# Patient Record
Sex: MALE | Race: WHITE | NOT HISPANIC OR LATINO | Employment: FULL TIME | URBAN - METROPOLITAN AREA
[De-identification: names, ages, dates, MRNs, and addresses within clinical notes are randomized per-mention and may not be internally consistent; named-entity substitution may affect disease eponyms.]

---

## 2017-04-14 ENCOUNTER — HOSPITAL ENCOUNTER (EMERGENCY)
Facility: CLINIC | Age: 47
Discharge: HOME | End: 2017-04-14
Attending: NURSE PRACTITIONER | Admitting: NURSE PRACTITIONER
Payer: OTHER MISCELLANEOUS

## 2017-04-14 VITALS
TEMPERATURE: 98.1 F | HEART RATE: 71 BPM | SYSTOLIC BLOOD PRESSURE: 146 MMHG | RESPIRATION RATE: 18 BRPM | OXYGEN SATURATION: 95 % | DIASTOLIC BLOOD PRESSURE: 106 MMHG

## 2017-04-14 DIAGNOSIS — S41.112A LACERATION OF ARM, LEFT, INITIAL ENCOUNTER: ICD-10-CM

## 2017-04-14 PROCEDURE — 99284 EMERGENCY DEPT VISIT MOD MDM: CPT | Mod: 25 | Performed by: NURSE PRACTITIONER

## 2017-04-14 PROCEDURE — 90715 TDAP VACCINE 7 YRS/> IM: CPT | Performed by: NURSE PRACTITIONER

## 2017-04-14 PROCEDURE — 99283 EMERGENCY DEPT VISIT LOW MDM: CPT

## 2017-04-14 PROCEDURE — 90471 IMMUNIZATION ADMIN: CPT

## 2017-04-14 PROCEDURE — 12002 RPR S/N/AX/GEN/TRNK2.6-7.5CM: CPT | Performed by: NURSE PRACTITIONER

## 2017-04-14 PROCEDURE — 25000125 ZZHC RX 250: Performed by: NURSE PRACTITIONER

## 2017-04-14 PROCEDURE — 12002 RPR S/N/AX/GEN/TRNK2.6-7.5CM: CPT

## 2017-04-14 PROCEDURE — 99284 EMERGENCY DEPT VISIT MOD MDM: CPT | Mod: 25

## 2017-04-14 RX ADMIN — CLOSTRIDIUM TETANI TOXOID ANTIGEN (FORMALDEHYDE INACTIVATED), CORYNEBACTERIUM DIPHTHERIAE TOXOID ANTIGEN (FORMALDEHYDE INACTIVATED), BORDETELLA PERTUSSIS TOXOID ANTIGEN (GLUTARALDEHYDE INACTIVATED), BORDETELLA PERTUSSIS FILAMENTOUS HEMAGGLUTININ ANTIGEN (FORMALDEHYDE INACTIVATED), BORDETELLA PERTUSSIS PERTACTIN ANTIGEN, AND BORDETELLA PERTUSSIS FIMBRIAE 2/3 ANTIGEN 0.5 ML: 5; 2; 2.5; 5; 3; 5 INJECTION, SUSPENSION INTRAMUSCULAR at 10:47

## 2017-04-14 NOTE — ED AVS SNAPSHOT
[facility] Emergency Department    [address] [address]    Phone:  [phone]    Fax:  [phone]                                       Matthew Still   MRN: [id number]    Department:  [facility] Emergency Department   Date of Visit:  4/14/2017           After Visit Summary Signature Page     I have received my discharge instructions, and my questions have been answered. I have discussed any challenges I see with this plan with the nurse or doctor.    ..........................................................................................................................................  Patient/Patient Representative Signature      ..........................................................................................................................................  Patient Representative Print Name and Relationship to Patient    ..................................................               ................................................  Date                                            Time    ..........................................................................................................................................  Reviewed by Signature/Title    ...................................................              ..............................................  Date                                                            Time

## 2017-04-14 NOTE — ED AVS SNAPSHOT
[facility] Emergency Department    [address] [address]    Phone:  [phone]    Fax:  [phone]                                       Matthew Still   MRN: [id number]    Department:  [facility] Emergency Department   Date of Visit:  4/14/2017           Patient Information     Date Of Birth          1970        Your diagnoses for this visit were:     Laceration of arm, left, initial encounter        You were seen by Suha Boles APRN CNP.      Follow-up Information     Follow up In 10 days.    Why:  For suture removal        Discharge Instructions           *LACERATION (All: sutures, staples, tape, glue)  A laceration is a cut through the skin. This will usually require stitches (sutures) or staples if it is deep. Minor cuts may be treated with a tape closure ( Steri-Strips ) or Dermabond skin glue.    HOME CARE:  1. EXTREMITY, FACE or TRUNK WOUNDS: Keep the wound clean and dry. If a bandage was applied and it becomes wet or dirty, replace it. Otherwise, leave it in place for the first 24 hours.    If stitches or staples were used, clean the wound daily. Protect the wound from sunlight and tanning lamps.    After removing the bandage, wash the area with soap and water. Use a wet cotton swab (Q tip) to loosen and remove any blood or crust that forms.    After cleaning, apply a thin layer of Polysporin or Bacitracin ointment. This will keep the wound clean and make it easier to remove the stitches or staples. Reapply a fresh bandage.    You may remove the bandage to shower as usual after the first 24 hours, but do not soak the area in water (no swimming) until the stitches or staples are removed.    If Steri-Strips were used, keep the area clean and dry. If it becomes wet, blot it dry with a towel. It is okay to take a brief shower, but avoid scrubbing the area.    If Dermabond skin adhesive was used, do not scratch, rub or pick at the adhesive film. Do not place  tape directly over the film. Do not apply liquid, ointment or creams to the wound while the film is in place. Do not clean the wound with peroxide and do not apply ointments. Avoid activities that cause heavy sweating until the film has fallen off. Protect the wound from prolonged exposure to sunlight or tanning lamps. You may shower as usual but do not soak the wound in water (no baths or swimming). The film will fall off by itself in 5-10 days.  2. SCALP WOUNDS: During the first two days, you may carefully rinse your hair in the shower to remove blood, glass or dirt particles. After two days, you may shower and shampoo your hair normally. Do not soak your scalp in the tub or go swimming until the stitches or staples have been removed.  3. MOUTH WOUNDS: Eat soft foods to reduce pain. If the cut is inside of your mouth, clean by rinsing after each meal and at bedtime with a mixture of equal parts water and Hydrogen Peroxide (do not swallow!). Or, you can use a cotton swab to directly apply Hydrogen Peroxide onto the cut.  4. You may use acetaminophen (Tylenol) 650-1000 mg every 6 hours or ibuprofen (Motrin, Advil) 600 mg every 6-8 hours with food to control pain, if you are able to take these medicines. [NOTE: If you have chronic liver or kidney disease or ever had a stomach ulcer or GI bleeding, talk with your doctor before using these medicines.]  Use sunscreen on the area for 6 months after the wound heals to keep the scar from getting darker.   FOLLOW UP: Most skin wounds heal within ten days. Mouth and facial wounds heal within five days. However, even with proper treatment, a wound infection may sometimes occur. Therefore, you should check the wound daily for signs of infection listed below.  Stitches should be removed from the face within five days; stitches and staples should be removed from other parts of the body within 7-10 days. Unless you are told to come back to the emergency room, you may have your  doctor or urgent care remove the stitches. If dissolving stitches were used in the mouth, these will fall out or dissolve without the need for removal. If tape closures ( Steri-Strips ) were used, remove them yourself if they have not fallen off after 7 days. If Dermabond skin glue was used, the film will fall off by itself in 5-10 days.   GET PROMPT MEDICAL ATTENTION if any of the following occur:    Increasing pain in the wound    Redness, swelling or pus coming from the wound    Fever over 101 F (38.3 C) oral    If stitches or staples come apart or fall out or if Steri-Strips fall off before seven days    If the wound edges re-open    Bleeding not controlled by direct pressure    0574-4722 [employer], [address] [place] [address], [place] [address]. All rights reserved. This information is not intended as a substitute for professional medical care. Always follow your healthcare professional's instructions.      24 Hour Appointment Hotline       To make an appointment at any [facility] clinic, call [phone] ([phone]). If you don't have a family doctor or clinic, we will help you find one. [facility] clinics are conveniently located to serve the needs of you and your family.             Review of your medicines      Notice     You have not been prescribed any medications.            Procedures and tests performed during your visit     Blood pressure      Orders Needing Specimen Collection     None      Pending Results     No orders found from 4/12/2017 to 4/15/2017.            Pending Culture Results     No orders found from 4/12/2017 to 4/15/2017.            Test Results From Your Hospital Stay               Thank you for choosing [facility]       Thank you for choosing [facility] for your care. Our goal is always to provide you with excellent care. Hearing back from our patients is one way we can continue to improve our services. Please take a few minutes to complete the written survey that you may  "receive in the mail after you visit with us. Thank you!        Amanda Huff DBA [facility] Information     [employer] lets you send messages to your doctor, view your test results, renew your prescriptions, schedule appointments and more. To sign up, go to [email or web address] . Click on \"Log in\" on the left side of the screen, which will take you to the Welcome page. Then click on \"Sign up Now\" on the right side of the page.     You will be asked to enter the access code listed below, as well as some personal information. Please follow the directions to create your username and password.     Your access code is: [email or web address]  Expires: 2017 10:52 AM     Your access code will  in 90 days. If you need help or a new code, please call your [place] clinic or [phone].        Care EveryWhere ID     This is your Care EveryWhere ID. This could be used by other organizations to access your [facility] medical records  [id number]        After Visit Summary       This is your record. Keep this with you and show to your community pharmacist(s) and doctor(s) at your next visit.                  "

## 2017-04-14 NOTE — DISCHARGE INSTRUCTIONS
*LACERATION (All: sutures, staples, tape, glue)  A laceration is a cut through the skin. This will usually require stitches (sutures) or staples if it is deep. Minor cuts may be treated with a tape closure ( Steri-Strips ) or Dermabond skin glue.    HOME CARE:  1. EXTREMITY, FACE or TRUNK WOUNDS: Keep the wound clean and dry. If a bandage was applied and it becomes wet or dirty, replace it. Otherwise, leave it in place for the first 24 hours.    If stitches or staples were used, clean the wound daily. Protect the wound from sunlight and tanning lamps.    After removing the bandage, wash the area with soap and water. Use a wet cotton swab (Q tip) to loosen and remove any blood or crust that forms.    After cleaning, apply a thin layer of Polysporin or Bacitracin ointment. This will keep the wound clean and make it easier to remove the stitches or staples. Reapply a fresh bandage.    You may remove the bandage to shower as usual after the first 24 hours, but do not soak the area in water (no swimming) until the stitches or staples are removed.    If Steri-Strips were used, keep the area clean and dry. If it becomes wet, blot it dry with a towel. It is okay to take a brief shower, but avoid scrubbing the area.    If Dermabond skin adhesive was used, do not scratch, rub or pick at the adhesive film. Do not place tape directly over the film. Do not apply liquid, ointment or creams to the wound while the film is in place. Do not clean the wound with peroxide and do not apply ointments. Avoid activities that cause heavy sweating until the film has fallen off. Protect the wound from prolonged exposure to sunlight or tanning lamps. You may shower as usual but do not soak the wound in water (no baths or swimming). The film will fall off by itself in 5-10 days.  2. SCALP WOUNDS: During the first two days, you may carefully rinse your hair in the shower to remove blood, glass or dirt particles. After two days, you may  shower and shampoo your hair normally. Do not soak your scalp in the tub or go swimming until the stitches or staples have been removed.  3. MOUTH WOUNDS: Eat soft foods to reduce pain. If the cut is inside of your mouth, clean by rinsing after each meal and at bedtime with a mixture of equal parts water and Hydrogen Peroxide (do not swallow!). Or, you can use a cotton swab to directly apply Hydrogen Peroxide onto the cut.  4. You may use acetaminophen (Tylenol) 650-1000 mg every 6 hours or ibuprofen (Motrin, Advil) 600 mg every 6-8 hours with food to control pain, if you are able to take these medicines. [NOTE: If you have chronic liver or kidney disease or ever had a stomach ulcer or GI bleeding, talk with your doctor before using these medicines.]  Use sunscreen on the area for 6 months after the wound heals to keep the scar from getting darker.   FOLLOW UP: Most skin wounds heal within ten days. Mouth and facial wounds heal within five days. However, even with proper treatment, a wound infection may sometimes occur. Therefore, you should check the wound daily for signs of infection listed below.  Stitches should be removed from the face within five days; stitches and staples should be removed from other parts of the body within 7-10 days. Unless you are told to come back to the emergency room, you may have your doctor or urgent care remove the stitches. If dissolving stitches were used in the mouth, these will fall out or dissolve without the need for removal. If tape closures ( Steri-Strips ) were used, remove them yourself if they have not fallen off after 7 days. If Dermabond skin glue was used, the film will fall off by itself in 5-10 days.   GET PROMPT MEDICAL ATTENTION if any of the following occur:    Increasing pain in the wound    Redness, swelling or pus coming from the wound    Fever over 101 F (38.3 C) oral    If stitches or staples come apart or fall out or if Steri-Strips fall off before seven  days    If the wound edges re-open    Bleeding not controlled by direct pressure    5907-8362 [employer], [address] [place] [address], [place] [address]. All rights reserved. This information is not intended as a substitute for professional medical care. Always follow your healthcare professional's instructions.

## 2017-04-14 NOTE — ED PROVIDER NOTES
History     Chief Complaint   Patient presents with     Laceration     HPI  Matthew Still is a 46 year old male with history of A-fib (takes ASA daily) who presents to urgent care for evaluation of laceration to his left forearm.  He was at work today using a utility knife accidentally cut his left forearm while cutting dense Styrofoam.  Bleeding controlled.  He did not irrigate her clean the wound.  His tetanus is not up-to-date.I have reviewed the Medications, Allergies, Past Medical and Surgical History, and Social History in the Epic system.    Review of Systems  As mentioned above in the history present illness. All other systems were reviewed and are negative.    Physical Exam   BP: (!) 195/114  Pulse: 71  Temp: 98.1  F (36.7  C)  Resp: 18  SpO2: 95 %  Physical Exam       Appearance:  Patient appears well, vitals are normal.   SKIN:  Laceration 3 cm noted to .  Description: clean wound edges, no foreign bodies. Neurovascular and tendon structures are intact.        ED Course     ED Course     Procedures           [facility] Procedure Note        Laceration Repair:    Performed by: Suha Boles  Authorized by: Suha Boles  Consent given by: Patient who states understanding of the procedure being performed after discussing the risks, benefits and alternatives.    Preparation: Patient was prepped and draped in usual sterile fashion.  Irrigation solution: saline (250ml)    Body area:left forearm  Laceration length: 3cm  Contamination: The wound is not contaminated.  Foreign bodies:none  Tendon involvement: none  Anesthesia: Local  Local anesthetic: Lidocaine  1%, with epinephrine  Anesthetic total: 4ml  Debridement: none  Skin closure: Closed with 6 sutures x 3.0 Ethilon  Technique: interrupted  Approximation: close  Approximation difficulty: simple    Patient tolerance: Patient tolerated the procedure well with no immediate complications.        Labs Ordered and Resulted from Time of  ED Arrival Up to the Time of Departure from the ED - No data to display    Assessments & Plan (with Medical Decision Making)     I have reviewed the nursing notes.    I have reviewed the findings, diagnosis, plan and need for follow up with the patient.    There are no discharge medications for this patient.      Final diagnoses:   Laceration of arm, left, initial encounter   -wound repaired as noted above.  -recheck of elevated BP, recommended follow-up with PCP  -instructed to have sutures out in 10 days, keep wound clean, dry, and covered with dressing when working.  Ok to shower.    4/14/2017   [facility] EMERGENCY DEPARTMENT     Suha Boles APRN CNP  04/14/17 1121

## 2017-11-14 ENCOUNTER — HOSPITAL ENCOUNTER (INPATIENT)
Facility: CLINIC | Age: 47
LOS: 3 days | Discharge: HOME | DRG: 282 | End: 2017-11-17
Attending: INTERNAL MEDICINE | Admitting: INTERNAL MEDICINE
Payer: COMMERCIAL

## 2017-11-14 ENCOUNTER — HOSPITAL ENCOUNTER (EMERGENCY)
Facility: CLINIC | Age: 47
Discharge: ANOTHER HOSPITAL | End: 2017-11-14
Attending: EMERGENCY MEDICINE | Admitting: EMERGENCY MEDICINE
Payer: COMMERCIAL

## 2017-11-14 ENCOUNTER — APPOINTMENT (OUTPATIENT)
Dept: GENERAL RADIOLOGY | Facility: CLINIC | Age: 47
End: 2017-11-14
Attending: EMERGENCY MEDICINE
Payer: COMMERCIAL

## 2017-11-14 VITALS
HEART RATE: 95 BPM | OXYGEN SATURATION: 97 % | TEMPERATURE: 97.5 F | DIASTOLIC BLOOD PRESSURE: 126 MMHG | HEIGHT: 70 IN | WEIGHT: 225 LBS | SYSTOLIC BLOOD PRESSURE: 163 MMHG | RESPIRATION RATE: 10 BRPM | BODY MASS INDEX: 32.21 KG/M2

## 2017-11-14 DIAGNOSIS — E78.5 HYPERLIPIDEMIA LDL GOAL <70: Primary | ICD-10-CM

## 2017-11-14 DIAGNOSIS — I21.4 NSTEMI (NON-ST ELEVATED MYOCARDIAL INFARCTION) (H): ICD-10-CM

## 2017-11-14 DIAGNOSIS — I48.91 ATRIAL FIBRILLATION, UNSPECIFIED TYPE (H): ICD-10-CM

## 2017-11-14 DIAGNOSIS — I10 BENIGN ESSENTIAL HYPERTENSION: ICD-10-CM

## 2017-11-14 DIAGNOSIS — I48.0 PAROXYSMAL ATRIAL FIBRILLATION (H): ICD-10-CM

## 2017-11-14 LAB
ALBUMIN SERPL-MCNC: 3.8 G/DL (ref 3.4–5)
ALP SERPL-CCNC: 67 U/L (ref 40–150)
ALT SERPL W P-5'-P-CCNC: 34 U/L (ref 0–70)
ANION GAP SERPL CALCULATED.3IONS-SCNC: 10 MMOL/L (ref 3–14)
AST SERPL W P-5'-P-CCNC: 18 U/L (ref 0–45)
BASOPHILS # BLD AUTO: 0.1 10E9/L (ref 0–0.2)
BASOPHILS NFR BLD AUTO: 0.5 %
BILIRUB SERPL-MCNC: 0.2 MG/DL (ref 0.2–1.3)
BUN SERPL-MCNC: 17 MG/DL (ref 7–30)
CALCIUM SERPL-MCNC: 9.2 MG/DL (ref 8.5–10.1)
CHLORIDE SERPL-SCNC: 109 MMOL/L (ref 94–109)
CO2 SERPL-SCNC: 22 MMOL/L (ref 20–32)
CREAT SERPL-MCNC: 0.96 MG/DL (ref 0.66–1.25)
DIFFERENTIAL METHOD BLD: NORMAL
EOSINOPHIL # BLD AUTO: 0.3 10E9/L (ref 0–0.7)
EOSINOPHIL NFR BLD AUTO: 3 %
ERYTHROCYTE [DISTWIDTH] IN BLOOD BY AUTOMATED COUNT: 12.1 % (ref 10–15)
ERYTHROCYTE [DISTWIDTH] IN BLOOD BY AUTOMATED COUNT: 12.6 % (ref 10–15)
GFR SERPL CREATININE-BSD FRML MDRD: 83 ML/MIN/1.7M2
GLUCOSE SERPL-MCNC: 94 MG/DL (ref 70–99)
HCT VFR BLD AUTO: 44.6 % (ref 40–53)
HCT VFR BLD AUTO: 45.1 % (ref 40–53)
HGB BLD-MCNC: 15.5 G/DL (ref 13.3–17.7)
HGB BLD-MCNC: 15.6 G/DL (ref 13.3–17.7)
IMM GRANULOCYTES # BLD: 0 10E9/L (ref 0–0.4)
IMM GRANULOCYTES NFR BLD: 0.3 %
LYMPHOCYTES # BLD AUTO: 2.9 10E9/L (ref 0.8–5.3)
LYMPHOCYTES NFR BLD AUTO: 30.3 %
MCH RBC QN AUTO: 30.8 PG (ref 26.5–33)
MCH RBC QN AUTO: 31.4 PG (ref 26.5–33)
MCHC RBC AUTO-ENTMCNC: 34.4 G/DL (ref 31.5–36.5)
MCHC RBC AUTO-ENTMCNC: 35 G/DL (ref 31.5–36.5)
MCV RBC AUTO: 88 FL (ref 78–100)
MCV RBC AUTO: 91 FL (ref 78–100)
MONOCYTES # BLD AUTO: 0.6 10E9/L (ref 0–1.3)
MONOCYTES NFR BLD AUTO: 6.2 %
NEUTROPHILS # BLD AUTO: 5.8 10E9/L (ref 1.6–8.3)
NEUTROPHILS NFR BLD AUTO: 59.7 %
NT-PROBNP SERPL-MCNC: 1376 PG/ML (ref 0–450)
PLATELET # BLD AUTO: 231 10E9/L (ref 150–450)
PLATELET # BLD AUTO: 240 10E9/L (ref 150–450)
POTASSIUM SERPL-SCNC: 3.8 MMOL/L (ref 3.4–5.3)
PROT SERPL-MCNC: 7.4 G/DL (ref 6.8–8.8)
RBC # BLD AUTO: 4.94 10E12/L (ref 4.4–5.9)
RBC # BLD AUTO: 5.06 10E12/L (ref 4.4–5.9)
SODIUM SERPL-SCNC: 141 MMOL/L (ref 133–144)
TROPONIN I SERPL-MCNC: 0.13 UG/L (ref 0–0.04)
TROPONIN I SERPL-MCNC: 0.13 UG/L (ref 0–0.04)
WBC # BLD AUTO: 10 10E9/L (ref 4–11)
WBC # BLD AUTO: 9.7 10E9/L (ref 4–11)

## 2017-11-14 PROCEDURE — 83880 ASSAY OF NATRIURETIC PEPTIDE: CPT | Performed by: EMERGENCY MEDICINE

## 2017-11-14 PROCEDURE — 93005 ELECTROCARDIOGRAM TRACING: CPT

## 2017-11-14 PROCEDURE — 93010 ELECTROCARDIOGRAM REPORT: CPT | Mod: Z6 | Performed by: EMERGENCY MEDICINE

## 2017-11-14 PROCEDURE — 99223 1ST HOSP IP/OBS HIGH 75: CPT | Mod: AI | Performed by: INTERNAL MEDICINE

## 2017-11-14 PROCEDURE — 99285 EMERGENCY DEPT VISIT HI MDM: CPT | Mod: 25

## 2017-11-14 PROCEDURE — 21400006 ZZH R&B CCU INTERMEDIATE UMMC

## 2017-11-14 PROCEDURE — 96366 THER/PROPH/DIAG IV INF ADDON: CPT

## 2017-11-14 PROCEDURE — 99285 EMERGENCY DEPT VISIT HI MDM: CPT | Mod: 25 | Performed by: EMERGENCY MEDICINE

## 2017-11-14 PROCEDURE — 93010 ELECTROCARDIOGRAM REPORT: CPT | Mod: 59 | Performed by: INTERNAL MEDICINE

## 2017-11-14 PROCEDURE — 85520 HEPARIN ASSAY: CPT | Performed by: INTERNAL MEDICINE

## 2017-11-14 PROCEDURE — 80053 COMPREHEN METABOLIC PANEL: CPT | Performed by: EMERGENCY MEDICINE

## 2017-11-14 PROCEDURE — 85025 COMPLETE CBC W/AUTO DIFF WBC: CPT | Performed by: EMERGENCY MEDICINE

## 2017-11-14 PROCEDURE — 85027 COMPLETE CBC AUTOMATED: CPT | Performed by: INTERNAL MEDICINE

## 2017-11-14 PROCEDURE — 36415 COLL VENOUS BLD VENIPUNCTURE: CPT | Performed by: INTERNAL MEDICINE

## 2017-11-14 PROCEDURE — 84484 ASSAY OF TROPONIN QUANT: CPT | Performed by: INTERNAL MEDICINE

## 2017-11-14 PROCEDURE — 71020 XR CHEST 2 VW: CPT

## 2017-11-14 PROCEDURE — 96365 THER/PROPH/DIAG IV INF INIT: CPT

## 2017-11-14 PROCEDURE — 25000132 ZZH RX MED GY IP 250 OP 250 PS 637: Performed by: INTERNAL MEDICINE

## 2017-11-14 PROCEDURE — 84484 ASSAY OF TROPONIN QUANT: CPT | Performed by: EMERGENCY MEDICINE

## 2017-11-14 PROCEDURE — 25000128 H RX IP 250 OP 636: Performed by: EMERGENCY MEDICINE

## 2017-11-14 RX ORDER — ASPIRIN 325 MG
325 TABLET ORAL ONCE
Status: ON HOLD | COMMUNITY
End: 2017-11-15

## 2017-11-14 RX ORDER — ACETAMINOPHEN 650 MG/1
650 SUPPOSITORY RECTAL EVERY 4 HOURS PRN
Status: DISCONTINUED | OUTPATIENT
Start: 2017-11-14 | End: 2017-11-17 | Stop reason: HOSPADM

## 2017-11-14 RX ORDER — ACETAMINOPHEN 325 MG/1
650 TABLET ORAL EVERY 4 HOURS PRN
Status: DISCONTINUED | OUTPATIENT
Start: 2017-11-14 | End: 2017-11-17 | Stop reason: HOSPADM

## 2017-11-14 RX ORDER — LIDOCAINE 40 MG/G
CREAM TOPICAL
Status: DISCONTINUED | OUTPATIENT
Start: 2017-11-14 | End: 2017-11-17 | Stop reason: HOSPADM

## 2017-11-14 RX ORDER — HEPARIN SODIUM 10000 [USP'U]/100ML
0-3500 INJECTION, SOLUTION INTRAVENOUS CONTINUOUS
Status: DISCONTINUED | OUTPATIENT
Start: 2017-11-14 | End: 2017-11-17 | Stop reason: HOSPADM

## 2017-11-14 RX ORDER — ASPIRIN 81 MG/1
81 TABLET, CHEWABLE ORAL DAILY
COMMUNITY
End: 2017-12-19

## 2017-11-14 RX ORDER — ASPIRIN 81 MG/1
81 TABLET, CHEWABLE ORAL DAILY
Status: DISCONTINUED | OUTPATIENT
Start: 2017-11-15 | End: 2017-11-17 | Stop reason: HOSPADM

## 2017-11-14 RX ORDER — ALUMINA, MAGNESIA, AND SIMETHICONE 2400; 2400; 240 MG/30ML; MG/30ML; MG/30ML
30 SUSPENSION ORAL EVERY 4 HOURS PRN
Status: DISCONTINUED | OUTPATIENT
Start: 2017-11-14 | End: 2017-11-17 | Stop reason: HOSPADM

## 2017-11-14 RX ADMIN — HEPARIN SODIUM 12 UNITS/KG/HR: 10000 INJECTION, SOLUTION INTRAVENOUS at 17:58

## 2017-11-14 RX ADMIN — Medication 5000 UNITS: at 17:58

## 2017-11-14 RX ADMIN — METOPROLOL TARTRATE 12.5 MG: 25 TABLET, FILM COATED ORAL at 21:43

## 2017-11-14 ASSESSMENT — ENCOUNTER SYMPTOMS
SHORTNESS OF BREATH: 1
MUSCULOSKELETAL NEGATIVE: 1
CHEST TIGHTNESS: 1
HEMATOLOGIC/LYMPHATIC NEGATIVE: 1
PSYCHIATRIC NEGATIVE: 1
GASTROINTESTINAL NEGATIVE: 1
EYES NEGATIVE: 1
ENDOCRINE NEGATIVE: 1
CONSTITUTIONAL NEGATIVE: 1
NEUROLOGICAL NEGATIVE: 1
ALLERGIC/IMMUNOLOGIC NEGATIVE: 1
PALPITATIONS: 1

## 2017-11-14 NOTE — IP AVS SNAPSHOT
[address] [address]    Phone:  [phone]                                       After Visit Summary   11/14/2017    Matthew Still    MRN: [id number]           After Visit Summary Signature Page     I have received my discharge instructions, and my questions have been answered. I have discussed any challenges I see with this plan with the nurse or doctor.    ..........................................................................................................................................  Patient/Patient Representative Signature      ..........................................................................................................................................  Patient Representative Print Name and Relationship to Patient    ..................................................               ................................................  Date                                            Time    ..........................................................................................................................................  Reviewed by Signature/Title    ...................................................              ..............................................  Date                                                            Time

## 2017-11-14 NOTE — ED PROVIDER NOTES
"  History     Chief Complaint   Patient presents with     Irregular Heart Beat     at 1 am last night pt c/o chest tight, \"feels like skipping beats\", dizzy with activity,      HPI  Matthew Still is a 47 year old male with a history of atrial fibrillation who presents to the emergency department today with his wife for evaluation of an irregular heart beat. The patient reports that he was diagnosed with atrial fibrillation when he was 25 years old and was started on daily baby aspirin. He has been taking a baby aspirin daily, but has not ever followed with a cardiologist. He reports that a week or two ago he had a sudden onset of intense sharp left arm pain while he was laying in bed that lasted for a few minutes. He was not evaluated after this event. This morning he was awoken at 1:00 AM with palpitations that have continued until now. He felt light headed and dizzy while he was at work, but these symptoms improved with rest. He also developed a chest pressure at the same time, which has improved but is still present. He did take a baby aspirin this morning. He does not smoke.       Social History: The patient lives in [place], [place]. He presents by private car with his wife. He works building semi trucks.     Problem List:    There are no active problems to display for this patient.       Past Medical History:    No past medical history on file.    Past Surgical History:    No past surgical history on file.    Family History:    No family history on file.    Medications:      aspirin 81 MG chewable tablet   aspirin 325 MG tablet         Review of Systems   Constitutional: Negative.    HENT: Negative.    Eyes: Negative.    Respiratory: Positive for chest tightness and shortness of breath.    Cardiovascular: Positive for palpitations.   Gastrointestinal: Negative.    Endocrine: Negative.    Genitourinary: Negative.    Musculoskeletal: Negative.    Skin: Negative.    Allergic/Immunologic: Negative.    Neurological: " "Negative.    Hematological: Negative.    Psychiatric/Behavioral: Negative.        Physical Exam   BP: (!) 165/120  Pulse: 95  Heart Rate: 74  Temp: 97.5  F (36.4  C)  Resp: (!) 36  Height: 177.8 cm (5' 10\")  Weight: 102.1 kg (225 lb)  SpO2: 99 %      Physical Exam   Constitutional: He is oriented to person, place, and time. He appears well-developed and well-nourished. No distress.   HENT:   Head: Normocephalic and atraumatic.   Eyes: Conjunctivae and EOM are normal. Pupils are equal, round, and reactive to light. Right eye exhibits no discharge. Left eye exhibits no discharge. No scleral icterus.   Neck: Normal range of motion. Neck supple. No JVD present. No tracheal deviation present. No thyromegaly present.   Cardiovascular: Normal rate and regular rhythm.    Pulmonary/Chest: Effort normal and breath sounds normal. No stridor. No respiratory distress. He has no wheezes. He has no rales. He exhibits no tenderness.   Abdominal: Soft. He exhibits no distension and no mass. There is no tenderness. There is no rebound and no guarding.   Lymphadenopathy:     He has no cervical adenopathy.   Neurological: He is alert and oriented to person, place, and time.   Skin: No rash noted. He is not diaphoretic. No erythema. No pallor.   Psychiatric: He has a normal mood and affect. His behavior is normal. Judgment and thought content normal.       ED Course     ED Course     Procedures               EKG Interpretation:      Interpreted by Bairon Soriano  Time reviewed:17:10  Symptoms at time of EKG: palpitations, chest pressure and discomfort   Rhythm: atrial fibrillation - controlled  Rate: Normal  Axis: Normal  Ectopy: none  Conduction: normal  ST Segments/ T Waves: T wave inversion diffusely in anterior lateral and inferior leads  Q Waves: nonspecific  Comparison to prior: No old EKG available    Clinical Impression: rate controlled atrial fibrillation with deep T wave inversion and depression.        Critical Care " time:  none                 ED Medications:  Medications   heparin infusion 25,000 units in 0.45% NaCl 250 mL (12 Units/kg/hr × 84.6 kg (Adjusted) Intravenous New Bag 11/14/17 1758)   heparin Loading Dose bolus dose from infusion pump 5,000 Units (5,000 Units Intravenous Given 11/14/17 1758)       ED Vitals:  Vitals:    11/14/17 1815 11/14/17 1830 11/14/17 1845 11/14/17 1900   BP: (!) 170/122 (!) 184/138 (!) 173/115 (!) 174/124   Pulse:       Resp: (!) 36 13 14 13   Temp:       TempSrc:       SpO2: 98% 98% 97% 98%   Weight:       Height:           ED Labs and Imaging:  Results for orders placed or performed during the hospital encounter of 11/14/17 (from the past 24 hour(s))   CBC with platelets differential   Result Value Ref Range    WBC 9.7 4.0 - 11.0 10e9/L    RBC Count 5.06 4.4 - 5.9 10e12/L    Hemoglobin 15.6 13.3 - 17.7 g/dL    Hematocrit 44.6 40.0 - 53.0 %    MCV 88 78 - 100 fl    MCH 30.8 26.5 - 33.0 pg    MCHC 35.0 31.5 - 36.5 g/dL    RDW 12.1 10.0 - 15.0 %    Platelet Count 240 150 - 450 10e9/L    Diff Method Automated Method     % Neutrophils 59.7 %    % Lymphocytes 30.3 %    % Monocytes 6.2 %    % Eosinophils 3.0 %    % Basophils 0.5 %    % Immature Granulocytes 0.3 %    Absolute Neutrophil 5.8 1.6 - 8.3 10e9/L    Absolute Lymphocytes 2.9 0.8 - 5.3 10e9/L    Absolute Monocytes 0.6 0.0 - 1.3 10e9/L    Absolute Eosinophils 0.3 0.0 - 0.7 10e9/L    Absolute Basophils 0.1 0.0 - 0.2 10e9/L    Abs Immature Granulocytes 0.0 0 - 0.4 10e9/L   Comprehensive metabolic panel   Result Value Ref Range    Sodium 141 133 - 144 mmol/L    Potassium 3.8 3.4 - 5.3 mmol/L    Chloride 109 94 - 109 mmol/L    Carbon Dioxide 22 20 - 32 mmol/L    Anion Gap 10 3 - 14 mmol/L    Glucose 94 70 - 99 mg/dL    Urea Nitrogen 17 7 - 30 mg/dL    Creatinine 0.96 0.66 - 1.25 mg/dL    GFR Estimate 83 >60 mL/min/1.7m2    GFR Estimate If Black >90 >60 mL/min/1.7m2    Calcium 9.2 8.5 - 10.1 mg/dL    Bilirubin Total 0.2 0.2 - 1.3 mg/dL    Albumin  3.8 3.4 - 5.0 g/dL    Protein Total 7.4 6.8 - 8.8 g/dL    Alkaline Phosphatase 67 40 - 150 U/L    ALT 34 0 - 70 U/L    AST 18 0 - 45 U/L   Troponin I   Result Value Ref Range    Troponin I ES 0.132 (HH) 0.000 - 0.045 ug/L   NT pro BNP   Result Value Ref Range    N-Terminal Pro BNP Inpatient 1376 (H) 0 - 450 pg/mL   Chest XR,  PA & LAT    Narrative    XR CHEST 2 VW 11/14/2017 5:28 PM    COMPARISON: None.    HISTORY: Palpitations and chest pressure.      Impression    IMPRESSION: Cardiac silhouette and pulmonary vasculature are within  normal limits. No focal airspace disease, pleural effusion or  pneumothorax.    MARICRUZ JJ MD       5:04 PM Patient Assessed    Assessments & Plan (with Medical Decision Making)   Clinical Impression: Pleasant 47-year-old male who reports a history of atrial fibrillation diagnosed at age 25 who presented for chest pressure and palpitations with  changes noted on EKG and rate controlled atrial fibrillation.  Patient arrived by private car with his wife reporting at 1 AM (15 hours prior to exam) this morning he awoke with palpitations and chest pressure.  He has been lost a cardiology follow-up since his diagnosis of A. fib at age 25.  He takes a baby aspirin and no other medications.  He is a nonsmoker.  He reported he felt dizzy with shortness of breath and some chest discomfort while at work today.  He works making trucks.  He did not report any orthopnea PND or lower extremity swelling.  No prior history of DVT or PE.  No recent illnesses.  His arrival EKG in the ED is shows diffuse T wave inversion with rate control atrial fibrillation.  Unfortunately is no old EKG for comparison.  On arrival in the ED patient was slightly hypertensive with diastolic blood pressure greater than 120.      ED Course and Plan:   He was monitored on telemetry, EKG given acute abnormality with report of symptoms of palpitation and chest pressure was reviewed with Dr. Mckee- on-call cardiologist at  [facility] at 5:15 PM.  We agreed that we'll obtain biomarkers if they're positive transfer him for further care and evaluation if normal consider admission to [facility] with echocardiogram in the morning and trending of his biomarkers.  X-ray chest was reviewed independently.  See radiologist interpretation in detailed report above.  Normal cardiac silhouette no widened mediastinum no effusion or other acute process. Arrival troponin is 0.132, BNP-1376. Patient was started empirically on IV heparin.  Patient is transferred to the [facility] for further care and evaluation by cardiology lab results x-ray imaging and plan of care and rationale for transfer as reviewed with the patient and his wife.          I have reviewed the nursing notes.    I have reviewed the findings, diagnosis, plan and need for follow up with the patient.       New Prescriptions    No medications on file       Final diagnoses:   NSTEMI (non-ST elevated myocardial infarction) (H)   Paroxysmal atrial fibrillation (H) - reported history of atrial fibrillation diagnosed at age 25     Disclaimer: This note consists of symbols derived from keyboarding, dictation and/or voice recognition software. As a result, there may be errors in the script that have gone undetected. Please consider this when interpreting information found in this chart.    This document serves as a record of the services and decisions personally performed and made by Bairon Soriano, *. The HPI was created on his behalf by Maikel Farmer, a trained medical scribe. The creation of this document is based the provider's statements to the medical scribe.  Maikel Farmer 5:03 PM 11/14/2017    Provider:   The information in this document, created by the medical scribe for me, accurately reflects the services I personally performed and the decisions made by me. I have reviewed and approved this document for accuracy prior to leaving the patient care area.  Bairon Soriano, *  5:03 PM 11/14/2017 11/14/2017   [facility] EMERGENCY DEPARTMENT     Bairon Soriano MD  11/14/17 1930

## 2017-11-14 NOTE — ED NOTES
Pt presents to ED for palpations that started around 0100 this morning. Pt states it woke him up out of his sleep. Pt denies pain but states he gets light headed with exertion. Pt breathing even and unlabored. Pt ambulatory and moving all extremities. Pt states he only takes aspirin for his Afib.

## 2017-11-14 NOTE — IP AVS SNAPSHOT
MRN:[id number]                      After Visit Summary   11/14/2017    Matthew Still    MRN: [id number]           Thank you!     Thank you for choosing [facility] for your care. Our goal is always to provide you with excellent care. Hearing back from our patients is one way we can continue to improve our services. Please take a few minutes to complete the written survey that you may receive in the mail after you visit with us. Thank you!        Patient Information     Date Of Birth          1970        Designated Caregiver       Most Recent Value    Caregiver    Will someone help with your care after discharge? yes    Name of designated caregiver Mery-spouse    Phone number of caregiver [phone]    Caregiver address [address] [place], [place]      About your hospital stay     You were admitted on:  November 14, 2017 You last received care in the:  [address] [facility]    You were discharged on:  November 16, 2017        Reason for your hospital stay       You were having chest pain for which you underwent a cor angio.            Reason for your hospital stay       Came with chest pain, underwent invasive coronary angiography with no evidence of disease based on FFR                  Who to Call     For medical emergencies, please call 911.  For non-urgent questions about your medical care, please call your primary care provider or clinic, [phone]          Attending Provider     Provider Specialty    Asa Webber MD Cardiology    LancasterAbhishek MD Internal Medicine - Interventional Cardiology       Primary Care Provider Office Phone # Fax #    [facility] [phone]      After Care Instructions     Activity       Your activity upon discharge: activity as tolerated            Activity       Your activity upon discharge: activity as tolerated            Diet       Follow this diet upon discharge: Orders Placed This Encounter    Regular             Diet       Follow this diet upon discharge: Orders Placed This Encounter      Diet  Cardiac diet                  Follow-up Appointments     Adult [facility] Follow-up and recommended labs and tests       Follow up with primary care provider, [facility], within 7 days for hospital follow- up.      Please follow up with cardiology as described    Appointments on [place] and/or [facility] (with [facility] or [facility] provider or service). Call [phone] if you haven't heard regarding these appointments within 7 days of discharge.                  Your next 10 appointments already scheduled     Nov 22, 2017 10:00 AM CST   Office Visit [facility] Roger [facility]lett MD   [facility]    [address] [place] [address] [phone]           Bring a current list of meds and any records pertaining to this visit. For Physicals, please bring immunization records and any forms needing to be filled out. Please arrive 10 minutes early to complete paperwork.              Further instructions from your care team       Going Home after Coronary Angiogram       Name: Matthew LELAND Cheko  Medical Record Number:  [id number]  Today's Date: November 15, 2017        For 24 hours:         Have an adult stay with you for 24 hours.         Relax and take it easy.         Drink plenty of fluids.         You may eat your normal diet, unless your doctor tells you otherwise.         Do NOT make any important or legal decisions.         Do NOT drive or operate machines at home or at work.         Do NOT drink alcohol.      Do NOT smoke.     Medicines:         If you have begun Plavix (clopidogrel), Effient (prasugrel), or Brilinta (ticagrelor), do not stop taking it until you talk to your heart doctor (cardiologist).         If you are on metformin (Glucophage), do not restart it until you have blood tests (within 2 to 3 days after discharge). When your doctor tells you it is  safe, you may restart the metformin.         If you have stopped any other medicines, check with your nurse or provider about when to restart them.    Care of groin site:         Remove the Band-Aid after 24 hours. If there is minor oozing, apply another Band-aid and remove it after 12 hours.          Do NOT take a bath, or use a hot tub or pool for at least 3 days. You may shower.          It is normal to have a small bruise or lump at the site.         Do not scrub the site.         Do not use lotion or powder near the puncture site for 3 days.         For the first 2 days: Do not stoop or squat. When you cough, sneeze or move your bowels, hold your hand over the puncture site and press gently.         Do not lift more than 10 pounds for at least 3 to 5 days.         For 2 days, do NOT have sex or do any heavy exercise.     If you start bleeding from the site in your groin:  Lie down flat and press firmly on the site.  Call your physician immediately, or, come to the emergency room.      Call 911 right away if you have bleeding that is heavy or does not stop.     Call your doctor if:         You have a large or growing hard lump around the site.         The site is red, swollen, hot or tender.         Blood or fluid is draining from the site.         You have chills or a fever greater than 101 F (38 C).         Your leg or arm turns bluish, feels numb or cool.         You have hives, a rash or unusual itching.     Cardiac Rehabilitation: You should receive a phone call from the Cardiac Rehabilitation Department within the next week.  If you do not receive the call, please contact [facility] at [phone].    ADDITIONAL INSTRUCTIONS: ***    [facility] [place]:   [phone] (7 days a week)      Cardiology Fellow on call (24 hours per day) at [facility] [place]:   [phone] (ask for Cardiology Fellow on call)      Number where we can reach you:   "____________    Pending Results     Date and Time Order Name Status Description    11/15/2017 2227 EKG 12-lead, tracing only Preliminary     11/15/2017 1917 EKG 12-lead, tracing only Preliminary             Statement of Approval     Ordered          17 2330  I have reviewed and agree with all the recommendations and orders detailed in this document.  EFFECTIVE NOW     Approved and electronically signed by:  Aaron Calixto MD             Admission Information     Date & Time Provider Department Dept. Phone    2017 Abhishek Huntley MD [address] [facility] [phone]      Your Vitals Were     Blood Pressure Pulse Temperature Respirations Height Weight    129/81 80 97.7  F (36.5  C) (Oral) 20 1.803 m (5' 11\") 102.4 kg (225 lb 11.2 oz)    Pulse Oximetry BMI (Body Mass Index)                97% 31.48 kg/m2          MyChart Information     [facility] lets you send messages to your doctor, view your test results, renew your prescriptions, schedule appointments and more. To sign up, go to [email or web address] . Click on \"Log in\" on the left side of the screen, which will take you to the Welcome page. Then click on \"Sign up Now\" on the right side of the page.     You will be asked to enter the access code listed below, as well as some personal information. Please follow the directions to create your username and password.     Your access code is: [email or web address]  Expires: 2018  6:25 PM     Your access code will  in 90 days. If you need help or a new code, please call your [facility] clinic or [phone].        Care EveryWhere ID     This is your Care EveryWhere ID. This could be used by other organizations to access your [facility] medical records  [id number]        Equal Access to Services     ARCHIE MOSCOSO : Meliza Glez, geneav dietrich, qanandini kapaul arshad, kika juan. So [facility] [phone].    ATENCIÓN: Si kari fuentes villeda " disposición servicios gratuitos de asistencia lingüística. Promise olsen [phone].    We comply with applicable federal civil rights laws and [place] laws. We do not discriminate on the basis of race, color, national origin, age, disability, sex, sexual orientation, or gender identity.               Review of your medicines      START taking        Dose / Directions    atorvastatin 80 MG tablet   Commonly known as:  LIPITOR   Used for:  Hyperlipidemia LDL goal <70        Dose:  80 mg   Take 1 tablet (80 mg) by mouth daily   Quantity:  30 tablet   Refills:  11       hydrALAZINE 25 MG tablet   Commonly known as:  APRESOLINE   Used for:  Benign essential hypertension        Dose:  25 mg   Take 1 tablet (25 mg) by mouth 4 times daily   Quantity:  120 tablet   Refills:  11       metoprolol 50 MG tablet   Commonly known as:  LOPRESSOR        Dose:  50 mg   Take 1 tablet (50 mg) by mouth 2 times daily   Quantity:  60 tablet   Refills:  11         CONTINUE these medicines which may have CHANGED, or have new prescriptions. If we are uncertain of the size of tablets/capsules you have at home, strength may be listed as something that might have changed.        Dose / Directions    aspirin 81 MG chewable tablet   This may have changed:  Another medication with the same name was removed. Continue taking this medication, and follow the directions you see here.        Dose:  81 mg   Take 81 mg by mouth daily   Refills:  0            Where to get your medicines      These medications were sent to [facility] [place], [place] - [address] [address]     Phone:  [phone]     atorvastatin 80 MG tablet    hydrALAZINE 25 MG tablet    metoprolol 50 MG tablet                Protect others around you: Learn how to safely use, store and throw away your medicines at www.disposemymeds.org.             Medication List: This is a list of all your medications and when to take them.  Check marks below indicate your daily home schedule. Keep this list as a reference.      Medications           Morning Afternoon Evening Bedtime As Needed    aspirin 81 MG chewable tablet   Take 81 mg by mouth daily   Last time this was given:  81 mg on 11/16/2017  8:31 AM                                atorvastatin 80 MG tablet   Commonly known as:  LIPITOR   Take 1 tablet (80 mg) by mouth daily   Last time this was given:  80 mg on 11/16/2017  8:31 AM                                hydrALAZINE 25 MG tablet   Commonly known as:  APRESOLINE   Take 1 tablet (25 mg) by mouth 4 times daily   Last time this was given:  25 mg on 11/16/2017  8:37 PM                                metoprolol 50 MG tablet   Commonly known as:  LOPRESSOR   Take 1 tablet (50 mg) by mouth 2 times daily   Last time this was given:  50 mg on 11/16/2017  8:37 PM

## 2017-11-15 ENCOUNTER — APPOINTMENT (OUTPATIENT)
Dept: CARDIOLOGY | Facility: CLINIC | Age: 47
DRG: 282 | End: 2017-11-15
Attending: INTERNAL MEDICINE
Payer: COMMERCIAL

## 2017-11-15 LAB
ANION GAP SERPL CALCULATED.3IONS-SCNC: 10 MMOL/L (ref 3–14)
BUN SERPL-MCNC: 16 MG/DL (ref 7–30)
CALCIUM SERPL-MCNC: 8.9 MG/DL (ref 8.5–10.1)
CHLORIDE SERPL-SCNC: 109 MMOL/L (ref 94–109)
CHOLEST SERPL-MCNC: 241 MG/DL
CO2 SERPL-SCNC: 23 MMOL/L (ref 20–32)
CREAT SERPL-MCNC: 0.9 MG/DL (ref 0.66–1.25)
ERYTHROCYTE [DISTWIDTH] IN BLOOD BY AUTOMATED COUNT: 12.6 % (ref 10–15)
GFR SERPL CREATININE-BSD FRML MDRD: 90 ML/MIN/1.7M2
GLUCOSE SERPL-MCNC: 102 MG/DL (ref 70–99)
HBA1C MFR BLD: 5.3 % (ref 4.3–6)
HCT VFR BLD AUTO: 44.9 % (ref 40–53)
HDLC SERPL-MCNC: 36 MG/DL
HGB BLD-MCNC: 15.3 G/DL (ref 13.3–17.7)
INTERPRETATION ECG - MUSE: NORMAL
LDLC SERPL CALC-MCNC: 172 MG/DL
LMWH PPP CHRO-ACNC: 0.23 IU/ML
LMWH PPP CHRO-ACNC: <0.1 IU/ML
MCH RBC QN AUTO: 30.8 PG (ref 26.5–33)
MCHC RBC AUTO-ENTMCNC: 34.1 G/DL (ref 31.5–36.5)
MCV RBC AUTO: 90 FL (ref 78–100)
NONHDLC SERPL-MCNC: 205 MG/DL
PLATELET # BLD AUTO: 220 10E9/L (ref 150–450)
POTASSIUM SERPL-SCNC: 3.8 MMOL/L (ref 3.4–5.3)
RBC # BLD AUTO: 4.97 10E12/L (ref 4.4–5.9)
SODIUM SERPL-SCNC: 141 MMOL/L (ref 133–144)
TRIGL SERPL-MCNC: 166 MG/DL
TROPONIN I SERPL-MCNC: 0.04 UG/L (ref 0–0.04)
TROPONIN I SERPL-MCNC: 0.07 UG/L (ref 0–0.04)
WBC # BLD AUTO: 9.2 10E9/L (ref 4–11)

## 2017-11-15 PROCEDURE — 25000128 H RX IP 250 OP 636: Performed by: INTERNAL MEDICINE

## 2017-11-15 PROCEDURE — 93010 ELECTROCARDIOGRAM REPORT: CPT | Mod: 76 | Performed by: INTERNAL MEDICINE

## 2017-11-15 PROCEDURE — 84484 ASSAY OF TROPONIN QUANT: CPT | Performed by: INTERNAL MEDICINE

## 2017-11-15 PROCEDURE — 40000264 ECHO COMPLETE WITH LUMASON

## 2017-11-15 PROCEDURE — 83036 HEMOGLOBIN GLYCOSYLATED A1C: CPT | Performed by: INTERNAL MEDICINE

## 2017-11-15 PROCEDURE — 80061 LIPID PANEL: CPT | Performed by: INTERNAL MEDICINE

## 2017-11-15 PROCEDURE — 85520 HEPARIN ASSAY: CPT | Performed by: INTERNAL MEDICINE

## 2017-11-15 PROCEDURE — 93306 TTE W/DOPPLER COMPLETE: CPT | Mod: 26 | Performed by: INTERNAL MEDICINE

## 2017-11-15 PROCEDURE — 25500064 ZZH RX 255 OP 636: Performed by: INTERNAL MEDICINE

## 2017-11-15 PROCEDURE — 25000132 ZZH RX MED GY IP 250 OP 250 PS 637: Performed by: INTERNAL MEDICINE

## 2017-11-15 PROCEDURE — 36415 COLL VENOUS BLD VENIPUNCTURE: CPT | Performed by: INTERNAL MEDICINE

## 2017-11-15 PROCEDURE — 99233 SBSQ HOSP IP/OBS HIGH 50: CPT | Performed by: INTERNAL MEDICINE

## 2017-11-15 PROCEDURE — 25000125 ZZHC RX 250

## 2017-11-15 PROCEDURE — 85027 COMPLETE CBC AUTOMATED: CPT | Performed by: INTERNAL MEDICINE

## 2017-11-15 PROCEDURE — 80048 BASIC METABOLIC PNL TOTAL CA: CPT | Performed by: INTERNAL MEDICINE

## 2017-11-15 PROCEDURE — 93005 ELECTROCARDIOGRAM TRACING: CPT

## 2017-11-15 PROCEDURE — 21400006 ZZH R&B CCU INTERMEDIATE UMMC

## 2017-11-15 RX ORDER — METOPROLOL TARTRATE 25 MG/1
25 TABLET, FILM COATED ORAL 2 TIMES DAILY
Status: DISCONTINUED | OUTPATIENT
Start: 2017-11-15 | End: 2017-11-15

## 2017-11-15 RX ORDER — HYDRALAZINE HYDROCHLORIDE 20 MG/ML
10-20 INJECTION INTRAMUSCULAR; INTRAVENOUS
Status: DISCONTINUED | OUTPATIENT
Start: 2017-11-15 | End: 2017-11-17 | Stop reason: HOSPADM

## 2017-11-15 RX ORDER — ASPIRIN 325 MG
325 TABLET ORAL
Status: DISCONTINUED | OUTPATIENT
Start: 2017-11-15 | End: 2017-11-17 | Stop reason: HOSPADM

## 2017-11-15 RX ORDER — NITROGLYCERIN 5 MG/ML
100-200 VIAL (ML) INTRAVENOUS
Status: DISCONTINUED | OUTPATIENT
Start: 2017-11-15 | End: 2017-11-17 | Stop reason: HOSPADM

## 2017-11-15 RX ORDER — NICARDIPINE HYDROCHLORIDE 2.5 MG/ML
100 INJECTION INTRAVENOUS
Status: DISCONTINUED | OUTPATIENT
Start: 2017-11-15 | End: 2017-11-17 | Stop reason: HOSPADM

## 2017-11-15 RX ORDER — ARGATROBAN 1 MG/ML
150 INJECTION, SOLUTION INTRAVENOUS
Status: DISCONTINUED | OUTPATIENT
Start: 2017-11-15 | End: 2017-11-17 | Stop reason: HOSPADM

## 2017-11-15 RX ORDER — METOPROLOL TARTRATE 50 MG
50 TABLET ORAL 2 TIMES DAILY
Status: DISCONTINUED | OUTPATIENT
Start: 2017-11-16 | End: 2017-11-17 | Stop reason: HOSPADM

## 2017-11-15 RX ORDER — DEXTROSE MONOHYDRATE 25 G/50ML
12.5-5 INJECTION, SOLUTION INTRAVENOUS EVERY 30 MIN PRN
Status: DISCONTINUED | OUTPATIENT
Start: 2017-11-15 | End: 2017-11-17 | Stop reason: HOSPADM

## 2017-11-15 RX ORDER — EPINEPHRINE 1 MG/ML
0.3 INJECTION, SOLUTION, CONCENTRATE INTRAVENOUS
Status: DISCONTINUED | OUTPATIENT
Start: 2017-11-15 | End: 2017-11-17 | Stop reason: HOSPADM

## 2017-11-15 RX ORDER — MAGNESIUM HYDROXIDE 1200 MG/15ML
1000 LIQUID ORAL CONTINUOUS PRN
Status: DISCONTINUED | OUTPATIENT
Start: 2017-11-15 | End: 2017-11-17 | Stop reason: HOSPADM

## 2017-11-15 RX ORDER — EPTIFIBATIDE 2 MG/ML
180 INJECTION, SOLUTION INTRAVENOUS EVERY 10 MIN PRN
Status: DISCONTINUED | OUTPATIENT
Start: 2017-11-15 | End: 2017-11-17 | Stop reason: HOSPADM

## 2017-11-15 RX ORDER — FENTANYL CITRATE 50 UG/ML
25-50 INJECTION, SOLUTION INTRAMUSCULAR; INTRAVENOUS
Status: DISCONTINUED | OUTPATIENT
Start: 2017-11-15 | End: 2017-11-17 | Stop reason: HOSPADM

## 2017-11-15 RX ORDER — POTASSIUM CHLORIDE 7.45 MG/ML
10 INJECTION INTRAVENOUS
Status: DISCONTINUED | OUTPATIENT
Start: 2017-11-15 | End: 2017-11-17 | Stop reason: HOSPADM

## 2017-11-15 RX ORDER — VERAPAMIL HYDROCHLORIDE 2.5 MG/ML
1-5 INJECTION, SOLUTION INTRAVENOUS
Status: DISCONTINUED | OUTPATIENT
Start: 2017-11-15 | End: 2017-11-17 | Stop reason: HOSPADM

## 2017-11-15 RX ORDER — CLOPIDOGREL BISULFATE 75 MG/1
300-600 TABLET ORAL
Status: DISCONTINUED | OUTPATIENT
Start: 2017-11-15 | End: 2017-11-17 | Stop reason: HOSPADM

## 2017-11-15 RX ORDER — METOPROLOL TARTRATE 1 MG/ML
5 INJECTION, SOLUTION INTRAVENOUS ONCE
Status: COMPLETED | OUTPATIENT
Start: 2017-11-15 | End: 2017-11-15

## 2017-11-15 RX ORDER — METOPROLOL TARTRATE 1 MG/ML
5 INJECTION, SOLUTION INTRAVENOUS EVERY 5 MIN PRN
Status: DISCONTINUED | OUTPATIENT
Start: 2017-11-15 | End: 2017-11-17 | Stop reason: HOSPADM

## 2017-11-15 RX ORDER — ADENOSINE 3 MG/ML
12-12000 INJECTION, SOLUTION INTRAVENOUS
Status: DISCONTINUED | OUTPATIENT
Start: 2017-11-15 | End: 2017-11-17 | Stop reason: HOSPADM

## 2017-11-15 RX ORDER — POTASSIUM CHLORIDE 29.8 MG/ML
20 INJECTION INTRAVENOUS
Status: DISCONTINUED | OUTPATIENT
Start: 2017-11-15 | End: 2017-11-17 | Stop reason: HOSPADM

## 2017-11-15 RX ORDER — LIDOCAINE HYDROCHLORIDE 10 MG/ML
30 INJECTION, SOLUTION EPIDURAL; INFILTRATION; INTRACAUDAL; PERINEURAL
Status: DISCONTINUED | OUTPATIENT
Start: 2017-11-15 | End: 2017-11-17 | Stop reason: HOSPADM

## 2017-11-15 RX ORDER — DIPHENHYDRAMINE HYDROCHLORIDE 50 MG/ML
25-50 INJECTION INTRAMUSCULAR; INTRAVENOUS
Status: DISCONTINUED | OUTPATIENT
Start: 2017-11-15 | End: 2017-11-17 | Stop reason: HOSPADM

## 2017-11-15 RX ORDER — ASPIRIN 81 MG/1
81-324 TABLET, CHEWABLE ORAL
Status: DISCONTINUED | OUTPATIENT
Start: 2017-11-15 | End: 2017-11-17 | Stop reason: HOSPADM

## 2017-11-15 RX ORDER — ATORVASTATIN CALCIUM 80 MG/1
80 TABLET, FILM COATED ORAL DAILY
Status: DISCONTINUED | OUTPATIENT
Start: 2017-11-15 | End: 2017-11-17 | Stop reason: HOSPADM

## 2017-11-15 RX ORDER — NITROGLYCERIN 0.4 MG/1
0.4 TABLET SUBLINGUAL EVERY 5 MIN PRN
Status: DISCONTINUED | OUTPATIENT
Start: 2017-11-15 | End: 2017-11-17 | Stop reason: HOSPADM

## 2017-11-15 RX ORDER — LORAZEPAM 2 MG/ML
.5-2 INJECTION INTRAMUSCULAR EVERY 4 HOURS PRN
Status: DISCONTINUED | OUTPATIENT
Start: 2017-11-15 | End: 2017-11-17 | Stop reason: HOSPADM

## 2017-11-15 RX ORDER — PRASUGREL 10 MG/1
10-60 TABLET, FILM COATED ORAL
Status: DISCONTINUED | OUTPATIENT
Start: 2017-11-15 | End: 2017-11-17 | Stop reason: HOSPADM

## 2017-11-15 RX ORDER — SODIUM NITROPRUSSIDE 25 MG/ML
100-200 INJECTION INTRAVENOUS
Status: DISCONTINUED | OUTPATIENT
Start: 2017-11-15 | End: 2017-11-17 | Stop reason: HOSPADM

## 2017-11-15 RX ORDER — ATORVASTATIN CALCIUM 80 MG/1
80 TABLET, FILM COATED ORAL DAILY
Qty: 30 TABLET | Refills: 11 | Status: SHIPPED | OUTPATIENT
Start: 2017-11-16 | End: 2018-03-27

## 2017-11-15 RX ORDER — DOPAMINE HYDROCHLORIDE 160 MG/100ML
2-20 INJECTION, SOLUTION INTRAVENOUS CONTINUOUS PRN
Status: DISCONTINUED | OUTPATIENT
Start: 2017-11-15 | End: 2017-11-17 | Stop reason: HOSPADM

## 2017-11-15 RX ORDER — NIFEDIPINE 10 MG/1
10 CAPSULE ORAL
Status: DISCONTINUED | OUTPATIENT
Start: 2017-11-15 | End: 2017-11-17 | Stop reason: HOSPADM

## 2017-11-15 RX ORDER — METOPROLOL TARTRATE 1 MG/ML
INJECTION, SOLUTION INTRAVENOUS
Status: COMPLETED
Start: 2017-11-15 | End: 2017-11-15

## 2017-11-15 RX ORDER — ARGATROBAN 1 MG/ML
350 INJECTION, SOLUTION INTRAVENOUS
Status: DISCONTINUED | OUTPATIENT
Start: 2017-11-15 | End: 2017-11-17 | Stop reason: HOSPADM

## 2017-11-15 RX ORDER — DOBUTAMINE HYDROCHLORIDE 200 MG/100ML
2-20 INJECTION INTRAVENOUS CONTINUOUS PRN
Status: DISCONTINUED | OUTPATIENT
Start: 2017-11-15 | End: 2017-11-17 | Stop reason: HOSPADM

## 2017-11-15 RX ORDER — NALOXONE HYDROCHLORIDE 0.4 MG/ML
0.4 INJECTION, SOLUTION INTRAMUSCULAR; INTRAVENOUS; SUBCUTANEOUS EVERY 5 MIN PRN
Status: DISCONTINUED | OUTPATIENT
Start: 2017-11-15 | End: 2017-11-17 | Stop reason: HOSPADM

## 2017-11-15 RX ORDER — NITROGLYCERIN 5 MG/ML
100-500 VIAL (ML) INTRAVENOUS
Status: DISCONTINUED | OUTPATIENT
Start: 2017-11-15 | End: 2017-11-17 | Stop reason: HOSPADM

## 2017-11-15 RX ORDER — ATROPINE SULFATE 0.1 MG/ML
.5-1 INJECTION INTRAVENOUS
Status: DISCONTINUED | OUTPATIENT
Start: 2017-11-15 | End: 2017-11-17 | Stop reason: HOSPADM

## 2017-11-15 RX ORDER — HEPARIN SODIUM 1000 [USP'U]/ML
1000-10000 INJECTION, SOLUTION INTRAVENOUS; SUBCUTANEOUS EVERY 5 MIN PRN
Status: DISCONTINUED | OUTPATIENT
Start: 2017-11-15 | End: 2017-11-17 | Stop reason: HOSPADM

## 2017-11-15 RX ORDER — ENALAPRILAT 1.25 MG/ML
1.25-2.5 INJECTION INTRAVENOUS
Status: DISCONTINUED | OUTPATIENT
Start: 2017-11-15 | End: 2017-11-17 | Stop reason: HOSPADM

## 2017-11-15 RX ORDER — HYDRALAZINE HYDROCHLORIDE 25 MG/1
25 TABLET, FILM COATED ORAL 4 TIMES DAILY
Status: DISCONTINUED | OUTPATIENT
Start: 2017-11-15 | End: 2017-11-17 | Stop reason: HOSPADM

## 2017-11-15 RX ORDER — FUROSEMIDE 10 MG/ML
20-100 INJECTION INTRAMUSCULAR; INTRAVENOUS
Status: DISCONTINUED | OUTPATIENT
Start: 2017-11-15 | End: 2017-11-17 | Stop reason: HOSPADM

## 2017-11-15 RX ORDER — CLOPIDOGREL BISULFATE 75 MG/1
75 TABLET ORAL
Status: DISCONTINUED | OUTPATIENT
Start: 2017-11-15 | End: 2017-11-17 | Stop reason: HOSPADM

## 2017-11-15 RX ORDER — PROTAMINE SULFATE 10 MG/ML
1-5 INJECTION, SOLUTION INTRAVENOUS
Status: DISCONTINUED | OUTPATIENT
Start: 2017-11-15 | End: 2017-11-17 | Stop reason: HOSPADM

## 2017-11-15 RX ORDER — PROTAMINE SULFATE 10 MG/ML
25-100 INJECTION, SOLUTION INTRAVENOUS EVERY 5 MIN PRN
Status: DISCONTINUED | OUTPATIENT
Start: 2017-11-15 | End: 2017-11-17 | Stop reason: HOSPADM

## 2017-11-15 RX ORDER — NITROGLYCERIN 20 MG/100ML
.07-2 INJECTION INTRAVENOUS CONTINUOUS PRN
Status: DISCONTINUED | OUTPATIENT
Start: 2017-11-15 | End: 2017-11-17 | Stop reason: HOSPADM

## 2017-11-15 RX ORDER — ONDANSETRON 2 MG/ML
4 INJECTION INTRAMUSCULAR; INTRAVENOUS EVERY 4 HOURS PRN
Status: DISCONTINUED | OUTPATIENT
Start: 2017-11-15 | End: 2017-11-17 | Stop reason: HOSPADM

## 2017-11-15 RX ORDER — PHENYLEPHRINE HCL IN 0.9% NACL 1 MG/10 ML
20-100 SYRINGE (ML) INTRAVENOUS
Status: DISCONTINUED | OUTPATIENT
Start: 2017-11-15 | End: 2017-11-17 | Stop reason: HOSPADM

## 2017-11-15 RX ORDER — ATORVASTATIN CALCIUM 80 MG/1
80 TABLET, FILM COATED ORAL DAILY
Status: DISCONTINUED | OUTPATIENT
Start: 2017-11-15 | End: 2017-11-15

## 2017-11-15 RX ORDER — PROMETHAZINE HYDROCHLORIDE 25 MG/ML
6.25-25 INJECTION, SOLUTION INTRAMUSCULAR; INTRAVENOUS EVERY 4 HOURS PRN
Status: DISCONTINUED | OUTPATIENT
Start: 2017-11-15 | End: 2017-11-17 | Stop reason: HOSPADM

## 2017-11-15 RX ORDER — METHYLPREDNISOLONE SODIUM SUCCINATE 125 MG/2ML
125 INJECTION, POWDER, LYOPHILIZED, FOR SOLUTION INTRAMUSCULAR; INTRAVENOUS
Status: DISCONTINUED | OUTPATIENT
Start: 2017-11-15 | End: 2017-11-17 | Stop reason: HOSPADM

## 2017-11-15 RX ORDER — FLUMAZENIL 0.1 MG/ML
0.2 INJECTION, SOLUTION INTRAVENOUS
Status: DISCONTINUED | OUTPATIENT
Start: 2017-11-15 | End: 2017-11-17 | Stop reason: HOSPADM

## 2017-11-15 RX ORDER — EPTIFIBATIDE 2 MG/ML
2 INJECTION, SOLUTION INTRAVENOUS CONTINUOUS PRN
Status: DISCONTINUED | OUTPATIENT
Start: 2017-11-15 | End: 2017-11-17 | Stop reason: HOSPADM

## 2017-11-15 RX ADMIN — METOPROLOL TARTRATE 5 MG: 1 INJECTION, SOLUTION INTRAVENOUS at 19:22

## 2017-11-15 RX ADMIN — METOPROLOL TARTRATE 25 MG: 25 TABLET ORAL at 08:56

## 2017-11-15 RX ADMIN — HYDRALAZINE HYDROCHLORIDE 25 MG: 25 TABLET ORAL at 08:56

## 2017-11-15 RX ADMIN — ASPIRIN 325 MG: 325 TABLET, DELAYED RELEASE ORAL at 16:38

## 2017-11-15 RX ADMIN — METOPROLOL TARTRATE 5 MG: 5 INJECTION, SOLUTION INTRAVENOUS at 19:22

## 2017-11-15 RX ADMIN — HEPARIN SODIUM 1300 UNITS/HR: 10000 INJECTION, SOLUTION INTRAVENOUS at 06:49

## 2017-11-15 RX ADMIN — ASPIRIN 81 MG CHEWABLE TABLET 81 MG: 81 TABLET CHEWABLE at 08:56

## 2017-11-15 RX ADMIN — HYDRALAZINE HYDROCHLORIDE 25 MG: 25 TABLET ORAL at 15:54

## 2017-11-15 RX ADMIN — ATORVASTATIN CALCIUM 80 MG: 80 TABLET, FILM COATED ORAL at 08:56

## 2017-11-15 RX ADMIN — HYDRALAZINE HYDROCHLORIDE 25 MG: 25 TABLET ORAL at 12:07

## 2017-11-15 RX ADMIN — HYDRALAZINE HYDROCHLORIDE 25 MG: 25 TABLET ORAL at 20:14

## 2017-11-15 RX ADMIN — METOPROLOL TARTRATE 25 MG: 25 TABLET ORAL at 19:22

## 2017-11-15 RX ADMIN — SULFUR HEXAFLUORIDE 5 ML: KIT at 09:12

## 2017-11-15 NOTE — PLAN OF CARE
Problem: Patient Care Overview  Goal: Plan of Care/Patient Progress Review  Focus:  Admission  Diagnosis: CP/Afib  Admitted from: [facility] ED  Via: stretcher   Accompanied by: self, family en route  Belongings: Placed in closet; valuables sent home with family(pt to send wallet home with wife)  Admission paperwork: complete.   Teaching: Call don't fall, use of console, meal times, visiting hours, orientation to unit, when to call for the RN (angina/sob/dizzyness, etc.), and stressed the importance of safety.   Access: PIV   Telemetry: Placed on pt   Ht./Wt.: complete  Arrived with heparin gtt @ 1000u/hr

## 2017-11-15 NOTE — PLAN OF CARE
Problem: Arrhythmia/Dysrhythmia (Symptomatic) (Adult)  Goal: Signs and Symptoms of Listed Potential Problems Will be Absent, Minimized or Managed (Arrhythmia/Dysrhythmia)  Signs and symptoms of listed potential problems will be absent, minimized or managed by discharge/transition of care (reference Arrhythmia/Dysrhythmia (Symptomatic) (Adult) CPG).   D: VSS. A-fib 70-100s. Denies chest pain. Up in room ad funmilayo. Wife supportive at bedside.  I: NPO all shift for angiogram today. Patient told at 1745 that procedure will be bumped to tomorrow. Heparin drip infusing at 1300units/hour.   A: Patient stable. Heparin therapeutic until tomorrow am Xa recheck.   P: Angiogram to be done tomorrow am. Continue to monitor patient and notify [facility] with changes or concerns.

## 2017-11-15 NOTE — PROGRESS NOTES
Care Coordinator Progress Note     Admission Date/Time:  11/14/2017  Attending MD:  Abhishek Huntley MD     Data  Chart reviewed, discussed with interdisciplinary team.   Patient was admitted for atrial fibrillation with elevated troponin.     Concerns with insurance coverage for discharge needs: None.  Current Living Situation: Patient lives with spouse. Pt states he is independent with his cares.  Support System: Supportive and Involved spouse  Services Involved: None currently  Transportation: Family or Friend will provide  Barriers to Discharge: Medical plan of care    Coordination of Care and Referrals: Provided patient/family with options for establishing a PCP. Pt states he would like to establish a new PCP at [facility]. Per MD, pt should have follow up with PCP end of next week.  Intervention  Appointment made at [facility] (P: [phone]) on 11/22/2017 at 10 am with Dr. Daniel Willett for establishment of care and post hospitalization follow up.     Plan  Anticipated Discharge Date: TBD  Anticipated Discharge Plan: Discharge to home with clinic follow up.  CC will continue to monitor patient's medical condition and progress towards discharge.  Celia Benitez RN BSN  6C Unit Care Coordinator  Phone number: [phone]  Pager: [phone]

## 2017-11-15 NOTE — PLAN OF CARE
Problem: Patient Care Overview  Goal: Plan of Care/Patient Progress Review  Monitor Afib, rate controlled. Pt hypertensive, MD aware. Xa <0.10, heparin gtt increased to 1300u/hr and given 5000u bolus. Recheck scheduled for 0630. Pt denies c/o CP. Troponin elevated but unchanged from previous result. NPO after MN for possible procedure today. Continue to monitor and notify MD with any issues or concerns.

## 2017-11-15 NOTE — ED NOTES
Attempted to call report to [facility], they state they will call RN back.

## 2017-11-15 NOTE — DISCHARGE INSTRUCTIONS
Going Home after Coronary Angiogram       Name: Matthew Still  Medical Record Number:  [id number]  Today's Date: November 15, 2017        For 24 hours:         Have an adult stay with you for 24 hours.         Relax and take it easy.         Drink plenty of fluids.         You may eat your normal diet, unless your doctor tells you otherwise.         Do NOT make any important or legal decisions.         Do NOT drive or operate machines at home or at work.         Do NOT drink alcohol.      Do NOT smoke.     Medicines:         If you have begun Plavix (clopidogrel), Effient (prasugrel), or Brilinta (ticagrelor), do not stop taking it until you talk to your heart doctor (cardiologist).         If you are on metformin (Glucophage), do not restart it until you have blood tests (within 2 to 3 days after discharge). When your doctor tells you it is safe, you may restart the metformin.         If you have stopped any other medicines, check with your nurse or provider about when to restart them.    Care of groin site:         Remove the Band-Aid after 24 hours. If there is minor oozing, apply another Band-aid and remove it after 12 hours.          Do NOT take a bath, or use a hot tub or pool for at least 3 days. You may shower.          It is normal to have a small bruise or lump at the site.         Do not scrub the site.         Do not use lotion or powder near the puncture site for 3 days.         For the first 2 days: Do not stoop or squat. When you cough, sneeze or move your bowels, hold your hand over the puncture site and press gently.         Do not lift more than 10 pounds for at least 3 to 5 days.         For 2 days, do NOT have sex or do any heavy exercise.     If you start bleeding from the site in your groin:  Lie down flat and press firmly on the site.  Call your physician immediately, or, come to the emergency room.      Call 911 right away if you have bleeding that is heavy or does not stop.     Call your  doctor if:         You have a large or growing hard lump around the site.         The site is red, swollen, hot or tender.         Blood or fluid is draining from the site.         You have chills or a fever greater than 101 F (38 C).         Your leg or arm turns bluish, feels numb or cool.         You have hives, a rash or unusual itching.     Cardiac Rehabilitation: You should receive a phone call from the Cardiac Rehabilitation Department within the next week.  If you do not receive the call, please contact [facility] at [phone].    ADDITIONAL INSTRUCTIONS: ***    [facility] [place]:   [phone] (7 days a week)      Cardiology Fellow on call (24 hours per day) at [facility], [place]:   [phone] (ask for Cardiology Fellow on call)      Number where we can reach you:  ____________

## 2017-11-15 NOTE — PROGRESS NOTES
SPIRITUAL HEALTH SERVICES  SPIRITUAL ASSESSMENT Progress Note  [facility] ([place]) 6C   ON-CALL VISIT    REFERRAL SOURCE: Patient requested visit upon his admission.     Visited with patient and his wife Mery.  He reflected on what brought him to the hospital.  He was at work yesterday and didn't feel well. Listened to his concerns about finding out he had suffered a heart attack.  His wife was tearful at times during this conversation.  Introduced  services and let them know that SH dept is here for them if they want to talk and/or pray.  Patient acknowledged this and said they would let us know if they wanted another visit.     PLAN: I will notify unit  of care provided.    Liz Alcocer  Staff   Pager [phone]

## 2017-11-15 NOTE — PROGRESS NOTES
"  Interventional Cardiology Progress Note  Subjective:  No acute events overnight. Patient remains on Heparin, currently chest pain free.    Objective:  Most recent vital signs:  BP (!) 141/104 (BP Location: Left arm)  Temp 97.5  F (36.4  C) (Oral)  Resp 16  Ht 1.803 m (5' 11\")  Wt 102.4 kg (225 lb 11.2 oz)  SpO2 96%  BMI 31.48 kg/m2  Temp:  [97.5  F (36.4  C)-98.2  F (36.8  C)] 97.5  F (36.4  C)  Pulse:  [95] 95  Heart Rate:  [] 77  Resp:  [9-36] 16  BP: (136-190)/() 141/104  SpO2:  [96 %-99 %] 96 %  Wt Readings from Last 2 Encounters:   11/15/17 102.4 kg (225 lb 11.2 oz)   11/14/17 102.1 kg (225 lb)       Intake/Output Summary (Last 24 hours) at 11/15/17 0848  Last data filed at 11/15/17 0659   Gross per 24 hour   Intake            355.7 ml   Output                0 ml   Net            355.7 ml     Physical exam:  General: In bed, in NAD  HEENT: EOMI, PERRLA, no scleral icterus or injection  CARDIAC: RRR, no m/r/g appreciated. Peripheral pulses 2+  RESP: CTAB, no wheezes, rhonchi or crackles appreciated.  GI: NABS, NT/ND, no guarding or rebound  EXTREMITIES: NO LE edema, pulses 2+.   SKIN: No acute lesions appreciated  NEURO: Alert and oriented X3, CN II-XII grossly intact, no focal neurological deficits noted      Labs (Past three days):  CBC  Recent Labs  Lab 11/15/17  0640 11/14/17  2143 11/14/17  1714   WBC 9.2 10.0 9.7   RBC 4.97 4.94 5.06   HGB 15.3 15.5 15.6   HCT 44.9 45.1 44.6   MCV 90 91 88   MCH 30.8 31.4 30.8   MCHC 34.1 34.4 35.0   RDW 12.6 12.6 12.1    231 240     BMP  Recent Labs  Lab 11/15/17  0640 11/14/17  1714    141   POTASSIUM 3.8 3.8   CHLORIDE 109 109   CO2 23 22   ANIONGAP 10 10   * 94   BUN 16 17   CR 0.90 0.96   GFRESTIMATED 90 83   GFRESTBLACK >90 >90   BARRON 8.9 9.2     Troponins:     INRNo lab results found in last 7 days.  Liver panel  Recent Labs  Lab 11/14/17  1714   PROTTOTAL 7.4   ALBUMIN 3.8   BILITOTAL 0.2   ALKPHOS 67   AST 18   ALT 34 "       Assessment & Plan:  Mr. Matthew Still is a 46 yo male patient with a PMH notable for paroxysmal atrial fibrillation who presented to OSH ED with chief complaint of chest pain, dyspnea, found to be in atrial fibrillation with elevated troponin.      #Chest Pain // resolved   #Elevated Troponin: 0.132   Chest pain free upon arrival. History with CP that awoke him from sleep, constant 5/6, central pressure. Worse with exertion. Resolved with sleep.   EKG with TWI in I, II, V6. No prior EKGS to compare; currently rate controlled in atrial fibrillation with rates in the 90s to 100s. Family history on mother's side. DYLAN score 3 (+ depending  CH, A1C (unk)). Trop leak could also be related to below, but chest pain and family history concerning.   RF: HTN, fam hx  - continue heparin  - continue ASA  - start metoprolol 12.5mg BID, up titrate as needed  - trend troponin, lipid panel and A1C in AM   - NPO for Cor angio  - echo ordered for AM      #Paroxysmal Atrial Fibrillation  Per patient, diagnosed ~20y ago at this institution. No record on file, possible that patient had different MRN. Reports he underwent coronary angiogram (states it was 'clean'), and cardioversion. Stayed out of afib for 2-3 years but then starting getting symptomatic (palpitations) episodes every once in a while. Lately, it has been about 1/week, lasting ~1 hour at time. Persistent palpitations led him to ED presentation.  CHADSVASC 1(HTN).   - heparin gtt for now   - echo as above, NPO at MN     HORTENSIA Garcia, CNP  [facility]  Interventional Cardiology  [phone]      ATTENDING NOTE:  Patient has been seen and evaluated by me on 11/15/2017. I have reviewed the H&P.  Please refer to it for additional details.  I have reviewed today's vital signs, medications, labs, and imaging results.  I have reviewed and edited, as necessary, the history, review of systems, physical examination, and assessment and plan.  I  have discussed my assessment and plan with the cardiology fellow.  Matthew Still is a 47 year old male with risk factor profile (+) HTN, (-) DM, (+) hypercholesterolemia, (-) tobacco use, (+) fam Hx premature CAD, chronic AF, CHADS2-Vasc =1 [not anticoagulated]        Abhishek Huntley MD     Cardiovascular Division

## 2017-11-15 NOTE — H&P
Cardiology History and Physical  Matthew Still MRN: [id number]  Age: 47 year old, : 1970  Primary care provider: [facility]            Assessment and Plan:     Mr. Matthew Still is a 48 yo male patient with a PMH notable for paroxysmal atrial fibrillation who presented to OSH ED with chief complaint of chest pain, dyspnea, found to be in atrial fibrillation with elevated troponin.     #Chest Pain // resolved   #Elevated Troponin: 0.132   Chest pain free upon arrival. History with CP that awoke him from sleep, constant 5/6, central pressure. Worse with exertion. Resolved with sleep.   EKG with TWI in I, II, V6. No prior EKGS to compare; currently rate controlled in atrial fibrillation with rates in the 90s to 100s. Family history on mother's side. DYLAN score 3 (+ depending  CH, A1C (unk)). Trop leak could also be related to below, but chest pain and family history concerning.   RF: HTN  - continue heparin  - continue ASA  - start metoprolol 12.5mg BID, up titrate as needed  - trend troponin, lipid panel and A1C in AM   - NPO at MN in case of procedure   - echo ordered for AM     #Paroxysmal Atrial Fibrillation  Per patient, diagnosed ~20y ago at this institution. No record on file, possible that patient had different MRN. Reports he underwent coronary angiogram (states it was 'clean'), and cardioversion. Stayed out of afib for 2-3 years but then starting getting symptomatic (palpitations) episodes every once in a while. Lately, it has been about 1/week, lasting ~1 hour at time. Persistent palpitations led him to ED presentation.  CHADSVASC 1(HTN).   - heparin gtt for now   - echo as above, NPO at MN       FEN:  - NPO at MN   - replete lytes to K 4, Mg 2   - no mIVF   PPX: heparin gtt  Code Status: Full      Patient discussed with cardiology fellow, Dr. Calixto. To be formally staffed in AM.     Darcy Pickard MD   Internal Medicine PGY2  Cardiology Service  Pager: [phone]            Chief  Complaint:     Chest pain, palpitations, dyspnea          History of Present Illness:     History is obtained from the patient.    Mr. Matthew Still is a 46 yo male patient with a PMH notable for paroxysmal atrial fibrillation who presented to [facility] ED with chief complaint of chest pain, dyspnea, found to be in atrial fibrillation with elevated troponin.     Per patient, he awoke around 1am this morning with centrally located chest pain. 5/6. Noted palpitations as well so figured his pain was related to atrial fibrillation. He went to work, but felt very winded and fatigued throughout the day. Chest pain persisted. No nausea, vomiting, sweating, abdominal or back pain. He went home to take a nap, woke up and chest pain had resolved - however he continued to have palpitations, so he went to the ED. Upon the arrival to the floor, patient was without chest pain/nausea/dyspnea but noted ongoing palpitations.     In the ED, patient was hypertensive, HR in the 90s, saturating well on room air. EKG showed atrial fibrillation with diffuse TWI in anterior lateral and inferior leads. CBC and BMP wnl. Troponin at 0.132, BNP of 1376. CXR negative for pathology. He was started on heparin gtt and transferred to [facility] for further management.           Past Medical History:   Atrial fibrillation s/p DCCV x1, on ASA          Past Surgical History:      No past surgical history on file.           Social History:   Non smoker  Quit alcohol 5 years ago - prior to that was drinking a case of beer/night  Used to chew a can of tobacco/day - quit that two years ago  No other drugs    Builds trucks for a living          Family History:     Maternal grandfather with MI <50, maternal uncle with MI <50  Mother with MI ~70ys, mother with atrial fibrillation            Allergies:     No Known Allergies           Medications:     ASA 81mg QD             Physical Exam:     Temp:  [97.5  F (36.4  C)-98.2  F (36.8  C)] 98.2  F (36.8  C)  Pulse:  [95]  95  Heart Rate:  [] 101  Resp:  [9-36] 10  BP: (159-190)/(115-146) 159/136  SpO2:  [97 %-99 %] 99 %    Gen: Resting comfortably in bed, NAD   HEENT:AT/ NC, PERRL b/l, EOM grossly intact, MMM   PULM/THORAX: Normal effort on RA. Clear to auscultation bilaterally, no rales/rhonchi/wheezes  CV: Irregularly irregular. Intermittently tachycardic.   ABD: soft, nontender, nondistended. Normoactive bowel sounds.   EXT: Warm, well perfused. No LE edema noted.  NEURO: AOx4. Speech fluent and articulate. No focal deficits appreciated.     Drips: heparinn           Data:     Labs Reviewed  Na 141   Cl 109   BUN 17  K 3.8     CO2 22    Cr 0.96    NTBNP 1376  Troponin 0.132    WBC 9.7 // Hgb 15.6 // Plt 240         Most Recent Imagin2017 CXR  XR CHEST 2 VW 2017 5:28 PM     COMPARISON: None.    HISTORY: Palpitations and chest pressure.      IMPRESSION: Cardiac silhouette and pulmonary vasculature are within  normal limits. No focal airspace disease, pleural effusion or  Pneumothorax.                  ATTENDING NOTE:  Patient has been seen and evaluated by me on 11/15/2017. I have reviewed the H&P.  Please refer to it for additional details.  I have reviewed today's vital signs, medications, labs, and imaging results.  I have reviewed and edited, as necessary, the history, review of systems, physical examination, and assessment and plan.  I have discussed my assessment and plan with the cardiology fellow.  Matthew Still is a 47 year old male with risk factor profile (+) HTN, (-) DM, (+) hypercholesterolemia, (-) tobacco use, (+) fam Hx premature CAD, chronic AF, CHADS2-Vasc =1 [not anticoagulated]      Abhishek Huntley MD     Cardiovascular Division

## 2017-11-16 ENCOUNTER — APPOINTMENT (OUTPATIENT)
Dept: CARDIOLOGY | Facility: CLINIC | Age: 47
DRG: 282 | End: 2017-11-16
Attending: INTERNAL MEDICINE
Payer: COMMERCIAL

## 2017-11-16 LAB
ANION GAP SERPL CALCULATED.3IONS-SCNC: 6 MMOL/L (ref 3–14)
BUN SERPL-MCNC: 17 MG/DL (ref 7–30)
CALCIUM SERPL-MCNC: 8.9 MG/DL (ref 8.5–10.1)
CHLORIDE SERPL-SCNC: 111 MMOL/L (ref 94–109)
CO2 SERPL-SCNC: 24 MMOL/L (ref 20–32)
CREAT SERPL-MCNC: 1 MG/DL (ref 0.66–1.25)
GFR SERPL CREATININE-BSD FRML MDRD: 80 ML/MIN/1.7M2
GLUCOSE SERPL-MCNC: 84 MG/DL (ref 70–99)
INR PPP: 1 (ref 0.86–1.14)
KCT BLD-ACNC: 270 SEC (ref 105–167)
LMWH PPP CHRO-ACNC: 0.12 IU/ML
LMWH PPP CHRO-ACNC: 0.4 IU/ML
POTASSIUM SERPL-SCNC: 4 MMOL/L (ref 3.4–5.3)
SODIUM SERPL-SCNC: 141 MMOL/L (ref 133–144)

## 2017-11-16 PROCEDURE — 25000132 ZZH RX MED GY IP 250 OP 250 PS 637: Performed by: INTERNAL MEDICINE

## 2017-11-16 PROCEDURE — 27211089 ZZH KIT ACIST INJECTOR CR3

## 2017-11-16 PROCEDURE — 93571 IV DOP VEL&/PRESS C FLO 1ST: CPT

## 2017-11-16 PROCEDURE — 99233 SBSQ HOSP IP/OBS HIGH 50: CPT | Performed by: INTERNAL MEDICINE

## 2017-11-16 PROCEDURE — 36415 COLL VENOUS BLD VENIPUNCTURE: CPT | Performed by: INTERNAL MEDICINE

## 2017-11-16 PROCEDURE — C1894 INTRO/SHEATH, NON-LASER: HCPCS

## 2017-11-16 PROCEDURE — 4A0335C MEASUREMENT OF ARTERIAL FLOW, CORONARY, PERCUTANEOUS APPROACH: ICD-10-PCS | Performed by: INTERNAL MEDICINE

## 2017-11-16 PROCEDURE — C1769 GUIDE WIRE: HCPCS

## 2017-11-16 PROCEDURE — 25000128 H RX IP 250 OP 636: Performed by: INTERNAL MEDICINE

## 2017-11-16 PROCEDURE — 27210843 ZZH DEVICE COMPRESSION CR12

## 2017-11-16 PROCEDURE — 85520 HEPARIN ASSAY: CPT | Performed by: INTERNAL MEDICINE

## 2017-11-16 PROCEDURE — B2111ZZ FLUOROSCOPY OF MULTIPLE CORONARY ARTERIES USING LOW OSMOLAR CONTRAST: ICD-10-PCS | Performed by: INTERNAL MEDICINE

## 2017-11-16 PROCEDURE — 27210893 ZZH CATH CR5

## 2017-11-16 PROCEDURE — 99153 MOD SED SAME PHYS/QHP EA: CPT

## 2017-11-16 PROCEDURE — 4A033BC MEASUREMENT OF ARTERIAL PRESSURE, CORONARY, PERCUTANEOUS APPROACH: ICD-10-PCS | Performed by: INTERNAL MEDICINE

## 2017-11-16 PROCEDURE — 99152 MOD SED SAME PHYS/QHP 5/>YRS: CPT

## 2017-11-16 PROCEDURE — 25000125 ZZHC RX 250: Performed by: INTERNAL MEDICINE

## 2017-11-16 PROCEDURE — 93454 CORONARY ARTERY ANGIO S&I: CPT | Mod: 26 | Performed by: INTERNAL MEDICINE

## 2017-11-16 PROCEDURE — C1887 CATHETER, GUIDING: HCPCS

## 2017-11-16 PROCEDURE — 21400006 ZZH R&B CCU INTERMEDIATE UMMC

## 2017-11-16 PROCEDURE — 27210787 ZZH MANIFOLD CR2

## 2017-11-16 PROCEDURE — 85347 COAGULATION TIME ACTIVATED: CPT

## 2017-11-16 PROCEDURE — 93571 IV DOP VEL&/PRESS C FLO 1ST: CPT | Mod: 26 | Performed by: INTERNAL MEDICINE

## 2017-11-16 PROCEDURE — 93454 CORONARY ARTERY ANGIO S&I: CPT

## 2017-11-16 PROCEDURE — 80048 BASIC METABOLIC PNL TOTAL CA: CPT | Performed by: INTERNAL MEDICINE

## 2017-11-16 PROCEDURE — 27210946 ZZH KIT HC TOTES DISP CR8

## 2017-11-16 PROCEDURE — 85610 PROTHROMBIN TIME: CPT | Performed by: INTERNAL MEDICINE

## 2017-11-16 RX ORDER — ARGATROBAN 1 MG/ML
350 INJECTION, SOLUTION INTRAVENOUS
Status: DISCONTINUED | OUTPATIENT
Start: 2017-11-16 | End: 2017-11-17 | Stop reason: HOSPADM

## 2017-11-16 RX ORDER — NITROGLYCERIN 5 MG/ML
100-500 VIAL (ML) INTRAVENOUS
Status: COMPLETED | OUTPATIENT
Start: 2017-11-16 | End: 2017-11-16

## 2017-11-16 RX ORDER — ADENOSINE 3 MG/ML
12-12000 INJECTION, SOLUTION INTRAVENOUS
Status: DISCONTINUED | OUTPATIENT
Start: 2017-11-16 | End: 2017-11-17 | Stop reason: HOSPADM

## 2017-11-16 RX ORDER — DOPAMINE HYDROCHLORIDE 160 MG/100ML
2-20 INJECTION, SOLUTION INTRAVENOUS CONTINUOUS PRN
Status: DISCONTINUED | OUTPATIENT
Start: 2017-11-16 | End: 2017-11-17 | Stop reason: HOSPADM

## 2017-11-16 RX ORDER — FENTANYL CITRATE 50 UG/ML
25-50 INJECTION, SOLUTION INTRAMUSCULAR; INTRAVENOUS
Status: DISCONTINUED | OUTPATIENT
Start: 2017-11-16 | End: 2017-11-17 | Stop reason: HOSPADM

## 2017-11-16 RX ORDER — NICARDIPINE HYDROCHLORIDE 2.5 MG/ML
100 INJECTION INTRAVENOUS
Status: DISCONTINUED | OUTPATIENT
Start: 2017-11-16 | End: 2017-11-17 | Stop reason: HOSPADM

## 2017-11-16 RX ORDER — HYDRALAZINE HYDROCHLORIDE 20 MG/ML
10-20 INJECTION INTRAMUSCULAR; INTRAVENOUS
Status: DISCONTINUED | OUTPATIENT
Start: 2017-11-16 | End: 2017-11-17 | Stop reason: HOSPADM

## 2017-11-16 RX ORDER — NITROGLYCERIN 20 MG/100ML
.07-2 INJECTION INTRAVENOUS CONTINUOUS PRN
Status: DISCONTINUED | OUTPATIENT
Start: 2017-11-16 | End: 2017-11-17 | Stop reason: HOSPADM

## 2017-11-16 RX ORDER — METHYLPREDNISOLONE SODIUM SUCCINATE 125 MG/2ML
125 INJECTION, POWDER, LYOPHILIZED, FOR SOLUTION INTRAMUSCULAR; INTRAVENOUS
Status: DISCONTINUED | OUTPATIENT
Start: 2017-11-16 | End: 2017-11-17 | Stop reason: HOSPADM

## 2017-11-16 RX ORDER — ASPIRIN 325 MG
325 TABLET ORAL
Status: DISCONTINUED | OUTPATIENT
Start: 2017-11-16 | End: 2017-11-17 | Stop reason: HOSPADM

## 2017-11-16 RX ORDER — PROMETHAZINE HYDROCHLORIDE 25 MG/ML
6.25-25 INJECTION, SOLUTION INTRAMUSCULAR; INTRAVENOUS EVERY 4 HOURS PRN
Status: DISCONTINUED | OUTPATIENT
Start: 2017-11-16 | End: 2017-11-17 | Stop reason: HOSPADM

## 2017-11-16 RX ORDER — PROTAMINE SULFATE 10 MG/ML
1-5 INJECTION, SOLUTION INTRAVENOUS
Status: DISCONTINUED | OUTPATIENT
Start: 2017-11-16 | End: 2017-11-17 | Stop reason: HOSPADM

## 2017-11-16 RX ORDER — SODIUM NITROPRUSSIDE 25 MG/ML
100-200 INJECTION INTRAVENOUS
Status: DISCONTINUED | OUTPATIENT
Start: 2017-11-16 | End: 2017-11-17 | Stop reason: HOSPADM

## 2017-11-16 RX ORDER — VERAPAMIL HYDROCHLORIDE 2.5 MG/ML
1-5 INJECTION, SOLUTION INTRAVENOUS
Status: COMPLETED | OUTPATIENT
Start: 2017-11-16 | End: 2017-11-16

## 2017-11-16 RX ORDER — NALOXONE HYDROCHLORIDE 0.4 MG/ML
0.4 INJECTION, SOLUTION INTRAMUSCULAR; INTRAVENOUS; SUBCUTANEOUS EVERY 5 MIN PRN
Status: DISCONTINUED | OUTPATIENT
Start: 2017-11-16 | End: 2017-11-17 | Stop reason: HOSPADM

## 2017-11-16 RX ORDER — PROTAMINE SULFATE 10 MG/ML
25-100 INJECTION, SOLUTION INTRAVENOUS EVERY 5 MIN PRN
Status: DISCONTINUED | OUTPATIENT
Start: 2017-11-16 | End: 2017-11-17 | Stop reason: HOSPADM

## 2017-11-16 RX ORDER — LIDOCAINE HYDROCHLORIDE 10 MG/ML
30 INJECTION, SOLUTION EPIDURAL; INFILTRATION; INTRACAUDAL; PERINEURAL
Status: DISCONTINUED | OUTPATIENT
Start: 2017-11-16 | End: 2017-11-17 | Stop reason: HOSPADM

## 2017-11-16 RX ORDER — ARGATROBAN 1 MG/ML
150 INJECTION, SOLUTION INTRAVENOUS
Status: DISCONTINUED | OUTPATIENT
Start: 2017-11-16 | End: 2017-11-17 | Stop reason: HOSPADM

## 2017-11-16 RX ORDER — FUROSEMIDE 10 MG/ML
20-100 INJECTION INTRAMUSCULAR; INTRAVENOUS
Status: DISCONTINUED | OUTPATIENT
Start: 2017-11-16 | End: 2017-11-17 | Stop reason: HOSPADM

## 2017-11-16 RX ORDER — LORAZEPAM 2 MG/ML
.5-2 INJECTION INTRAMUSCULAR EVERY 4 HOURS PRN
Status: DISCONTINUED | OUTPATIENT
Start: 2017-11-16 | End: 2017-11-17 | Stop reason: HOSPADM

## 2017-11-16 RX ORDER — IOPAMIDOL 755 MG/ML
130 INJECTION, SOLUTION INTRAVASCULAR ONCE
Status: COMPLETED | OUTPATIENT
Start: 2017-11-16 | End: 2017-11-16

## 2017-11-16 RX ORDER — PRASUGREL 10 MG/1
10-60 TABLET, FILM COATED ORAL
Status: DISCONTINUED | OUTPATIENT
Start: 2017-11-16 | End: 2017-11-17 | Stop reason: HOSPADM

## 2017-11-16 RX ORDER — NITROGLYCERIN 0.4 MG/1
0.4 TABLET SUBLINGUAL EVERY 5 MIN PRN
Status: DISCONTINUED | OUTPATIENT
Start: 2017-11-16 | End: 2017-11-17 | Stop reason: HOSPADM

## 2017-11-16 RX ORDER — ENALAPRILAT 1.25 MG/ML
1.25-2.5 INJECTION INTRAVENOUS
Status: DISCONTINUED | OUTPATIENT
Start: 2017-11-16 | End: 2017-11-17 | Stop reason: HOSPADM

## 2017-11-16 RX ORDER — DEXTROSE MONOHYDRATE 25 G/50ML
12.5-5 INJECTION, SOLUTION INTRAVENOUS EVERY 30 MIN PRN
Status: DISCONTINUED | OUTPATIENT
Start: 2017-11-16 | End: 2017-11-17 | Stop reason: HOSPADM

## 2017-11-16 RX ORDER — EPINEPHRINE 1 MG/ML
0.3 INJECTION, SOLUTION, CONCENTRATE INTRAVENOUS
Status: DISCONTINUED | OUTPATIENT
Start: 2017-11-16 | End: 2017-11-17 | Stop reason: HOSPADM

## 2017-11-16 RX ORDER — NITROGLYCERIN 5 MG/ML
100-200 VIAL (ML) INTRAVENOUS
Status: DISCONTINUED | OUTPATIENT
Start: 2017-11-16 | End: 2017-11-17 | Stop reason: HOSPADM

## 2017-11-16 RX ORDER — ASPIRIN 81 MG/1
81-324 TABLET, CHEWABLE ORAL
Status: DISCONTINUED | OUTPATIENT
Start: 2017-11-16 | End: 2017-11-17 | Stop reason: HOSPADM

## 2017-11-16 RX ORDER — METOPROLOL TARTRATE 50 MG
50 TABLET ORAL 2 TIMES DAILY
Qty: 60 TABLET | Refills: 11 | Status: SHIPPED | OUTPATIENT
Start: 2017-11-16 | End: 2018-01-23

## 2017-11-16 RX ORDER — MAGNESIUM HYDROXIDE 1200 MG/15ML
1000 LIQUID ORAL CONTINUOUS PRN
Status: DISCONTINUED | OUTPATIENT
Start: 2017-11-16 | End: 2017-11-17 | Stop reason: HOSPADM

## 2017-11-16 RX ORDER — EPTIFIBATIDE 2 MG/ML
180 INJECTION, SOLUTION INTRAVENOUS EVERY 10 MIN PRN
Status: DISCONTINUED | OUTPATIENT
Start: 2017-11-16 | End: 2017-11-17 | Stop reason: HOSPADM

## 2017-11-16 RX ORDER — POTASSIUM CHLORIDE 29.8 MG/ML
20 INJECTION INTRAVENOUS
Status: DISCONTINUED | OUTPATIENT
Start: 2017-11-16 | End: 2017-11-17 | Stop reason: HOSPADM

## 2017-11-16 RX ORDER — ONDANSETRON 2 MG/ML
4 INJECTION INTRAMUSCULAR; INTRAVENOUS EVERY 4 HOURS PRN
Status: DISCONTINUED | OUTPATIENT
Start: 2017-11-16 | End: 2017-11-17 | Stop reason: HOSPADM

## 2017-11-16 RX ORDER — FLUMAZENIL 0.1 MG/ML
0.2 INJECTION, SOLUTION INTRAVENOUS
Status: DISCONTINUED | OUTPATIENT
Start: 2017-11-16 | End: 2017-11-17 | Stop reason: HOSPADM

## 2017-11-16 RX ORDER — HEPARIN SODIUM 1000 [USP'U]/ML
1000-10000 INJECTION, SOLUTION INTRAVENOUS; SUBCUTANEOUS EVERY 5 MIN PRN
Status: DISCONTINUED | OUTPATIENT
Start: 2017-11-16 | End: 2017-11-17 | Stop reason: HOSPADM

## 2017-11-16 RX ORDER — NIFEDIPINE 10 MG/1
10 CAPSULE ORAL
Status: DISCONTINUED | OUTPATIENT
Start: 2017-11-16 | End: 2017-11-17 | Stop reason: HOSPADM

## 2017-11-16 RX ORDER — DIPHENHYDRAMINE HYDROCHLORIDE 50 MG/ML
25-50 INJECTION INTRAMUSCULAR; INTRAVENOUS
Status: DISCONTINUED | OUTPATIENT
Start: 2017-11-16 | End: 2017-11-17 | Stop reason: HOSPADM

## 2017-11-16 RX ORDER — POTASSIUM CHLORIDE 7.45 MG/ML
10 INJECTION INTRAVENOUS
Status: DISCONTINUED | OUTPATIENT
Start: 2017-11-16 | End: 2017-11-17 | Stop reason: HOSPADM

## 2017-11-16 RX ORDER — CLOPIDOGREL BISULFATE 75 MG/1
300-600 TABLET ORAL
Status: DISCONTINUED | OUTPATIENT
Start: 2017-11-16 | End: 2017-11-17 | Stop reason: HOSPADM

## 2017-11-16 RX ORDER — CLOPIDOGREL BISULFATE 75 MG/1
75 TABLET ORAL
Status: DISCONTINUED | OUTPATIENT
Start: 2017-11-16 | End: 2017-11-17 | Stop reason: HOSPADM

## 2017-11-16 RX ORDER — EPTIFIBATIDE 2 MG/ML
2 INJECTION, SOLUTION INTRAVENOUS CONTINUOUS PRN
Status: DISCONTINUED | OUTPATIENT
Start: 2017-11-16 | End: 2017-11-17 | Stop reason: HOSPADM

## 2017-11-16 RX ORDER — HYDRALAZINE HYDROCHLORIDE 25 MG/1
25 TABLET, FILM COATED ORAL 4 TIMES DAILY
Qty: 120 TABLET | Refills: 11 | Status: SHIPPED | OUTPATIENT
Start: 2017-11-16 | End: 2017-11-30

## 2017-11-16 RX ORDER — METOPROLOL TARTRATE 1 MG/ML
5 INJECTION, SOLUTION INTRAVENOUS EVERY 5 MIN PRN
Status: DISCONTINUED | OUTPATIENT
Start: 2017-11-16 | End: 2017-11-17 | Stop reason: HOSPADM

## 2017-11-16 RX ORDER — PHENYLEPHRINE HCL IN 0.9% NACL 1 MG/10 ML
20-100 SYRINGE (ML) INTRAVENOUS
Status: DISCONTINUED | OUTPATIENT
Start: 2017-11-16 | End: 2017-11-17 | Stop reason: HOSPADM

## 2017-11-16 RX ORDER — DOBUTAMINE HYDROCHLORIDE 200 MG/100ML
2-20 INJECTION INTRAVENOUS CONTINUOUS PRN
Status: DISCONTINUED | OUTPATIENT
Start: 2017-11-16 | End: 2017-11-17 | Stop reason: HOSPADM

## 2017-11-16 RX ORDER — ATROPINE SULFATE 0.1 MG/ML
.5-1 INJECTION INTRAVENOUS
Status: DISCONTINUED | OUTPATIENT
Start: 2017-11-16 | End: 2017-11-17 | Stop reason: HOSPADM

## 2017-11-16 RX ADMIN — IOPAMIDOL 130 ML: 755 INJECTION, SOLUTION INTRAVASCULAR at 20:00

## 2017-11-16 RX ADMIN — Medication 5000 UNITS: at 19:18

## 2017-11-16 RX ADMIN — MIDAZOLAM HYDROCHLORIDE 1.5 MG: 1 INJECTION, SOLUTION INTRAMUSCULAR; INTRAVENOUS at 19:18

## 2017-11-16 RX ADMIN — Medication 500 UNITS: at 19:18

## 2017-11-16 RX ADMIN — HEPARIN SODIUM 1300 UNITS/HR: 10000 INJECTION, SOLUTION INTRAVENOUS at 02:17

## 2017-11-16 RX ADMIN — ATORVASTATIN CALCIUM 80 MG: 80 TABLET, FILM COATED ORAL at 08:31

## 2017-11-16 RX ADMIN — ADENOSINE 180 MCG: 3 INJECTION, SOLUTION INTRAVENOUS at 19:44

## 2017-11-16 RX ADMIN — HYDRALAZINE HYDROCHLORIDE 25 MG: 25 TABLET ORAL at 08:31

## 2017-11-16 RX ADMIN — METOPROLOL TARTRATE 50 MG: 50 TABLET, FILM COATED ORAL at 08:31

## 2017-11-16 RX ADMIN — HYDRALAZINE HYDROCHLORIDE 25 MG: 25 TABLET ORAL at 17:23

## 2017-11-16 RX ADMIN — Medication 1500 UNITS: at 19:18

## 2017-11-16 RX ADMIN — METOPROLOL TARTRATE 50 MG: 50 TABLET, FILM COATED ORAL at 20:37

## 2017-11-16 RX ADMIN — VERAPAMIL HYDROCHLORIDE 5 MG: 2.5 INJECTION, SOLUTION INTRAVENOUS at 19:19

## 2017-11-16 RX ADMIN — NITROGLYCERIN 200 MCG: 5 INJECTION, SOLUTION INTRAVENOUS at 19:19

## 2017-11-16 RX ADMIN — FENTANYL CITRATE 75 MCG: 50 INJECTION, SOLUTION INTRAMUSCULAR; INTRAVENOUS at 19:15

## 2017-11-16 RX ADMIN — HYDRALAZINE HYDROCHLORIDE 25 MG: 25 TABLET ORAL at 20:37

## 2017-11-16 RX ADMIN — HYDRALAZINE HYDROCHLORIDE 25 MG: 25 TABLET ORAL at 13:59

## 2017-11-16 RX ADMIN — ASPIRIN 81 MG CHEWABLE TABLET 81 MG: 81 TABLET CHEWABLE at 08:31

## 2017-11-16 NOTE — PLAN OF CARE
"Problem: Arrhythmia/Dysrhythmia (Symptomatic) (Adult)  Goal: Signs and Symptoms of Listed Potential Problems Will be Absent, Minimized or Managed (Arrhythmia/Dysrhythmia)  Signs and symptoms of listed potential problems will be absent, minimized or managed by discharge/transition of care (reference Arrhythmia/Dysrhythmia (Symptomatic) (Adult) CPG).   Outcome: No Change  /84 (BP Location: Left arm)  Temp 98  F (36.7  C) (Oral)  Resp 18  Ht 1.803 m (5' 11\")  Wt 102.4 kg (225 lb 11.2 oz)  SpO2 96%  BMI 31.48 kg/m2    A&Ox4. BP and HR improved with metoprolol and hydralazine. HR remains in A fib with rates back to baseline (60s-90s bpm). AOVSS on RA. No c/o chest pain or SOB. Heparin gtt infusing 1,300units/hr. Hep10a recheck with AM labs. Voiding adequately. Last BM 11/14. Passing gas. He has been NPO since midnight for angiogram today. Will continue to monitor and follow POC.       "

## 2017-11-16 NOTE — PLAN OF CARE
VSS, A&Ox4, A-fib in 50s-70s. Heparin gtt at 1450units/hr, next 10a ordered for 1645. NPO for angio today, no time per cath lab. BPs 120s-130s/80s-100s (100s). No c/o chest pain, SOB, dizziness. Pt up independently, adequate UOP, +BM x1. Plan for angio today, possible d/c later this week. Notify CSI of changes or concerns.     Colin Becerril RN  Hours cared for: [phone]

## 2017-11-16 NOTE — PROGRESS NOTES
"  Interventional Cardiology Progress Note  Subjective:  No acute events overnight. Patient remains on Heparin, currently chest pain free.    Objective:  Most recent vital signs:  /83 (BP Location: Left arm)  Temp 98.1  F (36.7  C) (Oral)  Resp 18  Ht 1.803 m (5' 11\")  Wt 102.4 kg (225 lb 11.2 oz)  SpO2 97%  BMI 31.48 kg/m2  Temp:  [97.6  F (36.4  C)-98.9  F (37.2  C)] 98.1  F (36.7  C)  Heart Rate:  [] 99  Resp:  [16-18] 18  BP: (119-166)/() 138/83  SpO2:  [96 %-98 %] 97 %  Wt Readings from Last 2 Encounters:   11/16/17 102.4 kg (225 lb 11.2 oz)   11/14/17 102.1 kg (225 lb)       Intake/Output Summary (Last 24 hours) at 11/15/17 0848  Last data filed at 11/15/17 0659   Gross per 24 hour   Intake            355.7 ml   Output                0 ml   Net            355.7 ml     Physical exam:  General: In bed, in NAD  HEENT: EOMI, PERRLA, no scleral icterus or injection  CARDIAC: RRR, no m/r/g appreciated. Peripheral pulses 2+  RESP: CTAB, no wheezes, rhonchi or crackles appreciated.  GI: NABS, NT/ND, no guarding or rebound  EXTREMITIES: NO LE edema, pulses 2+.   SKIN: No acute lesions appreciated  NEURO: Alert and oriented X3, CN II-XII grossly intact, no focal neurological deficits noted      Labs (Past three days):  CBC    Recent Labs  Lab 11/15/17  0640 11/14/17  2143 11/14/17  1714   WBC 9.2 10.0 9.7   RBC 4.97 4.94 5.06   HGB 15.3 15.5 15.6   HCT 44.9 45.1 44.6   MCV 90 91 88   MCH 30.8 31.4 30.8   MCHC 34.1 34.4 35.0   RDW 12.6 12.6 12.1    231 240     BMP    Recent Labs  Lab 11/15/17  0640 11/14/17  1714    141   POTASSIUM 3.8 3.8   CHLORIDE 109 109   CO2 23 22   ANIONGAP 10 10   * 94   BUN 16 17   CR 0.90 0.96   GFRESTIMATED 90 83   GFRESTBLACK >90 >90   BARRON 8.9 9.2     Troponins:     INRNo lab results found in last 7 days.  Liver panel    Recent Labs  Lab 11/14/17  1714   PROTTOTAL 7.4   ALBUMIN 3.8   BILITOTAL 0.2   ALKPHOS 67   AST 18   ALT 34       Assessment & " Plan:  Mr. Matthew Still is a 48 yo male patient with a PMH notable for paroxysmal atrial fibrillation who presented to OSH ED with chief complaint of chest pain, dyspnea, found to be in atrial fibrillation with elevated troponin.      #Chest Pain // resolved   #Elevated Troponin: 0.132   Chest pain free upon arrival. History with CP that awoke him from sleep, constant 5/6, central pressure. Worse with exertion. Resolved with sleep.   EKG with TWI in I, II, V6. No prior EKGS to compare; currently rate controlled in atrial fibrillation with rates in the 90s to 100s. Family history on mother's side. DYLAN score 3 (+ depending  CH, A1C (unk)). Trop leak could also be related to below, but chest pain and family history concerning.   RF: HTN, fam hx  - continue heparin  - continue ASA  - start metoprolol 12.5mg BID, up titrate as needed  - trend troponin, lipid panel and A1C in AM   - NPO for Cor angio  - echo ordered for AM      #Paroxysmal Atrial Fibrillation  Per patient, diagnosed ~20y ago at this institution. No record on file, possible that patient had different MRN. Reports he underwent coronary angiogram (states it was 'clean'), and cardioversion. Stayed out of afib for 2-3 years but then starting getting symptomatic (palpitations) episodes every once in a while. Lately, it has been about 1/week, lasting ~1 hour at time. Persistent palpitations led him to ED presentation.  CHADSVASC 1(HTN).   - heparin gtt for now   - echo as above, NPO at MN     HORTENSIA Garcia, CNP  [facility]  Interventional Cardiology  [phone]      ATTENDING NOTE:  Patient has been seen and evaluated by me on 11/15/2017.  I have reviewed and edited, as necessary, the history, review of systems, physical examination, and assessment and plan.  I have discussed my assessment and plan with the cardiology fellow.  Matthew Still is a 47 year old male with risk factor profile (+) HTN, (-) DM, (+)  hypercholesterolemia, (-) tobacco use, (+) fam Hx premature CAD, chronic AF, CHADS2-Vasc =1 [not anticoagulated], presented on 11/14/17 with chest pain, resolving with SL NTG.  He was hypertensive on arrival with SBP 190s.  Troponins:    Lab Results   Component Value Date    TROPI 0.044 11/15/2017    TROPI 0.071 (H) 11/15/2017    TROPI 0.132 (HH) 11/14/2017    TROPI 0.132 (HH) 11/14/2017     ECG has shown AF with LVH and strain (ST depression lateral), no Q waves.  Prolonged QTc 0.52.  ECHO showed LVEF 65-70%, no wall motion abnormalities, no valvular heart disease.   There was mild to moderate concentric wall thickening consistent with left ventricular hypertrophy and apical segments were also thickened,  measuring 1.3 cm. Plan on coronary angiography today.     Abhishek Huntley MD     Cardiovascular Division

## 2017-11-17 ENCOUNTER — CARE COORDINATION (OUTPATIENT)
Dept: CARE COORDINATION | Facility: CLINIC | Age: 47
End: 2017-11-17

## 2017-11-17 VITALS
TEMPERATURE: 97.7 F | HEART RATE: 80 BPM | RESPIRATION RATE: 18 BRPM | WEIGHT: 225.7 LBS | BODY MASS INDEX: 31.6 KG/M2 | OXYGEN SATURATION: 97 % | DIASTOLIC BLOOD PRESSURE: 90 MMHG | SYSTOLIC BLOOD PRESSURE: 119 MMHG | HEIGHT: 71 IN

## 2017-11-17 LAB
INTERPRETATION ECG - MUSE: NORMAL
INTERPRETATION ECG - MUSE: NORMAL

## 2017-11-17 PROCEDURE — 99238 HOSP IP/OBS DSCHRG MGMT 30/<: CPT | Mod: GC | Performed by: INTERNAL MEDICINE

## 2017-11-17 NOTE — PROCEDURES
FINAL CARDIAC CATH REPORT:     PROCEDURES PERFORMED:   Coronary Angiography  Physiologic Assessment (FFR)    PHYSICIANS:  Attending Physician: Abhishek Huntley MD  Interventional Cardiology Fellow: None  Cardiology Fellow: Sumanth Chow MD    INDICATION:  Matthew Still is a 47 year old male with risk factor profile (-) HTN, (-) DM, (-) hypercholesterolemia, (-) tobacco use, (-) fam Hx premature CAD, who presents on an urgent basis. The clinical presentation was NSTEMI (CCS IV).     DESCRIPTION:  1. Consent obtained with discussion of risks.  All questions were answered.  2. Sterile prep and procedure.  3. Location with Sheaths:   RT Radial Arterial  6 Fr  10 cm [short]  4. Access: Local anesthetic with lidocaine.  A micropuncture 21 guage needle with ultrasound guidance was used to establish vascular access using a modified Seldinger technique.  5. Diagnostic Catheters:   6 Fr  Aaron  6. Guiding Catheters:  6 Fr  Ikari LF 3.5  6. Estimated blood loss: < 25 ml    MEDICATIONS:  The procedure was performed under conscious sedation for 29 minutes from 1914 to 1943.  The patient was assessed immediately before the first sedation medication was administered.  Midazolam 1.5 mg and Fentanyl 75 mcg were administered.  Heart rate, BP, respiration, oxygen saturation and patient responses were monitored throughout the procedure with the assistance of the RN under my supervision.  >> IA Verapamil and IA NTG were administered to prophylax against radial artery spasm.  >> Antiplatelet Therapy:  mg given at admission    Procedures:    CORONARY ANGIOGRAM:   1. Both coronary arteries arise from their respective cusps.  2. Dominance: Left  3. The LM is short without angiographic evidence of disease.   4. LAD: Type 3 [LAD supplies the entire apex].. The LAD gives rise to septal perforators, large D1 and large D2.  The pLAD has a 10% stenosis, mLAD has a minimal disease, dLAD has a <25% stenosis, D1 has a 60% stenosis and D2 has a  <25% stenosis.    5. LCX gives rise to OM1 and OM2 vessels.  The pLCx has a 0% stenosis, mLCx has a 0% stenosis, dLCx has a 0% stenosis, OM1 has a 0% stenosis and OM2 has a 0% stenosis.  There are 3 PL branches without stenosis.  6. RCA gives rise to the PDA.  This vessel is diffusely diseased. The pRCA has a 10% stenosis, mRCA has a 10% stenosis, dRCA has a 10% stenosis, RPDA has a 10% stenosis.       FUNCTIONAL ASSESSMENT:  Adequate anticoagulation was was obtained with IV UFH. A [employer] Aeris wire was passed across the D1 lesion.  Hyperemia was induced with IC Adenosine (80 mcg).  The FFR at peak hyperemia was 0.88.      Sheath Removal:  The right radial artery sheath was manually removed in the cardiac catheterization laboratory.    Contrast: Isovue, 130 ml     Fluoroscopy Time: 7.2 min    COMPLICATIONS:  1. None    SUMMARY:   1. Single vessel branch (D1) CAD, physiologically insignificant    PLAN:   >> ASA 81 mg qd   >> Patient should be on a statin  >> Continued medical management and lifestyle modification for cardiovascular risk factor optimization.   >> Return to the primary inpatient team for further evaluation and management.   >> If his wrist looks okay after the TR band is removed, it is reasonable for him to discharge later this evening.    The attending interventional cardiologist was present and supervised all critical aspects the procedure.    Findings discussed with patient at end of case.    See CVIS report for final draft.    Sumanth Chow MD   Cardiology Fellow      Staff Cardiologist: I supervised the cardiology fellow and reviewed the coronary angiography and physiologic findings with the fellow at the completion of the procedure.  I agree with the documentation above.    Abhishek Huntley MD

## 2017-11-17 NOTE — PROGRESS NOTES
"Called patient and left message to reduce lopressor.      1:59 PM  Patient returning call. He states he is not so much dizzy from the medication (lopressor) but feels \"blah\" after taking it. Discussed benefits and side effects of beta blockers. Patient understands and will address this with his PCP next week. States that he will continue to take the medication as prescribed, encouraged hydration and taking medication with food.  Patient states he needs an work release letter, he is a . Will send today.  "

## 2017-11-17 NOTE — PROGRESS NOTES
[facility]: Post-Discharge Note  SITUATION                                                      Admission:    Admission Date: 11/14/17   Reason for Admission: chest pain   Discharge:    Discharge Date: 11/16/17   Discharge Diagnosis: chest pain for which you underwent a cor angio.   Discharge Service: Cardiology            ASSESSMENT      Patient reports symptoms are: Lightheadedness after he takes meds  Does the patient have all of their medications?: Yes  Does patient know what their new medications are for?: Yes  Does paient have a follow-up appointment scheduled?: Yes  Does patient have any other questions or concerns?: No      PLAN                                                      Outpatient Plan:  Routing to  in [facility]    Future Appointments  Date Time Provider Department Center   11/22/2017 10:00 AM Roger Willett MD [facility] RN

## 2017-11-17 NOTE — PLAN OF CARE
Problem: Cardiac: ACS (Acute Coronary Syndrome) (Adult)  Goal: Signs and Symptoms of Listed Potential Problems Will be Absent, Minimized or Managed (Cardiac: ACS)  Signs and symptoms of listed potential problems will be absent, minimized or managed by discharge/transition of care (reference Cardiac: ACS (Acute Coronary Syndrome) (Adult) CPG).  Outcome: Improving  D/I: Monitor shows chronic AFib. Heparin drip dc'd at 1830 just prior to transfer to CV lab. Denied pain or shortness of breath. Returned to unit at 2015 with TR band on right wrist. Air removed over 1.5 hours with site remaining clean and dry. TR and deflated but in place until night shift on duty. Removed by night shift and patient dc'd to home with new medications. Wife and father present. Patient and family expressed frustrations at waiting for 2 full days NPO prior to angio early evening. Pleasant and cooperative. See flowsheets for assessment and additional data.  A: Stable ACS.   P: DC to home when orders received.

## 2017-11-17 NOTE — LETTER
November 17, 2017      TO: Whom it may concern       In regards to:  Matthew Still  [address] [facility] [address]      Matthew Still was under my care here at the [facility] on November 14th. Due to the nature of a procedure he underwent he will need to refrain from any work activities that require any lifting over 10 pounds for 5 day.   He is free to resume all activities without restrictions as of Monday, November 20th.   Thank you for your cooperation with [facility].    If you have any questions or concerns, please do not hesitate to call my nurse Jyothi Estrada RN at [phone].    Sincerely,      Abhishek Huntley MD   of Cardiology   [facility]

## 2017-11-17 NOTE — PROGRESS NOTES
SPIRITUAL HEALTH SERVICES  SPIRITUAL ASSESSMENT Progress Note  [facility] 6C     REFERRAL SOURCE: pt had been seen previously by on-call .    Visited briefly with pt and family. Pt expects to discharge soon.    PLAN: no further needs expressed at this time.    Haroldo Simmons M.Div. (Bill), [facility]  Staff   Pager [phone]

## 2017-11-17 NOTE — DISCHARGE SUMMARY
[facility] Discharge Summary    Matthew Still MRN# [id number]   Age: 47 year old YOB: 1970     Date of Admission:  11/14/2017  Date of Discharge::  11/17/2017 12:30 AM  Admitting Physician:  Abhishek Huntley MD  Discharge Physician:  Violet Oden MD           Discharge Diagnosis:   Chest pain          Procedures:     PROCEDURES PERFORMED:   Coronary Angiography  Physiologic Assessment (FFR)     PHYSICIANS:  Attending Physician: Abhishek Huntley MD  Interventional Cardiology Fellow: None  Cardiology Fellow: Sumanth Chow MD     INDICATION:  Matthew Still is a 47 year old male with risk factor profile (-) HTN, (-) DM, (-) hypercholesterolemia, (-) tobacco use, (-) fam Hx premature CAD, who presents on an urgent basis. The clinical presentation was NSTEMI (CCS IV).      DESCRIPTION:  1. Consent obtained with discussion of risks.  All questions were answered.  2. Sterile prep and procedure.  3. Location with Sheaths:   RT Radial Arterial  6 Fr  10 cm [short]  4. Access: Local anesthetic with lidocaine.  A micropuncture 21 guage needle with ultrasound guidance was used to establish vascular access using a modified Seldinger technique.  5. Diagnostic Catheters:   6 Fr  Aaron  6. Guiding Catheters:  6 Fr  Ikari LF 3.5  6. Estimated blood loss: < 25 ml     MEDICATIONS:  The procedure was performed under conscious sedation for 29 minutes from 1914 to 1943.  The patient was assessed immediately before the first sedation medication was administered.  Midazolam 1.5 mg and Fentanyl 75 mcg were administered.  Heart rate, BP, respiration, oxygen saturation and patient responses were monitored throughout the procedure with the assistance of the RN under my supervision.  >> IA Verapamil and IA NTG were administered to prophylax against radial artery spasm.  >> Antiplatelet Therapy:  mg given at admission     Procedures:     CORONARY ANGIOGRAM:   1. Both coronary arteries arise from their respective  cusps.  2. Dominance: Left  3. The LM is short without angiographic evidence of disease.   4. LAD: Type 3 [LAD supplies the entire apex].. The LAD gives rise to septal perforators, large D1 and large D2.  The pLAD has a 10% stenosis, mLAD has a minimal disease, dLAD has a <25% stenosis, D1 has a 60% stenosis and D2 has a <25% stenosis.    5. LCX gives rise to OM1 and OM2 vessels.  The pLCx has a 0% stenosis, mLCx has a 0% stenosis, dLCx has a 0% stenosis, OM1 has a 0% stenosis and OM2 has a 0% stenosis.  There are 3 PL branches without stenosis.  6. RCA gives rise to the PDA.  This vessel is diffusely diseased. The pRCA has a 10% stenosis, mRCA has a 10% stenosis, dRCA has a 10% stenosis, RPDA has a 10% stenosis.         FUNCTIONAL ASSESSMENT:  Adequate anticoagulation was was obtained with IV UFH. A Radi Aeris wire was passed across the lesion.  Hyperemia was induced with IC Adenosine (80 mcg).  The FFR at peak hyperemia was 0.88.        Sheath Removal:  The right radial artery sheath was manually removed in the cardiac catheterization laboratory.     Contrast: Isovue, 130 ml      Fluoroscopy Time: 7.2 min     COMPLICATIONS:  1. None     SUMMARY:   Single vessel CAD-D1 branch of the LAD with negative FFR     PLAN:   >> ASA 81 mg qd   >> Patient should be on a statin  >> Continued medical management and lifestyle modification for cardiovascular risk factor optimization.   >> Return to the primary inpatient team for further evaluation and management.   >> If his wrist looks okay after the TR band is removed, it is reasonable for him to discharge later this evening.     The attending interventional cardiologist was present and supervised all critical aspects the procedure.     Findings discussed with patient at end of case.          Discharge Medications:      Matthew Still   Home Medication Instructions SVETA:[id number]    Printed on:11/17/17 9488   Medication Information                      aspirin 81 MG chewable  tablet  Take 81 mg by mouth daily             atorvastatin (LIPITOR) 80 MG tablet  Take 1 tablet (80 mg) by mouth daily             hydrALAZINE (APRESOLINE) 25 MG tablet  Take 1 tablet (25 mg) by mouth 4 times daily             metoprolol (LOPRESSOR) 50 MG tablet  Take 1 tablet (50 mg) by mouth 2 times daily                       Brief History of Illness:     Mr. Matthew Still is a 48 yo male patient with a PMH notable for paroxysmal atrial fibrillation who presented to [facility] ED with chief complaint of chest pain, dyspnea, found to be in atrial fibrillation with elevated troponin.      Per patient, he awoke around 1am this morning with centrally located chest pain. 5/6. Noted palpitations as well so figured his pain was related to atrial fibrillation. He went to work, but felt very winded and fatigued throughout the day. Chest pain persisted. No nausea, vomiting, sweating, abdominal or back pain. He went home to take a nap, woke up and chest pain had resolved - however he continued to have palpitations, so he went to the ED. Upon the arrival to the floor, patient was without chest pain/nausea/dyspnea but noted ongoing palpitations.      In the ED, patient was hypertensive, HR in the 90s, saturating well on room air. EKG showed atrial fibrillation with diffuse TWI in anterior lateral and inferior leads. CBC and BMP wnl. Troponin at 0.132, BNP of 1376. CXR negative for pathology. He was started on heparin gtt and transferred to [facility] for further management.           Hospital Course:     Mr. Matthew Still is a 48 yo male patient with a PMH notable for paroxysmal atrial fibrillation who presented to [facility] ED with chief complaint of chest pain, dyspnea, found to be in atrial fibrillation with elevated troponin.       #Chest Pain // resolved   #Elevated Troponin: 0.132   Chest pain free upon arrival. History with CP that awoke him from sleep, constant 5/6, central pressure. Worse with exertion. Resolved with sleep. EKG with TWI  in I, II, V6. No prior EKGS to compare; currently rate controlled in atrial fibrillation with rates in the 90s to 100s. Family history on mother's side. DYLAN score 3 (+ depending  CH, A1C (unk)). Trop leak could also be related to below, but chest pain and family history concerning. Cath was negative on 11/16 (see above).         #Paroxysmal Atrial Fibrillation  Per patient, diagnosed ~20y ago at this institution. No record on file, possible that patient had different MRN. Reports he underwent coronary angiogram (states it was 'clean'), and cardioversion. Stayed out of afib for 2-3 years but then starting getting symptomatic (palpitations) episodes every once in a while. Lately, it has been about 1/week, lasting ~1 hour at time. Persistent palpitations led him to ED presentation.  CHADSVASC 1(HTN).             Discharge Instructions and Follow-Up:   Discharge diet: Regular   Discharge activity: Activity as tolerated   Discharge follow-up: Follow up with primary care provider in 7 days           Discharge Disposition:   Discharged to home      Violet Oden MD     Thank you for allowing me to care for this patient. This has been discussed with the attending physician.    Violet Oden MD  Cardiology Fellow, PGY-4    Staff Cardiologist: Please see my attestation statement on note 11/17/17.    Abhishek Huntley MD

## 2017-11-22 ENCOUNTER — OFFICE VISIT (OUTPATIENT)
Dept: FAMILY MEDICINE | Facility: CLINIC | Age: 47
End: 2017-11-22
Payer: COMMERCIAL

## 2017-11-22 VITALS
WEIGHT: 234 LBS | RESPIRATION RATE: 14 BRPM | HEART RATE: 76 BPM | TEMPERATURE: 97.6 F | DIASTOLIC BLOOD PRESSURE: 76 MMHG | HEIGHT: 71 IN | BODY MASS INDEX: 32.76 KG/M2 | SYSTOLIC BLOOD PRESSURE: 122 MMHG

## 2017-11-22 DIAGNOSIS — I48.91 ATRIAL FIBRILLATION, UNSPECIFIED TYPE (H): Primary | ICD-10-CM

## 2017-11-22 DIAGNOSIS — R94.31 PROLONGED Q-T INTERVAL ON ECG: ICD-10-CM

## 2017-11-22 DIAGNOSIS — Z23 NEED FOR PROPHYLACTIC VACCINATION AND INOCULATION AGAINST INFLUENZA: ICD-10-CM

## 2017-11-22 DIAGNOSIS — I51.7 LVH (LEFT VENTRICULAR HYPERTROPHY): ICD-10-CM

## 2017-11-22 PROCEDURE — 90686 IIV4 VACC NO PRSV 0.5 ML IM: CPT | Performed by: FAMILY MEDICINE

## 2017-11-22 PROCEDURE — 90471 IMMUNIZATION ADMIN: CPT | Performed by: FAMILY MEDICINE

## 2017-11-22 PROCEDURE — 99203 OFFICE O/P NEW LOW 30 MIN: CPT | Mod: 25 | Performed by: FAMILY MEDICINE

## 2017-11-22 NOTE — NURSING NOTE
"Chief Complaint   Patient presents with     Hospital F/U       Initial /77 (BP Location: Left arm, Patient Position: Chair, Cuff Size: Adult Large)  Pulse 76  Temp 97.6  F (36.4  C) (Tympanic)  Ht 5' 11\" (1.803 m)  Wt 234 lb (106.1 kg)  BMI 32.64 kg/m2 Estimated body mass index is 32.64 kg/(m^2) as calculated from the following:    Height as of this encounter: 5' 11\" (1.803 m).    Weight as of this encounter: 234 lb (106.1 kg).  Medication Reconciliation: complete  "

## 2017-11-22 NOTE — PROGRESS NOTES

## 2017-11-22 NOTE — MR AVS SNAPSHOT
After Visit Summary   11/22/2017    Matthew Still    MRN: [id number]           Patient Information     Date Of Birth          1970        Visit Information        Provider Department      11/22/2017 10:00 AM Roger Willett MD [facility]        Today's Diagnoses     Atrial fibrillation, unspecified type (H)    -  1    Prolonged Q-T interval on ECG        LVH (left ventricular hypertrophy)          Care Instructions    Schedule cardiology appointment for consult at the soonest available appointment.    Keep current medications as is.          Thank you for choosing [facility].  You may be receiving a survey in the mail from [employer] regarding your visit today.  Please take a few minutes to complete and return the survey to let us know how we are doing.      If you have questions or concerns, please contact us via [employer] or you can contact your care team at [phone].    Our Clinic hours are:  Monday 6:40 am  to 7:00 pm  Tuesday -Friday 6:40 am to 5:00 pm    The [place] outpatient lab hours are:  Monday - Friday 6:10 am to 4:45 pm  Saturdays 7:00 am to 11:00 am  Appointments are required, call [phone]    If you have clinical questions after hours or would like to schedule an appointment,  call the clinic at [phone].    Discharge Instructions for Atrial Fibrillation  You have been diagnosed with an abnormal heart rhythm called atrial fibrillation. With this condition, your heart s 2 upper chambers quiver rather than squeeze the blood out in a normal pattern. This leads to an irregular and sometimes rapid heartbeat. Some people will develop associated symptoms such as a flip-flopping heartbeat, chest pain, lightheadedness, or shortness of breath. Other people may have no symptoms at all. Atrial fibrillation is serious because it affects the heart s ability to fill with blood as it should. Blood clots may form. This increases the risk for stroke.  Untreated atrial fibrillation can also lead to heart failure. Atrial fibrillation can be controlled. With treatment, most people with atrial fibrillation lead normal lives.  Treatment options  Recommended treatment for atrial fibrillation depends on your age, symptoms, how long you have had atrial fibrillation, and other factors. You will have a complete evaluation to find out if you have any abnormalities that caused your heart to go into atrial fibrillation. This might be blocked heart arteries or a thyroid problem. Your doctor will assess your particular case and discuss choices with you.  Treatment choices may include:    Treating an underlying disorder that puts you at risk for atrial fibrillation. For example, correcting an abnormal thyroid or electrolyte problem, or treating a blocked heart artery.    Restoring a normal heart rhythm with an electrical shock (cardioversion) or with an antiarrhythmic medicine (chemical cardioversion)    Using medicine to control your heart rate in atrial fibrillation.    Preventing the risk for blood clot and stroke using blood-thinning medicines. Your doctor will tell you what he or she recommends. Choices may include aspirin, clopidogrel, warfarin, dabigatran, rivaroxaban, apixaban, and edoxaban.    Doing catheter ablation or a surgical maze procedure. These use different methods to destroy certain areas of heart tissue. This interrupts the electrical signals causing atrial fibrillation. One of these procedures may be a choice when medicines do not work, or as an alternative to long-term medicine.    Other treatment choices may be recommended for you by your doctor.  Managing risk factors for stroke and preventing heart failure are important parts of any treatment plan for atrial fibrillation.  Home care    Take your medicines exactly as directed. Don t skip doses.    Work with your doctor to find the right medicines and doses for you.    Learn to take your own pulse. Keep a  record of your results. Ask your doctor which pulse rates mean that you need medical attention. Slowing your pulse is often the goal of treatment. Ask your doctor if it s OK for you to use an automatic machine to check your pulse at home. Sometimes these machines don t count the pulse correctly when you have atrial fibrillation.    Limit your intake of coffee, tea, cola, and other beverages with caffeine. Talk with your doctor about whether you should eliminate caffeine.    Avoid over-the-counter medicines that have caffeine in them.    Let your doctor know what medicines you take, including prescription and over-the-counter medicines, as well as any supplements. They interfere with some medicines given for atrial fibrillation.    Ask your doctor about whether you can drink alcohol. Some people need to avoid alcohol to better treat atrial fibrillation. If you are taking blood-thinner medicines, alcohol may interfere with them by increasing their effect.    Never take stimulants such as amphetamines or cocaine. These drugs can speed up your heart rate and trigger atrial fibrillation.  Follow-up care  Follow up with your doctor, or as advised.     When should I call my healthcare provider  Call your healthcare provider right away if you have any of the following:    Weakness    Dizziness    Fainting    Fatigue    Shortness of breath    Chest pain with increased activity    A change in the usual regularity of your heartbeat, or an unusually fast heartbeat   Date Last Reviewed: 4/23/2016 2000-2017 [employer]. [address] [place] [address], [place] [address]. All rights reserved. This information is not intended as a substitute for professional medical care. Always follow your healthcare professional's instructions.                Follow-ups after your visit        Additional Services     CARDIOLOGY EVAL ADULT REFERRAL       Your provider has referred you to:  [facility]: [facility] ([phone])  "[phone]   [email or web address]    Please be aware that coverage of these services is subject to the terms and limitations of your health insurance plan.  Call member services at your health plan with any benefit or coverage questions.      Type of Referral:  New Cardiology Consult    Timeframe requested:  Less than 1 week    Please bring the following to your appointment:  >>   Any x-rays, CTs or MRIs which have been performed.  Contact the facility where they were done to arrange for  prior to your scheduled appointment.    >>   List of current medications  >>   This referral request   >>   Any documents/labs given to you for this referral                  Follow-up notes from your care team     Return if symptoms worsen or fail to improve.      Who to contact     If you have questions or need follow up information about today's clinic visit or your schedule please contact [facility] directly at [phone].  Normal or non-critical lab and imaging results will be communicated to you by MyChart, letter or phone within 4 business days after the clinic has received the results. If you do not hear from us within 7 days, please contact the clinic through [employer] or phone. If you have a critical or abnormal lab result, we will notify you by phone as soon as possible.  Submit refill requests through [employer] or call your pharmacy and they will forward the refill request to us. Please allow 3 business days for your refill to be completed.          Additional Information About Your Visit        [employer] Information     [employer] lets you send messages to your doctor, view your test results, renew your prescriptions, schedule appointments and more. To sign up, go to [email or web address] . Click on \"Log in\" on the left side of the screen, which will take you to the Welcome page. Then click on \"Sign up Now\" on the right side of the page.     You will " "be asked to enter the access code listed below, as well as some personal information. Please follow the directions to create your username and password.     Your access code is: [email or web address]  Expires: 2018  6:25 PM     Your access code will  in 90 days. If you need help or a new code, please call your [facility] clinic or [phone].        Care EveryWhere ID     This is your Care EveryWhere ID. This could be used by other organizations to access your [facility] medical records  [id number]        Your Vitals Were     Pulse Temperature Respirations Height BMI (Body Mass Index)       76 97.6  F (36.4  C) (Tympanic) 14 5' 11\" (1.803 m) 32.64 kg/m2        Blood Pressure from Last 3 Encounters:   17 122/76   17 119/90   17 (!) 163/126    Weight from Last 3 Encounters:   17 234 lb (106.1 kg)   17 225 lb 11.2 oz (102.4 kg)   17 225 lb (102.1 kg)              We Performed the Following     CARDIOLOGY EVAL ADULT REFERRAL        Primary Care Provider Office Phone # Fax #    [facility] [phone] [address] [facility] [phone]        Equal Access to Services     ARCHIE MOSCOSO : Hadii aad ku hadasho Soteresitaali, waaxda luqadaha, qaybta kaalmada adeegyada, kika juan. So [facility] [phone].    ATENCIÓN: Si habla español, tiene a villeda disposición servicios gratuitos de asistencia lingüística. Promise al [phone].    We comply with applicable federal civil rights laws and [place] laws. We do not discriminate on the basis of race, color, national origin, age, disability, sex, sexual orientation, or gender identity.            Thank you!     Thank you for choosing [facility]  for your care. Our goal is always to provide you with excellent care. Hearing back from our patients is one way we can continue to improve our services. Please take a few minutes to complete the written survey that you may receive " in the mail after your visit with us. Thank you!             Your Updated Medication List - Protect others around you: Learn how to safely use, store and throw away your medicines at www.disposemymeds.org.          This list is accurate as of: 11/22/17 10:53 AM.  Always use your most recent med list.                   Brand Name Dispense Instructions for use Diagnosis    aspirin 81 MG chewable tablet      Take 81 mg by mouth daily        atorvastatin 80 MG tablet    LIPITOR    30 tablet    Take 1 tablet (80 mg) by mouth daily    Hyperlipidemia LDL goal <70       hydrALAZINE 25 MG tablet    APRESOLINE    120 tablet    Take 1 tablet (25 mg) by mouth 4 times daily    Benign essential hypertension       metoprolol 50 MG tablet    LOPRESSOR    60 tablet    Take 1 tablet (50 mg) by mouth 2 times daily    Atrial fibrillation, unspecified type (H)

## 2017-11-22 NOTE — PATIENT INSTRUCTIONS
Schedule cardiology appointment for consult at the soonest available appointment.    Keep current medications as is.          Thank you for choosing [facility].  You may be receiving a survey in the mail from Anthony Frey regarding your visit today.  Please take a few minutes to complete and return the survey to let us know how we are doing.      If you have questions or concerns, please contact us via [employer] or you can contact your care team at [phone].    Our Clinic hours are:  Monday 6:40 am  to 7:00 pm  Tuesday -Friday 6:40 am to 5:00 pm    The [place] outpatient lab hours are:  Monday - Friday 6:10 am to 4:45 pm  Saturdays 7:00 am to 11:00 am  Appointments are required, call [phone]    If you have clinical questions after hours or would like to schedule an appointment,  call the clinic at [phone].    Discharge Instructions for Atrial Fibrillation  You have been diagnosed with an abnormal heart rhythm called atrial fibrillation. With this condition, your heart s 2 upper chambers quiver rather than squeeze the blood out in a normal pattern. This leads to an irregular and sometimes rapid heartbeat. Some people will develop associated symptoms such as a flip-flopping heartbeat, chest pain, lightheadedness, or shortness of breath. Other people may have no symptoms at all. Atrial fibrillation is serious because it affects the heart s ability to fill with blood as it should. Blood clots may form. This increases the risk for stroke. Untreated atrial fibrillation can also lead to heart failure. Atrial fibrillation can be controlled. With treatment, most people with atrial fibrillation lead normal lives.  Treatment options  Recommended treatment for atrial fibrillation depends on your age, symptoms, how long you have had atrial fibrillation, and other factors. You will have a complete evaluation to find out if you have any abnormalities that caused your heart to go into atrial fibrillation. This  might be blocked heart arteries or a thyroid problem. Your doctor will assess your particular case and discuss choices with you.  Treatment choices may include:    Treating an underlying disorder that puts you at risk for atrial fibrillation. For example, correcting an abnormal thyroid or electrolyte problem, or treating a blocked heart artery.    Restoring a normal heart rhythm with an electrical shock (cardioversion) or with an antiarrhythmic medicine (chemical cardioversion)    Using medicine to control your heart rate in atrial fibrillation.    Preventing the risk for blood clot and stroke using blood-thinning medicines. Your doctor will tell you what he or she recommends. Choices may include aspirin, clopidogrel, warfarin, dabigatran, rivaroxaban, apixaban, and edoxaban.    Doing catheter ablation or a surgical maze procedure. These use different methods to destroy certain areas of heart tissue. This interrupts the electrical signals causing atrial fibrillation. One of these procedures may be a choice when medicines do not work, or as an alternative to long-term medicine.    Other treatment choices may be recommended for you by your doctor.  Managing risk factors for stroke and preventing heart failure are important parts of any treatment plan for atrial fibrillation.  Home care    Take your medicines exactly as directed. Don t skip doses.    Work with your doctor to find the right medicines and doses for you.    Learn to take your own pulse. Keep a record of your results. Ask your doctor which pulse rates mean that you need medical attention. Slowing your pulse is often the goal of treatment. Ask your doctor if it s OK for you to use an automatic machine to check your pulse at home. Sometimes these machines don t count the pulse correctly when you have atrial fibrillation.    Limit your intake of coffee, tea, cola, and other beverages with caffeine. Talk with your doctor about whether you should eliminate  caffeine.    Avoid over-the-counter medicines that have caffeine in them.    Let your doctor know what medicines you take, including prescription and over-the-counter medicines, as well as any supplements. They interfere with some medicines given for atrial fibrillation.    Ask your doctor about whether you can drink alcohol. Some people need to avoid alcohol to better treat atrial fibrillation. If you are taking blood-thinner medicines, alcohol may interfere with them by increasing their effect.    Never take stimulants such as amphetamines or cocaine. These drugs can speed up your heart rate and trigger atrial fibrillation.  Follow-up care  Follow up with your doctor, or as advised.     When should I call my healthcare provider  Call your healthcare provider right away if you have any of the following:    Weakness    Dizziness    Fainting    Fatigue    Shortness of breath    Chest pain with increased activity    A change in the usual regularity of your heartbeat, or an unusually fast heartbeat   Date Last Reviewed: 4/23/2016 2000-2017 [employer]. [address] [place] [address], [place] [address]. All rights reserved. This information is not intended as a substitute for professional medical care. Always follow your healthcare professional's instructions.

## 2017-11-22 NOTE — PROGRESS NOTES
"  SUBJECTIVE:   Matthew Still is a 47 year old male who presents to clinic today for the following health issues:        Hospital Follow-up Visit:    Hospital/Nursing Home/IP Rehab Facility: [facility]  Date of Admission: 11/14/17  Date of Discharge: 11/17/17  Reason(s) for Admission: chest pain            Problems taking medications regularly:  None       Medication changes since discharge: None       Problems adhering to non-medication therapy:  None    Summary of hospitalization:  [facility] discharge summary reviewed       Discharge Instructions and Follow-Up:   Discharge diet: Regular   Discharge activity: Activity as tolerated   Discharge follow-up: Follow up with primary care provider in 7 days               Discharge Disposition:    Discharged to home       Diagnostic Tests/Treatments reviewed.  Follow up needed: see above  Other Healthcare Providers Involved in Patient s Care:         pt states he has not been told to follow up with cardiology.  Update since discharge: stable. Patient denies chest pain, ABREU, orthopnea or LOC. Patient still reports that he intermittently feels his \"a-fib act up\" - irregular pulse but none of the above symptoms.    Post Discharge Medication Reconciliation: discharge medications reconciled, continue medications without change.  Plan of care communicated with patient     Coding guidelines for this visit:  Type of Medical   Decision Making Face-to-Face Visit       within 7 Days of discharge Face-to-Face Visit        within 14 days of discharge   Moderate Complexity [id number]   High Complexity [id number]          Patient states he has had intermittent a-fib for 20 years.  Patient has not seen PCP or cardiologist for this in the last 20 years. He recalls he was told \"there's nothing you can do for that\".  Patient has no record based on his current chart in Epic.  Patient states he has undergone evaluation with [facility] - per hospitalist notes, no " record of cardiology evals have been found.    Problem list and histories reviewed & adjusted, as indicated.  Additional history: as documented    Patient Active Problem List   Diagnosis     Atrial fibrillation (H)     History reviewed. No pertinent surgical history.    Social History   Substance Use Topics     Smoking status: Never Smoker     Smokeless tobacco: Former User     Alcohol use Yes      Comment: 1 drink per month     Family History   Problem Relation Age of Onset     Coronary Artery Disease Mother      a fib, ,MI     Heart Failure Mother      CHF     Other Cancer Father      DIABETES Father      Coronary Artery Disease Paternal Grandmother      Coronary Artery Disease Paternal Grandfather          Current Outpatient Prescriptions   Medication Sig Dispense Refill     hydrALAZINE (APRESOLINE) 25 MG tablet Take 1 tablet (25 mg) by mouth 4 times daily 120 tablet 11     metoprolol (LOPRESSOR) 50 MG tablet Take 1 tablet (50 mg) by mouth 2 times daily 60 tablet 11     atorvastatin (LIPITOR) 80 MG tablet Take 1 tablet (80 mg) by mouth daily 30 tablet 11     aspirin 81 MG chewable tablet Take 81 mg by mouth daily       No Known Allergies      Reviewed and updated as needed this visit by clinical staffTobacco  Allergies  Meds  Problems  Med Hx  Surg Hx  Fam Hx  Soc Hx        Reviewed and updated as needed this visit by Provider  Allergies  Meds  Problems         ROS:  C: NEGATIVE for fever, chills, or change in weight  I: NEGATIVE for worrisome rashes, moles or lesions  E: NEGATIVE for vision changes or irritation  E/M: NEGATIVE for ear, mouth and throat problems  R: NEGATIVE for significant cough or SOB  CV: NEGATIVE for chest pain, or peripheral edema  GI: NEGATIVE for nausea, abdominal pain, heartburn, or change in bowel habits  : NEGATIVE for frequency, dysuria, or hematuria  M: NEGATIVE for significant arthralgias or myalgia  N: NEGATIVE for weakness, dizziness or paresthesias  E: NEGATIVE for  "temperature intolerance, skin/hair changes  H: NEGATIVE for bleeding problems  P: NEG for depression or mood swings.  OBJECTIVE:                                                    /76  Pulse 76  Temp 97.6  F (36.4  C) (Tympanic)  Resp 14  Ht 5' 11\" (1.803 m)  Wt 234 lb (106.1 kg)  BMI 32.64 kg/m2  Body mass index is 32.64 kg/(m^2).  GENERAL: alert and no distress, ambulatory w/o assist  NECK: no tenderness, no adenopathy,  Thyroid not enlarged  RESP: lungs clear to auscultation - no rales, no rhonchi, no wheezes  CV: regular rates and rhythm, no murmur  MS: no edema  SKIN: no suspicious lesions, no rashes  NEURO: CN grossly intact, normoreflexic, no paresis, no hypoesthesias  ABD:  nontender    Diagnostic test results:  Diagnostic Test Results:  Results for orders placed or performed during the hospital encounter of 11/14/17   Heparin 10a Level   Result Value Ref Range    Heparin 10A Level <0.10 IU/mL   CBC with platelets   Result Value Ref Range    WBC 10.0 4.0 - 11.0 10e9/L    RBC Count 4.94 4.4 - 5.9 10e12/L    Hemoglobin 15.5 13.3 - 17.7 g/dL    Hematocrit 45.1 40.0 - 53.0 %    MCV 91 78 - 100 fl    MCH 31.4 26.5 - 33.0 pg    MCHC 34.4 31.5 - 36.5 g/dL    RDW 12.6 10.0 - 15.0 %    Platelet Count 231 150 - 450 10e9/L   Troponin I   Result Value Ref Range    Troponin I ES 0.132 (HH) 0.000 - 0.045 ug/L   Lipid panel reflex to direct LDL   Result Value Ref Range    Cholesterol 241 (H) <200 mg/dL    Triglycerides 166 (H) <150 mg/dL    HDL Cholesterol 36 (L) >39 mg/dL    LDL Cholesterol Calculated 172 (H) <100 mg/dL    Non HDL Cholesterol 205 (H) <130 mg/dL   Hemoglobin A1c   Result Value Ref Range    Hemoglobin A1C 5.3 4.3 - 6.0 %   Basic metabolic panel   Result Value Ref Range    Sodium 141 133 - 144 mmol/L    Potassium 3.8 3.4 - 5.3 mmol/L    Chloride 109 94 - 109 mmol/L    Carbon Dioxide 23 20 - 32 mmol/L    Anion Gap 10 3 - 14 mmol/L    Glucose 102 (H) 70 - 99 mg/dL    Urea Nitrogen 16 7 - 30 mg/dL "    Creatinine 0.90 0.66 - 1.25 mg/dL    GFR Estimate 90 >60 mL/min/1.7m2    GFR Estimate If Black >90 >60 mL/min/1.7m2    Calcium 8.9 8.5 - 10.1 mg/dL   CBC with platelets   Result Value Ref Range    WBC 9.2 4.0 - 11.0 10e9/L    RBC Count 4.97 4.4 - 5.9 10e12/L    Hemoglobin 15.3 13.3 - 17.7 g/dL    Hematocrit 44.9 40.0 - 53.0 %    MCV 90 78 - 100 fl    MCH 30.8 26.5 - 33.0 pg    MCHC 34.1 31.5 - 36.5 g/dL    RDW 12.6 10.0 - 15.0 %    Platelet Count 220 150 - 450 10e9/L   Troponin I   Result Value Ref Range    Troponin I ES 0.071 (H) 0.000 - 0.045 ug/L   Heparin 10a Level   Result Value Ref Range    Heparin 10A Level 0.23 IU/mL   Troponin I   Result Value Ref Range    Troponin I ES 0.044 0.000 - 0.045 ug/L   Heparin Xa (10a) Level   Result Value Ref Range    Heparin 10A Level 0.12 IU/mL   Heparin 10a Level   Result Value Ref Range    Heparin 10A Level 0.40 IU/mL   INR   Result Value Ref Range    INR 1.00 0.86 - 1.14   Basic metabolic panel   Result Value Ref Range    Sodium 141 133 - 144 mmol/L    Potassium 4.0 3.4 - 5.3 mmol/L    Chloride 111 (H) 94 - 109 mmol/L    Carbon Dioxide 24 20 - 32 mmol/L    Anion Gap 6 3 - 14 mmol/L    Glucose 84 70 - 99 mg/dL    Urea Nitrogen 17 7 - 30 mg/dL    Creatinine 1.00 0.66 - 1.25 mg/dL    GFR Estimate 80 >60 mL/min/1.7m2    GFR Estimate If Black >90 >60 mL/min/1.7m2    Calcium 8.9 8.5 - 10.1 mg/dL   EKG 12-lead, tracing only   Result Value Ref Range    Interpretation ECG Click View Image link to view waveform and result    EKG 12-lead, tracing only   Result Value Ref Range    Interpretation ECG Click View Image link to view waveform and result    EKG 12-lead, tracing only   Result Value Ref Range    Interpretation ECG Click View Image link to view waveform and result    Activated clotting time POCT   Result Value Ref Range    Activated Clot Time 270 (H) 105 - 167 sec   Echo Complete with Lumason    Narrative    [id number]  [id number]  [id number]  [id number]^SUNDAY^KORI^LIZETH            [facility],[place]  Echocardiography Laboratory  [address] [place] [address]     Name: TAMARA CENTENO  MRN: [id number]  : 1970  Study Date: 11/15/2017 08:10 AM  Age: 47 yrs  Gender: Male  Patient Location: [facility]  Reason For Study: Afib, Chest Pain  Ordering Physician: KORI BRAND  Performed By: Doroteo Chopra RDCS     BSA: 2.2 m2  Height: 71 in  Weight: 226 lb  HR: 83  BP: 159/110 mmHg  _____________________________________________________________________________  __        Procedure  Complete Portable Echo Adult. Contrast Lumason. Lumason (NDC #1543-6528-28)  given intravenously. Patient was given 5ml mixture of Lumason. 0 ml wasted.  _____________________________________________________________________________  __        Interpretation Summary  Global and regional left ventricular function is hyperkinetic with an EF of  65-70%.  Mild to moderate concentric wall thickening consistent with left ventricular  hypertrophy is present. In addition, apical segments are also thickened,  measuring 1.3 cm. Recommend cardiac MRI to exclude HCM.  The inferior vena cava was normal in size with preserved respiratory  variability. Estimated mean right atrial pressure is 3 mmHg.  No pericardial effusion is present.  Previous study not available for comparison.     _____________________________________________________________________________  __        Left Ventricle  Global and regional left ventricular function is hyperkinetic with an EF of  65-70%. Left ventricular size is normal. Mild to moderate concentric wall  thickening consistent with left ventricular hypertrophy is present. Left  ventricular diastolic function is indeterminate.     Right Ventricle  The right ventricle is normal size. Global right ventricular function is  normal.     Atria  Moderate left atrial enlargement is present. Mild right atrial enlargement is  present.     Mitral Valve  The mitral valve is  normal. Trace mitral insufficiency is present.        Aortic Valve  Aortic valve is normal in structure and function. The aortic valve is  tricuspid.     Tricuspid Valve  Trace tricuspid insufficiency is present. The peak velocity of the tricuspid  regurgitant jet is not obtainable. Pulmonary artery systolic pressure cannot  be assessed.     Pulmonic Valve  The pulmonic valve is normal.     Vessels  The thoracic aorta is normal. The inferior vena cava was normal in size with  preserved respiratory variability. Ascending aorta 3.8 cm. Estimated mean  right atrial pressure is 3 mmHg.     Pericardium  No pericardial effusion is present.        Compared to Previous Study  Previous study not available for comparison.     Attestation  I have personally viewed the imaging and agree with the interpretation and  report as documented by the fellow, Carlitos Paniagua MD, and/or edited by me.  _____________________________________________________________________________  __     MMode/2D Measurements & Calculations  IVSd: 1.3 cm  LVIDd: 4.9 cm  LVIDs: 3.1 cm  LVPWd: 1.3 cm  FS: 37.1 %  EDV(Teich): 111.7 ml  ESV(Teich): 37.0 ml  LV mass(C)d: 251.6 grams  LV mass(C)dI: 113.3 grams/m2  Ao root diam: 3.3 cm  asc Aorta Diam: 3.8 cm  LVOT diam: 2.5 cm  LVOT area: 4.9 cm2  LA Volume (BP): 93.8 ml     LA Volume Index (BP): 42.3 ml/m2  RWT: 0.53        Doppler Measurements & Calculations  PA acc time: 0.09 sec     _____________________________________________________________________________  __           Report approved by: Emelia Castillo 11/15/2017 11:02 AM             ASSESSMENT/PLAN:                                                        ICD-10-CM    1. Atrial fibrillation, unspecified type (H) I48.91 CARDIOLOGY EVAL ADULT REFERRAL   2. Prolonged Q-T interval on ECG R94.31 CARDIOLOGY EVAL ADULT REFERRAL   3. LVH (left ventricular hypertrophy) I51.7 CARDIOLOGY EVAL ADULT REFERRAL   4. Need for prophylactic vaccination and  inoculation against influenza Z23 FLU VAC, SPLIT VIRUS IM > 3 YO (QUADRIVALENT) [57928]     Vaccine Administration, Initial [48866]     Asymptomatic today. No distress. No red flags on exam.  Patient was advised on findings during his hospitalization.  Persistent A-fib with rate control and prolonged QT on EKG.  LVH on echo.  With patient's multiple cardiac-related conditions, it is recommended to be followed by a cardiologist as well.  Patient will likely need surveillance of his LVH, and possibly further investigation of his now apparent persistent a-fib.  CHADSVASC2 score is 1  Will keep on metoprolol, hydralazine and ASA as prescribed at the hospital.  Monitor BP closely. Patient to measure at home.   Return precautions discussed and given to patient.    Total time: 25 mins, more than 50 % spent in addressing the above.    Follow up with Provider - at cardiology consult or prn   Patient Instructions     Schedule cardiology appointment for consult at the soonest available appointment.    Keep current medications as is.          Thank you for choosing [facility].  You may be receiving a survey in the mail from Anthony Frey regarding your visit today.  Please take a few minutes to complete and return the survey to let us know how we are doing.      If you have questions or concerns, please contact us via [employer] or you can contact your care team at [phone].    Our Clinic hours are:  Monday 6:40 am  to 7:00 pm  Tuesday -Friday 6:40 am to 5:00 pm    The [place] outpatient lab hours are:  Monday - Friday 6:10 am to 4:45 pm  Saturdays 7:00 am to 11:00 am  Appointments are required, call [phone]    If you have clinical questions after hours or would like to schedule an appointment,  call the clinic at [phone].    Discharge Instructions for Atrial Fibrillation  You have been diagnosed with an abnormal heart rhythm called atrial fibrillation. With this condition, your heart s 2 upper chambers quiver  rather than squeeze the blood out in a normal pattern. This leads to an irregular and sometimes rapid heartbeat. Some people will develop associated symptoms such as a flip-flopping heartbeat, chest pain, lightheadedness, or shortness of breath. Other people may have no symptoms at all. Atrial fibrillation is serious because it affects the heart s ability to fill with blood as it should. Blood clots may form. This increases the risk for stroke. Untreated atrial fibrillation can also lead to heart failure. Atrial fibrillation can be controlled. With treatment, most people with atrial fibrillation lead normal lives.  Treatment options  Recommended treatment for atrial fibrillation depends on your age, symptoms, how long you have had atrial fibrillation, and other factors. You will have a complete evaluation to find out if you have any abnormalities that caused your heart to go into atrial fibrillation. This might be blocked heart arteries or a thyroid problem. Your doctor will assess your particular case and discuss choices with you.  Treatment choices may include:    Treating an underlying disorder that puts you at risk for atrial fibrillation. For example, correcting an abnormal thyroid or electrolyte problem, or treating a blocked heart artery.    Restoring a normal heart rhythm with an electrical shock (cardioversion) or with an antiarrhythmic medicine (chemical cardioversion)    Using medicine to control your heart rate in atrial fibrillation.    Preventing the risk for blood clot and stroke using blood-thinning medicines. Your doctor will tell you what he or she recommends. Choices may include aspirin, clopidogrel, warfarin, dabigatran, rivaroxaban, apixaban, and edoxaban.    Doing catheter ablation or a surgical maze procedure. These use different methods to destroy certain areas of heart tissue. This interrupts the electrical signals causing atrial fibrillation. One of these procedures may be a choice  when medicines do not work, or as an alternative to long-term medicine.    Other treatment choices may be recommended for you by your doctor.  Managing risk factors for stroke and preventing heart failure are important parts of any treatment plan for atrial fibrillation.  Home care    Take your medicines exactly as directed. Don t skip doses.    Work with your doctor to find the right medicines and doses for you.    Learn to take your own pulse. Keep a record of your results. Ask your doctor which pulse rates mean that you need medical attention. Slowing your pulse is often the goal of treatment. Ask your doctor if it s OK for you to use an automatic machine to check your pulse at home. Sometimes these machines don t count the pulse correctly when you have atrial fibrillation.    Limit your intake of coffee, tea, cola, and other beverages with caffeine. Talk with your doctor about whether you should eliminate caffeine.    Avoid over-the-counter medicines that have caffeine in them.    Let your doctor know what medicines you take, including prescription and over-the-counter medicines, as well as any supplements. They interfere with some medicines given for atrial fibrillation.    Ask your doctor about whether you can drink alcohol. Some people need to avoid alcohol to better treat atrial fibrillation. If you are taking blood-thinner medicines, alcohol may interfere with them by increasing their effect.    Never take stimulants such as amphetamines or cocaine. These drugs can speed up your heart rate and trigger atrial fibrillation.  Follow-up care  Follow up with your doctor, or as advised.     When should I call my healthcare provider  Call your healthcare provider right away if you have any of the following:    Weakness    Dizziness    Fainting    Fatigue    Shortness of breath    Chest pain with increased activity    A change in the usual regularity of your heartbeat, or an unusually fast heartbeat   Date Last  Reviewed: 4/23/2016 2000-2017 [employer]. [address] [place] [address], [place] [address]. All rights reserved. This information is not intended as a substitute for professional medical care. Always follow your healthcare professional's instructions.            Roger Willett MD  [facility]

## 2017-11-30 ENCOUNTER — PRE VISIT (OUTPATIENT)
Dept: CARDIOLOGY | Facility: CLINIC | Age: 47
End: 2017-11-30

## 2017-11-30 ENCOUNTER — HOSPITAL ENCOUNTER (OUTPATIENT)
Dept: CARDIOLOGY | Facility: CLINIC | Age: 47
Discharge: HOME | End: 2017-11-30
Attending: INTERNAL MEDICINE | Admitting: INTERNAL MEDICINE
Payer: COMMERCIAL

## 2017-11-30 ENCOUNTER — OFFICE VISIT (OUTPATIENT)
Dept: CARDIOLOGY | Facility: CLINIC | Age: 47
End: 2017-11-30
Attending: FAMILY MEDICINE
Payer: COMMERCIAL

## 2017-11-30 VITALS
OXYGEN SATURATION: 98 % | SYSTOLIC BLOOD PRESSURE: 138 MMHG | DIASTOLIC BLOOD PRESSURE: 95 MMHG | BODY MASS INDEX: 33.05 KG/M2 | HEART RATE: 94 BPM | WEIGHT: 237 LBS

## 2017-11-30 DIAGNOSIS — I10 BENIGN ESSENTIAL HYPERTENSION: ICD-10-CM

## 2017-11-30 DIAGNOSIS — I48.0 PAROXYSMAL ATRIAL FIBRILLATION (H): Primary | ICD-10-CM

## 2017-11-30 DIAGNOSIS — I48.91 ATRIAL FIBRILLATION, UNSPECIFIED TYPE (H): Primary | ICD-10-CM

## 2017-11-30 DIAGNOSIS — I51.7 LVH (LEFT VENTRICULAR HYPERTROPHY): ICD-10-CM

## 2017-11-30 DIAGNOSIS — I48.91 ATRIAL FIBRILLATION, UNSPECIFIED TYPE (H): ICD-10-CM

## 2017-11-30 PROCEDURE — 93005 ELECTROCARDIOGRAM TRACING: CPT

## 2017-11-30 PROCEDURE — 99214 OFFICE O/P EST MOD 30 MIN: CPT | Performed by: INTERNAL MEDICINE

## 2017-11-30 PROCEDURE — 93010 ELECTROCARDIOGRAM REPORT: CPT | Performed by: INTERNAL MEDICINE

## 2017-11-30 RX ORDER — AMLODIPINE BESYLATE 5 MG/1
5 TABLET ORAL DAILY
Qty: 30 TABLET | Refills: 4 | Status: SHIPPED | OUTPATIENT
Start: 2017-11-30 | End: 2018-03-27

## 2017-11-30 NOTE — LETTER
11/30/2017    Roger Willett MD  [address] [place] [address]    RE: Matthew Still       Dear Colleague,    I had the pleasure of seeing Matthew Still in the [facility].    CARDIOLOGY CONSULT    REASON FOR CONSULT: afib, LVH    PRIMARY CARE PHYSICIAN:  [facility]    HISTORY OF PRESENT ILLNESS:  47-year-old male seen for atrial fibrillation and LVH.    He reports a long history of paroxysmal A. fib.  He states this started about 20 years ago when he was seen in the [facility].  He reportedly underwent EP study, angiogram, and cardioversion.  Over the years he thinks he had A. fib about every 6-8 months with short episodes of palpitations.    In the past one year he thinks his A. fib has been more frequent, occurring about every week.     In November 2017 he presented to the [facility] with atrial fibrillation and chest pain with troponin 0.13.  Angiogram showed normal left main, mild disease of LAD, 60% D1 (FFR 0.88), normal circumflex, dominant RCA with minimal disease.   Echo showed EF 65%, moderate mostly concentric LVH with slight prominence of the septum, moderate left atrial enlargement, no significant valve disease, no outflow obstruction.    In the past few weeks he's been feeling better, but still feels he is in atrial fibrillation.  He has a dull ache on occasion in his chest.  Blood pressure at home is been 120-140 with pulse between 70 and 85.  He has no lightheadedness, dizziness, or lower extremity edema.    PAST MEDICAL HISTORY:  1.  Paroxysmal atrial fibrillation  2.  Hypertension    MEDICATIONS:  Current Outpatient Prescriptions   Medication     Ascorbic Acid (VITAMIN C PO)     hydrALAZINE (APRESOLINE) 25 MG tablet     metoprolol (LOPRESSOR) 50 MG tablet     atorvastatin (LIPITOR) 80 MG tablet     aspirin 81 MG chewable tablet     No current facility-administered medications for this visit.        ALLERGIES:  No Known  Allergies    SOCIAL HISTORY:  I have reviewed this patient's social history and updated it with pertinent information if needed. Matthew Still  reports that he has never smoked. He has quit using smokeless tobacco. He reports that he drinks alcohol. He reports that he does not use illicit drugs.    FAMILY HISTORY:  I have reviewed this patient's family history and updated it with pertinent information if needed.   Family History   Problem Relation Age of Onset     Coronary Artery Disease Mother      a fib, ,MI     Heart Failure Mother      CHF     Other Cancer Father      DIABETES Father      Coronary Artery Disease Paternal Grandmother      Coronary Artery Disease Paternal Grandfather        REVIEW OF SYSTEMS:  Constitutional:  No weight loss, fever, chills, weakness or fatigue.  HEENT:  Eyes:  No visual loss, blurred vision, double vision or yellow sclerae. No hearing loss, sneezing, congestion, runny nose or sore throat.  Skin:  No rash or itching.  Cardiovascular: per HPI  Respiratory: per HPI  GI:  No anorexia, nausea, vomiting or diarrhea. No abdominal pain or blood.  :  No dysurea, hematuria  Neurologic:  No headache, dizziness, syncope, paralysis, ataxia, numbness or tingling in the extremities. No change in bowel or bladder control.  Musculoskeletal:  No muscle, back pain, joint pain or stiffness.  Hematologic:  No anemia, bleeding or bruising.  Lymphatics:  No enlarged nodes. No history of splenectomy.  Psychiatric:  No history of depression or anxiety.  Endocrine:  No reports of sweating, cold or heat intolerance. No polyuria or polydipsia.  Allergies:  No history of asthma, hives, eczema or rhinitis.    PHYSICAL EXAM:      BP: (!) 138/95 Pulse: 94     SpO2: 98 %      Vital Signs with Ranges  Pulse:  [94] 94  BP: (138)/(95) 138/95  SpO2:  [98 %] 98 %  237 lbs 0 oz    Constitutional: awake, alert, no distress  Eyes: PERRL, sclera nonicteric  ENT: trachea midline  Respiratory: Lungs  clear  Cardiovascular: Regular rate, totally irregular rhythm, no murmur  GI: nondistended, nontender, bowel sounds present  Lymph/Hematologic: no lymphadenopathy  Skin: dry, no rash  Musculoskeletal: good muscle tone, strength 5/5 in upper and lower extremities  Neurologic: no focal deficits  Neuropsychiatric: appropriate affact    DATA:  Labs:   November 2017: Cholesterol 241, triglycerides 166, HDL 36,      EKG, November 15, 2017: Atrial fibrillation versus flutter, rate 98, significant T-wave inversion in anterolateral leads, no ST elevation or depression   November 29: Atrial fibrillation, rate 71, deep mostly lateral T-wave inversion    ASSESSMENT:  47-year-old male seen for paroxysmal atrial fibrillation and LVH.  He remains in atrial fibrillation with controlled heart rate.  We talked about treatment strategies of rate versus rhythm control.  Because of his relatively young age and he still has some symptoms, would like to pursue rhythm control.  His CHADS-Vasc score is 1.  If cardioversion were to be done, he would need to be on anticoagulation.  He will look into insurance about the cost of Eliquis, if it is cost prohibitive, he would start warfarin.    We'll plan to do a ÁNGELA guided cardioversion.    His LVH could be from his untreated hypertension.  However there is suspicion for hypertrophic cardiomyopathy as well.  His EKG has very deep T-wave inversion that is a somewhat classic pattern for hypertrophic cardiomyopathy.  There is no outflow murmur and no significant mitral regurgitation on echo however.    RECOMMENDATIONS:  1.  Paroxysmal atrial fibrillation  - Start anticoagulation, he will look into the cost of Eliquis, which start 5 mg b.i.d.  - If Eliquis is cost prohibitive, then start warfarin  - Discontinue aspirin once on anticoagulation  - ÁNGELA guided cardioversion after he has started anticoagulation     2. Left ventricular hypertrophy, hypertensive heart disease versus hypertrophic  cardiomyopathy   - Cardiac MRI, to be scheduled same day as his cardioversion     3. Nonobstructive coronary artery disease   - Aggressive risk factor modification with blood pressure and lipid control, continue statin    4.  Hypertension   - Continue metoprolol, which he also needs for rate control   - Discontinue hydralazine because of the inconvenience of q.i.d. dosing   - Start amlodipine 5 mg daily     Follow-up in 4 weeks.    Brice Harrison MD  Cardiology - [facility]  Pager:  [phone]  Text Page  November 30, 2017    Thank you for allowing me to participate in the care of your patient.    Sincerely,     Brice Harrison MD     [facility]    cc:   [facility]  [address] [id number]

## 2017-11-30 NOTE — PROGRESS NOTES
CARDIOLOGY CONSULT    REASON FOR CONSULT: afib, LVH    PRIMARY CARE PHYSICIAN:  [facility]    HISTORY OF PRESENT ILLNESS:  47-year-old male seen for atrial fibrillation and LVH.    He reports a long history of paroxysmal A. fib.  He states this started about 20 years ago when he was seen in the [facility].  He reportedly underwent EP study, angiogram, and cardioversion.  Over the years he thinks he had A. fib about every 6-8 months with short episodes of palpitations.    In the past one year he thinks his A. fib has been more frequent, occurring about every week.     In November 2017 he presented to the [facility] with atrial fibrillation and chest pain with troponin 0.13.  Angiogram showed normal left main, mild disease of LAD, 60% D1 (FFR 0.88), normal circumflex, dominant RCA with minimal disease.   Echo showed EF 65%, moderate mostly concentric LVH with slight prominence of the septum, moderate left atrial enlargement, no significant valve disease, no outflow obstruction.    In the past few weeks he's been feeling better, but still feels he is in atrial fibrillation.  He has a dull ache on occasion in his chest.  Blood pressure at home is been 120-140 with pulse between 70 and 85.  He has no lightheadedness, dizziness, or lower extremity edema.    PAST MEDICAL HISTORY:  1.  Paroxysmal atrial fibrillation  2.  Hypertension    MEDICATIONS:  Current Outpatient Prescriptions   Medication     Ascorbic Acid (VITAMIN C PO)     hydrALAZINE (APRESOLINE) 25 MG tablet     metoprolol (LOPRESSOR) 50 MG tablet     atorvastatin (LIPITOR) 80 MG tablet     aspirin 81 MG chewable tablet     No current facility-administered medications for this visit.        ALLERGIES:  No Known Allergies    SOCIAL HISTORY:  I have reviewed this patient's social history and updated it with pertinent information if needed. Matthew Still  reports that he has never smoked. He has quit using smokeless tobacco. He reports that he  drinks alcohol. He reports that he does not use illicit drugs.    FAMILY HISTORY:  I have reviewed this patient's family history and updated it with pertinent information if needed.   Family History   Problem Relation Age of Onset     Coronary Artery Disease Mother      a fib, ,MI     Heart Failure Mother      CHF     Other Cancer Father      DIABETES Father      Coronary Artery Disease Paternal Grandmother      Coronary Artery Disease Paternal Grandfather        REVIEW OF SYSTEMS:  Constitutional:  No weight loss, fever, chills, weakness or fatigue.  HEENT:  Eyes:  No visual loss, blurred vision, double vision or yellow sclerae. No hearing loss, sneezing, congestion, runny nose or sore throat.  Skin:  No rash or itching.  Cardiovascular: per HPI  Respiratory: per HPI  GI:  No anorexia, nausea, vomiting or diarrhea. No abdominal pain or blood.  :  No dysurea, hematuria  Neurologic:  No headache, dizziness, syncope, paralysis, ataxia, numbness or tingling in the extremities. No change in bowel or bladder control.  Musculoskeletal:  No muscle, back pain, joint pain or stiffness.  Hematologic:  No anemia, bleeding or bruising.  Lymphatics:  No enlarged nodes. No history of splenectomy.  Psychiatric:  No history of depression or anxiety.  Endocrine:  No reports of sweating, cold or heat intolerance. No polyuria or polydipsia.  Allergies:  No history of asthma, hives, eczema or rhinitis.    PHYSICAL EXAM:      BP: (!) 138/95 Pulse: 94     SpO2: 98 %      Vital Signs with Ranges  Pulse:  [94] 94  BP: (138)/(95) 138/95  SpO2:  [98 %] 98 %  237 lbs 0 oz    Constitutional: awake, alert, no distress  Eyes: PERRL, sclera nonicteric  ENT: trachea midline  Respiratory: Lungs clear  Cardiovascular: Regular rate, totally irregular rhythm, no murmur  GI: nondistended, nontender, bowel sounds present  Lymph/Hematologic: no lymphadenopathy  Skin: dry, no rash  Musculoskeletal: good muscle tone, strength 5/5 in upper and lower  extremities  Neurologic: no focal deficits  Neuropsychiatric: appropriate affact    DATA:  Labs:   November 2017: Cholesterol 241, triglycerides 166, HDL 36,      EKG, November 15, 2017: Atrial fibrillation versus flutter, rate 98, significant T-wave inversion in anterolateral leads, no ST elevation or depression   November 29: Atrial fibrillation, rate 71, deep mostly lateral T-wave inversion    ASSESSMENT:  47-year-old male seen for paroxysmal atrial fibrillation and LVH.  He remains in atrial fibrillation with controlled heart rate.  We talked about treatment strategies of rate versus rhythm control.  Because of his relatively young age and he still has some symptoms, would like to pursue rhythm control.  His CHADS-Vasc score is 1.  If cardioversion were to be done, he would need to be on anticoagulation.  He will look into insurance about the cost of Eliquis, if it is cost prohibitive, he would start warfarin.    We'll plan to do a ÁNGELA guided cardioversion.    His LVH could be from his untreated hypertension.  However there is suspicion for hypertrophic cardiomyopathy as well.  His EKG has very deep T-wave inversion that is a somewhat classic pattern for hypertrophic cardiomyopathy.  There is no outflow murmur and no significant mitral regurgitation on echo however.    RECOMMENDATIONS:  1.  Paroxysmal atrial fibrillation  - Start anticoagulation, he will look into the cost of Eliquis, which start 5 mg b.i.d.  - If Eliquis is cost prohibitive, then start warfarin  - Discontinue aspirin once on anticoagulation  - ÁNGELA guided cardioversion after he has started anticoagulation     2. Left ventricular hypertrophy, hypertensive heart disease versus hypertrophic cardiomyopathy   - Cardiac MRI, to be scheduled same day as his cardioversion     3. Nonobstructive coronary artery disease   - Aggressive risk factor modification with blood pressure and lipid control, continue statin    4.  Hypertension   - Continue  metoprolol, which he also needs for rate control   - Discontinue hydralazine because of the inconvenience of q.i.d. dosing   - Start amlodipine 5 mg daily     Follow-up in 4 weeks.    Brice Harrison MD  Cardiology - [facility]  Pager:  [phone]  Text Page  November 30, 2017

## 2017-11-30 NOTE — MR AVS SNAPSHOT
After Visit Summary   11/30/2017    Matthew Still    MRN: [id number]           Patient Information     Date Of Birth          1970        Visit Information        Provider Department      11/30/2017 11:00 AM Brice Harrison MD [facility]        Today's Diagnoses     Paroxysmal atrial fibrillation (H)    -  1    LVH (left ventricular hypertrophy)        Benign essential hypertension          Care Instructions    Your cardiac MRI is scheduled for Wednesday December 13, 2017. Please arrive at the [facility]rd [facility] cardiology check in desk ([address]) at 7:30am. You cardiac MRI will be at 8:00am.   When that is done, go to the 1st floor Welcome Desk and report for your ÁNGELA/cardioversion. You should be done and ready to go home around 4pm [place].      1.  Your echocardiogram showed there is some thickening of the heart muscle.  This could be from high blood pressure, but this also could be from a genetic condition.  Recommend an MRI of the heart, which should help sort out the cause of the thickened heart muscle.    2. Cardiac MRI  Will schedule a cardiac MRI.  This is a MRI scan specifically of the heart.  This is a very good tool for getting detailed measurements of the heart size and function.  It is very good at looking at the heart muscle in determining if there are any areas of scar or other abnormalities of the heart muscle.  The function of the heart valves are also assessed.    3. Will schedule a transesophageal echo (ÁNGELA) and a cardioversion at [facility].  This will rule out a clot in the heart and should get the heart back in sinus rhythm.    4. You will need to start a blood thinner, which is required when we convert the heart out of afib back to a regular rhythm    5. Check with insurance about the cost of Eliquis 5 mg twice daily (a blood thinner).  If it is very expensive, then you could start warfarin (coumadin), but you would then need to  come in periodically for lab checks to measure the INR level.  Call us so we can prescribe which blood thinner you will start. (call [phone] and talk with either Yary or Jodi). Once you start the blood thinner, you will need to stop Aspirin    6. STOP hydralazine    7. START amlodipine 5mg once daily for blood pressure    8. FOLLOW UP with Dr Harrison (in [place]) on      For information only. Not to replace the advice of your health care provider. Copyright   2004, 2012 [facility]. All rights reserved. [employer] 342968 - 02/13  Getting Ready for Your MRI or MRA  1. Are you pregnant? 2. Claustrophobic? 3. Any contrast dye allergy? 4. Any implanted wires or cochler implants?  Where should I go?    [facility] Imaging Department located on the 3rd floor, [address].   What are these tests?  MRI (magnetic resonance imaging) uses a strong magnet and radio waves to look inside the body. An MRA (magnetic resonance angiogram) does the same thing, but it lets us look at your blood vessels.   A computer turns the radio waves into pictures showing cross sections of the body, much like slices of bread. This helps us see any problems more clearly.   You may receive fluid (called  contrast ) before or during your scan. The fluid helps us see the pictures better. We give the fluid through an IV (small needle in your arm).   How do I get ready for my exam?  Take your medicines as usual, unless your doctor tells you not to. Bring a list of your current medicines to your exam (including vitamins, minerals and over-the-counter drugs). Also bring the results of similar scans you may have had.  Please remove any body piercings and hair extensions before you arrive.   Follow the orders below.    ? You will have contrast for this exam.   ? IV contrast: The day before your exam, drink extra fluids--at least six 8-ounce glasses (unless your doctor tells you to restrict your fluids).   ? Have a blood test  (creatinine test) within 30 days of your exam. Go to your clinic or Diagnostic Imaging Department for this test.   ? For heart exams:    24 hours before the exam:  - Stop all caffeine (coffee, tea, soda pop, chocolate, aspirin, etc.).  - Stop any medicines that contain theophylline or dipyridamole.    Do not eat, drink or smoke for two hours before the test. Your exam may last up to two hours.  What else do I need to know?  The MRI machine uses a strong magnet. Please wear clothes without metal (snaps, zippers). A sweatsuit works well, or we may give you a hospital gown.  You will remove watches, jewelry, hairpins, wallets, dentures, partial dental plates and hearing aids. You may wear contact lenses, and you may be able to wear your rings. We have a safe place to keep your personal items, but it is safer to leave them at home.  What happens during the exam?  Most tests take 30 to 60 minutes. The tests are painless. You can talk to us through a two-way speaker during your exam.    We may inject fluid ( contrast ) into one of your veins. This is not a dye and does not contain iodine. It will help us see the pictures better.    You will lie on an exam table. The table slides into a lighted tunnel that s open at both ends. The tunnel is 4 feet long.     You may hear loud thumping or hammering. If so, it may last up to 15 minutes. We will give you earplugs or headphones with music. You may bring your own CDs.     We will take several sets of pictures. You will need to lie very still. You may relax and breathe normally. In some cases, we may ask you to hold your breath for up to 30 seconds.  Are these tests safe?  There are no side effects from these tests, but they aren t safe for everyone. You cannot have an MRI or MRA if you have certain implants, a defibrillator or pieces of metal in your eye. If you have a pacemaker, call the Diagnostic Imaging Department to see if it is safe for you to have this exam.  We will make  "sure it is safe for you to have these tests. You will fill out a safety checklist before the exam. If you have any concerns, you may discuss them with your doctor or radiologist (X-ray doctor).  When will I know the results?  Your family doctor (or the doctor who ordered the test) will give you the results. They should be ready within five days. You or your insurer will then receive two bills: one from the hospital and one from the radiologist.  Who should I call with questions?  Please call your Diagnostic Imaging Scheduling Department-[facility] at [phone].    TRANSESOPHAGEAL ECHOCARDIOGRAM (ÁNGELA)  [facility]     ____Your procedure will be done at [facility] [place] [facility]. Please park in the \"Skyway Ramp\" on the west side of [facility] on [address]. Take the skyway over [facility] to the hospital. Please check in on the first floor, which is one floor down from the skyway level.    Please follow the instructions below:    1. In preparation for a ÁNGELA we require that you do the following:    NOTHING to eat or drink 6 hours before the procedure.    Take your medications with a small sip of water the morning of your procedure.    2. You will not be able to drive home the day of your procedure. Please arrange to have someone drive you home. You will also need to make sure that there is a responsible adult with you for 24 hours after your procedure. Your procedure will be cancelled if you do not have transportation home or someone to stay with you for 24 hours.    3. If you have questions about your upcoming procedure please contact your cardiology RN at [facility] at [phone] or [facility] at [phone], Option#2.    4. Make a cardiology follow up appointment to discuss the results approximately 1-2 weeks after your procedure is done.     What is a ÁNGELA?  A flexible tube about the size of your index finger is inserted into your mouth and down your " esophagus. At the tip of the tube is a small probe that produces sound waves. The sound waves bounce off your heart and are changed into pictures on a video screen. The doctor can move the probe up, down, and sideways to look at different parts of your heart from different angles. Your throat is numbed so you should feel little or no discomfort during the procedure. Because sound waves travel very fast, images of your heart are recorded almost instantly. In some cases the doctor will discuss your test results with you before you leave. The sedative given during the test may make it hard for you to remember what is said so it is common for the doctor to make an appointment for another day to discuss the test results.            Follow-ups after your visit        Additional Services     Follow-Up with Cardiologist                 Your next 10 appointments already scheduled     Dec 13, 2017  8:00 AM CST   MR MYOCARDIUM W CONTRAST with SCIMR1   [facility] (Cardiovascular Imaging at [facility])    [address] [place] [address]  [address] [address] [phone]           Take your medicines as usual, unless your doctor tells you not to. Bring a list of your current medicines to your exam (including vitamins, minerals and over-the-counter drugs).  You will be given intravenous contrast for this exam. To prepare:   The day before your exam, drink extra fluids at least six 8-ounce glasses (unless your doctor tells you to restrict your fluids).   Have a blood test (creatinine test) within 30 days of your exam. Go to your clinic or Diagnostic Imaging Department for this test.  The MRI machine uses a strong magnet. Please wear clothes without metal (snaps, zippers). A sweatsuit works well, or we may give you a hospital gown.  Please remove any body piercings and hair extensions before you arrive. You will also remove watches, jewelry, hairpins, wallets, dentures, partial dental plates  and hearing aids. You may wear contact lenses, and you may be able to wear your rings. We have a safe place to keep your personal items, but it is safer to leave them at home.   **IMPORTANT** THE INSTRUCTIONS BELOW ARE ONLY FOR THOSE PATIENTS WHO HAVE BEEN TOLD THEY WILL RECEIVE SEDATION OR GENERAL ANESTHESIA DURING THEIR MRI PROCEDURE:  IF YOU WILL RECEIVE SEDATION (take medicine to help you relax during your exam):   You must get the medicine from your doctor before you arrive. Bring the medicine to the exam. Do not take it at home.   Arrive one hour early. Bring someone who can take you home after the test. Your medicine will make you sleepy. After the exam, you may not drive, take a bus or take a taxi by yourself.   No eating 8 hours before your exam. You may have clear liquids up until 4 hours before your exam. (Clear liquids include water, clear tea, black coffee and fruit juice without pulp.)  IF YOU WILL RECEIVE ANESTHESIA (be asleep for your exam):   Arrive 1 1/2 hours early. Bring someone who can take you home after the test. You may not drive, take a bus or take a taxi by yourself.   No eating 8 hours before your exam. You may have clear liquids up until 4 hours before your exam. (Clear liquids include water, clear tea, black coffee and fruit juice without pulp.)  Please call the Imaging Department at your exam site with any questions.            Dec 13, 2017  1:30 PM CST   Ech Yahir with SHECHR2   [facility] Radiology ([facility])    [address] [place] [address]   [phone]           1.  Please bring or wear a comfortable two-piece outfit. 2.  Arrival time: -   [facility]:  arrive 75 minutes prior to examination time. -   [facility]:  arrive 90 minutes prior to examination time. -   [facility]:   arrive 15 minutes prior to examination time. 3.  Plan to have someone here to drive you home after the test. -   Someone should stay with you for 6 hours after your  test. 4.  No food or drink: -   6 hours before the test 5.  If you take antacids or water pills (diuretics): Do not take them until after your test. You may take blood pressure medicine with a few sips of water. 6.  If you have diabetes: -   Morning slots preferred -   If you take insulin, call your diabetes care team. Ask if you should take a   dose the morning of your test. -   If you take diabetes medicine by mouth, don't take it on the morning of your test. Bring it with you to take after the test. (If you have questions, call your diabetes care team.) 7.  Bring a list of any medicines you are taking. 8.  Do not drive for 24 hours after the test. 9.   A responsible adult must stay with you for 24 hours after the test.  10.  For any questions that cannot be answered, please contact the ordering physician            Dec 13, 2017  2:30 PM CST   Cardioversion with  PACU/PROC ROOM   [facility] PACU ([facility])    [address] [place] [address] [phone]           1) NPO for 8 hours prior to the procedure except for sips of water with medications. 2) Hold insulin or oral diabetic medications morning of procedure. May take   dose long acting insulin. Follow further instructions of PMD. 3) Continue daily Coumadin. ~ If taking Digoxin, hold AM of procedure. ~ Plan to have a responsible adult take you home after the exam. You may not drive, take a bus or taxi by yourself. ~ A responsible adult must stay with you for 24 hours after the test.              Future tests that were ordered for you today     Open Future Orders        Priority Expected Expires Ordered    Follow-Up with Cardiologist Routine 12/30/2017 11/30/2018 11/30/2017    MRI Cardiac w/contrast Routine 12/1/2017 11/30/2018 11/30/2017    Transesophageal Echocardiogram Routine 12/7/2017 11/30/2018 11/30/2017    Cardioversion Routine 12/7/2017 11/30/2018 11/30/2017    EKG 12-LEAD CLINIC READ ([facility])- to be  "scheduled Routine 2017            Who to contact     If you have questions or need follow up information about today's clinic visit or your schedule please contact [facility] directly at [phone].  Normal or non-critical lab and imaging results will be communicated to you by MyChart, letter or phone within 4 business days after the clinic has received the results. If you do not hear from us within 7 days, please contact the clinic through MyChart or phone. If you have a critical or abnormal lab result, we will notify you by phone as soon as possible.  Submit refill requests through [employer] or call your pharmacy and they will forward the refill request to us. Please allow 3 business days for your refill to be completed.          Additional Information About Your Visit        MyChart Information     [employer] lets you send messages to your doctor, view your test results, renew your prescriptions, schedule appointments and more. To sign up, go to [email or web address] . Click on \"Log in\" on the left side of the screen, which will take you to the Welcome page. Then click on \"Sign up Now\" on the right side of the page.     You will be asked to enter the access code listed below, as well as some personal information. Please follow the directions to create your username and password.     Your access code is: [email or web address]  Expires: 2018  6:25 PM     Your access code will  in 90 days. If you need help or a new code, please call your [place] clinic or [phone].        Care EveryWhere ID     This is your Care EveryWhere ID. This could be used by other organizations to access your [place] medical records  [id number]        Your Vitals Were     Pulse Pulse Oximetry BMI (Body Mass Index)             94 98% 33.05 kg/m2          Blood Pressure from Last 3 Encounters:   17 (!) 138/95   17 122/76   17 119/90    Weight from " Last 3 Encounters:   11/30/17 107.5 kg (237 lb)   11/22/17 106.1 kg (234 lb)   11/16/17 102.4 kg (225 lb 11.2 oz)                 Today's Medication Changes          These changes are accurate as of: 11/30/17 12:25 PM.  If you have any questions, ask your nurse or doctor.               Start taking these medicines.        Dose/Directions    amLODIPine 5 MG tablet   Commonly known as:  NORVASC   Used for:  Benign essential hypertension   Started by:  Brice Harrison MD        Dose:  5 mg   Take 1 tablet (5 mg) by mouth daily   Quantity:  30 tablet   Refills:  4            Where to get your medicines      These medications were sent to [facility] [address]  [address] [address]     Phone:  [phone]     amLODIPine 5 MG tablet                Primary Care Provider Office Phone # Fax #    [facility] [phone] [address] [address]        Equal Access to Services     ARCHIE MOSCOSO : Hadii jordyn ku hadasho Soomaali, waaxda luqadaha, qaybta kaalmada adeegyada, waxay idiin hayawais willson . So [facility] [phone].    ATENCIÓN: Si habla español, tiene a villeda disposición servicios gratuitos de asistencia lingüística. Llame al [phone].    We comply with applicable federal civil rights laws and [place] laws. We do not discriminate on the basis of race, color, national origin, age, disability, sex, sexual orientation, or gender identity.            Thank you!     Thank you for choosing [facility]  for your care. Our goal is always to provide you with excellent care. Hearing back from our patients is one way we can continue to improve our services. Please take a few minutes to complete the written survey that you may receive in the mail after your visit with us. Thank you!             Your Updated Medication List - Protect others around you: Learn how to  safely use, store and throw away your medicines at www.disposemymeds.org.          This list is accurate as of: 11/30/17 12:25 PM.  Always use your most recent med list.                   Brand Name Dispense Instructions for use Diagnosis    amLODIPine 5 MG tablet    NORVASC    30 tablet    Take 1 tablet (5 mg) by mouth daily    Benign essential hypertension       aspirin 81 MG chewable tablet      Take 81 mg by mouth daily        atorvastatin 80 MG tablet    LIPITOR    30 tablet    Take 1 tablet (80 mg) by mouth daily    Hyperlipidemia LDL goal <70       metoprolol 50 MG tablet    LOPRESSOR    60 tablet    Take 1 tablet (50 mg) by mouth 2 times daily    Atrial fibrillation, unspecified type (H)       VITAMIN C PO

## 2017-11-30 NOTE — PATIENT INSTRUCTIONS
Your cardiac MRI is scheduled for Wednesday December 13, 2017. Please arrive at the [address] [place] [address] cardiology check in desk ([address]) at 7:30am. You cardiac MRI will be at 8:00am.   When that is done, go to the 1st floor Welcome Desk and report for your ÁNGELA/cardioversion. You should be done and ready to go home around 4pm [place].      1.  Your echocardiogram showed there is some thickening of the heart muscle.  This could be from high blood pressure, but this also could be from a genetic condition.  Recommend an MRI of the heart, which should help sort out the cause of the thickened heart muscle.    2. Cardiac MRI  Will schedule a cardiac MRI.  This is a MRI scan specifically of the heart.  This is a very good tool for getting detailed measurements of the heart size and function.  It is very good at looking at the heart muscle in determining if there are any areas of scar or other abnormalities of the heart muscle.  The function of the heart valves are also assessed.    3. Will schedule a transesophageal echo (ÁNGELA) and a cardioversion at [facility].  This will rule out a clot in the heart and should get the heart back in sinus rhythm.    4. You will need to start a blood thinner, which is required when we convert the heart out of afib back to a regular rhythm    5. Check with insurance about the cost of Eliquis 5 mg twice daily (a blood thinner).  If it is very expensive, then you could start warfarin (coumadin), but you would then need to come in periodically for lab checks to measure the INR level.  Call us so we can prescribe which blood thinner you will start. (call [phone] and talk with either Yary or Jodi). Once you start the blood thinner, you will need to stop Aspirin    6. STOP hydralazine    7. START amlodipine 5mg once daily for blood pressure    8. FOLLOW UP with Dr Harrison (in [place]) on      For information only. Not to replace the advice of your health care provider. Copyright   2004, 2012 [facility]. All rights reserved. [employer] 087347 - 02/13  Getting Ready for Your MRI or MRA  1. Are you pregnant? 2. Claustrophobic? 3. Any contrast dye allergy? 4. Any implanted wires or cochler implants?  Where should I go?    [facility] Imaging Department located on the 3rd floor, [address].   What are these tests?  MRI (magnetic resonance imaging) uses a strong magnet and radio waves to look inside the body. An MRA (magnetic resonance angiogram) does the same thing, but it lets us look at your blood vessels.   A computer turns the radio waves into pictures showing cross sections of the body, much like slices of bread. This helps us see any problems more clearly.   You may receive fluid (called  contrast ) before or during your scan. The fluid helps us see the pictures better. We give the fluid through an IV (small needle in your arm).   How do I get ready for my exam?  Take your medicines as usual, unless your doctor tells you not to. Bring a list of your current medicines to your exam (including vitamins, minerals and over-the-counter drugs). Also bring the results of similar scans you may have had.  Please remove any body piercings and hair extensions before you arrive.   Follow the orders below.    ? You will have contrast for this exam.   ? IV contrast: The day before your exam, drink extra fluids--at least six 8-ounce glasses (unless your doctor tells you to restrict your fluids).   ? Have a blood test (creatinine test) within 30 days of your exam. Go to your clinic or Diagnostic Imaging Department for this test.   ? For heart exams:    24 hours before the exam:  - Stop all caffeine (coffee, tea, soda pop, chocolate, aspirin, etc.).  - Stop any medicines that contain theophylline or dipyridamole.    Do not eat, drink or smoke for two hours before the test. Your exam may last up to two hours.  What else do I need to know?  The MRI machine uses a strong magnet. Please wear clothes without  metal (snaps, zippers). A sweatsuit works well, or we may give you a hospital gown.  You will remove watches, jewelry, hairpins, wallets, dentures, partial dental plates and hearing aids. You may wear contact lenses, and you may be able to wear your rings. We have a safe place to keep your personal items, but it is safer to leave them at home.  What happens during the exam?  Most tests take 30 to 60 minutes. The tests are painless. You can talk to us through a two-way speaker during your exam.    We may inject fluid ( contrast ) into one of your veins. This is not a dye and does not contain iodine. It will help us see the pictures better.    You will lie on an exam table. The table slides into a lighted tunnel that s open at both ends. The tunnel is 4 feet long.     You may hear loud thumping or hammering. If so, it may last up to 15 minutes. We will give you earplugs or headphones with music. You may bring your own CDs.     We will take several sets of pictures. You will need to lie very still. You may relax and breathe normally. In some cases, we may ask you to hold your breath for up to 30 seconds.  Are these tests safe?  There are no side effects from these tests, but they aren t safe for everyone. You cannot have an MRI or MRA if you have certain implants, a defibrillator or pieces of metal in your eye. If you have a pacemaker, call the Diagnostic Imaging Department to see if it is safe for you to have this exam.  We will make sure it is safe for you to have these tests. You will fill out a safety checklist before the exam. If you have any concerns, you may discuss them with your doctor or radiologist (X-ray doctor).  When will I know the results?  Your family doctor (or the doctor who ordered the test) will give you the results. They should be ready within five days. You or your insurer will then receive two bills: one from the hospital and one from the radiologist.  Who should I call with questions?  Please  "call your Diagnostic Imaging Scheduling Department-[facility] at [phone].    TRANSESOPHAGEAL ECHOCARDIOGRAM (ÁNGELA)  [facility]     ____Your procedure will be done at [facility]. Please park in the \"Skyway Ramp\" on the west side of [facility] on [address]. Take the skyway over [facility] to the hospital. Please check in on the first floor, which is one floor down from the skyway level.    Please follow the instructions below:    1. In preparation for a ÁNGELA we require that you do the following:    NOTHING to eat or drink 6 hours before the procedure.    Take your medications with a small sip of water the morning of your procedure.    2. You will not be able to drive home the day of your procedure. Please arrange to have someone drive you home. You will also need to make sure that there is a responsible adult with you for 24 hours after your procedure. Your procedure will be cancelled if you do not have transportation home or someone to stay with you for 24 hours.    3. If you have questions about your upcoming procedure please contact your cardiology RN at [facility] at [phone] or [facility] at [phone], Option#2.    4. Make a cardiology follow up appointment to discuss the results approximately 1-2 weeks after your procedure is done.     What is a ÁNGELA?  A flexible tube about the size of your index finger is inserted into your mouth and down your esophagus. At the tip of the tube is a small probe that produces sound waves. The sound waves bounce off your heart and are changed into pictures on a video screen. The doctor can move the probe up, down, and sideways to look at different parts of your heart from different angles. Your throat is numbed so you should feel little or no discomfort during the procedure. Because sound waves travel very fast, images of your heart are recorded almost instantly. In some cases the doctor will " discuss your test results with you before you leave. The sedative given during the test may make it hard for you to remember what is said so it is common for the doctor to make an appointment for another day to discuss the test results.

## 2017-12-04 ENCOUNTER — TELEPHONE (OUTPATIENT)
Dept: CARDIOLOGY | Facility: CLINIC | Age: 47
End: 2017-12-04

## 2017-12-04 NOTE — TELEPHONE ENCOUNTER
Central Prior Authorization Team   Phone: [phone]    PA Initiation ma fax to [employer]    Medication: Eliquis -pa initiated   Insurance Company: [employer] - Phone [phone] Fax [phone]  Pharmacy Filling the Rx: [facility] [place], [place] - [address]  Filling Pharmacy Phone: [phone]  Filling Pharmacy Fax:    Start Date: 12/4/2017

## 2017-12-04 NOTE — TELEPHONE ENCOUNTER
Prior Authorization Retail Medication Request  Medication/Dose: Eliquis  Diagnosis and ICD code: Atrial Fibrillation  I48.91  New/Renewal/Insurance Change PA: NEW  Previously Tried and Failed Therapies: ASA    Insurance ID (if provided): [id number]  [place] # [id number]  Insurance Phone (if provided): [phone]    Any additional info from fax request:     If you received a fax notification from an outside Pharmacy:  Pharmacy Name:[facility]  Pharmacy #:[phone]  Pharmacy Fax:[phone]

## 2017-12-06 NOTE — TELEPHONE ENCOUNTER
Wife Mery called and left a message on our voicemail that she was notified by her pharmacy that someone needs to call [phone] for pre-auth. I see a Prior Auth has been started so will hold on calling the [phone] number and leave to prior auth team at this time. Maria L Torres, RN Cardiology at [facility] December 6, 2017, 8:32 AM

## 2017-12-06 NOTE — TELEPHONE ENCOUNTER
ADDENDUM: LM for patient re: PA approval. Yary Tello RN Cardiology December 6, 2017, 10:59 AM      Prior Authorization Approval    Authorization Effective Date:11/06/2017    Authorization Expiration Date: 12/06/2018  Medication: Eliquis -pa initiated   Reference #: [id number]  Insurance Company: [employer] - Phone [phone] Fax [phone]  Expected CoPay: 40.00  Which Pharmacy is filling the prescription (Not needed for infusion/clinic administered): [facility] [address]  Pharmacy Notified:  yes  Patient Notified:  yes

## 2017-12-13 ENCOUNTER — HOSPITAL ENCOUNTER (OUTPATIENT)
Facility: CLINIC | Age: 47
Discharge: HOME | End: 2017-12-13
Attending: INTERNAL MEDICINE | Admitting: INTERNAL MEDICINE
Payer: COMMERCIAL

## 2017-12-13 ENCOUNTER — HOSPITAL ENCOUNTER (OUTPATIENT)
Dept: SURGERY | Facility: CLINIC | Age: 47
End: 2017-12-13
Attending: INTERNAL MEDICINE
Payer: COMMERCIAL

## 2017-12-13 ENCOUNTER — HOSPITAL ENCOUNTER (OUTPATIENT)
Dept: CARDIOLOGY | Facility: CLINIC | Age: 47
End: 2017-12-13
Attending: INTERNAL MEDICINE
Payer: COMMERCIAL

## 2017-12-13 ENCOUNTER — HOSPITAL ENCOUNTER (OUTPATIENT)
Dept: CARDIOLOGY | Facility: CLINIC | Age: 47
Discharge: HOME | End: 2017-12-13
Attending: INTERNAL MEDICINE | Admitting: INTERNAL MEDICINE
Payer: COMMERCIAL

## 2017-12-13 VITALS
TEMPERATURE: 97.9 F | DIASTOLIC BLOOD PRESSURE: 84 MMHG | RESPIRATION RATE: 26 BRPM | HEIGHT: 71 IN | HEART RATE: 82 BPM | OXYGEN SATURATION: 96 % | SYSTOLIC BLOOD PRESSURE: 118 MMHG

## 2017-12-13 DIAGNOSIS — I48.0 PAROXYSMAL ATRIAL FIBRILLATION (H): ICD-10-CM

## 2017-12-13 DIAGNOSIS — I51.7 LVH (LEFT VENTRICULAR HYPERTROPHY): ICD-10-CM

## 2017-12-13 LAB
INR PPP: 1.29 (ref 0.86–1.14)
MAGNESIUM SERPL-MCNC: 2.2 MG/DL (ref 1.6–2.3)
POTASSIUM SERPL-SCNC: 4.4 MMOL/L (ref 3.4–5.3)

## 2017-12-13 PROCEDURE — 85610 PROTHROMBIN TIME: CPT | Performed by: INTERNAL MEDICINE

## 2017-12-13 PROCEDURE — 84132 ASSAY OF SERUM POTASSIUM: CPT | Performed by: INTERNAL MEDICINE

## 2017-12-13 PROCEDURE — 93010 ELECTROCARDIOGRAM REPORT: CPT | Performed by: INTERNAL MEDICINE

## 2017-12-13 PROCEDURE — 40000857 ZZH STATISTIC TEE INCLUDES SEDATION

## 2017-12-13 PROCEDURE — 25000125 ZZHC RX 250: Performed by: INTERNAL MEDICINE

## 2017-12-13 PROCEDURE — 83735 ASSAY OF MAGNESIUM: CPT | Performed by: INTERNAL MEDICINE

## 2017-12-13 PROCEDURE — 93325 DOPPLER ECHO COLOR FLOW MAPG: CPT | Mod: 26 | Performed by: INTERNAL MEDICINE

## 2017-12-13 PROCEDURE — 25000128 H RX IP 250 OP 636: Performed by: INTERNAL MEDICINE

## 2017-12-13 PROCEDURE — 93320 DOPPLER ECHO COMPLETE: CPT | Mod: 26 | Performed by: INTERNAL MEDICINE

## 2017-12-13 PROCEDURE — 25500064 ZZH RX 255 OP 636: Performed by: INTERNAL MEDICINE

## 2017-12-13 PROCEDURE — 93325 DOPPLER ECHO COLOR FLOW MAPG: CPT

## 2017-12-13 PROCEDURE — 93312 ECHO TRANSESOPHAGEAL: CPT | Mod: 26 | Performed by: INTERNAL MEDICINE

## 2017-12-13 PROCEDURE — 40000235 ZZH STATISTIC TELEMETRY

## 2017-12-13 PROCEDURE — 93005 ELECTROCARDIOGRAM TRACING: CPT

## 2017-12-13 RX ORDER — FENTANYL CITRATE 50 UG/ML
25-50 INJECTION, SOLUTION INTRAMUSCULAR; INTRAVENOUS
Status: DISCONTINUED | OUTPATIENT
Start: 2017-12-13 | End: 2017-12-13

## 2017-12-13 RX ORDER — FENTANYL CITRATE 50 UG/ML
50-100 INJECTION, SOLUTION INTRAMUSCULAR; INTRAVENOUS
Status: COMPLETED | OUTPATIENT
Start: 2017-12-13 | End: 2017-12-13

## 2017-12-13 RX ORDER — FENTANYL CITRATE 50 UG/ML
25-50 INJECTION, SOLUTION INTRAMUSCULAR; INTRAVENOUS
Status: DISCONTINUED | OUTPATIENT
Start: 2017-12-13 | End: 2017-12-13 | Stop reason: HOSPADM

## 2017-12-13 RX ORDER — GLYCOPYRROLATE 0.2 MG/ML
0.1 INJECTION, SOLUTION INTRAMUSCULAR; INTRAVENOUS ONCE
Status: COMPLETED | OUTPATIENT
Start: 2017-12-13 | End: 2017-12-13

## 2017-12-13 RX ORDER — LIDOCAINE HYDROCHLORIDE 40 MG/ML
1.5 SOLUTION TOPICAL ONCE
Status: COMPLETED | OUTPATIENT
Start: 2017-12-13 | End: 2017-12-13

## 2017-12-13 RX ORDER — SODIUM CHLORIDE 9 MG/ML
1000 INJECTION, SOLUTION INTRAVENOUS CONTINUOUS
Status: DISCONTINUED | OUTPATIENT
Start: 2017-12-13 | End: 2017-12-13 | Stop reason: HOSPADM

## 2017-12-13 RX ORDER — GLYCOPYRROLATE 0.2 MG/ML
0.1 INJECTION, SOLUTION INTRAMUSCULAR; INTRAVENOUS ONCE
Status: DISCONTINUED | OUTPATIENT
Start: 2017-12-13 | End: 2017-12-13

## 2017-12-13 RX ORDER — FLUMAZENIL 0.1 MG/ML
0.2 INJECTION, SOLUTION INTRAVENOUS
Status: DISCONTINUED | OUTPATIENT
Start: 2017-12-13 | End: 2017-12-13 | Stop reason: HOSPADM

## 2017-12-13 RX ORDER — NALOXONE HYDROCHLORIDE 0.4 MG/ML
.1-.4 INJECTION, SOLUTION INTRAMUSCULAR; INTRAVENOUS; SUBCUTANEOUS
Status: DISCONTINUED | OUTPATIENT
Start: 2017-12-13 | End: 2017-12-13 | Stop reason: HOSPADM

## 2017-12-13 RX ORDER — FENTANYL CITRATE 50 UG/ML
50-100 INJECTION, SOLUTION INTRAMUSCULAR; INTRAVENOUS
Status: DISCONTINUED | OUTPATIENT
Start: 2017-12-13 | End: 2017-12-13

## 2017-12-13 RX ORDER — LIDOCAINE HYDROCHLORIDE 40 MG/ML
1.5 SOLUTION TOPICAL ONCE
Status: DISCONTINUED | OUTPATIENT
Start: 2017-12-13 | End: 2017-12-13

## 2017-12-13 RX ORDER — POTASSIUM CHLORIDE 1500 MG/1
40 TABLET, EXTENDED RELEASE ORAL
Status: DISCONTINUED | OUTPATIENT
Start: 2017-12-13 | End: 2017-12-13 | Stop reason: HOSPADM

## 2017-12-13 RX ORDER — FLUMAZENIL 0.1 MG/ML
0.2 INJECTION, SOLUTION INTRAVENOUS
Status: DISCONTINUED | OUTPATIENT
Start: 2017-12-13 | End: 2017-12-13

## 2017-12-13 RX ORDER — NALOXONE HYDROCHLORIDE 0.4 MG/ML
.1-.4 INJECTION, SOLUTION INTRAMUSCULAR; INTRAVENOUS; SUBCUTANEOUS
Status: DISCONTINUED | OUTPATIENT
Start: 2017-12-13 | End: 2017-12-13

## 2017-12-13 RX ORDER — LIDOCAINE 50 MG/G
0.5 OINTMENT TOPICAL ONCE
Status: COMPLETED | OUTPATIENT
Start: 2017-12-13 | End: 2017-12-13

## 2017-12-13 RX ORDER — ATROPINE SULFATE 0.1 MG/ML
.5-1 INJECTION INTRAVENOUS
Status: DISCONTINUED | OUTPATIENT
Start: 2017-12-13 | End: 2017-12-13 | Stop reason: HOSPADM

## 2017-12-13 RX ORDER — POTASSIUM CHLORIDE 1500 MG/1
40 TABLET, EXTENDED RELEASE ORAL
Status: DISCONTINUED | OUTPATIENT
Start: 2017-12-13 | End: 2017-12-13

## 2017-12-13 RX ORDER — ATROPINE SULFATE 0.1 MG/ML
.5-1 INJECTION INTRAVENOUS
Status: DISCONTINUED | OUTPATIENT
Start: 2017-12-13 | End: 2017-12-13

## 2017-12-13 RX ORDER — SODIUM CHLORIDE 9 MG/ML
1000 INJECTION, SOLUTION INTRAVENOUS CONTINUOUS
Status: DISCONTINUED | OUTPATIENT
Start: 2017-12-13 | End: 2017-12-13

## 2017-12-13 RX ORDER — POTASSIUM CHLORIDE 1500 MG/1
20 TABLET, EXTENDED RELEASE ORAL
Status: DISCONTINUED | OUTPATIENT
Start: 2017-12-13 | End: 2017-12-13 | Stop reason: HOSPADM

## 2017-12-13 RX ORDER — POTASSIUM CHLORIDE 1500 MG/1
20 TABLET, EXTENDED RELEASE ORAL
Status: DISCONTINUED | OUTPATIENT
Start: 2017-12-13 | End: 2017-12-13

## 2017-12-13 RX ADMIN — SODIUM CHLORIDE 1000 ML: 9 INJECTION, SOLUTION INTRAVENOUS at 13:42

## 2017-12-13 RX ADMIN — GLYCOPYRROLATE 0.1 MG: 0.2 INJECTION, SOLUTION INTRAMUSCULAR; INTRAVENOUS at 13:14

## 2017-12-13 RX ADMIN — MIDAZOLAM HYDROCHLORIDE 6 MG: 1 INJECTION, SOLUTION INTRAMUSCULAR; INTRAVENOUS at 14:33

## 2017-12-13 RX ADMIN — LIDOCAINE 0.5 G: 50 OINTMENT TOPICAL at 13:41

## 2017-12-13 RX ADMIN — LIDOCAINE HYDROCHLORIDE 1.5 ML: 40 SOLUTION TOPICAL at 13:20

## 2017-12-13 RX ADMIN — FENTANYL CITRATE 100 MCG: 50 INJECTION, SOLUTION INTRAMUSCULAR; INTRAVENOUS at 14:16

## 2017-12-13 RX ADMIN — SULFUR HEXAFLUORIDE 5 ML: KIT at 14:32

## 2017-12-13 NOTE — DISCHARGE INSTRUCTIONS
ÁNGELA  (Transesophageal Echocardiogram)  with Cardioversion Discharge Instructions    After you go home:      Have an adult stay with you for 6 hours.       For 24 hours - due to the sedation you received:    Relax and take it easy.    Do NOT make any important or legal decisions.    Do NOT drive or operate machines at home or at work.    Do NOT drink alcohol.    Diet:      You may resume your normal diet, but no scratchy foods for two days.    If your throat is sore, eat cold, bland or soft foods.    You may have heartburn if the tube used in the exam entered your stomach.  If so:   - Do not eat acidic and spicy foods.   - Do not eat three hours before bedtime.  Clear liquids are okay.   - When lying down, use two pillows to raise your head.    Medicines:      Take your medications, including blood thinners, unless your provider tells you not to.    If you have stopped any medicines, check with your provider about when to restart them.    You may take Tylenol (Acetaminophen) if your throat is sore.    You may take antacids if you have heartburn.      Follow Up Appointments:      Follow up with your cardiologist at [facility] of patient preference as instructed.    Follow up with your primary care provider as needed.    If you ve had a cardioversion:    The skin on your chest or back may feel tender for 48 hours.  If your skin is tender, you may:    Use a cold pack on the site. Never use ice directly on your skin. Use the cold pack for 20 minutes. Remove it for at least 30 minutes before re-using.    Apply 1% hydrocortisone cream to the skin (sold at drug stores)    Take Advil (Ibuprofen) or Tylenol (Acetaminophen).      Call the clinic if:      You have heartburn that is severe or lasts more than 72 hours.    You have a sore throat that feels worse after 72 hours.    You have shortness of breath, neck pain, chest pain, fever, chills, coughing up blood, or other unusual signs.    Call your cardiologist right  away if you have an irregular heartbeat, shortness of breath or feel dizzy.    Questions or concerns      [facility] at [place]:    [phone] UMP (7 days a week)

## 2017-12-13 NOTE — IP AVS SNAPSHOT
[place] [address]    [place] [address]    Phone:  [phone]                                       After Visit Summary   12/13/2017    Matthew Still    MRN: [id number]           After Visit Summary Signature Page     I have received my discharge instructions, and my questions have been answered. I have discussed any challenges I see with this plan with the nurse or doctor.    ..........................................................................................................................................  Patient/Patient Representative Signature      ..........................................................................................................................................  Patient Representative Print Name and Relationship to Patient    ..................................................               ................................................  Date                                            Time    ..........................................................................................................................................  Reviewed by Signature/Title    ...................................................              ..............................................  Date                                                            Time

## 2017-12-13 NOTE — IP AVS SNAPSHOT
MRN:[id number]                      After Visit Summary   12/13/2017    Matthew Still    MRN: [id number]           Visit Information        Department      12/13/2017 11:41 AM [facility]          Review of your medicines      UNREVIEWED medicines. Ask your doctor about these medicines        Dose / Directions    amLODIPine 5 MG tablet   Commonly known as:  NORVASC   Used for:  Benign essential hypertension        Dose:  5 mg   Take 1 tablet (5 mg) by mouth daily   Quantity:  30 tablet   Refills:  4       apixaban ANTICOAGULANT 5 MG tablet   Commonly known as:  ELIQUIS   Used for:  Paroxysmal atrial fibrillation (H)        Dose:  5 mg   Take 1 tablet (5 mg) by mouth 2 times daily   Quantity:  60 tablet   Refills:  11       aspirin 81 MG chewable tablet        Dose:  81 mg   Take 81 mg by mouth daily   Refills:  0       atorvastatin 80 MG tablet   Commonly known as:  LIPITOR   Used for:  Hyperlipidemia LDL goal <70        Dose:  80 mg   Take 1 tablet (80 mg) by mouth daily   Quantity:  30 tablet   Refills:  11       metoprolol 50 MG tablet   Commonly known as:  LOPRESSOR   Used for:  Atrial fibrillation, unspecified type (H)        Dose:  50 mg   Take 1 tablet (50 mg) by mouth 2 times daily   Quantity:  60 tablet   Refills:  11       VITAMIN C PO        Refills:  0                Protect others around you: Learn how to safely use, store and throw away your medicines at www.disposemymeds.org.         Follow-ups after your visit        Your next 10 appointments already scheduled     Dec 13, 2017  1:30 PM CST   Ech Yahir with SHECHR2   [facility] ([facility])    [address] [place] [address]   [phone]           1.  Please bring or wear a comfortable two-piece outfit. 2.  Arrival time: -   [facility]:  arrive 75 minutes prior to examination time. -   [facility]:  arrive 90 minutes prior to examination time. -   [facility]:    arrive 15 minutes prior to examination time. 3.  Plan to have someone here to drive you home after the test. -   Someone should stay with you for 6 hours after your test. 4.  No food or drink: -   6 hours before the test 5.  If you take antacids or water pills (diuretics): Do not take them until after your test. You may take blood pressure medicine with a few sips of water. 6.  If you have diabetes: -   Morning slots preferred -   If you take insulin, call your diabetes care team. Ask if you should take a   dose the morning of your test. -   If you take diabetes medicine by mouth, don't take it on the morning of your test. Bring it with you to take after the test. (If you have questions, call your diabetes care team.) 7.  Bring a list of any medicines you are taking. 8.  Do not drive for 24 hours after the test. 9.   A responsible adult must stay with you for 24 hours after the test.  10.  For any questions that cannot be answered, please contact the ordering physician            Dec 13, 2017   Procedure with GENERIC ANESTHESIA PROVIDER   [facility] (--)    [address]  [facility] [address]   [phone]            Dec 13, 2017  2:30 PM CST   Cardioversion with  CARE SUITES ([facility])   [facility] PACU ([facility])    [address]  [place] [address]   [phone]           1) NPO for 8 hours prior to the procedure except for sips of water with medications. 2) Hold insulin or oral diabetic medications morning of procedure. May take   dose long acting insulin. Follow further instructions of PMD. 3) Continue daily Coumadin. ~ If taking Digoxin, hold AM of procedure. ~ Plan to have a responsible adult take you home after the exam. You may not drive, take a bus or taxi by yourself. A responsible adult must stay with you for 24 hours after the test.            Dec 19, 2017  3:00 PM CST   Return Visit with Brice Harrison MD   [facility]  [facility] ([facility])    [address] [facility] [address] [phone]               Care Instructions        Further instructions from your care team       ÁNGELA  (Transesophageal Echocardiogram)  with Cardioversion Discharge Instructions    After you go home:      Have an adult stay with you for 6 hours.       For 24 hours - due to the sedation you received:    Relax and take it easy.    Do NOT make any important or legal decisions.    Do NOT drive or operate machines at home or at work.    Do NOT drink alcohol.    Diet:      You may resume your normal diet, but no scratchy foods for two days.    If your throat is sore, eat cold, bland or soft foods.    You may have heartburn if the tube used in the exam entered your stomach.  If so:   - Do not eat acidic and spicy foods.   - Do not eat three hours before bedtime.  Clear liquids are okay.   - When lying down, use two pillows to raise your head.    Medicines:      Take your medications, including blood thinners, unless your provider tells you not to.    If you have stopped any medicines, check with your provider about when to restart them.    You may take Tylenol (Acetaminophen) if your throat is sore.    You may take antacids if you have heartburn.      Follow Up Appointments:      Follow up with your cardiologist at [facility] of patient preference as instructed.    Follow up with your primary care provider as needed.    If you ve had a cardioversion:    The skin on your chest or back may feel tender for 48 hours.  If your skin is tender, you may:    Use a cold pack on the site. Never use ice directly on your skin. Use the cold pack for 20 minutes. Remove it for at least 30 minutes before re-using.    Apply 1% hydrocortisone cream to the skin (sold at drug stores)    Take Advil (Ibuprofen) or Tylenol (Acetaminophen).      Call the clinic if:      You have heartburn that is severe or lasts more than 72  "hours.    You have a sore throat that feels worse after 72 hours.    You have shortness of breath, neck pain, chest pain, fever, chills, coughing up blood, or other unusual signs.    Call your cardiologist right away if you have an irregular heartbeat, shortness of breath or feel dizzy.    Questions or concerns      [facility] at [place]:    [phone] UMP (7 days a week)               Additional Information About Your Visit        [employer]     [employer] lets you send messages to your doctor, view your test results, renew your prescriptions, schedule appointments and more. To sign up, go to [email or web address] . Click on \"Log in\" on the left side of the screen, which will take you to the Welcome page. Then click on \"Sign up Now\" on the right side of the page.     You will be asked to enter the access code listed below, as well as some personal information. Please follow the directions to create your username and password.     Your access code is: [email or web address]  Expires: 2018  6:25 PM     Your access code will  in 90 days. If you need help or a new code, please call your [place] clinic or [phone].        Care EveryWhere ID     This is your Care EveryWhere ID. This could be used by other organizations to access your [place] medical records  [id number]        Your Vitals Were     Blood Pressure Pulse Temperature Height Pulse Oximetry       118/81 (BP Location: Left arm) 98 97.9  F (36.6  C) (Oral) 1.803 m (5' 11\") 98%        Primary Care Provider Office Phone # Fax #    [facility] [phone]      Equal Access to Services     DOMINGO MOSCOSO : Meliza Glez, geneva dietrich, reyna kaalmada jeancarlos, kika juan. So [facility] [phone].    ATENCIÓN: Si habla español, tiene a villeda disposición servicios gratuitos de asistencia lingüística. Llame al [phone].    We comply with applicable " federal civil rights laws and [place] laws. We do not discriminate on the basis of race, color, national origin, age, disability, sex, sexual orientation, or gender identity.            Thank you!     Thank you for choosing [facility] for your care. Our goal is always to provide you with excellent care. Hearing back from our patients is one way we can continue to improve our services. Please take a few minutes to complete the written survey that you may receive in the mail after you visit with us. Thank you!             Medication List: This is a list of all your medications and when to take them. Check marks below indicate your daily home schedule. Keep this list as a reference.      Medications           Morning Afternoon Evening Bedtime As Needed    amLODIPine 5 MG tablet   Commonly known as:  NORVASC   Take 1 tablet (5 mg) by mouth daily                                apixaban ANTICOAGULANT 5 MG tablet   Commonly known as:  ELIQUIS   Take 1 tablet (5 mg) by mouth 2 times daily                                aspirin 81 MG chewable tablet   Take 81 mg by mouth daily                                atorvastatin 80 MG tablet   Commonly known as:  LIPITOR   Take 1 tablet (80 mg) by mouth daily                                metoprolol 50 MG tablet   Commonly known as:  LOPRESSOR   Take 1 tablet (50 mg) by mouth 2 times daily                                VITAMIN C PO

## 2017-12-13 NOTE — PROGRESS NOTES
Patient alert and awake taking ice chips, ambulating, and voiding in BR.  Denies pain and states understands DC home instructions.  DC home with

## 2017-12-13 NOTE — PROGRESS NOTES
Here with wife for ÁNGELA with possible DCCV. NPO, NKA, No fall risk. Reviewed procedure with pt and wife, questions answered. Reviewed d/c instructions  Questions answered. Labs WNL.  1454 Consent obtained, time out taken. Pt received total of 6 mg IV versed and 100 mg IV fentanyl for the procedure. Unable to cardiovert due to clot. Family in and discussed with Dr. Parker. VSS, A-fib with CVR, RR 9-20 through out. Sats 98%. Pt is awake but very sleepy but talking.  1518 Report to Analisa JUAREZ. Pt will need to be more awake before d/c. VSS,

## 2017-12-15 ENCOUNTER — HOSPITAL ENCOUNTER (OUTPATIENT)
Dept: GENERAL RADIOLOGY | Facility: CLINIC | Age: 47
End: 2017-12-15
Attending: INTERNAL MEDICINE
Payer: COMMERCIAL

## 2017-12-15 ENCOUNTER — HOSPITAL ENCOUNTER (OUTPATIENT)
Dept: CARDIOLOGY | Facility: CLINIC | Age: 47
Discharge: HOME | End: 2017-12-15
Attending: INTERNAL MEDICINE | Admitting: INTERNAL MEDICINE
Payer: COMMERCIAL

## 2017-12-15 DIAGNOSIS — Z98.890 HX OF METAL REMOVED FROM EYE: ICD-10-CM

## 2017-12-15 PROCEDURE — 25000128 H RX IP 250 OP 636: Performed by: INTERNAL MEDICINE

## 2017-12-15 PROCEDURE — 70200 X-RAY EXAM OF EYE SOCKETS: CPT

## 2017-12-15 PROCEDURE — 75561 CARDIAC MRI FOR MORPH W/DYE: CPT | Mod: 26 | Performed by: INTERNAL MEDICINE

## 2017-12-15 PROCEDURE — 75561 CARDIAC MRI FOR MORPH W/DYE: CPT

## 2017-12-15 PROCEDURE — A9585 GADOBUTROL INJECTION: HCPCS | Performed by: INTERNAL MEDICINE

## 2017-12-15 RX ORDER — GADOBUTROL 604.72 MG/ML
5-65 INJECTION INTRAVENOUS ONCE
Status: COMPLETED | OUTPATIENT
Start: 2017-12-15 | End: 2017-12-15

## 2017-12-15 RX ADMIN — GADOBUTROL 14 ML: 604.72 INJECTION INTRAVENOUS at 17:00

## 2017-12-16 LAB — INTERPRETATION ECG - MUSE: NORMAL

## 2017-12-19 ENCOUNTER — HOSPITAL ENCOUNTER (OUTPATIENT)
Dept: CARDIOLOGY | Facility: CLINIC | Age: 47
Discharge: HOME | End: 2017-12-19
Attending: INTERNAL MEDICINE | Admitting: INTERNAL MEDICINE
Payer: COMMERCIAL

## 2017-12-19 ENCOUNTER — OFFICE VISIT (OUTPATIENT)
Dept: CARDIOLOGY | Facility: CLINIC | Age: 47
End: 2017-12-19
Attending: INTERNAL MEDICINE
Payer: COMMERCIAL

## 2017-12-19 VITALS
BODY MASS INDEX: 32.64 KG/M2 | DIASTOLIC BLOOD PRESSURE: 81 MMHG | SYSTOLIC BLOOD PRESSURE: 130 MMHG | WEIGHT: 234 LBS | HEART RATE: 65 BPM | OXYGEN SATURATION: 98 %

## 2017-12-19 DIAGNOSIS — I48.0 PAROXYSMAL ATRIAL FIBRILLATION (H): ICD-10-CM

## 2017-12-19 DIAGNOSIS — I51.7 LVH (LEFT VENTRICULAR HYPERTROPHY): ICD-10-CM

## 2017-12-19 PROCEDURE — 99214 OFFICE O/P EST MOD 30 MIN: CPT | Mod: 25 | Performed by: INTERNAL MEDICINE

## 2017-12-19 PROCEDURE — 0296T ZIO PATCH HOLTER: CPT

## 2017-12-19 PROCEDURE — 0298T ZZC EXT ECG > 48HR TO 21 DAY REVIEW AND INTERPRETATN: CPT | Performed by: INTERNAL MEDICINE

## 2017-12-19 NOTE — LETTER
12/19/2017    [facility]  [address] [address]    RE: Matthew Still       Dear Colleague,    I had the pleasure of seeing Matthew Still in the [facility].    CARDIOLOGY VISIT    REASON FOR VISIT: f/u afib, HCM    SUBJECTIVE:  47-year-old male seen for atrial fibrillation and apical hypertrophic cardiomyopathy.    He reports a long history of paroxysmal A. fib.  He states this started about 20 years ago when he was seen in the [facility].  He reportedly underwent EP study, angiogram, and cardioversion.  Over the years he thinks he had A. fib about every 6-8 months with short episodes of palpitations.     In the past one year he thinks his A. fib has been more frequent, occurring about every week.     In November 2017 he presented to the [facility] with atrial fibrillation and chest pain with troponin 0.13.  Angiogram showed normal left main, mild disease of LAD, 60% D1 (FFR 0.88), normal circumflex, dominant RCA with minimal disease.   Echo showed EF 65%, moderate mostly concentric LVH with slight prominence of the septum, moderate left atrial enlargement, no significant valve disease, no outflow obstruction.     In the past few weeks he's been feeling better, but still feels he is in atrial fibrillation.  He has a dull ache on occasion in his chest.  Blood pressure at home is been 120-140 with pulse between 70 and 85.  He has no lightheadedness, dizziness, or lower extremity edema.     Cardiac MRI December 2017 showed EF 70%, mild to moderate mid ventricular hypertrophy and severe apical hypertrophy consistent with apical hypertrophic cardiomyopathy, normal RV, diffuse patchy mid wall enhancement of the anterior and apical segments, total scar burden 11%, trivial MR.     Transesophageal echo December 2017 showed EF 60%, severe apical hypertrophy consistent with hypertrophic cardiomyopathy, normal RV, mild left atrial enlargement, thrombus in  the left atrial appendage, mild MR, mild-to-moderate TR.     His main complaint is generalized fatigue.  He feels more tired as the day goes on.  He has some mild dyspnea, but no chest pain, lightheadedness, palpitations, or syncope.    MEDICATIONS:  Current Outpatient Prescriptions   Medication     Ascorbic Acid (VITAMIN C PO)     amLODIPine (NORVASC) 5 MG tablet     apixaban ANTICOAGULANT (ELIQUIS) 5 MG tablet     metoprolol (LOPRESSOR) 50 MG tablet     atorvastatin (LIPITOR) 80 MG tablet     No current facility-administered medications for this visit.        ALLERGIES:  No Known Allergies    REVIEW OF SYSTEMS:  Constitutional:  No weight loss, fever, chills, weakness or fatigue.  HEENT:  Eyes:  No visual loss, blurred vision, double vision or yellow sclerae. No hearing loss, sneezing, congestion, runny nose or sore throat.  Skin:  No rash or itching.  Cardiovascular: per HPI  Respiratory: per HPI  GI:  No anorexia, nausea, vomiting or diarrhea. No abdominal pain or blood.  :  No dysurea, hematuria  Neurologic:  No headache, dizziness, syncope, paralysis, ataxia, numbness or tingling in the extremities. No change in bowel or bladder control.  Musculoskeletal:  No muscle, back pain, joint pain or stiffness.  Hematologic:  No anemia, bleeding or bruising.  Lymphatics:  No enlarged nodes. No history of splenectomy.  Psychiatric:  No history of depression or anxiety.  Endocrine:  No reports of sweating, cold or heat intolerance. No polyuria or polydipsia.  Allergies:  No history of asthma, hives, eczema or rhinitis.    PHYSICAL EXAM:      BP: 130/81 Pulse: 65     SpO2: 98 %      Vital Signs with Ranges  Pulse:  [65] 65  BP: (130)/(81) 130/81  SpO2:  [98 %] 98 %  234 lbs 0 oz    Constitutional: awake, alert, no distress  Eyes: PERRL, sclera nonicteric  ENT: trachea midline  Respiratory: Lungs clear  Cardiovascular: Regular rate, totally irregular rhythm, no murmurs  GI: nondistended, nontender, bowel sounds  present  Lymph/Hematologic: no lymphadenopathy  Skin: dry, no rash  Musculoskeletal: good muscle tone, strength 5/5 in upper and lower extremities  Neurologic: no focal deficits  Neuropsychiatric: appropriate affact    ASSESSMENT:  47-year-old male seen for follow-up of atrial fibrillation and newly diagnosed hypertrophic cardiomyopathy.  Unfortunately he had a possible left atrial thrombus, no cardioversion was performed.  He will stay on Eliquis.  Repeat ÁNGELA guided cardioversion will be attempted after 30 days.  Otherwise his rates seem well-controlled.    He has an apical variant of hypertrophic cardiomyopathy.  This is a fairly uncommon pattern of this disease.  It reportedly is lower risk for ventricular arrhythmias compared to the more common septal version of hypertrophic cardiomyopathy.  He has no first-degree relatives with any history of cardiac arrest.  He does have a maternal grandfather and uncle who  in her 20s and 30s reportedly from heart attacks, but details are unclear.  He has no history of syncope and no palpitations.  There is no outflow obstruction, as his septal wall thickness is only mildly enlarged.    His metoprolol will be cut in half, he likely has some fatigue from the beta blocker.  He will wear a three-day Zio patch to screen for any ventricular arrhythmias.    RECOMMENDATIONS:  1.  Atrial fibrillation with probable left atrial appendage thrombus  - Continue Eliquis  - Reattempt ÁNGELA guided cardioversion after 30 days, likely late January  - Decrease metoprolol to 25 mg b.i.d. secondary to probable fatigue    2.  Apical variant of hypertrophic cardiomyopathy  - Three-day Zio patch  - No indication for ICD at this point  - Recommended screening of first-degree family members     Follow-up in 1 month.    Brice Harrison MD  Cardiology - [facility]  Pager:  [phone]  Text Page  2017    Thank you for allowing me to participate in the care of your  patient.    Sincerely,     Brice Harrison MD

## 2017-12-19 NOTE — PATIENT INSTRUCTIONS
1. Your MRI showed your heart muscle is thickened - this is consistent with hypertrophic cardiomyopathy, which is a genetic condition causing thick heart muscle.  A good website is [email or web address].    2. Your parents, siblings, and son should be screen at some point for this condition.  They will need an EKG and echocardiogram every five years    3. Stay on the Eliquis and after 30 days,will do another ÁNGELA and cardioversion    4. Decrease metoprolol to 25mg twice daily    5. Wear a Zio patch for five days

## 2017-12-19 NOTE — PROGRESS NOTES
CARDIOLOGY VISIT    REASON FOR VISIT: f/u afib, HCM    SUBJECTIVE:  47-year-old male seen for atrial fibrillation and apical hypertrophic cardiomyopathy.    He reports a long history of paroxysmal A. fib.  He states this started about 20 years ago when he was seen in the [facility].  He reportedly underwent EP study, angiogram, and cardioversion.  Over the years he thinks he had A. fib about every 6-8 months with short episodes of palpitations.     In the past one year he thinks his A. fib has been more frequent, occurring about every week.     In November 2017 he presented to the [facility] with atrial fibrillation and chest pain with troponin 0.13.  Angiogram showed normal left main, mild disease of LAD, 60% D1 (FFR 0.88), normal circumflex, dominant RCA with minimal disease.   Echo showed EF 65%, moderate mostly concentric LVH with slight prominence of the septum, moderate left atrial enlargement, no significant valve disease, no outflow obstruction.     In the past few weeks he's been feeling better, but still feels he is in atrial fibrillation.  He has a dull ache on occasion in his chest.  Blood pressure at home is been 120-140 with pulse between 70 and 85.  He has no lightheadedness, dizziness, or lower extremity edema.     Cardiac MRI December 2017 showed EF 70%, mild to moderate mid ventricular hypertrophy and severe apical hypertrophy consistent with apical hypertrophic cardiomyopathy, normal RV, diffuse patchy mid wall enhancement of the anterior and apical segments, total scar burden 11%, trivial MR.     Transesophageal echo December 2017 showed EF 60%, severe apical hypertrophy consistent with hypertrophic cardiomyopathy, normal RV, mild left atrial enlargement, thrombus in the left atrial appendage, mild MR, mild-to-moderate TR.     His main complaint is generalized fatigue.  He feels more tired as the day goes on.  He has some mild dyspnea, but no chest pain, lightheadedness, palpitations, or  syncope.    MEDICATIONS:  Current Outpatient Prescriptions   Medication     Ascorbic Acid (VITAMIN C PO)     amLODIPine (NORVASC) 5 MG tablet     apixaban ANTICOAGULANT (ELIQUIS) 5 MG tablet     metoprolol (LOPRESSOR) 50 MG tablet     atorvastatin (LIPITOR) 80 MG tablet     No current facility-administered medications for this visit.        ALLERGIES:  No Known Allergies    REVIEW OF SYSTEMS:  Constitutional:  No weight loss, fever, chills, weakness or fatigue.  HEENT:  Eyes:  No visual loss, blurred vision, double vision or yellow sclerae. No hearing loss, sneezing, congestion, runny nose or sore throat.  Skin:  No rash or itching.  Cardiovascular: per HPI  Respiratory: per HPI  GI:  No anorexia, nausea, vomiting or diarrhea. No abdominal pain or blood.  :  No dysurea, hematuria  Neurologic:  No headache, dizziness, syncope, paralysis, ataxia, numbness or tingling in the extremities. No change in bowel or bladder control.  Musculoskeletal:  No muscle, back pain, joint pain or stiffness.  Hematologic:  No anemia, bleeding or bruising.  Lymphatics:  No enlarged nodes. No history of splenectomy.  Psychiatric:  No history of depression or anxiety.  Endocrine:  No reports of sweating, cold or heat intolerance. No polyuria or polydipsia.  Allergies:  No history of asthma, hives, eczema or rhinitis.    PHYSICAL EXAM:      BP: 130/81 Pulse: 65     SpO2: 98 %      Vital Signs with Ranges  Pulse:  [65] 65  BP: (130)/(81) 130/81  SpO2:  [98 %] 98 %  234 lbs 0 oz    Constitutional: awake, alert, no distress  Eyes: PERRL, sclera nonicteric  ENT: trachea midline  Respiratory: Lungs clear  Cardiovascular: Regular rate, totally irregular rhythm, no murmurs  GI: nondistended, nontender, bowel sounds present  Lymph/Hematologic: no lymphadenopathy  Skin: dry, no rash  Musculoskeletal: good muscle tone, strength 5/5 in upper and lower extremities  Neurologic: no focal deficits  Neuropsychiatric: appropriate  affact    ASSESSMENT:  47-year-old male seen for follow-up of atrial fibrillation and newly diagnosed hypertrophic cardiomyopathy.  Unfortunately he had a possible left atrial thrombus, no cardioversion was performed.  He will stay on Eliquis.  Repeat ÁNGELA guided cardioversion will be attempted after 30 days.  Otherwise his rates seem well-controlled.    He has an apical variant of hypertrophic cardiomyopathy.  This is a fairly uncommon pattern of this disease.  It reportedly is lower risk for ventricular arrhythmias compared to the more common septal version of hypertrophic cardiomyopathy.  He has no first-degree relatives with any history of cardiac arrest.  He does have a maternal grandfather and uncle who  in her 20s and 30s reportedly from heart attacks, but details are unclear.  He has no history of syncope and no palpitations.  There is no outflow obstruction, as his septal wall thickness is only mildly enlarged.    His metoprolol will be cut in half, he likely has some fatigue from the beta blocker.  He will wear a three-day Zio patch to screen for any ventricular arrhythmias.    RECOMMENDATIONS:  1.  Atrial fibrillation with probable left atrial appendage thrombus  - Continue Eliquis  - Reattempt ÁNGELA guided cardioversion after 30 days, likely late January  - Decrease metoprolol to 25 mg b.i.d. secondary to probable fatigue    2.  Apical variant of hypertrophic cardiomyopathy  - Three-day Zio patch  - No indication for ICD at this point  - Recommended screening of first-degree family members     Follow-up in 1 month.    Brice Harrison MD  Cardiology - [facility]  Pager:  [phone]  Text Page  2017

## 2017-12-19 NOTE — MR AVS SNAPSHOT
After Visit Summary   12/19/2017    Matthew Still    MRN: [id number]           Patient Information     Date Of Birth          1970        Visit Information        Provider Department      12/19/2017 3:00 PM Brice Harrison MD [facility]        Today's Diagnoses     Paroxysmal atrial fibrillation (H)        LVH (left ventricular hypertrophy)          Care Instructions    1. Your MRI showed your heart muscle is thickened - this is consistent with hypertrophic cardiomyopathy, which is a genetic condition causing thick heart muscle.  A good website is [email or web address].    2. Your parents, siblings, and son should be screen at some point for this condition.  They will need an EKG and echocardiogram every five years    3. Stay on the Eliquis and after 30 days,will do another ÁNGELA and cardioversion    4. Decrease metoprolol to 25mg twice daily    5. Wear a Zio patch for five days          Follow-ups after your visit        Additional Services     Follow-Up with Cardiologist                 Future tests that were ordered for you today     Open Future Orders        Priority Expected Expires Ordered    Follow-Up with Cardiologist Routine 1/15/2018 12/19/2018 12/19/2017    Zio Patch Monitor Routine 12/26/2017 12/19/2018 12/19/2017            Who to contact     If you have questions or need follow up information about today's clinic visit or your schedule please contact [facility] directly at [phone].  Normal or non-critical lab and imaging results will be communicated to you by MyChart, letter or phone within 4 business days after the clinic has received the results. If you do not hear from us within 7 days, please contact the clinic through MyChart or phone. If you have a critical or abnormal lab result, we will notify you by phone as soon as possible.  Submit refill requests through [employer] or call your pharmacy  "and they will forward the refill request to us. Please allow 3 business days for your refill to be completed.          Additional Information About Your Visit        MyChart Information     [employer] lets you send messages to your doctor, view your test results, renew your prescriptions, schedule appointments and more. To sign up, go to [email or web address] . Click on \"Log in\" on the left side of the screen, which will take you to the Welcome page. Then click on \"Sign up Now\" on the right side of the page.     You will be asked to enter the access code listed below, as well as some personal information. Please follow the directions to create your username and password.     Your access code is: [email or web address]  Expires: 2018  6:25 PM     Your access code will  in 90 days. If you need help or a new code, please call your [place] clinic or [phone].        Care EveryWhere ID     This is your Care EveryWhere ID. This could be used by other organizations to access your [place] medical records  [id number]        Your Vitals Were     Pulse Pulse Oximetry BMI (Body Mass Index)             65 98% 32.64 kg/m2          Blood Pressure from Last 3 Encounters:   17 130/81   17 118/84   17 (!) 138/95    Weight from Last 3 Encounters:   17 106.1 kg (234 lb)   17 107.5 kg (237 lb)   17 106.1 kg (234 lb)              We Performed the Following     Follow-Up with Cardiologist          Today's Medication Changes          These changes are accurate as of: 17  3:27 PM.  If you have any questions, ask your nurse or doctor.               Stop taking these medicines if you haven't already. Please contact your care team if you have questions.     aspirin 81 MG chewable tablet   Stopped by:  Brice Harrison MD                    Primary Care Provider Office Phone # Fax #    [facility] [phone] [address] [address]   "      Equal Access to Services     ARCHIE [facility] : Hadii aad ku hadjasbirrenetta [place], wajudieda luqadaha, qaybta allysuzikika mcclelland. So [facility] [phone].    ATENCIÓN: Si habla español, tiene a villeda disposición servicios gratuitos de asistencia lingüística. Carmename al [phone].    We comply with applicable federal civil rights laws and [place] laws. We do not discriminate on the basis of race, color, national origin, age, disability, sex, sexual orientation, or gender identity.            Thank you!     Thank you for choosing [facility]  for your care. Our goal is always to provide you with excellent care. Hearing back from our patients is one way we can continue to improve our services. Please take a few minutes to complete the written survey that you may receive in the mail after your visit with us. Thank you!             Your Updated Medication List - Protect others around you: Learn how to safely use, store and throw away your medicines at www.disposemymeds.org.          This list is accurate as of: 12/19/17  3:27 PM.  Always use your most recent med list.                   Brand Name Dispense Instructions for use Diagnosis    amLODIPine 5 MG tablet    NORVASC    30 tablet    Take 1 tablet (5 mg) by mouth daily    Benign essential hypertension       apixaban ANTICOAGULANT 5 MG tablet    ELIQUIS    60 tablet    Take 1 tablet (5 mg) by mouth 2 times daily    Paroxysmal atrial fibrillation (H)       atorvastatin 80 MG tablet    LIPITOR    30 tablet    Take 1 tablet (80 mg) by mouth daily    Hyperlipidemia LDL goal <70       metoprolol 50 MG tablet    LOPRESSOR    60 tablet    Take 1 tablet (50 mg) by mouth 2 times daily    Atrial fibrillation, unspecified type (H)       VITAMIN C PO

## 2018-01-02 ENCOUNTER — TELEPHONE (OUTPATIENT)
Dept: CARDIOLOGY | Facility: CLINIC | Age: 48
End: 2018-01-02

## 2018-01-02 DIAGNOSIS — I48.91 ATRIAL FIBRILLATION (H): Primary | ICD-10-CM

## 2018-01-02 DIAGNOSIS — I47.29 PAROXYSMAL VENTRICULAR TACHYCARDIA (H): ICD-10-CM

## 2018-01-02 NOTE — TELEPHONE ENCOUNTER
"Results of Zio patch noted: persistant AFib with extreme HR up to 167 (avg ). 1 pause lasting 3 sec at 1:38 am. 2 runs of VT with longest lasting 10.5 seconds (in automated report but not mentioned by reader). To be discussed at OV with Emerald Corrigan NP on 1/19/18. Will discuss with Dr Harrison re: recommendations prior to follow up. Per Dr Harrison: \"I would like patient to see EP regarding his Zio result.  There was some VT, but this could have been aberrantly conducted afib.  He should still f/u with me as planned\". LM for patient to call back to discuss results of Zio patch and recommendation from Dr Harrison. Yary Tello RN Cardiology January 2, 2018, 10:28 AM    ADDENDUM: Pt called back to discuss. He verbalized understanding and agreed with plan. First available appointment with EP 2/13/18--will squeeze in with Dr Tang on 1/23/18 at 10:45am. Yary Tello RN Cardiology January 2, 2018, 2:32 PM    "

## 2018-01-23 ENCOUNTER — OFFICE VISIT (OUTPATIENT)
Dept: CARDIOLOGY | Facility: CLINIC | Age: 48
End: 2018-01-23
Attending: INTERNAL MEDICINE
Payer: COMMERCIAL

## 2018-01-23 VITALS — HEART RATE: 67 BPM | BODY MASS INDEX: 33.61 KG/M2 | WEIGHT: 241 LBS | OXYGEN SATURATION: 98 %

## 2018-01-23 DIAGNOSIS — I47.29 PAROXYSMAL VENTRICULAR TACHYCARDIA (H): ICD-10-CM

## 2018-01-23 DIAGNOSIS — I48.20 CHRONIC ATRIAL FIBRILLATION (H): ICD-10-CM

## 2018-01-23 PROCEDURE — 99214 OFFICE O/P EST MOD 30 MIN: CPT | Performed by: INTERNAL MEDICINE

## 2018-01-23 RX ORDER — AMIODARONE HYDROCHLORIDE 200 MG/1
200 TABLET ORAL 2 TIMES DAILY
Qty: 60 TABLET | Refills: 3 | Status: ON HOLD | OUTPATIENT
Start: 2018-01-23 | End: 2018-02-06

## 2018-01-23 NOTE — MR AVS SNAPSHOT
"              After Visit Summary   1/23/2018    Matthew Still    MRN: [id number]           Patient Information     Date Of Birth          1970        Visit Information        Provider Department      1/23/2018 10:00 AM Vik Tang MD [facility]        Today's Diagnoses     Chronic atrial fibrillation (H)        Paroxysmal ventricular tachycardia (H)           Follow-ups after your visit        Additional Services     Follow-Up with Cardiac Advanced Practice Provider                 Future tests that were ordered for you today     Open Future Orders        Priority Expected Expires Ordered    Transesophageal Echocardiogram Routine 1/30/2018 1/23/2019 1/23/2018    Cardioversion Routine 1/30/2018 1/23/2019 1/23/2018    Follow-Up with Cardiac Advanced Practice Provider Routine 2/22/2018 1/23/2019 1/23/2018            Who to contact     If you have questions or need follow up information about today's clinic visit or your schedule please contact [facility] directly at [phone].  Normal or non-critical lab and imaging results will be communicated to you by Mersivehart, letter or phone within 4 business days after the clinic has received the results. If you do not hear from us within 7 days, please contact the clinic through SoundOutt or phone. If you have a critical or abnormal lab result, we will notify you by phone as soon as possible.  Submit refill requests through DiscoveRX or call your pharmacy and they will forward the refill request to us. Please allow 3 business days for your refill to be completed.          Additional Information About Your Visit        Mersivehart Information     DiscoveRX lets you send messages to your doctor, view your test results, renew your prescriptions, schedule appointments and more. To sign up, go to [email or web address] . Click on \"Log in\" on the left side of the screen, which will take you to the " "Welcome page. Then click on \"Sign up Now\" on the right side of the page.     You will be asked to enter the access code listed below, as well as some personal information. Please follow the directions to create your username and password.     Your access code is: [email or web address]  Expires: 2018  6:25 PM     Your access code will  in 90 days. If you need help or a new code, please call your [place] clinic or [phone].        Care EveryWhere ID     This is your Care EveryWhere ID. This could be used by other organizations to access your [place] medical records  [id number]        Your Vitals Were     Pulse Pulse Oximetry BMI (Body Mass Index)             67 98% 33.61 kg/m2          Blood Pressure from Last 3 Encounters:   17 130/81   17 118/84   17 (!) 138/95    Weight from Last 3 Encounters:   18 109.3 kg (241 lb)   17 106.1 kg (234 lb)   17 107.5 kg (237 lb)              We Performed the Following     Follow-Up with Electrophysiologist          Today's Medication Changes          These changes are accurate as of: 18 12:30 PM.  If you have any questions, ask your nurse or doctor.               Start taking these medicines.        Dose/Directions    amiodarone 200 MG tablet   Commonly known as:  PACERONE/CODARONE   Used for:  Chronic atrial fibrillation (H)   Started by:  Vik Tang MD        Dose:  200 mg   Take 1 tablet (200 mg) by mouth 2 times daily For 3 weeks then one tablet (200 mg) daily   Quantity:  60 tablet   Refills:  3         Stop taking these medicines if you haven't already. Please contact your care team if you have questions.     metoprolol tartrate 50 MG tablet   Commonly known as:  LOPRESSOR   Stopped by:  Vik Tang MD                Where to get your medicines      These medications were sent to [facility] [place], [place] - [address]  [address] [address]     Phone:  [phone]     amiodarone " 200 MG tablet                Primary Care Provider Office Phone # Fax #    [facility] [phone] [address] [address]        Equal Access to Services     ARCHIE MOSCOSO : Hadii aad ku hadjasbiro Soteresitaali, waaxda luqadaha, qaybta kaalmada adeegyada, kika zavalan sakshisudha roy demetris juan. So [facility] [phone].    ATENCIÓN: Si habla español, tiene a villeda disposición servicios gratuitos de asistencia lingüística. Llame al [phone].    We comply with applicable federal civil rights laws and [place] laws. We do not discriminate on the basis of race, color, national origin, age, disability, sex, sexual orientation, or gender identity.            Thank you!     Thank you for choosing [facility]  for your care. Our goal is always to provide you with excellent care. Hearing back from our patients is one way we can continue to improve our services. Please take a few minutes to complete the written survey that you may receive in the mail after your visit with us. Thank you!             Your Updated Medication List - Protect others around you: Learn how to safely use, store and throw away your medicines at www.disposemymeds.org.          This list is accurate as of: 1/23/18 12:30 PM.  Always use your most recent med list.                   Brand Name Dispense Instructions for use Diagnosis    amiodarone 200 MG tablet    PACERONE/CODARONE    60 tablet    Take 1 tablet (200 mg) by mouth 2 times daily For 3 weeks then one tablet (200 mg) daily    Chronic atrial fibrillation (H)       amLODIPine 5 MG tablet    NORVASC    30 tablet    Take 1 tablet (5 mg) by mouth daily    Benign essential hypertension       apixaban ANTICOAGULANT 5 MG tablet    ELIQUIS    60 tablet    Take 1 tablet (5 mg) by mouth 2 times daily    Paroxysmal atrial fibrillation (H)       atorvastatin 80 MG tablet    LIPITOR    30 tablet    Take 1 tablet (80 mg) by  mouth daily    Hyperlipidemia LDL goal <70       VITAMIN C PO

## 2018-01-23 NOTE — PROGRESS NOTES
HPI and Plan:   See dictation    Orders Placed This Encounter   Procedures     Follow-Up with Cardiac Advanced Practice Provider       Orders Placed This Encounter   Medications     amiodarone (PACERONE/CODARONE) 200 MG tablet     Sig: Take 1 tablet (200 mg) by mouth 2 times daily For 3 weeks then one tablet (200 mg) daily     Dispense:  60 tablet     Refill:  3       Medications Discontinued During This Encounter   Medication Reason     metoprolol (LOPRESSOR) 50 MG tablet          Encounter Diagnoses   Name Primary?     Chronic atrial fibrillation (H)      Paroxysmal ventricular tachycardia (H)        CURRENT MEDICATIONS:  Current Outpatient Prescriptions   Medication Sig Dispense Refill     amiodarone (PACERONE/CODARONE) 200 MG tablet Take 1 tablet (200 mg) by mouth 2 times daily For 3 weeks then one tablet (200 mg) daily 60 tablet 3     Ascorbic Acid (VITAMIN C PO)        amLODIPine (NORVASC) 5 MG tablet Take 1 tablet (5 mg) by mouth daily 30 tablet 4     apixaban ANTICOAGULANT (ELIQUIS) 5 MG tablet Take 1 tablet (5 mg) by mouth 2 times daily 60 tablet 11     atorvastatin (LIPITOR) 80 MG tablet Take 1 tablet (80 mg) by mouth daily 30 tablet 11       ALLERGIES   No Known Allergies    PAST MEDICAL HISTORY:  No past medical history on file.    PAST SURGICAL HISTORY:  No past surgical history on file.    FAMILY HISTORY:  Family History   Problem Relation Age of Onset     Coronary Artery Disease Mother      a fib, ,MI     Heart Failure Mother      CHF     Other Cancer Father      DIABETES Father      Coronary Artery Disease Paternal Grandmother      Coronary Artery Disease Paternal Grandfather        SOCIAL HISTORY:  Social History     Social History     Marital status:      Spouse name: N/A     Number of children: N/A     Years of education: N/A     Social History Main Topics     Smoking status: Never Smoker     Smokeless tobacco: Former User     Alcohol use Yes      Comment: 1 drink per month     Drug use: No      Sexual activity: Yes     Other Topics Concern     Parent/Sibling W/ Cabg, Mi Or Angioplasty Before 65f 55m? Yes     Mother MI age 45     Social History Narrative       Review of Systems:  Skin:  Negative       Eyes:  Positive for   vision going black  ENT:  Negative      Respiratory:  Positive for shortness of breath;cough     Cardiovascular:    Positive for;palpitations;fatigue;lightheadedness;dizziness;chest pain;syncope or near-syncope    Gastroenterology: Negative      Genitourinary:  Negative      Musculoskeletal:  Negative      Neurologic:  Positive for headaches    Psychiatric:  Negative      Heme/Lymph/Imm:  Negative      Endocrine:  Negative        Physical Exam:  Vitals: Pulse 67  Wt 109.3 kg (241 lb)  SpO2 98%  BMI 33.61 kg/m2    Constitutional:  cooperative, alert and oriented, well developed, well nourished, in no acute distress        Skin:  warm and dry to the touch, no apparent skin lesions or masses noted          Head:  normocephalic, no masses or lesions        Eyes:  pupils equal and round, conjunctivae and lids unremarkable, sclera white, no xanthalasma, EOMS intact, no nystagmus        Lymph:No Cervical lymphadenopathy present     ENT:  no pallor or cyanosis, dentition good        Neck:  carotid pulses are full and equal bilaterally, JVP normal, no carotid bruit        Respiratory:  normal breath sounds, clear to auscultation, normal A-P diameter, normal symmetry, normal respiratory excursion, no use of accessory muscles         Cardiac:   irregularly irregular rhythm   no presence of murmur          pulses full and equal, no bruits auscultated                                        GI:  abdomen soft, non-tender, BS normoactive, no mass, no HSM, no bruits        Extremities and Muscular Skeletal:  no deformities, clubbing, cyanosis, erythema observed              Neurological:  no gross motor deficits        Psych:  Alert and Oriented x 3        CC  Brice Harrison MD  [facility]  HEART CARE  [address] [address]

## 2018-01-23 NOTE — LETTER
"1/23/2018      [facility]  [address] [address]      RE: Matthew Still       Dear Colleague,    I had the pleasure of seeing Matthew Still in the [facility].    Service Date: 01/23/2018      HISTORY OF PRESENT ILLNESS:  It was my pleasure to see Matthew today at the request of my partner, Dr. Harrison, for evaluation of symptomatic chronic atrial fibrillation in the background of atypical hypertrophy.  As you know, Matthew is a 47-year-old gentleman who has been having intermittent palpitations for the past 20 years or so and was told to have atrial fibrillation as well.  At that time, he was evaluated at the [facility] where he underwent EP study, angiogram and cardioversion and since then, has been having episodes of atrial fibrillation for which he describes as palpitation every 6-8 months but it has been more chronic over the past 3-4 months now.  He was hospitalized at the [facility] this past November when he presented with chest discomfort along with atrial fibrillation and had a trivial troponin rise.  Angiogram was performed which demonstrated mild disease up to 60% with a normal FFR.  Echocardiogram demonstrated LVH with EF of 65%.      When Matthew saw Dr. Harrison, he recommended ÁNGELA-guided cardioversion and also to get a cardiac MRI for formal evaluation for possible hypertrophic cardiomyopathy.  The patient was noted to have a lot of \"smoke\" in the left atrium with possible clot in left atrial appendage and therefore cardioversion was postponed.  MRI was done which did not demonstrate clot in left atrial appendage.  It does show apical hypertrophy.  The patient has been on low dose metoprolol along with Eliquis.  He wore a 3-day ZIO Patch monitor which demonstrated 100% atrial fibrillation with up to 3-second pauses at night when he was sleeping.  There was also an episode of what appeared to be a different QRS morphology but it was " "not as wide and for which the monitor indicated as \"VT\" but on further inspection, it appeared to be quite irregular, lasting for 10 seconds for 22 beats with an average of 123 beats per minute.  It is unclear what leads his monitor was set at but it appeared to be a right bundle branch block pattern with a prominent S wave.      The patient did not have any symptoms when he wore the monitor but aside from continuing to feel fatigued and tired, he was aware of his heart rhythm, even under control.  He does take occasional extra dose of metoprolol when it is fast.  Recently, he had an episode where he felt as if the heart stopped and shortly after, he felt tunnel-like vision briefly.  He denies having palpitations preceding that nor did he experience any nausea or vomiting.  His wife noted he was quite ashen with purple lips.  He did not take any extra dose of metoprolol beforehand.  The patient stated he would feel better when he was in normal rhythm.      We discussed at length about the association between atrial fibrillation and hypertrophic cardiomyopathy.  It appears that the patient had a significant devika episode causing him to feel near syncopal.  In the past, he also stated that he would take a deep breath sometimes as well, suggesting that it may be caused by a pause as well.      I would agree the patient would benefit from rhythm control strategy and to enhance the success rate to restore and maintain sinus rhythm, I would have him on amiodarone at 200 mg twice a day for 2 weeks and after that, once a day.  Given the potential worsening bradyarrhythmia, I would have him stop the metoprolol for now.  The patient has no family history of sudden cardiac death or in his first cousins.  He is the only one of his siblings so far who was told to have hypertrophy.  I would agree that with atypical hypertrophy, the risk of sudden death is quite low and so far the patient has not exhibited high-risk " characteristics that would argue for placement of an ICD at this point in time.  The description of his recent episode appeared to be bradyarrhythmia driven rather than ventricular tachyarrhythmias but again, one cannot be sure with 100% certainty.  Right now, the patient does not have any major criteria that would warrant it at this point in time.      Given his questionable clots on the previous ÁNGELA, I would like to repeat another one prior to proceeding with a cardioversion.  I emphasized the importance of taking his Eliquis nonstop until then and then have him come back and see one of us in about a month's time for further followup.     Outpatient Encounter Prescriptions as of 1/23/2018   Medication Sig Dispense Refill     amiodarone (PACERONE/CODARONE) 200 MG tablet Take 1 tablet (200 mg) by mouth 2 times daily For 3 weeks then one tablet (200 mg) daily 60 tablet 3     Ascorbic Acid (VITAMIN C PO)        amLODIPine (NORVASC) 5 MG tablet Take 1 tablet (5 mg) by mouth daily 30 tablet 4     apixaban ANTICOAGULANT (ELIQUIS) 5 MG tablet Take 1 tablet (5 mg) by mouth 2 times daily 60 tablet 11     atorvastatin (LIPITOR) 80 MG tablet Take 1 tablet (80 mg) by mouth daily 30 tablet 11     [DISCONTINUED] metoprolol (LOPRESSOR) 50 MG tablet Take 1 tablet (50 mg) by mouth 2 times daily 60 tablet 11     No facility-administered encounter medications on file as of 1/23/2018.      Again, thank you for allowing me to participate in the care of your patient.      Sincerely,    Vik Clancy MD

## 2018-01-23 NOTE — PROGRESS NOTES
"Service Date: 01/23/2018      HISTORY OF PRESENT ILLNESS:  It was my pleasure to see Matthew today at the request of my partner, Dr. Harrison, for evaluation of symptomatic chronic atrial fibrillation in the background of atypical hypertrophy.  As you know, Matthew is a 47-year-old gentleman who has been having intermittent palpitations for the past 20 years or so and was told to have atrial fibrillation as well.  At that time, he was evaluated at the [facility] where he underwent EP study, angiogram and cardioversion and since then, has been having episodes of atrial fibrillation for which he describes as palpitation every 6-8 months but it has been more chronic over the past 3-4 months now.  He was hospitalized at the [facility] this past November when he presented with chest discomfort along with atrial fibrillation and had a trivial troponin rise.  Angiogram was performed which demonstrated mild disease up to 60% with a normal FFR.  Echocardiogram demonstrated LVH with EF of 65%.      When Matthew saw Dr. Harrison, he recommended ÁNGELA-guided cardioversion and also to get a cardiac MRI for formal evaluation for possible hypertrophic cardiomyopathy.  The patient was noted to have a lot of \"smoke\" in the left atrium with possible clot in left atrial appendage and therefore cardioversion was postponed.  MRI was done which did not demonstrate clot in left atrial appendage.  It does show apical hypertrophy.  The patient has been on low dose metoprolol along with Eliquis.  He wore a 3-day ZIO Patch monitor which demonstrated 100% atrial fibrillation with up to 3-second pauses at night when he was sleeping.  There was also an episode of what appeared to be a different QRS morphology but it was not as wide and for which the monitor indicated as \"VT\" but on further inspection, it appeared to be quite irregular, lasting for 10 seconds for 22 beats with an average of 123 beats per minute.  It is unclear what leads his " monitor was set at but it appeared to be a right bundle branch block pattern with a prominent S wave.      The patient did not have any symptoms when he wore the monitor but aside from continuing to feel fatigued and tired, he was aware of his heart rhythm, even under control.  He does take occasional extra dose of metoprolol when it is fast.  Recently, he had an episode where he felt as if the heart stopped and shortly after, he felt tunnel-like vision briefly.  He denies having palpitations preceding that nor did he experience any nausea or vomiting.  His wife noted he was quite ashen with purple lips.  He did not take any extra dose of metoprolol beforehand.  The patient stated he would feel better when he was in normal rhythm.      We discussed at length about the association between atrial fibrillation and hypertrophic cardiomyopathy.  It appears that the patient had a significant devika episode causing him to feel near syncopal.  In the past, he also stated that he would take a deep breath sometimes as well, suggesting that it may be caused by a pause as well.      I would agree the patient would benefit from rhythm control strategy and to enhance the success rate to restore and maintain sinus rhythm, I would have him on amiodarone at 200 mg twice a day for 2 weeks and after that, once a day.  Given the potential worsening bradyarrhythmia, I would have him stop the metoprolol for now.  The patient has no family history of sudden cardiac death or in his first cousins.  He is the only one of his siblings so far who was told to have hypertrophy.  I would agree that with atypical hypertrophy, the risk of sudden death is quite low and so far the patient has not exhibited high-risk characteristics that would argue for placement of an ICD at this point in time.  The description of his recent episode appeared to be bradyarrhythmia driven rather than ventricular tachyarrhythmias but again, one cannot be sure with 100%  certainty.  Right now, the patient does not have any major criteria that would warrant it at this point in time.      Given his questionable clots on the previous ÁNGELA, I would like to repeat another one prior to proceeding with a cardioversion.  I emphasized the importance of taking his Eliquis nonstop until then and then have him come back and see one of us in about a month's time for further followup.         JESUS OTT MD             D: 2018   T: 2018   MT: AMADOR      Name:     TAMARA CENTENO   MRN:      [id number]        Account:      [id number]   :      1970           Service Date: 2018      Document: [id number]

## 2018-01-26 ENCOUNTER — TELEPHONE (OUTPATIENT)
Dept: CARDIOLOGY | Facility: CLINIC | Age: 48
End: 2018-01-26

## 2018-01-26 ENCOUNTER — MEDICAL CORRESPONDENCE (OUTPATIENT)
Dept: HEALTH INFORMATION MANAGEMENT | Facility: CLINIC | Age: 48
End: 2018-01-26

## 2018-01-26 DIAGNOSIS — I48.20 CHRONIC ATRIAL FIBRILLATION (H): Primary | ICD-10-CM

## 2018-01-26 NOTE — TELEPHONE ENCOUNTER
I have a second thought about ÁNGELA guided cardioversion for this patient and have him on amiodarone. Since he's quite young and potentially have to be on amiodarone for a long time with side-effects I think ablation can offer a long term benefit. I would recommend proceed with ablation the more I think about it. If he is agreeable please cancel CVN and schedule ablation. No need for CT but does need ÁNGELA before hand on same day of ablation. Thanks, qp       Call to pt with Dr Tang change of plan and new recommendations to proceed with a Fib Ablation instead of Cardioversion.  Pt states that he will do what Dr Tang recommends as he is the expert. Explained that we would set pt up to do the ÁNGELA and the Ablation on the same day, so that pt does not need to make multiple trips.  Discussed proceeding on 2/5, but explained that [facility] in scheduling will call pt with exact times. Orders in place for ablation. ÁNGELA and Cardioversion for 1/31 will be cancelled. JNelsonRN

## 2018-01-29 NOTE — TELEPHONE ENCOUNTER
Pt wanting to know if he is to stay on the Amiodarone or go back to the metoprolol since he is to have Ablation instead of Cardioversion. Verified with Dr Tang and pt will stay on Amio until Ablation on 2/5. No further questions at this time. Zonia

## 2018-02-02 ENCOUNTER — TELEPHONE (OUTPATIENT)
Dept: CARDIOLOGY | Facility: CLINIC | Age: 48
End: 2018-02-02

## 2018-02-02 DIAGNOSIS — I48.20 CHRONIC ATRIAL FIBRILLATION (H): Primary | ICD-10-CM

## 2018-02-02 RX ORDER — LIDOCAINE 40 MG/G
CREAM TOPICAL
Status: CANCELLED | OUTPATIENT
Start: 2018-02-02

## 2018-02-02 RX ORDER — SODIUM CHLORIDE 450 MG/100ML
INJECTION, SOLUTION INTRAVENOUS CONTINUOUS
Status: CANCELLED | OUTPATIENT
Start: 2018-02-02

## 2018-02-02 NOTE — TELEPHONE ENCOUNTER
Spoke to patient to review instructions for afib ablation scheduled for 2/5.   Patient aware that he/ is to be NPO after midnight and may take sips of water with am medications. Patient to arrive at 8am to complete ÁNGELA prior to procedure scheduled for 1pm.  Patient aware that he will be staying overnight after procedure.  ANN Peter

## 2018-02-05 ENCOUNTER — APPOINTMENT (OUTPATIENT)
Dept: CARDIOLOGY | Facility: CLINIC | Age: 48
End: 2018-02-05
Attending: INTERNAL MEDICINE
Payer: COMMERCIAL

## 2018-02-05 ENCOUNTER — HOSPITAL ENCOUNTER (OUTPATIENT)
Facility: CLINIC | Age: 48
Discharge: HOME | End: 2018-02-06
Attending: INTERNAL MEDICINE | Admitting: INTERNAL MEDICINE
Payer: COMMERCIAL

## 2018-02-05 ENCOUNTER — SURGERY (OUTPATIENT)
Age: 48
End: 2018-02-05

## 2018-02-05 ENCOUNTER — ANESTHESIA EVENT (OUTPATIENT)
Dept: SURGERY | Facility: CLINIC | Age: 48
End: 2018-02-05
Payer: COMMERCIAL

## 2018-02-05 ENCOUNTER — HOSPITAL ENCOUNTER (OUTPATIENT)
Dept: CARDIOLOGY | Facility: CLINIC | Age: 48
End: 2018-02-05
Attending: INTERNAL MEDICINE | Admitting: INTERNAL MEDICINE
Payer: COMMERCIAL

## 2018-02-05 ENCOUNTER — ANESTHESIA (OUTPATIENT)
Dept: SURGERY | Facility: CLINIC | Age: 48
End: 2018-02-05
Payer: COMMERCIAL

## 2018-02-05 DIAGNOSIS — I48.20 CHRONIC ATRIAL FIBRILLATION (H): ICD-10-CM

## 2018-02-05 LAB
ANION GAP SERPL CALCULATED.3IONS-SCNC: 6 MMOL/L (ref 3–14)
BUN SERPL-MCNC: 18 MG/DL (ref 7–30)
CALCIUM SERPL-MCNC: 8.8 MG/DL (ref 8.5–10.1)
CHLORIDE SERPL-SCNC: 112 MMOL/L (ref 94–109)
CO2 SERPL-SCNC: 25 MMOL/L (ref 20–32)
CREAT SERPL-MCNC: 1.09 MG/DL (ref 0.66–1.25)
ERYTHROCYTE [DISTWIDTH] IN BLOOD BY AUTOMATED COUNT: 13 % (ref 10–15)
GFR SERPL CREATININE-BSD FRML MDRD: 72 ML/MIN/1.7M2
GLUCOSE BLDC GLUCOMTR-MCNC: 187 MG/DL (ref 70–99)
GLUCOSE SERPL-MCNC: 109 MG/DL (ref 70–99)
HCT VFR BLD AUTO: 44.2 % (ref 40–53)
HGB BLD-MCNC: 15.1 G/DL (ref 13.3–17.7)
KCT BLD-ACNC: 155 SEC (ref 105–167)
KCT BLD-ACNC: 272 SEC (ref 105–167)
KCT BLD-ACNC: 278 SEC (ref 105–167)
KCT BLD-ACNC: 348 SEC (ref 105–167)
MCH RBC QN AUTO: 30.7 PG (ref 26.5–33)
MCHC RBC AUTO-ENTMCNC: 34.2 G/DL (ref 31.5–36.5)
MCV RBC AUTO: 90 FL (ref 78–100)
PLATELET # BLD AUTO: 242 10E9/L (ref 150–450)
POTASSIUM SERPL-SCNC: 4.1 MMOL/L (ref 3.4–5.3)
RBC # BLD AUTO: 4.92 10E12/L (ref 4.4–5.9)
SODIUM SERPL-SCNC: 143 MMOL/L (ref 133–144)
WBC # BLD AUTO: 9.3 10E9/L (ref 4–11)

## 2018-02-05 PROCEDURE — 71000012 ZZH RECOVERY PHASE 1 LEVEL 1 FIRST HR

## 2018-02-05 PROCEDURE — C1893 INTRO/SHEATH, FIXED,NON-PEEL: HCPCS

## 2018-02-05 PROCEDURE — 27210995 ZZH RX 272: Performed by: INTERNAL MEDICINE

## 2018-02-05 PROCEDURE — 27210796 ZZH PAD EXTRNAL REFRENCE CARDIAC MAPPING CR14

## 2018-02-05 PROCEDURE — C1759 CATH, INTRA ECHOCARDIOGRAPHY: HCPCS

## 2018-02-05 PROCEDURE — 93662 INTRACARDIAC ECG (ICE): CPT

## 2018-02-05 PROCEDURE — 93662 INTRACARDIAC ECG (ICE): CPT | Mod: 26 | Performed by: INTERNAL MEDICINE

## 2018-02-05 PROCEDURE — 40000935 ZZH STATISTIC OUTPATIENT (NON-OBS) EVE

## 2018-02-05 PROCEDURE — 27211287 ZZ H NEEDLE BRK TRANSEPTAL CR10

## 2018-02-05 PROCEDURE — 93320 DOPPLER ECHO COMPLETE: CPT | Mod: 26 | Performed by: INTERNAL MEDICINE

## 2018-02-05 PROCEDURE — 40000857 ZZH STATISTIC TEE INCLUDES SEDATION

## 2018-02-05 PROCEDURE — 85347 COAGULATION TIME ACTIVATED: CPT

## 2018-02-05 PROCEDURE — 25000125 ZZHC RX 250: Performed by: INTERNAL MEDICINE

## 2018-02-05 PROCEDURE — 37000008 ZZH ANESTHESIA TECHNICAL FEE, 1ST 30 MIN

## 2018-02-05 PROCEDURE — 25000128 H RX IP 250 OP 636: Performed by: INTERNAL MEDICINE

## 2018-02-05 PROCEDURE — 40000235 ZZH STATISTIC TELEMETRY

## 2018-02-05 PROCEDURE — 27210782 ZZH KIT EP TOTES DISP CR7

## 2018-02-05 PROCEDURE — 36415 COLL VENOUS BLD VENIPUNCTURE: CPT | Performed by: INTERNAL MEDICINE

## 2018-02-05 PROCEDURE — 85027 COMPLETE CBC AUTOMATED: CPT | Performed by: INTERNAL MEDICINE

## 2018-02-05 PROCEDURE — 93312 ECHO TRANSESOPHAGEAL: CPT | Mod: 26 | Performed by: INTERNAL MEDICINE

## 2018-02-05 PROCEDURE — 93325 DOPPLER ECHO COLOR FLOW MAPG: CPT

## 2018-02-05 PROCEDURE — 25000125 ZZHC RX 250: Performed by: NURSE ANESTHETIST, CERTIFIED REGISTERED

## 2018-02-05 PROCEDURE — 93657 TX L/R ATRIAL FIB ADDL: CPT | Performed by: INTERNAL MEDICINE

## 2018-02-05 PROCEDURE — 93613 INTRACARDIAC EPHYS 3D MAPG: CPT | Performed by: INTERNAL MEDICINE

## 2018-02-05 PROCEDURE — 37000009 ZZH ANESTHESIA TECHNICAL FEE, EACH ADDTL 15 MIN

## 2018-02-05 PROCEDURE — 82962 GLUCOSE BLOOD TEST: CPT

## 2018-02-05 PROCEDURE — 25000132 ZZH RX MED GY IP 250 OP 250 PS 637: Performed by: INTERNAL MEDICINE

## 2018-02-05 PROCEDURE — 27210964 ZZH ACCESS EP PROC CR8

## 2018-02-05 PROCEDURE — 93325 DOPPLER ECHO COLOR FLOW MAPG: CPT | Mod: 26 | Performed by: INTERNAL MEDICINE

## 2018-02-05 PROCEDURE — 93656 COMPRE EP EVAL ABLTJ ATR FIB: CPT | Performed by: INTERNAL MEDICINE

## 2018-02-05 PROCEDURE — 40000936 ZZH STATISTIC OUTPATIENT (NON-OBS) NIGHT

## 2018-02-05 PROCEDURE — 93010 ELECTROCARDIOGRAM REPORT: CPT | Performed by: INTERNAL MEDICINE

## 2018-02-05 PROCEDURE — C1732 CATH, EP, DIAG/ABL, 3D/VECT: HCPCS

## 2018-02-05 PROCEDURE — 93613 INTRACARDIAC EPHYS 3D MAPG: CPT

## 2018-02-05 PROCEDURE — 27210795 ZZH PAD DEFIB QUICK CR4

## 2018-02-05 PROCEDURE — 40000065 ZZH STATISTIC EKG NON-CHARGEABLE

## 2018-02-05 PROCEDURE — 80048 BASIC METABOLIC PNL TOTAL CA: CPT | Performed by: INTERNAL MEDICINE

## 2018-02-05 PROCEDURE — 25000128 H RX IP 250 OP 636: Performed by: NURSE ANESTHETIST, CERTIFIED REGISTERED

## 2018-02-05 PROCEDURE — 93656 COMPRE EP EVAL ABLTJ ATR FIB: CPT

## 2018-02-05 PROCEDURE — 93005 ELECTROCARDIOGRAM TRACING: CPT

## 2018-02-05 PROCEDURE — 93657 TX L/R ATRIAL FIB ADDL: CPT

## 2018-02-05 PROCEDURE — 25000566 ZZH SEVOFLURANE, EA 15 MIN

## 2018-02-05 RX ORDER — NEOSTIGMINE METHYLSULFATE 1 MG/ML
VIAL (ML) INJECTION PRN
Status: DISCONTINUED | OUTPATIENT
Start: 2018-02-05 | End: 2018-02-05

## 2018-02-05 RX ORDER — LIDOCAINE 40 MG/G
CREAM TOPICAL
Status: DISCONTINUED | OUTPATIENT
Start: 2018-02-05 | End: 2018-02-05 | Stop reason: HOSPADM

## 2018-02-05 RX ORDER — ONDANSETRON 2 MG/ML
4 INJECTION INTRAMUSCULAR; INTRAVENOUS EVERY 30 MIN PRN
Status: DISCONTINUED | OUTPATIENT
Start: 2018-02-05 | End: 2018-02-05 | Stop reason: HOSPADM

## 2018-02-05 RX ORDER — LIDOCAINE HYDROCHLORIDE 10 MG/ML
10-30 INJECTION, SOLUTION EPIDURAL; INFILTRATION; INTRACAUDAL; PERINEURAL
Status: DISCONTINUED | OUTPATIENT
Start: 2018-02-05 | End: 2018-02-05 | Stop reason: HOSPADM

## 2018-02-05 RX ORDER — ONDANSETRON 4 MG/1
4 TABLET, ORALLY DISINTEGRATING ORAL EVERY 30 MIN PRN
Status: DISCONTINUED | OUTPATIENT
Start: 2018-02-05 | End: 2018-02-05 | Stop reason: HOSPADM

## 2018-02-05 RX ORDER — ATORVASTATIN CALCIUM 40 MG/1
80 TABLET, FILM COATED ORAL DAILY
Status: DISCONTINUED | OUTPATIENT
Start: 2018-02-06 | End: 2018-02-06 | Stop reason: HOSPADM

## 2018-02-05 RX ORDER — DOBUTAMINE HYDROCHLORIDE 200 MG/100ML
5-40 INJECTION INTRAVENOUS CONTINUOUS PRN
Status: DISCONTINUED | OUTPATIENT
Start: 2018-02-05 | End: 2018-02-05 | Stop reason: HOSPADM

## 2018-02-05 RX ORDER — MORPHINE SULFATE 2 MG/ML
1-2 INJECTION, SOLUTION INTRAMUSCULAR; INTRAVENOUS EVERY 5 MIN PRN
Status: DISCONTINUED | OUTPATIENT
Start: 2018-02-05 | End: 2018-02-05 | Stop reason: HOSPADM

## 2018-02-05 RX ORDER — MEPERIDINE HYDROCHLORIDE 25 MG/ML
12.5 INJECTION INTRAMUSCULAR; INTRAVENOUS; SUBCUTANEOUS
Status: DISCONTINUED | OUTPATIENT
Start: 2018-02-05 | End: 2018-02-05 | Stop reason: HOSPADM

## 2018-02-05 RX ORDER — AMLODIPINE BESYLATE 5 MG/1
5 TABLET ORAL DAILY
Status: DISCONTINUED | OUTPATIENT
Start: 2018-02-06 | End: 2018-02-06 | Stop reason: HOSPADM

## 2018-02-05 RX ORDER — ONDANSETRON 2 MG/ML
INJECTION INTRAMUSCULAR; INTRAVENOUS PRN
Status: DISCONTINUED | OUTPATIENT
Start: 2018-02-05 | End: 2018-02-05

## 2018-02-05 RX ORDER — ATROPINE SULFATE 0.1 MG/ML
.5-1 INJECTION INTRAVENOUS
Status: DISCONTINUED | OUTPATIENT
Start: 2018-02-05 | End: 2018-02-05 | Stop reason: HOSPADM

## 2018-02-05 RX ORDER — FENTANYL CITRATE 50 UG/ML
25-50 INJECTION, SOLUTION INTRAMUSCULAR; INTRAVENOUS
Status: DISCONTINUED | OUTPATIENT
Start: 2018-02-05 | End: 2018-02-05 | Stop reason: HOSPADM

## 2018-02-05 RX ORDER — NALOXONE HYDROCHLORIDE 0.4 MG/ML
.1-.4 INJECTION, SOLUTION INTRAMUSCULAR; INTRAVENOUS; SUBCUTANEOUS
Status: DISCONTINUED | OUTPATIENT
Start: 2018-02-05 | End: 2018-02-06 | Stop reason: HOSPADM

## 2018-02-05 RX ORDER — PROPOFOL 10 MG/ML
INJECTION, EMULSION INTRAVENOUS PRN
Status: DISCONTINUED | OUTPATIENT
Start: 2018-02-05 | End: 2018-02-05

## 2018-02-05 RX ORDER — EPHEDRINE SULFATE 50 MG/ML
INJECTION, SOLUTION INTRAMUSCULAR; INTRAVENOUS; SUBCUTANEOUS PRN
Status: DISCONTINUED | OUTPATIENT
Start: 2018-02-05 | End: 2018-02-05

## 2018-02-05 RX ORDER — GLYCOPYRROLATE 0.2 MG/ML
INJECTION, SOLUTION INTRAMUSCULAR; INTRAVENOUS PRN
Status: DISCONTINUED | OUTPATIENT
Start: 2018-02-05 | End: 2018-02-05

## 2018-02-05 RX ORDER — DEXAMETHASONE SODIUM PHOSPHATE 4 MG/ML
INJECTION, SOLUTION INTRA-ARTICULAR; INTRALESIONAL; INTRAMUSCULAR; INTRAVENOUS; SOFT TISSUE PRN
Status: DISCONTINUED | OUTPATIENT
Start: 2018-02-05 | End: 2018-02-05

## 2018-02-05 RX ORDER — HEPARIN SODIUM 1000 [USP'U]/ML
1000-10000 INJECTION, SOLUTION INTRAVENOUS; SUBCUTANEOUS EVERY 5 MIN PRN
Status: DISCONTINUED | OUTPATIENT
Start: 2018-02-05 | End: 2018-02-05 | Stop reason: HOSPADM

## 2018-02-05 RX ORDER — FENTANYL CITRATE 50 UG/ML
INJECTION, SOLUTION INTRAMUSCULAR; INTRAVENOUS PRN
Status: DISCONTINUED | OUTPATIENT
Start: 2018-02-05 | End: 2018-02-05

## 2018-02-05 RX ORDER — IBUTILIDE FUMARATE 1 MG/10ML
0.01 INJECTION, SOLUTION INTRAVENOUS
Status: DISCONTINUED | OUTPATIENT
Start: 2018-02-05 | End: 2018-02-05 | Stop reason: HOSPADM

## 2018-02-05 RX ORDER — KETOROLAC TROMETHAMINE 30 MG/ML
15-30 INJECTION, SOLUTION INTRAMUSCULAR; INTRAVENOUS
Status: DISCONTINUED | OUTPATIENT
Start: 2018-02-05 | End: 2018-02-05 | Stop reason: HOSPADM

## 2018-02-05 RX ORDER — ONDANSETRON 2 MG/ML
4 INJECTION INTRAMUSCULAR; INTRAVENOUS EVERY 4 HOURS PRN
Status: DISCONTINUED | OUTPATIENT
Start: 2018-02-05 | End: 2018-02-05 | Stop reason: HOSPADM

## 2018-02-05 RX ORDER — ADENOSINE 3 MG/ML
6-12 INJECTION, SOLUTION INTRAVENOUS EVERY 5 MIN PRN
Status: DISCONTINUED | OUTPATIENT
Start: 2018-02-05 | End: 2018-02-05 | Stop reason: HOSPADM

## 2018-02-05 RX ORDER — FLUMAZENIL 0.1 MG/ML
0.2 INJECTION, SOLUTION INTRAVENOUS
Status: DISCONTINUED | OUTPATIENT
Start: 2018-02-05 | End: 2018-02-05 | Stop reason: HOSPADM

## 2018-02-05 RX ORDER — IBUTILIDE FUMARATE 1 MG/10ML
1 INJECTION, SOLUTION INTRAVENOUS
Status: DISCONTINUED | OUTPATIENT
Start: 2018-02-05 | End: 2018-02-05 | Stop reason: HOSPADM

## 2018-02-05 RX ORDER — SODIUM CHLORIDE, SODIUM LACTATE, POTASSIUM CHLORIDE, CALCIUM CHLORIDE 600; 310; 30; 20 MG/100ML; MG/100ML; MG/100ML; MG/100ML
INJECTION, SOLUTION INTRAVENOUS CONTINUOUS
Status: DISCONTINUED | OUTPATIENT
Start: 2018-02-05 | End: 2018-02-05 | Stop reason: HOSPADM

## 2018-02-05 RX ORDER — HYDROMORPHONE HYDROCHLORIDE 1 MG/ML
.3-.5 INJECTION, SOLUTION INTRAMUSCULAR; INTRAVENOUS; SUBCUTANEOUS EVERY 10 MIN PRN
Status: DISCONTINUED | OUTPATIENT
Start: 2018-02-05 | End: 2018-02-05 | Stop reason: HOSPADM

## 2018-02-05 RX ORDER — LIDOCAINE 40 MG/G
CREAM TOPICAL
Status: DISCONTINUED | OUTPATIENT
Start: 2018-02-05 | End: 2018-02-06 | Stop reason: HOSPADM

## 2018-02-05 RX ORDER — SODIUM CHLORIDE 9 MG/ML
INJECTION, SOLUTION INTRAVENOUS CONTINUOUS PRN
Status: DISCONTINUED | OUTPATIENT
Start: 2018-02-05 | End: 2018-02-05 | Stop reason: HOSPADM

## 2018-02-05 RX ORDER — LIDOCAINE HYDROCHLORIDE 40 MG/ML
1.5 SOLUTION TOPICAL ONCE
Status: COMPLETED | OUTPATIENT
Start: 2018-02-05 | End: 2018-02-05

## 2018-02-05 RX ORDER — FUROSEMIDE 10 MG/ML
20-100 INJECTION INTRAMUSCULAR; INTRAVENOUS
Status: COMPLETED | OUTPATIENT
Start: 2018-02-05 | End: 2018-02-05

## 2018-02-05 RX ORDER — LIDOCAINE HYDROCHLORIDE 10 MG/ML
10-30 INJECTION, SOLUTION EPIDURAL; INFILTRATION; INTRACAUDAL; PERINEURAL
Status: DISCONTINUED | OUTPATIENT
Start: 2018-02-05 | End: 2018-02-05

## 2018-02-05 RX ORDER — PROMETHAZINE HYDROCHLORIDE 25 MG/ML
6.25-25 INJECTION, SOLUTION INTRAMUSCULAR; INTRAVENOUS EVERY 4 HOURS PRN
Status: DISCONTINUED | OUTPATIENT
Start: 2018-02-05 | End: 2018-02-05 | Stop reason: HOSPADM

## 2018-02-05 RX ORDER — GLYCOPYRROLATE 0.2 MG/ML
0.1 INJECTION, SOLUTION INTRAMUSCULAR; INTRAVENOUS ONCE
Status: COMPLETED | OUTPATIENT
Start: 2018-02-05 | End: 2018-02-05

## 2018-02-05 RX ORDER — NALOXONE HYDROCHLORIDE 0.4 MG/ML
0.4 INJECTION, SOLUTION INTRAMUSCULAR; INTRAVENOUS; SUBCUTANEOUS EVERY 5 MIN PRN
Status: DISCONTINUED | OUTPATIENT
Start: 2018-02-05 | End: 2018-02-05 | Stop reason: HOSPADM

## 2018-02-05 RX ORDER — DIPHENHYDRAMINE HYDROCHLORIDE 50 MG/ML
25-50 INJECTION INTRAMUSCULAR; INTRAVENOUS
Status: DISCONTINUED | OUTPATIENT
Start: 2018-02-05 | End: 2018-02-05 | Stop reason: HOSPADM

## 2018-02-05 RX ORDER — BUPIVACAINE HYDROCHLORIDE 2.5 MG/ML
10-30 INJECTION, SOLUTION EPIDURAL; INFILTRATION; INTRACAUDAL
Status: DISCONTINUED | OUTPATIENT
Start: 2018-02-05 | End: 2018-02-05 | Stop reason: HOSPADM

## 2018-02-05 RX ORDER — PROTAMINE SULFATE 10 MG/ML
1-5 INJECTION, SOLUTION INTRAVENOUS
Status: COMPLETED | OUTPATIENT
Start: 2018-02-05 | End: 2018-02-05

## 2018-02-05 RX ORDER — VECURONIUM BROMIDE 1 MG/ML
INJECTION, POWDER, LYOPHILIZED, FOR SOLUTION INTRAVENOUS PRN
Status: DISCONTINUED | OUTPATIENT
Start: 2018-02-05 | End: 2018-02-05

## 2018-02-05 RX ORDER — LIDOCAINE HYDROCHLORIDE AND EPINEPHRINE 10; 10 MG/ML; UG/ML
10-30 INJECTION, SOLUTION INFILTRATION; PERINEURAL
Status: DISCONTINUED | OUTPATIENT
Start: 2018-02-05 | End: 2018-02-05 | Stop reason: HOSPADM

## 2018-02-05 RX ORDER — BUPIVACAINE HYDROCHLORIDE 2.5 MG/ML
10-30 INJECTION, SOLUTION EPIDURAL; INFILTRATION; INTRACAUDAL
Status: DISCONTINUED | OUTPATIENT
Start: 2018-02-05 | End: 2018-02-05

## 2018-02-05 RX ORDER — SODIUM CHLORIDE 450 MG/100ML
INJECTION, SOLUTION INTRAVENOUS CONTINUOUS
Status: DISCONTINUED | OUTPATIENT
Start: 2018-02-05 | End: 2018-02-05 | Stop reason: HOSPADM

## 2018-02-05 RX ORDER — NALOXONE HYDROCHLORIDE 0.4 MG/ML
.1-.4 INJECTION, SOLUTION INTRAMUSCULAR; INTRAVENOUS; SUBCUTANEOUS
Status: DISCONTINUED | OUTPATIENT
Start: 2018-02-05 | End: 2018-02-05 | Stop reason: HOSPADM

## 2018-02-05 RX ORDER — OXYCODONE AND ACETAMINOPHEN 5; 325 MG/1; MG/1
1 TABLET ORAL EVERY 4 HOURS PRN
Status: DISCONTINUED | OUTPATIENT
Start: 2018-02-05 | End: 2018-02-06 | Stop reason: HOSPADM

## 2018-02-05 RX ORDER — SODIUM CHLORIDE, SODIUM LACTATE, POTASSIUM CHLORIDE, CALCIUM CHLORIDE 600; 310; 30; 20 MG/100ML; MG/100ML; MG/100ML; MG/100ML
INJECTION, SOLUTION INTRAVENOUS CONTINUOUS PRN
Status: DISCONTINUED | OUTPATIENT
Start: 2018-02-05 | End: 2018-02-05

## 2018-02-05 RX ORDER — LIDOCAINE HYDROCHLORIDE 20 MG/ML
INJECTION, SOLUTION INFILTRATION; PERINEURAL PRN
Status: DISCONTINUED | OUTPATIENT
Start: 2018-02-05 | End: 2018-02-05

## 2018-02-05 RX ORDER — PROTAMINE SULFATE 10 MG/ML
5-40 INJECTION, SOLUTION INTRAVENOUS EVERY 10 MIN PRN
Status: DISCONTINUED | OUTPATIENT
Start: 2018-02-05 | End: 2018-02-05 | Stop reason: HOSPADM

## 2018-02-05 RX ORDER — FENTANYL CITRATE 50 UG/ML
50-100 INJECTION, SOLUTION INTRAMUSCULAR; INTRAVENOUS
Status: COMPLETED | OUTPATIENT
Start: 2018-02-05 | End: 2018-02-05

## 2018-02-05 RX ORDER — LORAZEPAM 2 MG/ML
.5-2 INJECTION INTRAMUSCULAR EVERY 10 MIN PRN
Status: DISCONTINUED | OUTPATIENT
Start: 2018-02-05 | End: 2018-02-05 | Stop reason: HOSPADM

## 2018-02-05 RX ORDER — AMIODARONE HYDROCHLORIDE 200 MG/1
200 TABLET ORAL DAILY
Status: DISCONTINUED | OUTPATIENT
Start: 2018-02-05 | End: 2018-02-06 | Stop reason: HOSPADM

## 2018-02-05 RX ADMIN — Medication 5 MG: at 12:59

## 2018-02-05 RX ADMIN — NEOSTIGMINE METHYLSULFATE 5 MG: 1 INJECTION INTRAMUSCULAR; INTRAVENOUS; SUBCUTANEOUS at 14:57

## 2018-02-05 RX ADMIN — PROTAMINE SULFATE 35 MG: 10 INJECTION, SOLUTION INTRAVENOUS at 14:49

## 2018-02-05 RX ADMIN — HEPARIN SODIUM 200 UNITS: 200 INJECTION, SOLUTION INTRAVENOUS at 13:20

## 2018-02-05 RX ADMIN — VECURONIUM BROMIDE 2 MG: 1 INJECTION, POWDER, LYOPHILIZED, FOR SOLUTION INTRAVENOUS at 12:55

## 2018-02-05 RX ADMIN — PHENYLEPHRINE HYDROCHLORIDE 100 MCG: 10 INJECTION INTRAVENOUS at 12:34

## 2018-02-05 RX ADMIN — LIDOCAINE HYDROCHLORIDE 100 MG: 20 INJECTION, SOLUTION INFILTRATION; PERINEURAL at 12:24

## 2018-02-05 RX ADMIN — MIDAZOLAM HYDROCHLORIDE 2 MG: 1 INJECTION, SOLUTION INTRAMUSCULAR; INTRAVENOUS at 11:35

## 2018-02-05 RX ADMIN — ROCURONIUM BROMIDE 50 MG: 10 INJECTION INTRAVENOUS at 12:25

## 2018-02-05 RX ADMIN — Medication 5 MG: at 13:19

## 2018-02-05 RX ADMIN — AMIODARONE HYDROCHLORIDE 200 MG: 200 TABLET ORAL at 19:28

## 2018-02-05 RX ADMIN — PHENYLEPHRINE HYDROCHLORIDE 100 MCG: 10 INJECTION INTRAVENOUS at 12:39

## 2018-02-05 RX ADMIN — FENTANYL CITRATE 100 MCG: 50 INJECTION, SOLUTION INTRAMUSCULAR; INTRAVENOUS at 12:24

## 2018-02-05 RX ADMIN — DEXAMETHASONE SODIUM PHOSPHATE 4 MG: 4 INJECTION, SOLUTION INTRA-ARTICULAR; INTRALESIONAL; INTRAMUSCULAR; INTRAVENOUS; SOFT TISSUE at 12:44

## 2018-02-05 RX ADMIN — LIDOCAINE HYDROCHLORIDE 1.5 ML: 40 SOLUTION TOPICAL at 11:14

## 2018-02-05 RX ADMIN — GLYCOPYRROLATE 0.8 MG: 0.2 INJECTION, SOLUTION INTRAMUSCULAR; INTRAVENOUS at 14:57

## 2018-02-05 RX ADMIN — Medication 10000 UNITS: at 13:12

## 2018-02-05 RX ADMIN — ONDANSETRON 4 MG: 2 INJECTION INTRAMUSCULAR; INTRAVENOUS at 14:54

## 2018-02-05 RX ADMIN — PROPOFOL 200 MG: 10 INJECTION, EMULSION INTRAVENOUS at 12:24

## 2018-02-05 RX ADMIN — LIDOCAINE HYDROCHLORIDE 200 MG: 10 INJECTION, SOLUTION EPIDURAL; INFILTRATION; INTRACAUDAL; PERINEURAL at 13:04

## 2018-02-05 RX ADMIN — PHENYLEPHRINE HYDROCHLORIDE 0.4 MCG/KG/MIN: 10 INJECTION, SOLUTION INTRAMUSCULAR; INTRAVENOUS; SUBCUTANEOUS at 12:32

## 2018-02-05 RX ADMIN — Medication 7000 UNITS: at 13:30

## 2018-02-05 RX ADMIN — GLYCOPYRROLATE 0.1 MG: 0.4 INJECTION INTRAMUSCULAR; INTRAVENOUS at 11:14

## 2018-02-05 RX ADMIN — MIDAZOLAM HYDROCHLORIDE 2 MG: 1 INJECTION, SOLUTION INTRAMUSCULAR; INTRAVENOUS at 11:30

## 2018-02-05 RX ADMIN — APIXABAN 5 MG: 5 TABLET, FILM COATED ORAL at 19:28

## 2018-02-05 RX ADMIN — MIDAZOLAM 2 MG: 1 INJECTION INTRAMUSCULAR; INTRAVENOUS at 12:22

## 2018-02-05 RX ADMIN — FENTANYL CITRATE 25 MCG: 50 INJECTION, SOLUTION INTRAMUSCULAR; INTRAVENOUS at 11:35

## 2018-02-05 RX ADMIN — PHENYLEPHRINE HYDROCHLORIDE 100 MCG: 10 INJECTION INTRAVENOUS at 12:32

## 2018-02-05 RX ADMIN — Medication 5 MG: at 12:50

## 2018-02-05 RX ADMIN — PROTAMINE SULFATE 5 MG: 10 INJECTION, SOLUTION INTRAVENOUS at 14:49

## 2018-02-05 RX ADMIN — FUROSEMIDE 20 MG: 10 INJECTION, SOLUTION INTRAVENOUS at 14:48

## 2018-02-05 RX ADMIN — SODIUM CHLORIDE, POTASSIUM CHLORIDE, SODIUM LACTATE AND CALCIUM CHLORIDE: 600; 310; 30; 20 INJECTION, SOLUTION INTRAVENOUS at 14:11

## 2018-02-05 RX ADMIN — FENTANYL CITRATE 100 MCG: 50 INJECTION, SOLUTION INTRAMUSCULAR; INTRAVENOUS at 11:30

## 2018-02-05 RX ADMIN — VECURONIUM BROMIDE 2 MG: 1 INJECTION, POWDER, LYOPHILIZED, FOR SOLUTION INTRAVENOUS at 12:38

## 2018-02-05 RX ADMIN — SODIUM CHLORIDE: 4.5 INJECTION, SOLUTION INTRAVENOUS at 12:18

## 2018-02-05 ASSESSMENT — LIFESTYLE VARIABLES: TOBACCO_USE: 0

## 2018-02-05 ASSESSMENT — ENCOUNTER SYMPTOMS: DYSRHYTHMIAS: 1

## 2018-02-05 NOTE — IP AVS SNAPSHOT
[facility]    [address] [place] [address]    Phone:  [phone]                                       After Visit Summary   2/5/2018    Matthew Still    MRN: [id number]           After Visit Summary Signature Page     I have received my discharge instructions, and my questions have been answered. I have discussed any challenges I see with this plan with the nurse or doctor.    ..........................................................................................................................................  Patient/Patient Representative Signature      ..........................................................................................................................................  Patient Representative Print Name and Relationship to Patient    ..................................................               ................................................  Date                                            Time    ..........................................................................................................................................  Reviewed by Signature/Title    ...................................................              ..............................................  Date                                                            Time

## 2018-02-05 NOTE — IP AVS SNAPSHOT
MRN:[id number]                      After Visit Summary   2/5/2018    Matthew Still    MRN: [id number]           Thank you!     Thank you for choosing [facility] for your care. Our goal is always to provide you with excellent care. Hearing back from our patients is one way we can continue to improve our services. Please take a few minutes to complete the written survey that you may receive in the mail after you visit with us. Thank you!        Patient Information     Date Of Birth          1970        About your hospital stay     You were admitted on:  February 5, 2018 [facility]    You were discharged on:  February 6, 2018       Who to Call     For medical emergencies, please call 911.  For non-urgent questions about your medical care, please call your primary care provider or clinic, [phone]  For questions related to your surgery, please call your surgery clinic        Attending Provider     Provider Specialty    Vik Tang MD Cardiology       Primary Care Provider Office Phone # Fax #    [facility] [phone]      Your next 10 appointments already scheduled     Feb 13, 2018  2:15 PM CST   ecg with [place] [facility]   [facility] ([facility])    [address] [address]   [phone]            Feb 13, 2018  2:30 PM CST   Return Visit with Marge Rodrigues PA-C   [facility] ([facility])    [address] [address]   [phone]            Mar 27, 2018 10:30 AM CDT   ecg with [place] [facility]   [facility] ([facility])    [address] [address]   [phone]            Mar 27, 2018 11:00 AM CDT   P EP RETURN with Vik Clancy MD   [facility] ([facility])    [address] [place] [address]  Sheela  [facility] [address]   [phone]              Further instructions from your care team       A Fib Ablation Discharge Instructions    After you go home:    Have an adult stay with you until tomorrow.    You may eat your normal diet, unless your doctor tells you otherwise.    RELAX and take it easy for 3 days to protect groin sites       For 24-48 hours (due to the sedation you received):    DO NOT DRIVE FOR 2 DAYS!     Do NOT make any important or legal decisions.    Do NOT drive or operate machines at home or at work.    Do NOT drink alcohol.    Care of Puncture Site:    Check the puncture sites every 1-2 hours while awake.    For 2-3 days, when you cough, sneeze, laugh or move your bowels, hold your hand over the puncture sites and press firmly.    Change band aid daily for at least 3 days. If there is minor oozing, apply another band aid and remove it after 12 hours.     It is normal to have a small bruise or pea size lump at the sites.    You may shower. Do NOT take a bath, or use a hot tub or pool until groin site heals, which may take up to a week.  Do NOT scrub the site. Do not use lotion or powder near the puncture site.    Activity:    Do NOT lift, push or pull more than 10 pounds (equal to a gallon of milk) for 3 days.    NO repetitive motions such as loading , vacuuming, raking, shoveling.   OK to get back to work Monday 2/12 but CALL if you are having concerns about this    Bleeding:    If you start bleeding from the groin sites, lie down flat and press firmly on the site for 10 minutes or until bleeding stops.     Once bleeding stops, lay flat for 1-2 hours.    Call your A Fib nurse if bleeding does not stop or after hours will need to go to ER.       Go to ER or Call 911 right away if you have heavy bleeding or bleeding that does not stop.    Medications:    DECREASE amiodarone to 200 mg daily (previously had been taking it twice daily)    Continue Eliquis 5 mg twice  daily    Continue all other medications    If you have pain, you may takeTylenol (acetaminophen) and if this does not help may take Advil (ibuprofen - 400 mg with food).    Call the A Fib RN if:    Chest pain not relieved by tylenol or ibuprofen    Difficulty swallowing and/or coughing up blood    Shortness of breath    Increased groin pain or a large or growing hard lump around the site.    Groin site is red, swollen, hot or tender.    Blood or fluid is draining from the groin site.    You have chills or a fever greater than 101 F (38 C).    Your leg feels numb, cool or changes color.    If groin pain is not relieved by Tylenol or Ibuprofen.    Recurrent irregular or fast heart rate lasting over 2-3 hours.    Any questions or concerns.    Heart rhythms:  You may have some irregular heartbeats. These feel very strong. They may make you feel that the A Fib is going to start again.  Give it time. The irregular beats should occur less often.    Follow Up Appointments:    2/13/18 at 2:30pm with ELMIRA Mcgarry in [place]    3/27/18 at 11am with Dr Tang in [facility]: A Fib clinic RN's Mary Dangelo [phone] (Mon-Fri, 8:00-5:00)                                                                        [phone] (7 days a week) after hours for on call Cardiologist.     Pending Results     Date and Time Order Name Status Description    2/6/2018 0809 EKG 12-lead, tracing only Preliminary     2/5/2018 0823 EKG 12-lead, tracing only Preliminary             Statement of Approval     Ordered          02/06/18 0848  I have reviewed and agree with all the recommendations and orders detailed in this document.  EFFECTIVE NOW     Approved and electronically signed by:  Marge Rodrigues PA-C             Admission Information     Date & Time Provider Department Dept. Phone    2/5/2018 Vik Tang MD [facility] [phone]      Your Vitals Were     Blood Pressure  "Pulse Temperature Respirations Height Weight    126/73 (BP Location: Right arm) 67 96.9  F (36.1  C) (Oral) 16 1.803 m (5' 11\") 111.1 kg (244 lb 14.4 oz)    Pulse Oximetry BMI (Body Mass Index)                95% 34.16 kg/m2          MyCharYellowSchedule Information     [employer] lets you send messages to your doctor, view your test results, renew your prescriptions, schedule appointments and more. To sign up, go to [email or web address] . Click on \"Log in\" on the left side of the screen, which will take you to the Welcome page. Then click on \"Sign up Now\" on the right side of the page.     You will be asked to enter the access code listed below, as well as some personal information. Please follow the directions to create your username and password.     Your access code is: [email or web address]  Expires: 2018  6:25 PM     Your access code will  in 90 days. If you need help or a new code, please call your [facility] clinic or [phone].        Care EveryWhere ID     This is your Care EveryWhere ID. This could be used by other organizations to access your [facility] medical records  [id number]        Equal Access to Services     ARCHIE MOSCOSO : Meliza tapiao [place], waaxda luqadaha, qaybta kaalmada adeegyada, kika juan. So [facility] [phone].    ATENCIÓN: Si habla español, tiene a villeda disposición servicios gratuitos de asistencia lingüística. Llame al [phone].    We comply with applicable federal civil rights laws and [place] laws. We do not discriminate on the basis of race, color, national origin, age, disability, sex, sexual orientation, or gender identity.               Review of your medicines      CONTINUE these medicines which may have CHANGED, or have new prescriptions. If we are uncertain of the size of tablets/capsules you have at home, strength may be listed as something that might have changed.        Dose / Directions    amiodarone 200 MG tablet   Commonly known as:  " PACERONE/CODARONE   This may have changed:    - when to take this  - additional instructions   Used for:  Chronic atrial fibrillation (H)        Dose:  200 mg   Take 1 tablet (200 mg) by mouth daily   Refills:  0         CONTINUE these medicines which have NOT CHANGED        Dose / Directions    amLODIPine 5 MG tablet   Commonly known as:  NORVASC   Used for:  Benign essential hypertension        Dose:  5 mg   Take 1 tablet (5 mg) by mouth daily   Quantity:  30 tablet   Refills:  4       apixaban ANTICOAGULANT 5 MG tablet   Commonly known as:  ELIQUIS   Used for:  Paroxysmal atrial fibrillation (H)        Dose:  5 mg   Take 1 tablet (5 mg) by mouth 2 times daily   Quantity:  60 tablet   Refills:  11       atorvastatin 80 MG tablet   Commonly known as:  LIPITOR   Used for:  Hyperlipidemia LDL goal <70        Dose:  80 mg   Take 1 tablet (80 mg) by mouth daily   Quantity:  30 tablet   Refills:  11       VITAMIN C PO        Refills:  0            Where to get your medicines      Some of these will need a paper prescription and others can be bought over the counter. Ask your nurse if you have questions.     You don't need a prescription for these medications     amiodarone 200 MG tablet                Protect others around you: Learn how to safely use, store and throw away your medicines at www.disposemymeds.org.             Medication List: This is a list of all your medications and when to take them. Check marks below indicate your daily home schedule. Keep this list as a reference.      Medications           Morning Afternoon Evening Bedtime As Needed    amiodarone 200 MG tablet   Commonly known as:  PACERONE/CODARONE   Take 1 tablet (200 mg) by mouth daily   Last time this was given:  200 mg on 2/6/2018  8:53 AM                                   amLODIPine 5 MG tablet   Commonly known as:  NORVASC   Take 1 tablet (5 mg) by mouth daily   Last time this was given:  5 mg on 2/6/2018  8:53 AM                                    apixaban ANTICOAGULANT 5 MG tablet   Commonly known as:  ELIQUIS   Take 1 tablet (5 mg) by mouth 2 times daily   Last time this was given:  5 mg on 2/6/2018  8:53 AM                                      atorvastatin 80 MG tablet   Commonly known as:  LIPITOR   Take 1 tablet (80 mg) by mouth daily                                   VITAMIN C PO

## 2018-02-05 NOTE — PROGRESS NOTES
Patient tolerated procedure well.  EP lab notified that patient ready for ablation.  Wife with patient now.

## 2018-02-05 NOTE — PROGRESS NOTES
Patient consented for ablation.  Echo notified of a start time of 1130.  Echo to notify Dr. Rutledge.

## 2018-02-05 NOTE — ANESTHESIA POSTPROCEDURE EVALUATION
Patient: Matthew Still    Procedure(s):  AFIB ABLATION    Diagnosis:AFIB  Diagnosis Additional Information: No value filed.    Anesthesia Type:  General, ETT    Note:  Anesthesia Post Evaluation    Patient location during evaluation: PACU  Patient participation: Able to fully participate in evaluation  Level of consciousness: awake and alert  Pain management: satisfactory to patient  Airway patency: patent  Cardiovascular status: hemodynamically stable  Respiratory status: acceptable and unassisted  Hydration status: balanced  PONV: none     Anesthetic complications: None          Last vitals:  Vitals:    02/05/18 1530 02/05/18 1540 02/05/18 1550   BP: 120/76 112/72    Pulse:      Resp: 14 14 12   Temp:      SpO2: 97% 95% 95%         Electronically Signed By: Luigi Belle MD  February 5, 2018  4:00 PM

## 2018-02-05 NOTE — PROGRESS NOTES
Uneventful isolation of PVs and SVC. AF was cardioverted after procedure and no inducible atrial arrhythmias post ablation. Resume Eliquis and reduce amio to 200 mg daily.

## 2018-02-05 NOTE — ANESTHESIA PREPROCEDURE EVALUATION
Anesthesia Evaluation     . Pt has had prior anesthetic.     No history of anesthetic complications          ROS/MED HX    ENT/Pulmonary:      (-) tobacco use and sleep apnea   Neurologic:       Cardiovascular:     (+) hypertension----. : . . . :. dysrhythmias a-fib, .       METS/Exercise Tolerance:     Hematologic:         Musculoskeletal:         GI/Hepatic:        (-) GERD and liver disease   Renal/Genitourinary:      (-) renal disease   Endo:     (+) Obesity, .   (-) Type II DM and thyroid disease   Psychiatric:         Infectious Disease:         Malignancy:         Other:                     Physical Exam  Normal systems: cardiovascular, pulmonary and dental    Airway   Mallampati: II  TM distance: >3 FB  Neck ROM: full    Dental     Cardiovascular       Pulmonary                     Anesthesia Plan      History & Physical Review  History and physical reviewed and following examination; no interval change.    ASA Status:  2 .    NPO Status:  > 8 hours    Plan for General and ETT with Intravenous induction. Maintenance will be Balanced.    PONV prophylaxis:  Ondansetron (or other 5HT-3) and Dexamethasone or Solumedrol       Postoperative Care  Postoperative pain management:  IV analgesics.      Consents  Anesthetic plan, risks, benefits and alternatives discussed with:  Patient and Spouse..        Procedure: Procedure(s):  ANESTHESIA OUT OF OR CATH LAB ABLATION ADULT  Preop diagnosis: AFIB    No Known Allergies  History reviewed. No pertinent past medical history.  History reviewed. No pertinent surgical history.  Prior to Admission medications    Medication Sig Start Date End Date Taking? Authorizing Provider   amiodarone (PACERONE/CODARONE) 200 MG tablet Take 1 tablet (200 mg) by mouth 2 times daily For 3 weeks then one tablet (200 mg) daily 1/23/18  Yes Vik Tang MD   Ascorbic Acid (VITAMIN C PO)    Yes Reported, Patient   amLODIPine (NORVASC) 5 MG tablet Take 1 tablet (5 mg) by mouth daily 11/30/17   Yes Brice Harrison MD   apixaban ANTICOAGULANT (ELIQUIS) 5 MG tablet Take 1 tablet (5 mg) by mouth 2 times daily 11/30/17  Yes Brice Harrison MD   atorvastatin (LIPITOR) 80 MG tablet Take 1 tablet (80 mg) by mouth daily 11/16/17  Yes Analisa Burciaga APRN CNP     Current Facility-Administered Medications Ordered in Epic   Medication Dose Route Frequency Last Rate Last Dose     0.45% sodium chloride infusion   Intravenous Continuous         lidocaine (LMX4) cream   Topical Q1H PRN         lidocaine 1 % 1 mL  1 mL Other Q1H PRN         sodium chloride (PF) 0.9% PF flush 3 mL  3 mL Intracatheter Q1H PRN         sodium chloride (PF) 0.9% PF flush 3 mL  3 mL Intracatheter Q8H         lidocaine 1 % 1 mL  1 mL Other Q1H PRN         lidocaine (LMX4) cream   Topical Q1H PRN         sodium chloride (PF) 0.9% PF flush 3 mL  3 mL Intracatheter Q1H PRN         sodium chloride (PF) 0.9% PF flush 3 mL  3 mL Intracatheter Q8H         0.9% sodium chloride infusion   Intravenous Continuous PRN         glycopyrrolate (ROBINUL) injection 0.1 mg  0.1 mg Intravenous Once         Benzocaine (HURRICAINE/TOPEX) 20 % spray 0.5-2 mL  1-4 spray Mouth/Throat Once         midazolam (VERSED) injection 1-2 mg  1-2 mg Intravenous Once within 24 hrs        Followed by     midazolam (VERSED) injection 1 mg  1 mg Intravenous Q2 Min PRN         fentaNYL (PF) (SUBLIMAZE) injection  mcg   mcg Intravenous Once within 24 hrs        Followed by     fentaNYL (PF) (SUBLIMAZE) injection 25-50 mcg  25-50 mcg Intravenous Q2 Min PRN         naloxone (NARCAN) injection 0.1-0.4 mg  0.1-0.4 mg Intravenous Q2 Min PRN         flumazenil (ROMAZICON) injection 0.2 mg  0.2 mg Intravenous q1 min prn         lidocaine (XYLOCAINE) 4 % solution 1.5 mL  1.5 mL Topical Once         sodium chloride (PF) 0.9% PF flush 9.5 mL  9.5 mL Intravenous q1 min prn         No current [facility]-ordered outpatient prescriptions on file.     Wt Readings from  Last 1 Encounters:   02/05/18 109.8 kg (242 lb)     Temp Readings from Last 1 Encounters:   02/05/18 35.8  C (96.5  F) (Oral)     BP Readings from Last 6 Encounters:   02/05/18 (!) 146/98   12/19/17 130/81   12/13/17 118/84   11/30/17 (!) 138/95   11/22/17 122/76   11/16/17 119/90     Pulse Readings from Last 4 Encounters:   02/05/18 62   01/23/18 67   12/19/17 65   12/13/17 82     Resp Readings from Last 1 Encounters:   02/05/18 18     SpO2 Readings from Last 1 Encounters:   02/05/18 96%     Recent Labs   Lab Test  02/05/18   0847  12/13/17   1240  11/16/17   1820   NA  143   --   141   POTASSIUM  4.1  4.4  4.0   CHLORIDE  112*   --   111*   CO2  25   --   24   ANIONGAP  6   --   6   GLC  109*   --   84   BUN  18   --   17   CR  1.09   --   1.00   BARRON  8.8   --   8.9     Recent Labs   Lab Test  02/05/18   0847  11/15/17   0640   WBC  9.3  9.2   HGB  15.1  15.3   PLT  242  220     Recent Labs   Lab Test  12/13/17   1240  11/16/17   0743   INR  1.29*  1.00      RECENT LABS:   ECG:   ECHO:   CXR:                      .

## 2018-02-05 NOTE — ANESTHESIA CARE TRANSFER NOTE
Patient: Matthew Still    Procedure(s):  AFIB ABLATION    Diagnosis: AFIB  Diagnosis Additional Information: No value filed.    Anesthesia Type:   General, ETT     Note:  Airway :Face Mask  Patient transferred to:PACU  Comments: Transferred to PACU, spontaneous respirations, 10L oxygen via facemask.  All monitors and alarms on and functioning, VSS.  Patient awake, comfortable.  Report to PACU RN.Handoff Report: Identifed the Patient, Identified the Reponsible Provider, Reviewed the pertinent medical history, Discussed the surgical course, Reviewed Intra-OP anesthesia mangement and issues during anesthesia, Set expectations for post-procedure period and Allowed opportunity for questions and acknowledgement of understanding      Vitals: (Last set prior to Anesthesia Care Transfer)    CRNA VITALS  2/5/2018 1449 - 2/5/2018 1527      2/5/2018             Resp Rate (set): 10                Electronically Signed By: HORTENSIA Kendall CRNA  February 5, 2018  3:27 PM

## 2018-02-06 VITALS
BODY MASS INDEX: 34.28 KG/M2 | TEMPERATURE: 96.9 F | SYSTOLIC BLOOD PRESSURE: 126 MMHG | DIASTOLIC BLOOD PRESSURE: 73 MMHG | OXYGEN SATURATION: 95 % | HEIGHT: 71 IN | HEART RATE: 67 BPM | RESPIRATION RATE: 16 BRPM | WEIGHT: 244.9 LBS

## 2018-02-06 LAB — INTERPRETATION ECG - MUSE: NORMAL

## 2018-02-06 PROCEDURE — 25000132 ZZH RX MED GY IP 250 OP 250 PS 637: Performed by: INTERNAL MEDICINE

## 2018-02-06 PROCEDURE — 25000132 ZZH RX MED GY IP 250 OP 250 PS 637: Performed by: PHYSICIAN ASSISTANT

## 2018-02-06 PROCEDURE — 40000065 ZZH STATISTIC EKG NON-CHARGEABLE

## 2018-02-06 PROCEDURE — 40000934 ZZH STATISTIC OUTPATIENT (NON-OBS) DAY

## 2018-02-06 PROCEDURE — 93005 ELECTROCARDIOGRAM TRACING: CPT

## 2018-02-06 PROCEDURE — 99215 OFFICE O/P EST HI 40 MIN: CPT | Performed by: PHYSICIAN ASSISTANT

## 2018-02-06 RX ORDER — AMIODARONE HYDROCHLORIDE 200 MG/1
200 TABLET ORAL DAILY
Start: 2018-02-06 | End: 2018-03-27

## 2018-02-06 RX ORDER — FUROSEMIDE 20 MG
20 TABLET ORAL DAILY
Status: DISCONTINUED | OUTPATIENT
Start: 2018-02-06 | End: 2018-02-06 | Stop reason: HOSPADM

## 2018-02-06 RX ADMIN — AMIODARONE HYDROCHLORIDE 200 MG: 200 TABLET ORAL at 08:53

## 2018-02-06 RX ADMIN — FUROSEMIDE 20 MG: 20 TABLET ORAL at 08:53

## 2018-02-06 RX ADMIN — AMLODIPINE BESYLATE 5 MG: 5 TABLET ORAL at 08:53

## 2018-02-06 RX ADMIN — APIXABAN 5 MG: 5 TABLET, FILM COATED ORAL at 08:53

## 2018-02-06 NOTE — PLAN OF CARE
Problem: Patient Care Overview  Goal: Plan of Care/Patient Progress Review  Outcome: Adequate for Discharge Date Met: 02/06/18  Patient discharged to home TCU by wheelchair at 10:27 AM 02/06/18.  Medication regimen and new medications discussed with patient and patient verbalizes understanding.  Diet and activity and wound care discussed with patient.  Upcoming appointments reviewed.  No questions at this time.    Groin and subclavian sites CDI.  CMS intact.  Tele shows sinus rhythm.  PT is alert and oriented X4. Up independently.

## 2018-02-06 NOTE — PROGRESS NOTES
[facility]    EP Progress Note    Date of Service (when I saw the patient): 02/06/2018     Assessment & Plan   Matthew Still is a 47 year old male with h/o atrial fibrillation since the age of 24 y/o, minimal CAD (D1) based on [facility]2017 and apical hypertrophic cardiomyopathy followed by Dr. Ta, who was admitted on 2/5/2018 for elective AFib ablation with Dr. Tang.    1. Paroxysmal AFib -     * First dx'd >20 years ago. Previously had episodes q 6-8 months, but increasing episodes noted since 2016  * Rhythm control recommended given his sxs (palpitatations, fatigue) but DCCV set up for 12/13 cancelled due to RADHA thrombus (though MRI done that same day showed no clot).   * Saw Dr. Tang 1/23 and started Amiodarone 200 mg BID x 2 weeks (1/23-2/6) followed by daily dose. Metoprolol tartrate 50 mg BID DCd (had 3 second pauses at night noted on Ziopatch and pt has felt pre-syncopal. Initially, Dr. Tang recommended repeat ÁNGELA/DCCV and no long term use of Amiodarone.    * ÁNGELA done 2/5 showed EF 55-60%, severe dilation of LA, moderate dilation of RA and no RADHA thrombus.  * Now s/p uneventful isolation of PVs and SVC with DCCV.  Note that RIPV was too small for LassoNo inducible atrial arrhthymias post ablation    * Tele thus far with SR.   * Ko DCd but low UO (and little residual). Weight up 2 pounds. I/Os are positive. He's now had 4 jugs of water and has urinated even more.  * Exam with weight up 2 pounds, minimal JVD and very mild R>L bibasilar crackles.  * No c/o swallowing discomfort/CP      PLAN:   * EKG today in SR   * Lasix 20 mg x 1 po before he leaves   * Amiodarone decreased to 200 mg daily (likely to use <3 months)   * Eliquis resumed   * FU in [place] with me and Dr. Tang in [place]   * Off work this whole week (letter given). May return to work starting 2/12/2018    2. CAD -     * D1 lesion with negative FFR noted on angiogram 11/2017 in setting of CP and mild  troponin elevation (0.132 peak)   * PTA on high intensity statin therapy. No ASA with Eliquis use; no BB with Amiodarone use as pt with h/o pauses  * Tele continues to show ST depression.     PLAN:   * Continue PTA meds    3. Apical hypertrophy -     * Noted on cMRI. EKG with ST depression (unchanged)  * Zio with no ventricular arrhythmias     PLAN:   * Continue FU with Dr. Harrison (I believe this is yet to be scheduled)        Marge Rodrigues PA-C    Interval History   Feeling OK. Some mild discomfort with deep breaths and scratchy throat. Has voided more substantially since increasing po intake. No groin site discomfort    Physical Exam   Temp: 97.6  F (36.4  C) Temp src: Oral BP: 108/56 Pulse: 70 Heart Rate: 61 Resp: 16 SpO2: 93 % O2 Device: None (Room air) Oxygen Delivery: 3 LPM  Vitals:    02/05/18 0831 02/06/18 0544   Weight: 109.8 kg (242 lb) 111.1 kg (244 lb 14.4 oz)     Vital Signs with Ranges  Temp:  [96.5  F (35.8  C)-97.6  F (36.4  C)] 97.6  F (36.4  C)  Pulse:  [62-70] 70  Heart Rate:  [61-83] 61  Resp:  [10-18] 16  BP: ()/(55-98) 108/56  SpO2:  [91 %-97 %] 93 %  I/O last 3 completed shifts:  In: 1990 [P.O.:740; I.V.:1250]  Out: 975 [Urine:975]    Telemetry: SR  Constitutional: awake, alert, cooperative, no apparent distress, and appears stated age  Respiratory: R>L MINIMAL bibasilar crackles  Cardiovascular: Regular. No MRG  Musculoskeletal: No edema. R groin site with minimal ecchymosis. L without ecchymosis. Neither with hematoma or bruit.    Medications       atorvastatin  80 mg Oral Daily     amLODIPine  5 mg Oral Daily     apixaban ANTICOAGULANT  5 mg Oral BID     sodium chloride (PF)  3 mL Intracatheter Q8H     amiodarone  200 mg Oral Daily       Data   I personally reviewed the EKG tracing showing PENDING.  Results for orders placed or performed during the hospital encounter of 02/05/18 (from the past 24 hour(s))   EKG 12-lead, tracing only   Result Value Ref Range     Interpretation ECG Click View Image link to view waveform and result    Basic metabolic panel   Result Value Ref Range    Sodium 143 133 - 144 mmol/L    Potassium 4.1 3.4 - 5.3 mmol/L    Chloride 112 (H) 94 - 109 mmol/L    Carbon Dioxide 25 20 - 32 mmol/L    Anion Gap 6 3 - 14 mmol/L    Glucose 109 (H) 70 - 99 mg/dL    Urea Nitrogen 18 7 - 30 mg/dL    Creatinine 1.09 0.66 - 1.25 mg/dL    GFR Estimate 72 >60 mL/min/1.7m2    GFR Estimate If Black 87 >60 mL/min/1.7m2    Calcium 8.8 8.5 - 10.1 mg/dL   CBC with platelets   Result Value Ref Range    WBC 9.3 4.0 - 11.0 10e9/L    RBC Count 4.92 4.4 - 5.9 10e12/L    Hemoglobin 15.1 13.3 - 17.7 g/dL    Hematocrit 44.2 40.0 - 53.0 %    MCV 90 78 - 100 fl    MCH 30.7 26.5 - 33.0 pg    MCHC 34.2 31.5 - 36.5 g/dL    RDW 13.0 10.0 - 15.0 %    Platelet Count 242 150 - 450 10e9/L   Activated clotting time POCT   Result Value Ref Range    Activated Clot Time 272 (H) 105 - 167 sec   Activated clotting time POCT   Result Value Ref Range    Activated Clot Time 348 (H) 105 - 167 sec   Activated clotting time POCT   Result Value Ref Range    Activated Clot Time 278 (H) 105 - 167 sec   EP Ablation focal afib    Narrative    PROCEDURES PERFORMED:  1. Wide circumferential pulmonary vein isolation.  2. Superior vena cava (SVC) isolation  3. Three-dimensional CARTO mapping.  4. Comprehensive electrophysiology (EP) study with arrhythmia  induction.  5. Left atrial recording and pacing via the CS.  6. Transseptal access.  7. Cardiac fluoroscopy.  8. Intracardiac echocardiogram (ICE)    INDICATION: Symptomatic persistent atrial fibrillation.    HISTORY OF PRESENT ILLNESS: This is a 47 year-old patient with  symptomatic persistent atrial fibrillation in the background of apical  hypertrophy. Patient opted for ablation as a first line rather than  subject to potential toxicities from long term use of antiarrhythmic  drug such as amiodarone.  ÁNGELA performed procedure shows no clots in  the RADHA.  "Extensive discussion was held regarding risks and benefits  including but not limited to vascular injury, cardiac perforation,  phrenic nerve injury and stroke. Patient understood and accepted these  risks.     METHOD: The patient was intubated and received general anesthesia. A  temperature probe was placed inside the esophagus and Ko catheter  was inserted.    The patient was then prepped and draped in the usual manner. 1%  lidocaine was infiltrated into the right and left femoral area. Two  SL1 sheaths were then placed in the right femoral vein and an 6, and  9-[place] sheath placed in the left femoral vein via the modified  Seldinger technique.  A deflectable decapolar catheter was then placed  in the CS for left atrial recording and pacing.  An ICE catheter was  placed in the 9-[place] sheath. Multiple views of the left atrium and  atrial appendage were obtained from right atrium and ventricle. There  was no evidence of clots noted in either chambers. Heparin boluses  were given to keep ACT above 300 seconds.    A HeartS pan needle was then introduced into one of the SL1 sheaths.  Under both ICE and fluoroscopic guidance, the HeartS pan needle was  used to cross the septum after observing tenting of the atrial septum.  Agitated saline was injected into the HeartS pan needle.  After  observing \"bubbles\" in the left atrium (LA), the dilator was then  advanced over the needle along with the sheath.  The needle and  dilator were then removed. The sheath was then aspirated and was  irrigated continuously with heparinized saline at 100 mL per hour.  given. The second transseptal access was performed in a similar  fashion. The Lasso catheter was then placed in one of the SL1 sheaths  and the JJ curve irrigated tip, Smart Touch ablation catheter was  placed in the other one. Both SL-1 sheaths were then pulled back into  the right atrium    The geometry of the pulmonary veins along with the body of the LA " were  obtained using the Lasso catheter and CARTO mapping system. Afterward,  the Lasso catheter was placed in each of the respective pulmonary  veins. Ablation was then performed using a Stockert generator. The  ablation catheter was moved at each 15 - 20 seconds interval at 40  Taylor and a minimum of 5 grams of force. The ablation was stopped  immediately if there is a rise of 0.5 Celsius degree. Burst and atrial  extrastimuli were performed. Lasso catheter was then placed in the SVC  searching for any potentials.  Before ablation the anterior aspect of  the right pulmonary veins and SVC phrenic nerve mapping was performed  by high output pacing and area of phrenic capturing was tagged by  CARTO. Frequent fluoroscopy of the diaphragm was also performed during  ablation of the right anterior pulmonary veins and SVC.  Lasix (20 mg)  and Protamine (40 mg) were given intravenously.  Catheters and sheaths  were then removed. Hemostasis was achieved by direct manual pressure.  The patient was extubated and was then transferred back to the PACU in  stable condition.    CONDUCTION INTERVALS:  1. A-H:  70 ms , H-V: 36 ms  2. AV Wenckebach: 440 ms  3. AV rip ERP: 600/380 ms  4. VA ERP: 600/470 ms, concentric    ARRHYTHMIA SUMMARY: The patient presented in atrial fibrillation. Wide  circumferential pulmonary vein isolation was performed.  Afib  persisted despite confirmation of isolation. DC cardioversion was  performed. Atrial burst pacing and extrastimuli up to triples were  then performed decremented down either to ERP or 200 ms.    ABLATION SUMMARY: Approximately 20 applications of radiofrequency  ablation were targeted at the antra of the pulmonary veins. Complete  pulmonary vein isolation was demonstrated with the absence of  pulmonary vein potential as documented by the Lasso catheter and was  mapped with a 3-dimensional CARTO system. The right inferior pulmonary  vein was too small for the Lasso catheter. There was  no capturing  inside this vein at high output pacing after isolation. 6 applications  were targeted at the SVC after phrenic mapping as described above with  isolation demonstrated.    FLUOROSCOPY TIME (minutes): 6.    COMPLICATIONS: None.    CONCLUSIONS:  1. Uneventful wide circumferential pulmonary vein and SVC isolation  (see arrhythmia and ablation summary)  2. Normal sinus and atrioventricular function without evidence of dual  AVN physiology or accessory pathway  3. Resume amiodarone and Eliquis    MD JESUS Childers MD   Activated clotting time POCT   Result Value Ref Range    Activated Clot Time 155 105 - 167 sec   Glucose by meter   Result Value Ref Range    Glucose 187 (H) 70 - 99 mg/dL

## 2018-02-06 NOTE — PLAN OF CARE
Problem: Patient Care Overview  Goal: Plan of Care/Patient Progress Review  Outcome: No Change  Groin sites CDI.  CMS intact.  Tele shows sinus rhythm.  Patient has a rice catheter draining clear, yellow urine. PT is alert and oriented X4. Baseline independent.  Off bedrest at 7pm.

## 2018-02-06 NOTE — PLAN OF CARE
Problem: Patient Care Overview  Goal: Plan of Care/Patient Progress Review  Outcome: Improving  Pt A&O, VSS, pt up independently.  Pt DTV pt informed about using urinal so amount can be measured.  Pt bilateral groin sites CD/I. Pt in SR currently.  Will continue to monitor.

## 2018-02-06 NOTE — PROGRESS NOTES
Discharge instructions post A Fib Ablation reviewed with patient. Patient made aware that AVS provided by hospital staff will include all instructions, appointments and phone numbers.  Patient reminded that there is no driving for 2 days and no lifting, pushing or pulling of more than 10 pounds for 3 days. Patient instructed to call with any concerns or problems including, coughing up blood, difficulty swallowing, groin bleed or swelling, increased shortness of breath, fever greater than 101, A Fib lasting longer than 2-3 hours. Follow up appointments have been made and reviewed with patient.  Pt having no problems with pain or shortness of breath at this time. Patient weight up 2 pounds. Per ELMIRA Mcgarry exam it was noted he had minimal JVD and very mild R>L bibasilar crackles.  Lasix 20mg po given x1 before discharge.  Patient reports he is voiding adequate amounts.   Pt has no further questions at this time. ANN Peter.

## 2018-02-06 NOTE — DISCHARGE INSTRUCTIONS
A Fib Ablation Discharge Instructions    After you go home:    Have an adult stay with you until tomorrow.    You may eat your normal diet, unless your doctor tells you otherwise.    RELAX and take it easy for 3 days to protect groin sites       For 24-48 hours (due to the sedation you received):    DO NOT DRIVE FOR 2 DAYS!     Do NOT make any important or legal decisions.    Do NOT drive or operate machines at home or at work.    Do NOT drink alcohol.    Care of Puncture Site:    Check the puncture sites every 1-2 hours while awake.    For 2-3 days, when you cough, sneeze, laugh or move your bowels, hold your hand over the puncture sites and press firmly.    Change band aid daily for at least 3 days. If there is minor oozing, apply another band aid and remove it after 12 hours.     It is normal to have a small bruise or pea size lump at the sites.    You may shower. Do NOT take a bath, or use a hot tub or pool until groin site heals, which may take up to a week.  Do NOT scrub the site. Do not use lotion or powder near the puncture site.    Activity:    Do NOT lift, push or pull more than 10 pounds (equal to a gallon of milk) for 3 days.    NO repetitive motions such as loading , vacuuming, raking, shoveling.   OK to get back to work Monday 2/12 but CALL if you are having concerns about this    Bleeding:    If you start bleeding from the groin sites, lie down flat and press firmly on the site for 10 minutes or until bleeding stops.     Once bleeding stops, lay flat for 1-2 hours.    Call your A Fib nurse if bleeding does not stop or after hours will need to go to ER.       Go to ER or Call 911 right away if you have heavy bleeding or bleeding that does not stop.    Medications:    DECREASE amiodarone to 200 mg daily (previously had been taking it twice daily)    Continue Eliquis 5 mg twice daily    Continue all other medications    If you have pain, you may takeTylenol (acetaminophen) and if this does not  help may take Advil (ibuprofen - 400 mg with food).    Call the A Fib RN if:    Chest pain not relieved by tylenol or ibuprofen    Difficulty swallowing and/or coughing up blood    Shortness of breath    Increased groin pain or a large or growing hard lump around the site.    Groin site is red, swollen, hot or tender.    Blood or fluid is draining from the groin site.    You have chills or a fever greater than 101 F (38 C).    Your leg feels numb, cool or changes color.    If groin pain is not relieved by Tylenol or Ibuprofen.    Recurrent irregular or fast heart rate lasting over 2-3 hours.    Any questions or concerns.    Heart rhythms:  You may have some irregular heartbeats. These feel very strong. They may make you feel that the A Fib is going to start again.  Give it time. The irregular beats should occur less often.    Follow Up Appointments:    2/13/18 at 2:30pm with ELMIRA Mcgarry in [place]    3/27/18 at 11am with Dr Tang in [facility]: A Fib clinic RN's Mary Dangelo [phone] (Mon-Fri, 8:00-5:00)                                                                        [phone] (7 days a week) after hours for on call Cardiologist.

## 2018-02-06 NOTE — PLAN OF CARE
Problem: Patient Care Overview  Goal: Plan of Care/Patient Progress Review  Outcome: No Change  AVSS; bilateral groin sites clean, dry and intact; pt voided about 25 cc at 0500, (rice d/c'd about 1945); pt bladder scanned for 68 cc. Pt encouraged to drink. Pt up with standby assist without difficulty. Pt denied pain. Continue to monitor urine output and encourage fluids.

## 2018-02-07 ENCOUNTER — TELEPHONE (OUTPATIENT)
Dept: CARDIOLOGY | Facility: CLINIC | Age: 48
End: 2018-02-07

## 2018-02-07 LAB — INTERPRETATION ECG - MUSE: NORMAL

## 2018-02-07 NOTE — TELEPHONE ENCOUNTER
Pt wife called and LM at 1851 and then called again at 1944, but did not LM that pt was having severe chest pain.  Tylenol after one hour was not working.  Called both wife and pt phone this message with no answer. LM on pt cell phone to call with how he is doing.  Do not see a note that pt called on call in epic.  Question if pt tried any ibuprofen. Zonia

## 2018-02-07 NOTE — TELEPHONE ENCOUNTER
Patient called back. States that he had ' great' pain last night -really 'bad. He took about 1000 MG of Tylenol and was able to sleep and this morning the pain is 'significantly' better.I advised him to take 400-600 MG of Tylenol Q 4-6 hours for the next day or two to keep the inflammation/pain under control and he agreed to do so. He is voiding w/o difficulties or pain. Is swallowing fine but voice is raspy. He is fine with the plan for Tylenol and had no other questions for me. I told him to keep us posted on his progress. Ave

## 2018-02-09 ENCOUNTER — TELEPHONE (OUTPATIENT)
Dept: CARDIOLOGY | Facility: CLINIC | Age: 48
End: 2018-02-09

## 2018-02-09 NOTE — TELEPHONE ENCOUNTER
Patient called in to say that he has a temp of 99.7 along with a raspy voice and phlegm that is clear to yellow. I told him that the 99.7 temp is not concerning-we are concerned with a temp >101.0. I told him that this is probably lingering side effect of ET tube. He is drinking 2-3 bottles of water daily and has a room humidifier. I encouraged him to move around more and to take occasion al deep breaths with coughing to keep his airways open. I gave him the on call number should he have issues over the weekend. Ave

## 2018-02-13 ENCOUNTER — OFFICE VISIT (OUTPATIENT)
Dept: CARDIOLOGY | Facility: CLINIC | Age: 48
End: 2018-02-13
Attending: INTERNAL MEDICINE
Payer: COMMERCIAL

## 2018-02-13 ENCOUNTER — HOSPITAL ENCOUNTER (OUTPATIENT)
Dept: CARDIOLOGY | Facility: CLINIC | Age: 48
Discharge: HOME | End: 2018-02-13
Attending: INTERNAL MEDICINE | Admitting: INTERNAL MEDICINE
Payer: COMMERCIAL

## 2018-02-13 VITALS
HEART RATE: 74 BPM | SYSTOLIC BLOOD PRESSURE: 130 MMHG | DIASTOLIC BLOOD PRESSURE: 62 MMHG | WEIGHT: 241 LBS | BODY MASS INDEX: 33.61 KG/M2 | OXYGEN SATURATION: 97 %

## 2018-02-13 DIAGNOSIS — I48.19 PERSISTENT ATRIAL FIBRILLATION (H): ICD-10-CM

## 2018-02-13 DIAGNOSIS — I47.29 PAROXYSMAL VENTRICULAR TACHYCARDIA (H): ICD-10-CM

## 2018-02-13 DIAGNOSIS — I25.10 CORONARY ARTERY DISEASE INVOLVING NATIVE CORONARY ARTERY OF NATIVE HEART WITHOUT ANGINA PECTORIS: Primary | ICD-10-CM

## 2018-02-13 DIAGNOSIS — I48.20 CHRONIC ATRIAL FIBRILLATION (H): ICD-10-CM

## 2018-02-13 DIAGNOSIS — I48.0 PAROXYSMAL ATRIAL FIBRILLATION (H): ICD-10-CM

## 2018-02-13 DIAGNOSIS — I42.2 APICAL VARIANT HYPERTROPHIC CARDIOMYOPATHY (H): ICD-10-CM

## 2018-02-13 PROCEDURE — 93005 ELECTROCARDIOGRAM TRACING: CPT

## 2018-02-13 PROCEDURE — 93010 ELECTROCARDIOGRAM REPORT: CPT | Performed by: PHYSICIAN ASSISTANT

## 2018-02-13 PROCEDURE — 99214 OFFICE O/P EST MOD 30 MIN: CPT | Performed by: PHYSICIAN ASSISTANT

## 2018-02-13 NOTE — LETTER
2/13/2018    [facility]  [address] [address]    RE: Matthew Still       Dear Colleague,    I had the pleasure of seeing Matthew Still in the [facility].    HPI and Plan:   See dictation #[id number]    Orders Placed This Encounter   Procedures     Follow-Up with Cardiologist       No orders of the defined types were placed in this encounter.      There are no discontinued medications.      Encounter Diagnoses   Name Primary?     Persistent atrial fibrillation (H)      Coronary artery disease involving native coronary artery of native heart without angina pectoris Yes     Paroxysmal atrial fibrillation (H)      Apical variant hypertrophic cardiomyopathy (H)        CURRENT MEDICATIONS:  Current Outpatient Prescriptions   Medication Sig Dispense Refill     amiodarone (PACERONE/CODARONE) 200 MG tablet Take 1 tablet (200 mg) by mouth daily       Ascorbic Acid (VITAMIN C PO)        amLODIPine (NORVASC) 5 MG tablet Take 1 tablet (5 mg) by mouth daily 30 tablet 4     apixaban ANTICOAGULANT (ELIQUIS) 5 MG tablet Take 1 tablet (5 mg) by mouth 2 times daily 60 tablet 11     atorvastatin (LIPITOR) 80 MG tablet Take 1 tablet (80 mg) by mouth daily 30 tablet 11       ALLERGIES   No Known Allergies    PAST MEDICAL HISTORY:  No past medical history on file.    PAST SURGICAL HISTORY:  No past surgical history on file.    FAMILY HISTORY:  Family History   Problem Relation Age of Onset     Coronary Artery Disease Mother      a fib, ,MI     Heart Failure Mother      CHF     Other Cancer Father      DIABETES Father      Coronary Artery Disease Paternal Grandmother      Coronary Artery Disease Paternal Grandfather        SOCIAL HISTORY:  Social History     Social History     Marital status:      Spouse name: N/A     Number of children: N/A     Years of education: N/A     Social History Main Topics     Smoking status: Never Smoker     Smokeless tobacco: Former User      Alcohol use Yes      Comment: 1 drink per month     Drug use: No     Sexual activity: Yes     Other Topics Concern     Parent/Sibling W/ Cabg, Mi Or Angioplasty Before 65f 55m? Yes     Mother MI age 45     Social History Narrative       Review of Systems:  Skin:  Negative       Eyes:  Positive for   vision going black  ENT:  Negative      Respiratory:  Negative for shortness of breath;cough;dyspnea on exertion     Cardiovascular:  Negative for;palpitations;dizziness;lightheadedness;fatigue      Gastroenterology: Negative for melena;hematochezia    Genitourinary:  Negative      Musculoskeletal:  Negative      Neurologic:  Positive for headaches    Psychiatric:  Negative      Heme/Lymph/Imm:  Negative      Endocrine:  Negative        Physical Exam:  Vitals: /62  Pulse 74  Wt 109.3 kg (241 lb)  SpO2 97%  BMI 33.61 kg/m2    Constitutional:  cooperative, alert and oriented, well developed, well nourished, in no acute distress        Skin:  warm and dry to the touch, no apparent skin lesions or masses noted          Head:  normocephalic, no masses or lesions        Eyes:  pupils equal and round;conjunctivae and lids unremarkable;sclera white        Lymph:      ENT:           Neck:  JVP normal;no carotid bruit        Respiratory:  normal breath sounds, clear to auscultation, normal A-P diameter, normal symmetry, normal respiratory excursion, no use of accessory muscles         Cardiac: regular rhythm, normal S1/S2, no S3 or S4, apical impulse not displaced, no murmurs, gallops or rubs                pulses full and equal                               right femoral bruit (-) left femoral bruit (-) R groin site ecchymosis    GI:  abdomen soft obese      Extremities and Muscular Skeletal:  no deformities, clubbing, cyanosis, erythema observed;no edema              Neurological:  no gross motor deficits        Psych:  Alert and Oriented x 3              Thank you for allowing me to participate in the care of your  patient.      Sincerely,     Marge Rodrigues PA-C     [facility]    cc:   [facility]  [address] [place] [address]

## 2018-02-13 NOTE — PROGRESS NOTES
HPI and Plan:   See dictation #[id number]    Orders Placed This Encounter   Procedures     Follow-Up with Cardiologist       No orders of the defined types were placed in this encounter.      There are no discontinued medications.      Encounter Diagnoses   Name Primary?     Persistent atrial fibrillation (H)      Coronary artery disease involving native coronary artery of native heart without angina pectoris Yes     Paroxysmal atrial fibrillation (H)      Apical variant hypertrophic cardiomyopathy (H)        CURRENT MEDICATIONS:  Current Outpatient Prescriptions   Medication Sig Dispense Refill     amiodarone (PACERONE/CODARONE) 200 MG tablet Take 1 tablet (200 mg) by mouth daily       Ascorbic Acid (VITAMIN C PO)        amLODIPine (NORVASC) 5 MG tablet Take 1 tablet (5 mg) by mouth daily 30 tablet 4     apixaban ANTICOAGULANT (ELIQUIS) 5 MG tablet Take 1 tablet (5 mg) by mouth 2 times daily 60 tablet 11     atorvastatin (LIPITOR) 80 MG tablet Take 1 tablet (80 mg) by mouth daily 30 tablet 11       ALLERGIES   No Known Allergies    PAST MEDICAL HISTORY:  No past medical history on file.    PAST SURGICAL HISTORY:  No past surgical history on file.    FAMILY HISTORY:  Family History   Problem Relation Age of Onset     Coronary Artery Disease Mother      a fib, ,MI     Heart Failure Mother      CHF     Other Cancer Father      DIABETES Father      Coronary Artery Disease Paternal Grandmother      Coronary Artery Disease Paternal Grandfather        SOCIAL HISTORY:  Social History     Social History     Marital status:      Spouse name: N/A     Number of children: N/A     Years of education: N/A     Social History Main Topics     Smoking status: Never Smoker     Smokeless tobacco: Former User     Alcohol use Yes      Comment: 1 drink per month     Drug use: No     Sexual activity: Yes     Other Topics Concern     Parent/Sibling W/ Cabg, Mi Or Angioplasty Before 65f 55m? Yes     Mother MI age 45     Social History  Narrative       Review of Systems:  Skin:  Negative       Eyes:  Positive for   vision going black  ENT:  Negative      Respiratory:  Negative for shortness of breath;cough;dyspnea on exertion     Cardiovascular:  Negative for;palpitations;dizziness;lightheadedness;fatigue      Gastroenterology: Negative for melena;hematochezia    Genitourinary:  Negative      Musculoskeletal:  Negative      Neurologic:  Positive for headaches    Psychiatric:  Negative      Heme/Lymph/Imm:  Negative      Endocrine:  Negative        Physical Exam:  Vitals: /62  Pulse 74  Wt 109.3 kg (241 lb)  SpO2 97%  BMI 33.61 kg/m2    Constitutional:  cooperative, alert and oriented, well developed, well nourished, in no acute distress        Skin:  warm and dry to the touch, no apparent skin lesions or masses noted          Head:  normocephalic, no masses or lesions        Eyes:  pupils equal and round;conjunctivae and lids unremarkable;sclera white        Lymph:      ENT:           Neck:  JVP normal;no carotid bruit        Respiratory:  normal breath sounds, clear to auscultation, normal A-P diameter, normal symmetry, normal respiratory excursion, no use of accessory muscles         Cardiac: regular rhythm, normal S1/S2, no S3 or S4, apical impulse not displaced, no murmurs, gallops or rubs                pulses full and equal                               right femoral bruit (-) left femoral bruit (-) R groin site ecchymosis    GI:  abdomen soft obese      Extremities and Muscular Skeletal:  no deformities, clubbing, cyanosis, erythema observed;no edema              Neurological:  no gross motor deficits        Psych:  Alert and Oriented x 3

## 2018-02-13 NOTE — MR AVS SNAPSHOT
After Visit Summary   2/13/2018    Matthew Still    MRN: [id number]           Patient Information     Date Of Birth          1970        Visit Information        Provider Department      2/13/2018 2:30 PM Marge Rodrigues PA-C [facility]        Today's Diagnoses     Coronary artery disease involving native coronary artery of native heart without angina pectoris    -  1    Persistent atrial fibrillation (H)          Care Instructions    1. Reviewed ablation procedure done last week.     2. EKG today looks great    3. Continue to pay attention to trouble with lightheadedness. Try checking BP if able to see if related to low BP.      4. Call for prolonged palpitations or any other questions/concerns - [phone]     5. See Dr. Tang 3/27 - he may stop amiodarone at that time and will determine how long you should stay on the Eliquis.          Follow-ups after your visit        Your next 10 appointments already scheduled     Mar 27, 2018 10:30 AM CDT   ecg with [facility] [facility]   [facility] ([facility])    [address] [address] [phone]            Mar 27, 2018 11:00 AM CDT   [facility] EP RETURN with Vik Clancy MD   [facility] ([facility])    [address] [id number] [phone]              Who to contact     If you have questions or need follow up information about today's clinic visit or your schedule please contact [facility] directly at [phone].  Normal or non-critical lab and imaging results will be communicated to you by MyChart, letter or phone within 4 business days after the clinic has received the results. If you do not hear from us within 7 days, please contact the clinic through MyChart or phone. If you have a critical or abnormal lab result, we will notify you  "by phone as soon as possible.  Submit refill requests through motify or call your pharmacy and they will forward the refill request to us. Please allow 3 business days for your refill to be completed.          Additional Information About Your Visit        [facility] [employer] Information     motify lets you send messages to your doctor, view your test results, renew your prescriptions, schedule appointments and more. To sign up, go to [email or web address] . Click on \"Log in\" on the left side of the screen, which will take you to the Welcome page. Then click on \"Sign up Now\" on the right side of the page.     You will be asked to enter the access code listed below, as well as some personal information. Please follow the directions to create your username and password.     Your access code is: [email or web address]  Expires: 2018  2:36 PM     Your access code will  in 90 days. If you need help or a new code, please call your [place] clinic or [phone].        Care EveryWhere ID     This is your Care EveryWhere ID. This could be used by other organizations to access your [place] medical records  [id number]        Your Vitals Were     Pulse Pulse Oximetry BMI (Body Mass Index)             74 97% 33.61 kg/m2          Blood Pressure from Last 3 Encounters:   18 130/62   18 126/73   17 130/81    Weight from Last 3 Encounters:   18 109.3 kg (241 lb)   18 111.1 kg (244 lb 14.4 oz)   18 109.3 kg (241 lb)              We Performed the Following     Follow-Up with Cardiac Advanced Practice Provider        Primary Care Provider Office Phone # Fax #    [facility] [phone] [address] [address]        Equal Access to Services     ARCHIE MOSCOSO : Meliza Glez, geneva dietrich, qashata kaalmyron arshad, kika juan. [facility] [phone].    ATENCIÓN: Si habla español, tiene a villeda disposición " servicios gratuitos de asistencia lingüística. Promise olsen [phone].    We comply with applicable federal civil rights laws and [place] laws. We do not discriminate on the basis of race, color, national origin, age, disability, sex, sexual orientation, or gender identity.            Thank you!     Thank you for choosing [facility]  for your care. Our goal is always to provide you with excellent care. Hearing back from our patients is one way we can continue to improve our services. Please take a few minutes to complete the written survey that you may receive in the mail after your visit with us. Thank you!             Your Updated Medication List - Protect others around you: Learn how to safely use, store and throw away your medicines at www.disposemymeds.org.          This list is accurate as of 2/13/18  2:36 PM.  Always use your most recent med list.                   Brand Name Dispense Instructions for use Diagnosis    amiodarone 200 MG tablet    PACERONE/CODARONE     Take 1 tablet (200 mg) by mouth daily    Chronic atrial fibrillation (H)       amLODIPine 5 MG tablet    NORVASC    30 tablet    Take 1 tablet (5 mg) by mouth daily    Benign essential hypertension       apixaban ANTICOAGULANT 5 MG tablet    ELIQUIS    60 tablet    Take 1 tablet (5 mg) by mouth 2 times daily    Paroxysmal atrial fibrillation (H)       atorvastatin 80 MG tablet    LIPITOR    30 tablet    Take 1 tablet (80 mg) by mouth daily    Hyperlipidemia LDL goal <70       VITAMIN C PO

## 2018-02-13 NOTE — LETTER
2/13/2018      [facility]  [address] [address]      RE: Matthew HA Cheko       Dear Colleague,    I had the pleasure of seeing Matthew Still in the [facility].    Service Date: 02/13/2018      HISTORY OF PRESENT ILLNESS:  I had the pleasure of seeing Matthew today when he came for followup of his recent atrial fibrillation ablation.  He is a very pleasant 47-year-old with a history of coronary disease (minimal, D1) based on angiogram done at the [facility] in 11/2017 and apical hypertrophic cardiomyopathy followed by Dr. Harrison.  He was first diagnosed with atrial fibrillation roughly 20 years ago and started having increasing episodes since 2016.  There was concern for left atrial thrombus back in 12/2017 and cardioversion was canceled at that time. MRI was then done showing no evidence of clot. He saw Dr. Tang 01/23 and amiodarone 200 mg twice daily for 2 weeks followed by 200 mg daily was started. Metoprolol was discontinued secondary to 3-second pauses at night noted on a Zio Patch where he felt presyncopal.  He ultimately underwent uneventful isolation of the pulmonary veins and SVC with DC cardioversion with Dr. Tang on 02/04/2018.  It was noted that the right inferior pulmonary vein was too small for the Lasso catheter.  Post-ablation he had no inducible atrial arrhythmias noted.      He was sent home on 02/05 on amiodarone 200 mg daily and continued on Eliquis 5 mg twice daily.      He comes back today telling me that for the first few days he had significant chest discomfort relieved with Tylenol.  Yesterday he went back to work for the first time (02/12) and noted that about 4:00 he felt very exhausted, lightheaded and dizzy.  He did not have a blood pressure cuff there, but as soon as he got home about an hour later, his blood pressure was 138/80 and his heart rate was 74.  He was feeling quite a bit better at that time.  He cannot think of  anything he had done differently.  He denied palpitations.  He denied any chest discomfort.  He had taken his medicine and had enough water to drink prior to that episode.  It has not recurred.      He denies any problems with the groin sited but does note significant ecchymosis on the right.  He denies fevers, chills, trouble swallowing or TIA symptoms.      EKG today, which I overread, confirmed sinus rhythm at 70 beats per minute.  He has evidence of atrial hypertrophy which is grossly unchanged from previous.        ASSESSMENT AND PLAN:     1.  Paroxysmal atrial fibrillation.  As above, he is now status post SVC isolation, pulmonary vein isolation and cardioversion on 02/05/2018.  He remains on amiodarone therapy at 200 mg daily.  Dr. Tang will see him in March and likely discontinue this medicine at this time.  It was felt that given his structural heart disease he would be a poor candidate for other antiarrhythmics.      He remains on Eliquis.  Dr. Tang will determine how long he should remain on this given the fact that he has minimal coronary disease, history of hypertension and questionable left atrial appendage thrombus noted on ÁNGELA 12/13 (though MRI done the same day showed no clot).      2.  Coronary disease.  He had a D1 lesion with negative FFR on angiogram in 11/2017, had in the setting of chest pain and mild troponin elevation.  He remains on high-intensity statin therapy.  He is not on beta blocker secondary to history of pauses noted on the Zio Patch.      3.  Apical hypertrophy.  This was noted on cardiac MRI, and EKG remains abnormal based on this.  He has had no ventricular arrhythmias noted on his Zio Patch and will continue to follow with Dr. Harrison.      Currently, he is due to see Dr. Tang 03/27.  I will have him see Dr. Harrison in roughly 6 months but have encouraged him to contact us in the interim can we be of any assistance.         MONIKA VALLECILLO PA-C             D: 02/13/2018    T: 2018   MT: DEXTER      Name:     TAMARA CENTENO   MRN:      [id number]        Account:      [id number]   :      1970           Service Date: 2018      Document: [id number]           Outpatient Encounter Prescriptions as of 2018   Medication Sig Dispense Refill     amiodarone (PACERONE/CODARONE) 200 MG tablet Take 1 tablet (200 mg) by mouth daily       Ascorbic Acid (VITAMIN C PO)        amLODIPine (NORVASC) 5 MG tablet Take 1 tablet (5 mg) by mouth daily 30 tablet 4     apixaban ANTICOAGULANT (ELIQUIS) 5 MG tablet Take 1 tablet (5 mg) by mouth 2 times daily 60 tablet 11     atorvastatin (LIPITOR) 80 MG tablet Take 1 tablet (80 mg) by mouth daily 30 tablet 11     No facility-administered encounter medications on file as of 2018.        Again, thank you for allowing me to participate in the care of your patient.      Sincerely,    Marge Rodrigues PA-C     [facility]

## 2018-02-13 NOTE — PATIENT INSTRUCTIONS
1. Reviewed ablation procedure done last week.     2. EKG today looks great    3. Continue to pay attention to trouble with lightheadedness. Try checking BP if able to see if related to low BP.      4. Call for prolonged palpitations or any other questions/concerns - [phone]     5. See Dr. Tang 3/27 - he may stop amiodarone at that time and will determine how long you should stay on the Eliquis.

## 2018-02-14 NOTE — PROGRESS NOTES
Service Date: 02/13/2018      HISTORY OF PRESENT ILLNESS:  I had the pleasure of seeing Matthew today when he came for followup of his recent atrial fibrillation ablation.  He is a very pleasant 47-year-old with a history of coronary disease (minimal, D1) based on angiogram done at the [place] in 11/2017 and apical hypertrophic cardiomyopathy followed by Dr. Harrison.  He was first diagnosed with atrial fibrillation roughly 20 years ago and started having increasing episodes since 2016.  There was concern for left atrial thrombus back in 12/2017 and cardioversion was canceled at that time. MRI was then done showing no evidence of clot. He saw Dr. Tang 01/23 and amiodarone 200 mg twice daily for 2 weeks followed by 200 mg daily was started. Metoprolol was discontinued secondary to 3-second pauses at night noted on a Zio Patch where he felt presyncopal.  He ultimately underwent uneventful isolation of the pulmonary veins and SVC with DC cardioversion with Dr. Tang on 02/04/2018.  It was noted that the right inferior pulmonary vein was too small for the Lasso catheter.  Post-ablation he had no inducible atrial arrhythmias noted.      He was sent home on 02/05 on amiodarone 200 mg daily and continued on Eliquis 5 mg twice daily.      He comes back today telling me that for the first few days he had significant chest discomfort relieved with Tylenol.  Yesterday he went back to work for the first time (02/12) and noted that about 4:00 he felt very exhausted, lightheaded and dizzy.  He did not have a blood pressure cuff there, but as soon as he got home about an hour later, his blood pressure was 138/80 and his heart rate was 74.  He was feeling quite a bit better at that time.  He cannot think of anything he had done differently.  He denied palpitations.  He denied any chest discomfort.  He had taken his medicine and had enough water to drink prior to that episode.  It has not recurred.      He denies any problems with  the groin sited but does note significant ecchymosis on the right.  He denies fevers, chills, trouble swallowing or TIA symptoms.      EKG today, which I overread, confirmed sinus rhythm at 70 beats per minute.  He has evidence of atrial hypertrophy which is grossly unchanged from previous.        ASSESSMENT AND PLAN:     1.  Paroxysmal atrial fibrillation.  As above, he is now status post SVC isolation, pulmonary vein isolation and cardioversion on 2018.  He remains on amiodarone therapy at 200 mg daily.  Dr. Tang will see him in March and likely discontinue this medicine at this time.  It was felt that given his structural heart disease he would be a poor candidate for other antiarrhythmics.      He remains on Eliquis.  Dr. Tang will determine how long he should remain on this given the fact that he has minimal coronary disease, history of hypertension and questionable left atrial appendage thrombus noted on ÁNGELA  (though MRI done the same day showed no clot).      2.  Coronary disease.  He had a D1 lesion with negative FFR on angiogram in 2017, had in the setting of chest pain and mild troponin elevation.  He remains on high-intensity statin therapy.  He is not on beta blocker secondary to history of pauses noted on the Zio Patch.      3.  Apical hypertrophy.  This was noted on cardiac MRI, and EKG remains abnormal based on this.  He has had no ventricular arrhythmias noted on his Zio Patch and will continue to follow with Dr. Harrison.      Currently, he is due to see Dr. Tang .  I will have him see Dr. Harrison in roughly 6 months but have encouraged him to contact us in the interim can we be of any assistance.         MONIKA VALLECILLO PA-C             D: 2018   T: 2018   MT: DEXTER      Name:     TAMARA CENTENO   MRN:      [id number]        Account:      [id number]   :      1970           Service Date: 2018      Document: [id number]

## 2018-03-27 ENCOUNTER — OFFICE VISIT (OUTPATIENT)
Dept: CARDIOLOGY | Facility: CLINIC | Age: 48
End: 2018-03-27
Payer: COMMERCIAL

## 2018-03-27 ENCOUNTER — HOSPITAL ENCOUNTER (OUTPATIENT)
Dept: CARDIOLOGY | Facility: CLINIC | Age: 48
Discharge: HOME | End: 2018-03-27
Attending: INTERNAL MEDICINE | Admitting: INTERNAL MEDICINE
Payer: COMMERCIAL

## 2018-03-27 VITALS
DIASTOLIC BLOOD PRESSURE: 92 MMHG | HEART RATE: 63 BPM | WEIGHT: 244.8 LBS | BODY MASS INDEX: 34.14 KG/M2 | SYSTOLIC BLOOD PRESSURE: 142 MMHG | OXYGEN SATURATION: 98 %

## 2018-03-27 DIAGNOSIS — I48.0 PAROXYSMAL ATRIAL FIBRILLATION (H): ICD-10-CM

## 2018-03-27 DIAGNOSIS — I42.2 HYPERTROPHIC CARDIOMYOPATHY (H): Primary | ICD-10-CM

## 2018-03-27 DIAGNOSIS — I48.20 CHRONIC ATRIAL FIBRILLATION (H): ICD-10-CM

## 2018-03-27 DIAGNOSIS — E78.5 HYPERLIPIDEMIA LDL GOAL <70: ICD-10-CM

## 2018-03-27 DIAGNOSIS — I10 BENIGN ESSENTIAL HYPERTENSION: ICD-10-CM

## 2018-03-27 PROCEDURE — 93010 ELECTROCARDIOGRAM REPORT: CPT | Performed by: INTERNAL MEDICINE

## 2018-03-27 PROCEDURE — 93005 ELECTROCARDIOGRAM TRACING: CPT

## 2018-03-27 PROCEDURE — 99215 OFFICE O/P EST HI 40 MIN: CPT | Performed by: INTERNAL MEDICINE

## 2018-03-27 RX ORDER — AMIODARONE HYDROCHLORIDE 200 MG/1
100 TABLET ORAL DAILY
Qty: 90 TABLET | Refills: 3
Start: 2018-03-27 | End: 2018-08-14

## 2018-03-27 RX ORDER — ATORVASTATIN CALCIUM 80 MG/1
80 TABLET, FILM COATED ORAL DAILY
Qty: 90 TABLET | Refills: 3 | Status: SHIPPED | OUTPATIENT
Start: 2018-03-27 | End: 2019-02-21

## 2018-03-27 RX ORDER — AMLODIPINE BESYLATE 5 MG/1
5 TABLET ORAL DAILY
Qty: 90 TABLET | Refills: 3 | Status: SHIPPED | OUTPATIENT
Start: 2018-03-27 | End: 2019-02-21

## 2018-03-27 NOTE — LETTER
3/27/2018      [facility]  [address] [address]      RE: Matthew Still       Dear Colleague,    I had the pleasure of seeing Matthew Still in the [facility].    Service Date: 03/27/2018      HISTORY OF PRESENT ILLNESS:  It was my pleasure to see Matthew today for followup of recent atrial fibrillation.  As you know, Matthew is a delightful 47-year-old gentleman whom I had the pleasure of meeting for the first time at request of my partner, Dr. Harrison, for evaluation of symptomatic chronic atrial fibrillation in the background of apical hypertrophy 2 months ago.  As you recall, Matthew has been having palpitations off and on for the past 20 years and was told in the past to have atrial fibrillation.  He was evaluated at [facility] for which he underwent EP study, angiogram and cardioversion but since then, continued to have episodes of atrial fibrillation that have been chronic for the past 3-4 months.      A cardiac MRI confirmed that he has apical hypertrophy with the thickest segment measured at 24 mm.  There is diffuse patchy mid wall enhancement involving the mid anterior anterolateral wall consistent with apical hypertrophy.  The scar burden is approximately 11%.  He also had a Zio Patch monitor done around the same time which demonstrated on that day he was in atrial fibrillation with an average rate of 86 per minute and a maximum of 195.  There was also what appeared to be a different QRS morphology that happened for 10 seconds with some irregularity at the beginning but the last 5 beats were quite regular at 134 beats per minute for which he was asymptomatic.      When I saw Matthew for the first time, we discussed the option of a trial of amiodarone versus a catheter-based ablation.  Given his young age, I opted for the latter.  He underwent isolation of pulmonary veins along with SVC and proceeded with a cardioversion.  I did not do  "any linear ablation because of his young age.  I had him continue with the amiodarone and since then, he has been doing quite well from the atrial fibrillation perspective without any recurrence.  He has been on Eliquis all along.      Matthew mentioned that a week after he got home, he had an episode of near syncope.  By that, I mean he had very blurry vision and needed to brace against something.  Otherwise, he would fall.  He had a similar episode occur a week ago when he was watching TV and got up to go to the bathroom and felt tunnel vision-like with severe lightheadedness.  He had to hold onto something.  This lasted for about 30 seconds or less.  He does not recall having palpitation beforehand.      Matthew's maternal grandfather  in his 30s along with his maternal uncle as well.  They were told they both  of a \"heart condition\" but no exact details were given.  EKG today demonstrated sinus rhythm with EKG criteria for apical hypertrophy with a very prominent R wave in the precordial leads and deep T-wave inversions.      I am somewhat optimistic that the ablation worked but way too early to tell.  For now, I would like to reduce the dose of amiodarone to 100 mg a day.  I am concerned about his 2 episodes of near syncope and coupled with his family history, even though it is not a first-degree relative but with the questionable nonsustained VT noted on the Holter, even though the apical hypertrophy is not commonly associated with sudden cardiac death, these episodes are concerning for potential ventricular arrhythmias as a cause.  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  In the meantime, I told him to take it easy and will schedule this procedure.  I went over the procedure in detail with risks including but not " limited to vascular injury and cardiac perforation.         JESUS OTT MD             D: 2018   T: 2018   MT: AMADOR      Name:     TAMARA CENTENO   MRN:      [id number]        Account:      [id number]   :      1970           Service Date: 2018      Document: [id number]           Outpatient Encounter Prescriptions as of 3/27/2018   Medication Sig Dispense Refill     atorvastatin (LIPITOR) 80 MG tablet Take 1 tablet (80 mg) by mouth daily 90 tablet 3     amLODIPine (NORVASC) 5 MG tablet Take 1 tablet (5 mg) by mouth daily 90 tablet 3     amiodarone (PACERONE/CODARONE) 200 MG tablet Take 0.5 tablets (100 mg) by mouth daily 90 tablet 3     apixaban ANTICOAGULANT (ELIQUIS) 5 MG tablet Take 1 tablet (5 mg) by mouth 2 times daily 180 tablet 3     Ascorbic Acid (VITAMIN C PO)        [DISCONTINUED] amiodarone (PACERONE/CODARONE) 200 MG tablet Take 1 tablet (200 mg) by mouth daily       [DISCONTINUED] amLODIPine (NORVASC) 5 MG tablet Take 1 tablet (5 mg) by mouth daily 30 tablet 4     [DISCONTINUED] apixaban ANTICOAGULANT (ELIQUIS) 5 MG tablet Take 1 tablet (5 mg) by mouth 2 times daily 60 tablet 11     [DISCONTINUED] atorvastatin (LIPITOR) 80 MG tablet Take 1 tablet (80 mg) by mouth daily 30 tablet 11     No facility-administered encounter medications on file as of 3/27/2018.        Again, thank you for allowing me to participate in the care of your patient.      Sincerely,    Jesus Clancy MD     [facility]

## 2018-03-27 NOTE — MR AVS SNAPSHOT
"              After Visit Summary   3/27/2018    Matthew Still    MRN: [id number]           Patient Information     Date Of Birth          1970        Visit Information        Provider Department      3/27/2018 11:00 AM Vik Tang MD [facility]        Today's Diagnoses     Hypertrophic cardiomyopathy (H)    -  1    Hyperlipidemia LDL goal <70        Benign essential hypertension        Chronic atrial fibrillation (H)        Paroxysmal atrial fibrillation (H)           Follow-ups after your visit        Future tests that were ordered for you today     Open Future Orders        Priority Expected Expires Ordered    ICD/Pacemaker/Loop Recorder Procedure Routine 4/3/2018 3/27/2019 3/27/2018            Who to contact     If you have questions or need follow up information about today's clinic visit or your schedule please contact [facility] directly at [phone].  Normal or non-critical lab and imaging results will be communicated to you by NXVISIONhart, letter or phone within 4 business days after the clinic has received the results. If you do not hear from us within 7 days, please contact the clinic through NXVISIONhart or phone. If you have a critical or abnormal lab result, we will notify you by phone as soon as possible.  Submit refill requests through TeensSuccess or call your pharmacy and they will forward the refill request to us. Please allow 3 business days for your refill to be completed.          Additional Information About Your Visit        NXVISIONhart Information     TeensSuccess lets you send messages to your doctor, view your test results, renew your prescriptions, schedule appointments and more. To sign up, go to [email or web address] . Click on \"Log in\" on the left side of the screen, which will take you to the Welcome page. Then click on \"Sign up Now\" on the right side of the page.     You will be asked to enter the access code " listed below, as well as some personal information. Please follow the directions to create your username and password.     Your access code is: [email or web address]  Expires: 2018  3:36 PM     Your access code will  in 90 days. If you need help or a new code, please call your [facility] clinic or [phone].        Care EveryWhere ID     This is your Care EveryWhere ID. This could be used by other organizations to access your [facility] medical records  [id number]        Your Vitals Were     Pulse Pulse Oximetry BMI (Body Mass Index)             63 98% 34.14 kg/m2          Blood Pressure from Last 3 Encounters:   18 (!) 142/92   18 130/62   18 126/73    Weight from Last 3 Encounters:   18 111 kg (244 lb 12.8 oz)   18 109.3 kg (241 lb)   18 111.1 kg (244 lb 14.4 oz)                 Today's Medication Changes          These changes are accurate as of 3/27/18 12:19 PM.  If you have any questions, ask your nurse or doctor.               These medicines have changed or have updated prescriptions.        Dose/Directions    amiodarone 200 MG tablet   Commonly known as:  PACERONE/CODARONE   This may have changed:  how much to take   Used for:  Chronic atrial fibrillation (H)   Changed by:  Vik Tang MD        Dose:  100 mg   Take 0.5 tablets (100 mg) by mouth daily   Quantity:  90 tablet   Refills:  3            Where to get your medicines      These medications were sent to [facility] [address]  [address] [facility] [address]     Phone:  [phone]     amLODIPine 5 MG tablet    apixaban ANTICOAGULANT 5 MG tablet    atorvastatin 80 MG tablet         Some of these will need a paper prescription and others can be bought over the counter.  Ask your nurse if you have questions.     You don't need a prescription for these medications     amiodarone 200 MG tablet                Primary Care Provider Office Phone # Fax #    [facility]  Clinic [phone]       [address] [facility] [address]        Equal Access to Services     ARCHIE MOSCOSO : Hadii aad ku hadjasbirrenetta Glez, festusshea gonzalezbraulioha, eduardoolya canalessuzida jeancarlos, kika meyer sakshisudha roy laJasminawais juan. So [facility] [phone].    ATENCIÓN: Si habla español, tiene a villeda disposición servicios gratuitos de asistencia lingüística. Llame al [phone].    We comply with applicable federal civil rights laws and [place] laws. We do not discriminate on the basis of race, color, national origin, age, disability, sex, sexual orientation, or gender identity.            Thank you!     Thank you for choosing [facility]  for your care. Our goal is always to provide you with excellent care. Hearing back from our patients is one way we can continue to improve our services. Please take a few minutes to complete the written survey that you may receive in the mail after your visit with us. Thank you!             Your Updated Medication List - Protect others around you: Learn how to safely use, store and throw away your medicines at www.disposemymeds.org.          This list is accurate as of 3/27/18 12:19 PM.  Always use your most recent med list.                   Brand Name Dispense Instructions for use Diagnosis    amiodarone 200 MG tablet    PACERONE/CODARONE    90 tablet    Take 0.5 tablets (100 mg) by mouth daily    Chronic atrial fibrillation (H)       amLODIPine 5 MG tablet    NORVASC    90 tablet    Take 1 tablet (5 mg) by mouth daily    Benign essential hypertension       apixaban ANTICOAGULANT 5 MG tablet    ELIQUIS    180 tablet    Take 1 tablet (5 mg) by mouth 2 times daily    Paroxysmal atrial fibrillation (H)       atorvastatin 80 MG tablet    LIPITOR    90 tablet    Take 1 tablet (80 mg) by mouth daily    Hyperlipidemia LDL goal <70       VITAMIN C PO

## 2018-03-27 NOTE — PROGRESS NOTES
Service Date: 03/27/2018      HISTORY OF PRESENT ILLNESS:  It was my pleasure to see Matthew today for followup of recent atrial fibrillation.  As you know, Matthew is a delightful 47-year-old gentleman whom I had the pleasure of meeting for the first time at request of my partner, Dr. Harrison, for evaluation of symptomatic chronic atrial fibrillation in the background of apical hypertrophy 2 months ago.  As you recall, Matthew has been having palpitations off and on for the past 20 years and was told in the past to have atrial fibrillation.  He was evaluated at [facility] for which he underwent EP study, angiogram and cardioversion but since then, continued to have episodes of atrial fibrillation that have been chronic for the past 3-4 months.      A cardiac MRI confirmed that he has apical hypertrophy with the thickest segment measured at 24 mm.  There is diffuse patchy mid wall enhancement involving the mid anterior anterolateral wall consistent with apical hypertrophy.  The scar burden is approximately 11%.  He also had a Zio Patch monitor done around the same time which demonstrated on that day he was in atrial fibrillation with an average rate of 86 per minute and a maximum of 195.  There was also what appeared to be a different QRS morphology that happened for 10 seconds with some irregularity at the beginning but the last 5 beats were quite regular at 134 beats per minute for which he was asymptomatic.      When I saw Matthew for the first time, we discussed the option of a trial of amiodarone versus a catheter-based ablation.  Given his young age, I opted for the latter.  He underwent isolation of pulmonary veins along with SVC and proceeded with a cardioversion.  I did not do any linear ablation because of his young age.  I had him continue with the amiodarone and since then, he has been doing quite well from the atrial fibrillation perspective without any recurrence.  He has been on Eliquis all  "along.      Matthew mentioned that a week after he got home, he had an episode of near syncope.  By that, I mean he had very blurry vision and needed to brace against something.  Otherwise, he would fall.  He had a similar episode occur a week ago when he was watching TV and got up to go to the bathroom and felt tunnel vision-like with severe lightheadedness.  He had to hold onto something.  This lasted for about 30 seconds or less.  He does not recall having palpitation beforehand.      Matthew's maternal grandfather  in his 30s along with his maternal uncle as well.  They were told they both  of a \"heart condition\" but no exact details were given.  EKG today demonstrated sinus rhythm with EKG criteria for apical hypertrophy with a very prominent R wave in the precordial leads and deep T-wave inversions.      I am somewhat optimistic that the ablation worked but way too early to tell.  For now, I would like to reduce the dose of amiodarone to 100 mg a day.  I am concerned about his 2 episodes of near syncope. Risk of sudden death is lower in patients with apical hypertrophy. Patient is known to have sinus bradycardia in the past and it could very well be that bradycardia was the cause. I would like to have patient on an Event monitor for further evaluation.         JESUS OTT MD             D: 2018   T: 2018   MT: AMADOR      Name:     MATTHEW CENTENO   MRN:      [id number]        Account:      [id number]   :      1970           Service Date: 2018      Document: [id number]      "

## 2018-03-27 NOTE — LETTER
3/27/2018    [facility]  [address] [facility] [address] [address]    RE: Matthew Still       Dear Colleague,    I had the pleasure of seeing Matthew Still in the [facility].    HPI and Plan:   See dictation  [id number]  Orders Placed This Encounter   Procedures     ICD/Pacemaker/Loop Recorder Procedure       Orders Placed This Encounter   Medications     atorvastatin (LIPITOR) 80 MG tablet     Sig: Take 1 tablet (80 mg) by mouth daily     Dispense:  90 tablet     Refill:  3     amLODIPine (NORVASC) 5 MG tablet     Sig: Take 1 tablet (5 mg) by mouth daily     Dispense:  90 tablet     Refill:  3     amiodarone (PACERONE/CODARONE) 200 MG tablet     Sig: Take 0.5 tablets (100 mg) by mouth daily     Dispense:  90 tablet     Refill:  3     apixaban ANTICOAGULANT (ELIQUIS) 5 MG tablet     Sig: Take 1 tablet (5 mg) by mouth 2 times daily     Dispense:  180 tablet     Refill:  3       Medications Discontinued During This Encounter   Medication Reason     atorvastatin (LIPITOR) 80 MG tablet Reorder     amLODIPine (NORVASC) 5 MG tablet Reorder     amiodarone (PACERONE/CODARONE) 200 MG tablet Reorder     apixaban ANTICOAGULANT (ELIQUIS) 5 MG tablet Reorder         Encounter Diagnoses   Name Primary?     Hyperlipidemia LDL goal <70      Benign essential hypertension      Chronic atrial fibrillation (H)      Paroxysmal atrial fibrillation (H)      Hypertrophic cardiomyopathy (H) Yes       CURRENT MEDICATIONS:  Current Outpatient Prescriptions   Medication Sig Dispense Refill     atorvastatin (LIPITOR) 80 MG tablet Take 1 tablet (80 mg) by mouth daily 90 tablet 3     amLODIPine (NORVASC) 5 MG tablet Take 1 tablet (5 mg) by mouth daily 90 tablet 3     amiodarone (PACERONE/CODARONE) 200 MG tablet Take 0.5 tablets (100 mg) by mouth daily 90 tablet 3     apixaban ANTICOAGULANT (ELIQUIS) 5 MG tablet Take 1 tablet (5 mg) by mouth 2 times daily 180 tablet 3     Ascorbic Acid (VITAMIN C PO)         [DISCONTINUED] amiodarone (PACERONE/CODARONE) 200 MG tablet Take 1 tablet (200 mg) by mouth daily       [DISCONTINUED] amLODIPine (NORVASC) 5 MG tablet Take 1 tablet (5 mg) by mouth daily 30 tablet 4     [DISCONTINUED] atorvastatin (LIPITOR) 80 MG tablet Take 1 tablet (80 mg) by mouth daily 30 tablet 11       ALLERGIES   No Known Allergies    PAST MEDICAL HISTORY:  No past medical history on file.    PAST SURGICAL HISTORY:  No past surgical history on file.    FAMILY HISTORY:  Family History   Problem Relation Age of Onset     Coronary Artery Disease Mother      a fib, ,MI     Heart Failure Mother      CHF     Other Cancer Father      DIABETES Father      Coronary Artery Disease Paternal Grandmother      Coronary Artery Disease Paternal Grandfather        SOCIAL HISTORY:  Social History     Social History     Marital status:      Spouse name: N/A     Number of children: N/A     Years of education: N/A     Social History Main Topics     Smoking status: Never Smoker     Smokeless tobacco: Former User     Alcohol use Yes      Comment: 1 drink per month     Drug use: No     Sexual activity: Yes     Other Topics Concern     Parent/Sibling W/ Cabg, Mi Or Angioplasty Before 65f 55m? Yes     Mother MI age 45     Social History Narrative       Review of Systems:  Skin:  Negative       Eyes:  Negative      ENT:  Negative      Respiratory:  Negative       Cardiovascular:    Positive for;lightheadedness    Gastroenterology: Negative      Genitourinary:  Negative      Musculoskeletal:  Negative      Neurologic:  Positive for headaches    Psychiatric:  Negative      Heme/Lymph/Imm:  Negative      Endocrine:  Negative        Physical Exam:  Vitals: BP (!) 142/92 (BP Location: Right arm, Patient Position: Sitting, Cuff Size: Adult Regular)  Pulse 63  Wt 111 kg (244 lb 12.8 oz)  SpO2 98%  BMI 34.14 kg/m2    Constitutional:  cooperative, alert and oriented, well developed, well nourished, in no acute distress         Skin:  warm and dry to the touch, no apparent skin lesions or masses noted          Head:  normocephalic, no masses or lesions        Eyes:  pupils equal and round, conjunctivae and lids unremarkable, sclera white, no xanthalasma, EOMS intact, no nystagmus        Lymph:No Cervical lymphadenopathy present     ENT:  no pallor or cyanosis, dentition good        Neck:  carotid pulses are full and equal bilaterally, JVP normal, no carotid bruit        Respiratory:  normal breath sounds, clear to auscultation, normal A-P diameter, normal symmetry, normal respiratory excursion, no use of accessory muscles         Cardiac: regular rhythm, normal S1/S2, no S3 or S4, apical impulse not displaced, no murmurs, gallops or rubs                pulses full and equal, no bruits auscultated                                        GI:  abdomen soft, non-tender, BS normoactive, no mass, no HSM, no bruits        Extremities and Muscular Skeletal:  no deformities, clubbing, cyanosis, erythema observed              Neurological:  no gross motor deficits        Psych:  Alert and Oriented x 3        CC  No referring provider defined for this encounter.                Service Date: 03/27/2018      HISTORY OF PRESENT ILLNESS:  It was my pleasure to see Matthew today for followup of recent atrial fibrillation.  As you know, Matthew is a delightful 47-year-old gentleman whom I had the pleasure of meeting for the first time at request of my partner, Dr. Harrison, for evaluation of symptomatic chronic atrial fibrillation in the background of apical hypertrophy 2 months ago.  As you recall, Matthew has been having palpitations off and on for the past 20 years and was told in the past to have atrial fibrillation.  He was evaluated at [facility] for which he underwent EP study, angiogram and cardioversion but since then, continued to have episodes of atrial fibrillation that have been chronic for the past 3-4 months.      A cardiac MRI  "confirmed that he has apical hypertrophy with the thickest segment measured at 24 mm.  There is diffuse patchy mid wall enhancement involving the mid anterior anterolateral wall consistent with apical hypertrophy.  The scar burden is approximately 11%.  He also had a Zio Patch monitor done around the same time which demonstrated on that day he was in atrial fibrillation with an average rate of 86 per minute and a maximum of 195.  There was also what appeared to be a different QRS morphology that happened for 10 seconds with some irregularity at the beginning but the last 5 beats were quite regular at 134 beats per minute for which he was asymptomatic.      When I saw Matthew for the first time, we discussed the option of a trial of amiodarone versus a catheter-based ablation.  Given his young age, I opted for the latter.  He underwent isolation of pulmonary veins along with SVC and proceeded with a cardioversion.  I did not do any linear ablation because of his young age.  I had him continue with the amiodarone and since then, he has been doing quite well from the atrial fibrillation perspective without any recurrence.  He has been on Eliquis all along.      Matthew mentioned that a week after he got home, he had an episode of near syncope.  By that, I mean he had very blurry vision and needed to brace against something.  Otherwise, he would fall.  He had a similar episode occur a week ago when he was watching TV and got up to go to the bathroom and felt tunnel vision-like with severe lightheadedness.  He had to hold onto something.  This lasted for about 30 seconds or less.  He does not recall having palpitation beforehand.      Matthew's maternal grandfather  in his 30s along with his maternal uncle as well.  They were told they both  of a \"heart condition\" but no exact details were given.  EKG today demonstrated sinus rhythm with EKG criteria for apical hypertrophy with a very prominent R wave in the precordial " leads and deep T-wave inversions.      I am somewhat optimistic that the ablation worked but way too early to tell.  For now, I would like to reduce the dose of amiodarone to 100 mg a day.  I am concerned about his 2 episodes of near syncope and coupled with his family history, even though it is not a first-degree relative but with the questionable nonsustained VT noted on the Holter, even though the apical hypertrophy is not commonly associated with sudden cardiac death, these episodes are concerning for potential ventricular arrhythmias as a cause.  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  In the meantime, I told him to take it easy and will schedule this procedure.  I went over the procedure in detail with risks including but not limited to vascular injury and cardiac perforation.         JESUS OTT MD             D: 2018   T: 2018   MT: AMADOR      Name:     TAMARA CENTENO   MRN:      [id number]        Account:      [id number]   :      1970           Service Date: 2018      Document: [id number]        Thank you for allowing me to participate in the care of your patient.      Sincerely,     Jesus Clancy MD     [facility]    cc:   No referring provider defined for this encounter.

## 2018-03-27 NOTE — PROGRESS NOTES
HPI and Plan:   See dictation  [id number]  Orders Placed This Encounter   Procedures     ICD/Pacemaker/Loop Recorder Procedure       Orders Placed This Encounter   Medications     atorvastatin (LIPITOR) 80 MG tablet     Sig: Take 1 tablet (80 mg) by mouth daily     Dispense:  90 tablet     Refill:  3     amLODIPine (NORVASC) 5 MG tablet     Sig: Take 1 tablet (5 mg) by mouth daily     Dispense:  90 tablet     Refill:  3     amiodarone (PACERONE/CODARONE) 200 MG tablet     Sig: Take 0.5 tablets (100 mg) by mouth daily     Dispense:  90 tablet     Refill:  3     apixaban ANTICOAGULANT (ELIQUIS) 5 MG tablet     Sig: Take 1 tablet (5 mg) by mouth 2 times daily     Dispense:  180 tablet     Refill:  3       Medications Discontinued During This Encounter   Medication Reason     atorvastatin (LIPITOR) 80 MG tablet Reorder     amLODIPine (NORVASC) 5 MG tablet Reorder     amiodarone (PACERONE/CODARONE) 200 MG tablet Reorder     apixaban ANTICOAGULANT (ELIQUIS) 5 MG tablet Reorder         Encounter Diagnoses   Name Primary?     Hyperlipidemia LDL goal <70      Benign essential hypertension      Chronic atrial fibrillation (H)      Paroxysmal atrial fibrillation (H)      Hypertrophic cardiomyopathy (H) Yes       CURRENT MEDICATIONS:  Current Outpatient Prescriptions   Medication Sig Dispense Refill     atorvastatin (LIPITOR) 80 MG tablet Take 1 tablet (80 mg) by mouth daily 90 tablet 3     amLODIPine (NORVASC) 5 MG tablet Take 1 tablet (5 mg) by mouth daily 90 tablet 3     amiodarone (PACERONE/CODARONE) 200 MG tablet Take 0.5 tablets (100 mg) by mouth daily 90 tablet 3     apixaban ANTICOAGULANT (ELIQUIS) 5 MG tablet Take 1 tablet (5 mg) by mouth 2 times daily 180 tablet 3     Ascorbic Acid (VITAMIN C PO)        [DISCONTINUED] amiodarone (PACERONE/CODARONE) 200 MG tablet Take 1 tablet (200 mg) by mouth daily       [DISCONTINUED] amLODIPine (NORVASC) 5 MG tablet Take 1 tablet (5 mg) by mouth daily 30 tablet 4     [DISCONTINUED]  atorvastatin (LIPITOR) 80 MG tablet Take 1 tablet (80 mg) by mouth daily 30 tablet 11       ALLERGIES   No Known Allergies    PAST MEDICAL HISTORY:  No past medical history on file.    PAST SURGICAL HISTORY:  No past surgical history on file.    FAMILY HISTORY:  Family History   Problem Relation Age of Onset     Coronary Artery Disease Mother      a fib, ,MI     Heart Failure Mother      CHF     Other Cancer Father      DIABETES Father      Coronary Artery Disease Paternal Grandmother      Coronary Artery Disease Paternal Grandfather        SOCIAL HISTORY:  Social History     Social History     Marital status:      Spouse name: N/A     Number of children: N/A     Years of education: N/A     Social History Main Topics     Smoking status: Never Smoker     Smokeless tobacco: Former User     Alcohol use Yes      Comment: 1 drink per month     Drug use: No     Sexual activity: Yes     Other Topics Concern     Parent/Sibling W/ Cabg, Mi Or Angioplasty Before 65f 55m? Yes     Mother MI age 45     Social History Narrative       Review of Systems:  Skin:  Negative       Eyes:  Negative      ENT:  Negative      Respiratory:  Negative       Cardiovascular:    Positive for;lightheadedness    Gastroenterology: Negative      Genitourinary:  Negative      Musculoskeletal:  Negative      Neurologic:  Positive for headaches    Psychiatric:  Negative      Heme/Lymph/Imm:  Negative      Endocrine:  Negative        Physical Exam:  Vitals: BP (!) 142/92 (BP Location: Right arm, Patient Position: Sitting, Cuff Size: Adult Regular)  Pulse 63  Wt 111 kg (244 lb 12.8 oz)  SpO2 98%  BMI 34.14 kg/m2    Constitutional:  cooperative, alert and oriented, well developed, well nourished, in no acute distress        Skin:  warm and dry to the touch, no apparent skin lesions or masses noted          Head:  normocephalic, no masses or lesions        Eyes:  pupils equal and round, conjunctivae and lids unremarkable, sclera white, no  xanthalasma, EOMS intact, no nystagmus        Lymph:No Cervical lymphadenopathy present     ENT:  no pallor or cyanosis, dentition good        Neck:  carotid pulses are full and equal bilaterally, JVP normal, no carotid bruit        Respiratory:  normal breath sounds, clear to auscultation, normal A-P diameter, normal symmetry, normal respiratory excursion, no use of accessory muscles         Cardiac: regular rhythm, normal S1/S2, no S3 or S4, apical impulse not displaced, no murmurs, gallops or rubs                pulses full and equal, no bruits auscultated                                        GI:  abdomen soft, non-tender, BS normoactive, no mass, no HSM, no bruits        Extremities and Muscular Skeletal:  no deformities, clubbing, cyanosis, erythema observed              Neurological:  no gross motor deficits        Psych:  Alert and Oriented x 3        CC  No referring provider defined for this encounter.

## 2018-03-28 ENCOUNTER — TELEPHONE (OUTPATIENT)
Dept: CARDIOLOGY | Facility: CLINIC | Age: 48
End: 2018-03-28

## 2018-03-28 NOTE — TELEPHONE ENCOUNTER
Phone call to patient to explain to him that Dr. Tang, after reviewing the c-MRI findings for apical HCM with his colleagues, does not feel that this poses a high risk situation for sudden cardiac death. Therefore, there is no need at this time to Implant an ICD. However, to rule out any potential life threatening rhythm or bradycardia, he recommends the Life Watch mobile telemetry monitor that will provide REAL TIME monitoring which is different than the Ziopatch that he wore recently. I told him that unfortunately, [facility] does not offer this monitor and he would have to come to [place] to get this placed. He expressed understanding of this plan and will call scheduling to get this appointment. I gave him our nurse line number to call anytime he has questions or concerns. Ave

## 2018-03-28 NOTE — TELEPHONE ENCOUNTER
----- Message from Vik Clancy MD sent at 3/28/2018 11:09 AM CDT -----  Michael, this is the pt from [facility] that I discussed with you about ICD. I emailed him infor on ICD and welding. I discussed the case with Dr. Rush and reviewed his findings together. His type of HCM carries a low risk of SCD and in light of other potential causes for his near-syncope such as bradycardia which he had before I think we should get more infor with a special EVent monitor for month which will provide us real time information about his rhythm. The order is in Epic and I am not sure it's avail at [facility]. Thanks. ashwini

## 2018-04-05 ENCOUNTER — HOSPITAL ENCOUNTER (OUTPATIENT)
Dept: CARDIOLOGY | Facility: CLINIC | Age: 48
Discharge: HOME | End: 2018-04-05
Attending: INTERNAL MEDICINE | Admitting: INTERNAL MEDICINE
Payer: COMMERCIAL

## 2018-04-05 DIAGNOSIS — I42.2 HYPERTROPHIC CARDIOMYOPATHY (H): ICD-10-CM

## 2018-04-05 PROCEDURE — 93270 REMOTE 30 DAY ECG REV/REPORT: CPT

## 2018-04-05 PROCEDURE — 93272 ECG/REVIEW INTERPRET ONLY: CPT | Performed by: INTERNAL MEDICINE

## 2018-04-06 ENCOUNTER — DOCUMENTATION ONLY (OUTPATIENT)
Dept: CARDIOLOGY | Facility: CLINIC | Age: 48
End: 2018-04-06

## 2018-04-06 NOTE — PROGRESS NOTES
Received baseline recording of Biotel event monitoring showing SR. We are assessing for any NSVT/VT events or lewis events that may be cause of near syncope. Folder started.  ANN Cortez

## 2018-05-07 ENCOUNTER — DOCUMENTATION ONLY (OUTPATIENT)
Dept: CARDIOLOGY | Facility: CLINIC | Age: 48
End: 2018-05-07

## 2018-05-14 ENCOUNTER — TELEPHONE (OUTPATIENT)
Dept: CARDIOLOGY | Facility: CLINIC | Age: 48
End: 2018-05-14

## 2018-05-14 NOTE — TELEPHONE ENCOUNTER
Pt called and stated that he had 2 episodes of A Fib yesterday, one episode in the morning lasted 1 hour and the send episode lasted 8 hours and pt woke up this morning. In SR.   Pt wore a 30 day monitor up until 5/4, which documented SR the whole time.  Since pt back in SR explained that will have pt continue to monitor and if pt has another episode will attempt to get an EKG. Pt did states that HR were between 79-92 bpm. Will make Dr Tang aware and pt will call if further episodes. Pt continues on Amiodarone 100 mg and Eliquis.  Felicitas

## 2018-05-16 ENCOUNTER — TELEPHONE (OUTPATIENT)
Dept: CARDIOLOGY | Facility: CLINIC | Age: 48
End: 2018-05-16

## 2018-05-16 NOTE — TELEPHONE ENCOUNTER
Spoke with pt regarding scheduling ICD implant per Dr Tang order on 3/27/18. Pt states that he wore the event monitor for 30 days and nothing showed up, so he thought he did not need implant. Explained Dr Candelaria reasoning ( due to near syncope and questionable NSVT on ziopatch). And he was still confused about having the ICD implant. Will recheck with Dr Tang.

## 2018-05-16 NOTE — TELEPHONE ENCOUNTER
Recalled pt and informed him that he does not need an ICD per Dr Tang. Will cont to monitor for afib. JESSICA

## 2018-08-10 ENCOUNTER — OFFICE VISIT (OUTPATIENT)
Dept: CARDIOLOGY | Facility: CLINIC | Age: 48
End: 2018-08-10
Attending: PHYSICIAN ASSISTANT
Payer: COMMERCIAL

## 2018-08-10 VITALS
HEART RATE: 70 BPM | DIASTOLIC BLOOD PRESSURE: 86 MMHG | OXYGEN SATURATION: 98 % | BODY MASS INDEX: 33.47 KG/M2 | SYSTOLIC BLOOD PRESSURE: 130 MMHG | WEIGHT: 240 LBS

## 2018-08-10 DIAGNOSIS — I48.0 PAROXYSMAL ATRIAL FIBRILLATION (H): Primary | ICD-10-CM

## 2018-08-10 DIAGNOSIS — I42.2 APICAL VARIANT HYPERTROPHIC CARDIOMYOPATHY (H): ICD-10-CM

## 2018-08-10 PROCEDURE — 99214 OFFICE O/P EST MOD 30 MIN: CPT | Performed by: INTERNAL MEDICINE

## 2018-08-10 NOTE — PROGRESS NOTES
CARDIOLOGY VISIT    REASON FOR VISIT: f/u afib, HCM    SUBJECTIVE:  47-year-old male seen for atrial fibrillation and apical hypertrophic cardiomyopathy.     He reports a history of paroxysmal A. fib dating back about 20 years.  He has had several cardioversions in the past.    In November 2017 he presented to the [facility] with atrial fibrillation and chest pain with troponin 0.13.  Angiogram showed normal left main, mild disease of LAD, 60% D1 (FFR 0.88), normal circumflex, dominant RCA with minimal disease.   Echo showed EF 65%, moderate mostly concentric LVH with slight prominence of the septum, moderate left atrial enlargement, no significant valve disease, no outflow obstruction.      Cardiac MRI December 2017 showed EF 70%, mild to moderate mid ventricular hypertrophy and severe apical hypertrophy consistent with apical hypertrophic cardiomyopathy, normal RV, diffuse patchy mid wall enhancement of the anterior and apical segments, total scar burden 11%, trivial MR.      Transesophageal echo December 2017 showed EF 60%, severe apical hypertrophy consistent with hypertrophic cardiomyopathy, normal RV, mild left atrial enlargement, thrombus in the left atrial appendage, mild MR, mild-to-moderate TR.      In February 2018 he underwent atrial fibrillation ablation including wide circumferential pulmonary vein isolation and SVC isolation.  30 day event monitor April 2018 showed sinus rhythm, no A. fib.     In the past few months he thinks he has had a brief episode of A. fib 2 or 3 times per month.  He describes a pounding and slight pressure in his chest with a little lightheadedness.  His pulse seems irregular and faster than normal, but not severely tachycardic.  Symptoms last about 2 minutes.    MEDICATIONS:  Current Outpatient Prescriptions   Medication     amiodarone (PACERONE/CODARONE) 200 MG tablet     amLODIPine (NORVASC) 5 MG tablet     apixaban ANTICOAGULANT (ELIQUIS) 5 MG tablet     Ascorbic Acid (VITAMIN  C PO)     atorvastatin (LIPITOR) 80 MG tablet     No current facility-administered medications for this visit.        ALLERGIES:  No Known Allergies    REVIEW OF SYSTEMS:  Constitutional:  No weight loss, fever, chills, weakness or fatigue.  HEENT:  Eyes:  No visual loss, blurred vision, double vision or yellow sclerae. No hearing loss, sneezing, congestion, runny nose or sore throat.  Skin:  No rash or itching.  Cardiovascular: per HPI  Respiratory: per HPI  GI:  No anorexia, nausea, vomiting or diarrhea. No abdominal pain or blood.  :  No dysurea, hematuria  Neurologic:  No headache, dizziness, syncope, paralysis, ataxia, numbness or tingling in the extremities. No change in bowel or bladder control.  Musculoskeletal:  No muscle, back pain, joint pain or stiffness.  Hematologic:  No anemia, bleeding or bruising.  Lymphatics:  No enlarged nodes. No history of splenectomy.  Psychiatric:  No history of depression or anxiety.  Endocrine:  No reports of sweating, cold or heat intolerance. No polyuria or polydipsia.  Allergies:  No history of asthma, hives, eczema or rhinitis.    PHYSICAL EXAM:      BP: 130/86 Pulse: 70     SpO2: 98 %      Vital Signs with Ranges  Pulse:  [70] 70  BP: (130)/(86) 130/86  SpO2:  [98 %] 98 %  240 lbs 0 oz    Constitutional: awake, alert, no distress  Eyes: PERRL, sclera nonicteric  ENT: trachea midline  Respiratory: Lungs clear  Cardiovascular: Regular rate and rhythm, no murmurs  GI: nondistended, nontender, bowel sounds present  Lymph/Hematologic: no lymphadenopathy  Skin: dry, no rash  Musculoskeletal: good muscle tone, strength 5/5 in upper and lower extremities  Neurologic: no focal deficits  Neuropsychiatric: appropriate affact    ASSESSMENT:  47-year-old male seen for follow-up of paroxysmal A. fib and apical hypertrophic cardiomyopathy.  He is having some symptoms every 1-2 weeks that may be brief episodes of A. fib.  However other arrhythmias or even non-arrhythmia causes cannot  be excluded.  Will discuss patient with EP to see if amiodarone can be discontinued.  It may be reasonable to wear a Zio patch again to confirm if he might be having A. fib or not.     He has no syncope and no ventricular arrhythmias were noted on his recent 30 day monitor.  There is no absolute indication at this time for an ICD from a hypertrophic cardiomyopathy perspective.    RECOMMENDATIONS:  1.  Paroxysmal A. fib, status post ablation  -Will discuss with EP possibly stopping amiodarone and then wearing a 14 day Zio patch   - Continue Eliquis since he-likely is still having some A. Fib    2.  Apical hypertrophic cardiomyopathy  -Discussed case with EP as above  -Stress echo in early 2019   -No documented ventricular arrhythmias or high risk features that warrant ICD at this time    Follow-up in 6 months, possibly sooner pending plan by EP.    Brice Harrison MD  Cardiology - [facility]  Pager:  [phone]  Text Page  August 10, 2018

## 2018-08-10 NOTE — LETTER
8/10/2018    [facility]  [address] [address]    RE: Matthew Still       Dear Colleague,    I had the pleasure of seeing Matthew Still in the [facility].    CARDIOLOGY VISIT    REASON FOR VISIT: f/u afib, HCM    SUBJECTIVE:  47-year-old male seen for atrial fibrillation and apical hypertrophic cardiomyopathy.     He reports a history of paroxysmal A. fib dating back about 20 years.  He has had several cardioversions in the past.    In November 2017 he presented to the [facility] with atrial fibrillation and chest pain with troponin 0.13.  Angiogram showed normal left main, mild disease of LAD, 60% D1 (FFR 0.88), normal circumflex, dominant RCA with minimal disease.   Echo showed EF 65%, moderate mostly concentric LVH with slight prominence of the septum, moderate left atrial enlargement, no significant valve disease, no outflow obstruction.      Cardiac MRI December 2017 showed EF 70%, mild to moderate mid ventricular hypertrophy and severe apical hypertrophy consistent with apical hypertrophic cardiomyopathy, normal RV, diffuse patchy mid wall enhancement of the anterior and apical segments, total scar burden 11%, trivial MR.      Transesophageal echo December 2017 showed EF 60%, severe apical hypertrophy consistent with hypertrophic cardiomyopathy, normal RV, mild left atrial enlargement, thrombus in the left atrial appendage, mild MR, mild-to-moderate TR.      In February 2018 he underwent atrial fibrillation ablation including wide circumferential pulmonary vein isolation and SVC isolation.  30 day event monitor April 2018 showed sinus rhythm, no A. fib.     In the past few months he thinks he has had a brief episode of A. fib 2 or 3 times per month.  He describes a pounding and slight pressure in his chest with a little lightheadedness.  His pulse seems irregular and faster than normal, but not severely tachycardic.  Symptoms last about 2  minutes.    MEDICATIONS:  Current Outpatient Prescriptions   Medication     amiodarone (PACERONE/CODARONE) 200 MG tablet     amLODIPine (NORVASC) 5 MG tablet     apixaban ANTICOAGULANT (ELIQUIS) 5 MG tablet     Ascorbic Acid (VITAMIN C PO)     atorvastatin (LIPITOR) 80 MG tablet     No current facility-administered medications for this visit.        ALLERGIES:  No Known Allergies    REVIEW OF SYSTEMS:  Constitutional:  No weight loss, fever, chills, weakness or fatigue.  HEENT:  Eyes:  No visual loss, blurred vision, double vision or yellow sclerae. No hearing loss, sneezing, congestion, runny nose or sore throat.  Skin:  No rash or itching.  Cardiovascular: per HPI  Respiratory: per HPI  GI:  No anorexia, nausea, vomiting or diarrhea. No abdominal pain or blood.  :  No dysurea, hematuria  Neurologic:  No headache, dizziness, syncope, paralysis, ataxia, numbness or tingling in the extremities. No change in bowel or bladder control.  Musculoskeletal:  No muscle, back pain, joint pain or stiffness.  Hematologic:  No anemia, bleeding or bruising.  Lymphatics:  No enlarged nodes. No history of splenectomy.  Psychiatric:  No history of depression or anxiety.  Endocrine:  No reports of sweating, cold or heat intolerance. No polyuria or polydipsia.  Allergies:  No history of asthma, hives, eczema or rhinitis.    PHYSICAL EXAM:      BP: 130/86 Pulse: 70     SpO2: 98 %      Vital Signs with Ranges  Pulse:  [70] 70  BP: (130)/(86) 130/86  SpO2:  [98 %] 98 %  240 lbs 0 oz    Constitutional: awake, alert, no distress  Eyes: PERRL, sclera nonicteric  ENT: trachea midline  Respiratory: Lungs clear  Cardiovascular: Regular rate and rhythm, no murmurs  GI: nondistended, nontender, bowel sounds present  Lymph/Hematologic: no lymphadenopathy  Skin: dry, no rash  Musculoskeletal: good muscle tone, strength 5/5 in upper and lower extremities  Neurologic: no focal deficits  Neuropsychiatric: appropriate  affact    ASSESSMENT:  47-year-old male seen for follow-up of paroxysmal A. fib and apical hypertrophic cardiomyopathy.  He is having some symptoms every 1-2 weeks that may be brief episodes of A. fib.  However other arrhythmias or even non-arrhythmia causes cannot be excluded.  Will discuss patient with EP to see if amiodarone can be discontinued.  It may be reasonable to wear a Zio patch again to confirm if he might be having A. fib or not.     He has no syncope and no ventricular arrhythmias were noted on his recent 30 day monitor.  There is no absolute indication at this time for an ICD from a hypertrophic cardiomyopathy perspective.    RECOMMENDATIONS:  1.  Paroxysmal A. fib, status post ablation  -Will discuss with EP possibly stopping amiodarone and then wearing a 14 day Zio patch   - Continue Eliquis since he-likely is still having some A. Fib    2.  Apical hypertrophic cardiomyopathy  -Discussed case with EP as above  -Stress echo in early 2019   -No documented ventricular arrhythmias or high risk features that warrant ICD at this time    Follow-up in 6 months, possibly sooner pending plan by EP.    Brice Harrison MD  Cardiology - [facility]  Pager:  [phone]  Text Page  August 10, 2018          Thank you for allowing me to participate in the care of your patient.    Sincerely,     Brice Harrison MD     [facility]

## 2018-08-10 NOTE — MR AVS SNAPSHOT
After Visit Summary   8/10/2018    Matthew Still    MRN: [id number]           Patient Information     Date Of Birth          1970        Visit Information        Provider Department      8/10/2018 2:30 PM Brice Harrison MD [facility]        Today's Diagnoses     Paroxysmal atrial fibrillation (H)    -  1    Apical variant hypertrophic cardiomyopathy (H)           Follow-ups after your visit        Additional Services     Follow-Up with Cardiologist                 Future tests that were ordered for you today     Open Future Orders        Priority Expected Expires Ordered    Follow-Up with Cardiologist Routine 2/6/2019 8/10/2019 8/10/2018            Who to contact     If you have questions or need follow up information about today's clinic visit or your schedule please contact [facility] directly at [phone].  Normal or non-critical lab and imaging results will be communicated to you by MyChart, letter or phone within 4 business days after the clinic has received the results. If you do not hear from us within 7 days, please contact the clinic through MyChart or phone. If you have a critical or abnormal lab result, we will notify you by phone as soon as possible.  Submit refill requests through [employer] or call your pharmacy and they will forward the refill request to us. Please allow 3 business days for your refill to be completed.          Additional Information About Your Visit        Care EveryWhere ID     This is your Care EveryWhere ID. This could be used by other organizations to access your [facility] medical records  [id number]        Your Vitals Were     Pulse Pulse Oximetry BMI (Body Mass Index)             70 98% 33.47 kg/m2          Blood Pressure from Last 3 Encounters:   08/10/18 130/86   03/27/18 (!) 142/92   02/13/18 130/62    Weight from Last 3 Encounters:   08/10/18 108.9 kg (240 lb)    03/27/18 111 kg (244 lb 12.8 oz)   02/13/18 109.3 kg (241 lb)              We Performed the Following     Follow-Up with Cardiologist        Primary Care Provider Office Phone # Fax #    [facility] [phone] [address] [address]        Equal Access to Services     ARCHIE MOSCOSO : Hadii aad ku hadasho Soomaali, waaxda luqadaha, qaybta kaalmada adeegyada, waxay idiin hayaan adeeg kharash la'aan ah. So [facility] [phone].    ATENCIÓN: Si habla español, tiene a villeda disposición servicios gratuitos de asistencia lingüística. Carmenines al [phone].    We comply with applicable federal civil rights laws and [place] laws. We do not discriminate on the basis of race, color, national origin, age, disability, sex, sexual orientation, or gender identity.            Thank you!     Thank you for choosing [facility]  for your care. Our goal is always to provide you with excellent care. Hearing back from our patients is one way we can continue to improve our services. Please take a few minutes to complete the written survey that you may receive in the mail after your visit with us. Thank you!             Your Updated Medication List - Protect others around you: Learn how to safely use, store and throw away your medicines at www.disposemymeds.org.          This list is accurate as of 8/10/18  3:02 PM.  Always use your most recent med list.                   Brand Name Dispense Instructions for use Diagnosis    amiodarone 200 MG tablet    PACERONE/CODARONE    90 tablet    Take 0.5 tablets (100 mg) by mouth daily    Chronic atrial fibrillation (H)       amLODIPine 5 MG tablet    NORVASC    90 tablet    Take 1 tablet (5 mg) by mouth daily    Benign essential hypertension       apixaban ANTICOAGULANT 5 MG tablet    ELIQUIS    180 tablet    Take 1 tablet (5 mg) by mouth 2 times daily    Paroxysmal atrial fibrillation (H)       atorvastatin 80  MG tablet    LIPITOR    90 tablet    Take 1 tablet (80 mg) by mouth daily    Hyperlipidemia LDL goal <70       VITAMIN C PO

## 2018-08-14 ENCOUNTER — TELEPHONE (OUTPATIENT)
Dept: CARDIOLOGY | Facility: CLINIC | Age: 48
End: 2018-08-14

## 2018-08-14 DIAGNOSIS — I48.0 PAROXYSMAL ATRIAL FIBRILLATION (H): Primary | ICD-10-CM

## 2018-08-14 NOTE — TELEPHONE ENCOUNTER
Called patient with information below.   1. STOP Amiodarone.  2. Wear 14 day Zio Patch in ~3 weeks.  3. See MD Hunter in 3 months~November 2018.  Pt in agreement. Transferred to scheduling. Maria L Torres, RN Cardiology at [facility] August 14, 2018, 10:11 AM    ----- Message from Brice Harrison MD sent at 8/13/2018  7:12 AM CDT -----  Regarding: FW: med question  Can you update this patient with the below plan?  I just wanted to run it by EP.    Stop amio, then 14 day Zio about 3 weeks later.  Cont Eliquis for now.    F/u with me in about 3 months.    Leonid      ----- Message -----     From: Johnny Tang MD     Sent: 8/11/2018   9:49 AM       To: Brice Harrison MD  Subject: RE: med question                                 Good plan! Thanks for the update. johnny  ----- Message -----     From: Brice Harrison MD     Sent: 8/10/2018   2:56 PM       To: Johnny Clancy MD  Subject: med question                                     Jose Chery question about this common patient of ours.  46 y/o with apical hypertrophic cardiomyopathy and paroxysmal A. fib.  You did an ablation in February.  30 day monitor in April showed no more A. fib.  However now he has symptoms every 1-2 weeks for a few minutes.  He thinks it is A. fib.    He is on amiodarone 100 mg daily and Eliquis.  I was thinking of stopping the amiodarone, then doing a 14 day Zio patch a few weeks later to see if this is A. fib, some other arrhythmia, or neither.  I think he needs to stay on the Eliquis until we know the answer.    Does this seem reasonable to you?    Leonid

## 2018-09-07 ENCOUNTER — HOSPITAL ENCOUNTER (OUTPATIENT)
Dept: CARDIOLOGY | Facility: CLINIC | Age: 48
Discharge: HOME | End: 2018-09-07
Attending: INTERNAL MEDICINE | Admitting: INTERNAL MEDICINE
Payer: COMMERCIAL

## 2018-09-07 DIAGNOSIS — I48.0 PAROXYSMAL ATRIAL FIBRILLATION (H): ICD-10-CM

## 2018-09-07 PROCEDURE — 0296T ZIO PATCH HOLTER: CPT | Performed by: INTERNAL MEDICINE

## 2018-09-07 PROCEDURE — 0298T ZZC EXT ECG > 48HR TO 21 DAY REVIEW AND INTERPRETATN: CPT | Performed by: INTERNAL MEDICINE

## 2018-09-25 ENCOUNTER — OFFICE VISIT (OUTPATIENT)
Dept: CARDIOLOGY | Facility: CLINIC | Age: 48
End: 2018-09-25
Payer: COMMERCIAL

## 2018-09-25 VITALS
BODY MASS INDEX: 33.95 KG/M2 | DIASTOLIC BLOOD PRESSURE: 98 MMHG | OXYGEN SATURATION: 99 % | SYSTOLIC BLOOD PRESSURE: 150 MMHG | WEIGHT: 243.4 LBS | HEART RATE: 65 BPM

## 2018-09-25 DIAGNOSIS — I47.29 NSVT (NONSUSTAINED VENTRICULAR TACHYCARDIA) (H): Primary | ICD-10-CM

## 2018-09-25 PROCEDURE — 99214 OFFICE O/P EST MOD 30 MIN: CPT | Performed by: INTERNAL MEDICINE

## 2018-09-25 RX ORDER — METOPROLOL SUCCINATE 50 MG/1
50 TABLET, EXTENDED RELEASE ORAL DAILY
Qty: 90 TABLET | Refills: 3 | Status: SHIPPED | OUTPATIENT
Start: 2018-09-25 | End: 2019-02-21

## 2018-09-25 NOTE — LETTER
9/25/2018    [facility]  [address] [address]    RE: Matthew Still       Dear Colleague,    I had the pleasure of seeing Matthew Still in the [facility].    HPI and Plan:   See dictation  [id number]  No orders of the defined types were placed in this encounter.      Orders Placed This Encounter   Medications     metoprolol succinate (TOPROL XL) 50 MG 24 hr tablet     Sig: Take 1 tablet (50 mg) by mouth daily     Dispense:  90 tablet     Refill:  3       There are no discontinued medications.      Encounter Diagnosis   Name Primary?     NSVT (nonsustained ventricular tachycardia) (H) Yes       CURRENT MEDICATIONS:  Current Outpatient Prescriptions   Medication Sig Dispense Refill     apixaban ANTICOAGULANT (ELIQUIS) 5 MG tablet Take 1 tablet (5 mg) by mouth 2 times daily 180 tablet 3     Ascorbic Acid (VITAMIN C PO)        atorvastatin (LIPITOR) 80 MG tablet Take 1 tablet (80 mg) by mouth daily 90 tablet 3     metoprolol succinate (TOPROL XL) 50 MG 24 hr tablet Take 1 tablet (50 mg) by mouth daily 90 tablet 3     amLODIPine (NORVASC) 5 MG tablet Take 1 tablet (5 mg) by mouth daily (Patient not taking: Reported on 9/25/2018) 90 tablet 3       ALLERGIES   No Known Allergies    PAST MEDICAL HISTORY:  No past medical history on file.    PAST SURGICAL HISTORY:  No past surgical history on file.    FAMILY HISTORY:  Family History   Problem Relation Age of Onset     Coronary Artery Disease Mother      a fib, ,MI     Heart Failure Mother      CHF     Other Cancer Father      Diabetes Father      Coronary Artery Disease Paternal Grandmother      Coronary Artery Disease Paternal Grandfather        SOCIAL HISTORY:  Social History     Social History     Marital status:      Spouse name: N/A     Number of children: N/A     Years of education: N/A     Social History Main Topics     Smoking status: Never Smoker     Smokeless tobacco: Former User     Alcohol use  Yes      Comment: 1 drink per month     Drug use: No     Sexual activity: Yes     Other Topics Concern     Parent/Sibling W/ Cabg, Mi Or Angioplasty Before 65f 55m? Yes     Mother MI age 45     Social History Narrative       Review of Systems:  Skin:  Negative       Eyes:  Negative   vision going black  ENT:  Negative      Respiratory:  Positive for shortness of breath;dyspnea on exertion     Cardiovascular:  Negative for;syncope or near-syncope;dizziness Positive for;palpitations;chest pain;edema;fatigue;lightheadedness    Gastroenterology: Negative melena;hematochezia    Genitourinary:  Negative      Musculoskeletal:  Negative      Neurologic:  Positive for headaches    Psychiatric:  Negative      Heme/Lymph/Imm:  Negative      Endocrine:  Negative        Physical Exam:  Vitals: BP (!) 150/98  Pulse 65  Wt 110.4 kg (243 lb 6.4 oz)  SpO2 99%  BMI 33.95 kg/m2    Constitutional:  cooperative, alert and oriented, well developed, well nourished, in no acute distress        Skin:  warm and dry to the touch, no apparent skin lesions or masses noted          Head:  normocephalic, no masses or lesions        Eyes:  pupils equal and round, conjunctivae and lids unremarkable, sclera white, no xanthalasma, EOMS intact, no nystagmus        Lymph:No Cervical lymphadenopathy present     ENT:  no pallor or cyanosis, dentition good        Neck:  carotid pulses are full and equal bilaterally, JVP normal, no carotid bruit        Respiratory:  normal breath sounds, clear to auscultation, normal A-P diameter, normal symmetry, normal respiratory excursion, no use of accessory muscles         Cardiac: regular rhythm, normal S1/S2, no S3 or S4, apical impulse not displaced, no murmurs, gallops or rubs                pulses full and equal, no bruits auscultated                                        GI:  abdomen soft, non-tender, BS normoactive, no mass, no HSM, no bruits        Extremities and Muscular Skeletal:  no deformities,  clubbing, cyanosis, erythema observed              Neurological:  no gross motor deficits        Psych:  Alert and Oriented x 3        CC  No referring provider defined for this encounter.                Thank you for allowing me to participate in the care of your patient.      Sincerely,     Vik Clancy MD     [facility]    cc:   No referring provider defined for this encounter.

## 2018-09-25 NOTE — PROGRESS NOTES
HPI and Plan:   See dictation  [id number]  No orders of the defined types were placed in this encounter.      Orders Placed This Encounter   Medications     metoprolol succinate (TOPROL XL) 50 MG 24 hr tablet     Sig: Take 1 tablet (50 mg) by mouth daily     Dispense:  90 tablet     Refill:  3       There are no discontinued medications.      Encounter Diagnosis   Name Primary?     NSVT (nonsustained ventricular tachycardia) (H) Yes       CURRENT MEDICATIONS:  Current Outpatient Prescriptions   Medication Sig Dispense Refill     apixaban ANTICOAGULANT (ELIQUIS) 5 MG tablet Take 1 tablet (5 mg) by mouth 2 times daily 180 tablet 3     Ascorbic Acid (VITAMIN C PO)        atorvastatin (LIPITOR) 80 MG tablet Take 1 tablet (80 mg) by mouth daily 90 tablet 3     metoprolol succinate (TOPROL XL) 50 MG 24 hr tablet Take 1 tablet (50 mg) by mouth daily 90 tablet 3     amLODIPine (NORVASC) 5 MG tablet Take 1 tablet (5 mg) by mouth daily (Patient not taking: Reported on 9/25/2018) 90 tablet 3       ALLERGIES   No Known Allergies    PAST MEDICAL HISTORY:  No past medical history on file.    PAST SURGICAL HISTORY:  No past surgical history on file.    FAMILY HISTORY:  Family History   Problem Relation Age of Onset     Coronary Artery Disease Mother      a fib, ,MI     Heart Failure Mother      CHF     Other Cancer Father      Diabetes Father      Coronary Artery Disease Paternal Grandmother      Coronary Artery Disease Paternal Grandfather        SOCIAL HISTORY:  Social History     Social History     Marital status:      Spouse name: N/A     Number of children: N/A     Years of education: N/A     Social History Main Topics     Smoking status: Never Smoker     Smokeless tobacco: Former User     Alcohol use Yes      Comment: 1 drink per month     Drug use: No     Sexual activity: Yes     Other Topics Concern     Parent/Sibling W/ Cabg, Mi Or Angioplasty Before 65f 55m? Yes     Mother MI age 45     Social History Narrative        Review of Systems:  Skin:  Negative       Eyes:  Negative   vision going black  ENT:  Negative      Respiratory:  Positive for shortness of breath;dyspnea on exertion     Cardiovascular:  Negative for;syncope or near-syncope;dizziness Positive for;palpitations;chest pain;edema;fatigue;lightheadedness    Gastroenterology: Negative melena;hematochezia    Genitourinary:  Negative      Musculoskeletal:  Negative      Neurologic:  Positive for headaches    Psychiatric:  Negative      Heme/Lymph/Imm:  Negative      Endocrine:  Negative        Physical Exam:  Vitals: BP (!) 150/98  Pulse 65  Wt 110.4 kg (243 lb 6.4 oz)  SpO2 99%  BMI 33.95 kg/m2    Constitutional:  cooperative, alert and oriented, well developed, well nourished, in no acute distress        Skin:  warm and dry to the touch, no apparent skin lesions or masses noted          Head:  normocephalic, no masses or lesions        Eyes:  pupils equal and round, conjunctivae and lids unremarkable, sclera white, no xanthalasma, EOMS intact, no nystagmus        Lymph:No Cervical lymphadenopathy present     ENT:  no pallor or cyanosis, dentition good        Neck:  carotid pulses are full and equal bilaterally, JVP normal, no carotid bruit        Respiratory:  normal breath sounds, clear to auscultation, normal A-P diameter, normal symmetry, normal respiratory excursion, no use of accessory muscles         Cardiac: regular rhythm, normal S1/S2, no S3 or S4, apical impulse not displaced, no murmurs, gallops or rubs                pulses full and equal, no bruits auscultated                                        GI:  abdomen soft, non-tender, BS normoactive, no mass, no HSM, no bruits        Extremities and Muscular Skeletal:  no deformities, clubbing, cyanosis, erythema observed              Neurological:  no gross motor deficits        Psych:  Alert and Oriented x 3        CC  No referring provider defined for this encounter.

## 2018-09-25 NOTE — MR AVS SNAPSHOT
After Visit Summary   9/25/2018    Matthew Still    MRN: [id number]           Patient Information     Date Of Birth          1970        Visit Information        Provider Department      9/25/2018 11:30 AM Vik Tang MD [facility]        Today's Diagnoses     NSVT (nonsustained ventricular tachycardia) (H)    -  1       Follow-ups after your visit        Your next 10 appointments already scheduled     Nov 01, 2018  3:00 PM CDT   Return Visit with Brice Harrison MD   [facility] ([facility])    [address] [address] [phone]              Who to contact     If you have questions or need follow up information about today's clinic visit or your schedule please contact [facility] directly at [phone].  Normal or non-critical lab and imaging results will be communicated to you by MyChart, letter or phone within 4 business days after the clinic has received the results. If you do not hear from us within 7 days, please contact the clinic through MyChart or phone. If you have a critical or abnormal lab result, we will notify you by phone as soon as possible.  Submit refill requests through [employer] or call your pharmacy and they will forward the refill request to us. Please allow 3 business days for your refill to be completed.          Additional Information About Your Visit        Care EveryWhere ID     This is your Care EveryWhere ID. This could be used by other organizations to access your [facility] medical records  [id number]        Your Vitals Were     Pulse Pulse Oximetry BMI (Body Mass Index)             65 99% 33.95 kg/m2          Blood Pressure from Last 3 Encounters:   09/25/18 (!) 150/98   08/10/18 130/86   03/27/18 (!) 142/92    Weight from Last 3 Encounters:   09/25/18 110.4 kg (243 lb 6.4 oz)   08/10/18 108.9 kg  (240 lb)   03/27/18 111 kg (244 lb 12.8 oz)              Today, you had the following     No orders found for display         Today's Medication Changes          These changes are accurate as of 9/25/18 12:51 PM.  If you have any questions, ask your nurse or doctor.               Start taking these medicines.        Dose/Directions    metoprolol succinate 50 MG 24 hr tablet   Commonly known as:  TOPROL XL   Used for:  NSVT (nonsustained ventricular tachycardia) (H)   Started by:  Vik Tang MD        Dose:  50 mg   Take 1 tablet (50 mg) by mouth daily   Quantity:  90 tablet   Refills:  3            Where to get your medicines      These medications were sent to [facility] [address]  [address] [address]     Phone:  [phone]     metoprolol succinate 50 MG 24 hr tablet                Primary Care Provider Office Phone # Fax #    [facility] [phone] [address] [address]        Equal Access to Services     ARCHIE MOSCOSO : Hadii jordyn ku hadasho Soomaali, waaxda luqadaha, qaybta kaalmada adeegyada, waxay idiin hayawais willson . So [facility] [phone].    ATENCIÓN: Si habla español, tiene a villeda disposición servicios gratuitos de asistencia lingüística. Llame al [phone].    We comply with applicable federal civil rights laws and [place] laws. We do not discriminate on the basis of race, color, national origin, age, disability, sex, sexual orientation, or gender identity.            Thank you!     Thank you for choosing [facility]  for your care. Our goal is always to provide you with excellent care. Hearing back from our patients is one way we can continue to improve our services. Please take a few minutes to complete the written survey that you may receive in the mail after your visit with us. Thank you!             Your Updated Medication List -  Protect others around you: Learn how to safely use, store and throw away your medicines at www.disposemymeds.org.          This list is accurate as of 9/25/18 12:51 PM.  Always use your most recent med list.                   Brand Name Dispense Instructions for use Diagnosis    amLODIPine 5 MG tablet    NORVASC    90 tablet    Take 1 tablet (5 mg) by mouth daily    Benign essential hypertension       apixaban ANTICOAGULANT 5 MG tablet    ELIQUIS    180 tablet    Take 1 tablet (5 mg) by mouth 2 times daily    Paroxysmal atrial fibrillation (H)       atorvastatin 80 MG tablet    LIPITOR    90 tablet    Take 1 tablet (80 mg) by mouth daily    Hyperlipidemia LDL goal <70       metoprolol succinate 50 MG 24 hr tablet    TOPROL XL    90 tablet    Take 1 tablet (50 mg) by mouth daily    NSVT (nonsustained ventricular tachycardia) (H)       VITAMIN C PO

## 2018-09-25 NOTE — PROGRESS NOTES
"Service Date: 09/25/2018      HISTORY OF PRESENT ILLNESS:  It was my pleasure to see Matthew back today to discuss the results of his Zio Patch monitor.  As you know, Matthew is a 48-year-old gentleman with a history of symptomatic chronic atrial fibrillation in the background of apical hypertrophy which was diagnosed recently.  The MRI confirmed that he has apical hypertrophy with thickest measurement of 24 mm.  There was diffuse patchy mid-wall enhancement involving the mid-anterior and anterolateral wall with a scar burden of approximately consisting of 11%.        The patient continues to have atrial fibrillation despite being on amiodarone.  In light of that, I recommended EP study and ablation.  He underwent pulmonary vein isolation along with isolation of SVC.  I elected not to perform a linear ablation given his young age.        We also discussed about his risk of sudden cardiac death.  The patient never had ashtyn syncope and no first-degree relative of dying suddenly or any ashtyn syncope, and given the apical location of his hypertrophy, the risk of sudden cardiac death is lower, and therefore I did not recommend ICD as a primary prevention.        The Zio Patch monitor showed no atrial fibrillation but occasional PACs, 3 or 4 beats in a row, corresponding to his symptoms of palpitations and \"expanding feeling like a balloon inflating\" in his chest.  There were also a few runs of nonsustained VT, with the longest being 8 beats at a rate of 140-160 beats per minute for which he was asymptomatic.        For now, I would resume his beta blocker at 50 mg once a day of the long-acting one, given that he has symptoms with his PACs, and I will discuss with my colleagues regarding the implication of the nonsustained VT noted in patients with apical hypertrophy in the absence of any syncope or first-degree relative of dying suddenly.  If the consensus is that the risk is higher than the risk of implantation of ICD, I " would notify the patient of that recommendation.  The patient will continue Eliquis for now and reassess in the future.         JESUS OTT MD             D: 2018   T: 2018   MT: DEXTER      Name:     TAMARA CENTENO   MRN:      [id number]        Account:      [id number]   :      1970           Service Date: 2018      Document: [id number]

## 2018-09-25 NOTE — LETTER
"9/25/2018      [facility]  [address] [address]      RE: Matthew Still       Dear Colleague,    I had the pleasure of seeing Matthew Still in the [facility].    Service Date: 09/25/2018      HISTORY OF PRESENT ILLNESS:  It was my pleasure to see Matthew back today to discuss the results of his Zio Patch monitor.  As you know, Matthew is a 48-year-old gentleman with a history of symptomatic chronic atrial fibrillation in the background of apical hypertrophy which was diagnosed recently.  The MRI confirmed that he has apical hypertrophy with thickest measurement of 24 mm.  There was diffuse patchy mid-wall enhancement involving the mid-anterior and anterolateral wall with a scar burden of approximately consisting of 11%.        The patient continues to have atrial fibrillation despite being on amiodarone.  In light of that, I recommended EP study and ablation.  He underwent pulmonary vein isolation along with isolation of SVC.  I elected not to perform a linear ablation given his young age.        We also discussed about his risk of sudden cardiac death.  The patient never had ashtyn syncope and no first-degree relative of dying suddenly or any ashtyn syncope, and given the apical location of his hypertrophy, the risk of sudden cardiac death is lower, and therefore I did not recommend ICD as a primary prevention.        The Zio Patch monitor showed no atrial fibrillation but occasional PACs, 3 or 4 beats in a row, corresponding to his symptoms of palpitations and \"expanding feeling like a balloon inflating\" in his chest.  There were also a few runs of nonsustained VT, with the longest being 8 beats at a rate of 140-160 beats per minute for which he was asymptomatic.        For now, I would resume his beta blocker at 50 mg once a day of the long-acting one, given that he has symptoms with his PACs, and I will discuss with my colleagues regarding the " implication of the nonsustained VT noted in patients with apical hypertrophy in the absence of any syncope or first-degree relative of dying suddenly.  If the consensus is that the risk is higher than the risk of implantation of ICD, I would notify the patient of that recommendation.  The patient will continue Eliquis for now and reassess in the future.         VIK OTT MD             D: 2018   T: 2018   MT: LJ      Name:     TAMARA CENTENO   MRN:      [id number]        Account:      [id number]   :      1970           Service Date: 2018      Document: [id number]           Outpatient Encounter Prescriptions as of 2018   Medication Sig Dispense Refill     apixaban ANTICOAGULANT (ELIQUIS) 5 MG tablet Take 1 tablet (5 mg) by mouth 2 times daily 180 tablet 3     Ascorbic Acid (VITAMIN C PO)        atorvastatin (LIPITOR) 80 MG tablet Take 1 tablet (80 mg) by mouth daily 90 tablet 3     metoprolol succinate (TOPROL XL) 50 MG 24 hr tablet Take 1 tablet (50 mg) by mouth daily 90 tablet 3     amLODIPine (NORVASC) 5 MG tablet Take 1 tablet (5 mg) by mouth daily (Patient not taking: Reported on 2018) 90 tablet 3     No facility-administered encounter medications on file as of 2018.        Again, thank you for allowing me to participate in the care of your patient.      Sincerely,    Vik Clancy MD     [facility]

## 2018-09-28 ENCOUNTER — TELEPHONE (OUTPATIENT)
Dept: CARDIOLOGY | Facility: CLINIC | Age: 48
End: 2018-09-28

## 2018-09-28 DIAGNOSIS — I48.20 CHRONIC ATRIAL FIBRILLATION (H): ICD-10-CM

## 2018-09-28 DIAGNOSIS — I47.29 NON-SUSTAINED VENTRICULAR TACHYCARDIA (H): Primary | ICD-10-CM

## 2018-09-28 DIAGNOSIS — I42.2 APICAL VARIANT HYPERTROPHIC CARDIOMYOPATHY (H): ICD-10-CM

## 2018-09-28 NOTE — TELEPHONE ENCOUNTER
----- Message from Vik Clancy MD sent at 9/27/2018  4:55 PM CDT -----  Please inform pt that I have discussed his case with my colleagues. We decided that we should obtain a stress ecg to see whether his bp drops as it is one of the risk factors for SCD. Further testing may still be needed. Please schedule stress ecg test. Thanks, qp  ADDENDUM: Called and discussed with patient per phone. Transferred to scheduling to schedule. Maria L Torres RN Cardiology at [facility] September 28, 2018, 10:15 AM.

## 2018-10-18 ENCOUNTER — TELEPHONE (OUTPATIENT)
Dept: CARDIOLOGY | Facility: CLINIC | Age: 48
End: 2018-10-18

## 2018-10-18 NOTE — TELEPHONE ENCOUNTER
"Pt's wife calls in to report that pt has not been feeling very good lately--when he saw Dr Tang on 9/25/18--he restarted Toprol 50 mg daily. States for the past few days, he has been weak and tired. Last night, he got up to use the bathroom and felt very lightheaded and had a syncopal episode with complete LOC. Wife stated he was \"a bit incoherent\" when he came to. Disc that pt needs to be evaluated. No EP availability here in [place] for several weeks. Rec he be seen by EP at [facility]. Wife would like to call for appt as she knows patient's schedule. Pt had Zio done in September and per Dr Harrison--needs to see Dr Tang again to discuss ICD issue as there were short runs of VT seen. Scheduled to see Dr Harrison on 11/1/18 with GXT on 10/26/18. Will forward call to Dr Tang for his information. Yary Tello, RN Cardiology October 18, 2018, 3:45 PM    "

## 2018-10-23 DIAGNOSIS — I42.2 HYPERTROPHIC CARDIOMYOPATHY (H): Primary | ICD-10-CM

## 2018-10-23 NOTE — TELEPHONE ENCOUNTER
No need for stress test. Thanks ,qp    ADDENDUM: LM for patient to call back to discuss recommendations and cancel GXT and Dr Harrison follow up. Yary Tello RN Cardiology October 24, 2018, 8:14 AM    ADDENDUM: Have not yet heard back from patient regarding upcoming appointments. LM with wife to call back to discuss and confirm cancellation of GXT on 10/26/18, follow up with Dr Harrison on 11/1/18, and to inform of plan to follow up with Dr Harrison in February 2019 (order already in). Yary Tello RN Cardiology October 25, 2018, 8:33 AM    ADDENDUM: Pt's wife called back to discuss. Cancelled GXT on 10/26/18 and Dr Harrison on 11/1/18. Confirmed ICD date and time and answered questions. Confirmed 2/2019 plan for follow up with Dr Harrison. Yary Tello RN Cardiology October 25, 2018, 1:27 PM

## 2018-10-23 NOTE — TELEPHONE ENCOUNTER
We have our  to call her to schedule an ICD. I have talked to him about it in the past and will talk to him on the day of procedure. He doesn't need to see Dr. Harrison for this issue. Thanks, qp    ADDENDUM: Will discuss with Dr Tang to see if pt still needs exercise stress test scheduled for 10/26/18--this was recommended to assess for BP drop with exercise as a risk factor for SCD. Yray Tello RN Cardiology October 23, 2018, 3:37 PM

## 2018-11-09 RX ORDER — SODIUM CHLORIDE 450 MG/100ML
INJECTION, SOLUTION INTRAVENOUS CONTINUOUS
Status: CANCELLED | OUTPATIENT
Start: 2018-11-09

## 2018-11-09 RX ORDER — LIDOCAINE 40 MG/G
CREAM TOPICAL
Status: CANCELLED | OUTPATIENT
Start: 2018-11-09

## 2018-11-09 RX ORDER — CEFAZOLIN SODIUM 2 G/100ML
2 INJECTION, SOLUTION INTRAVENOUS
Status: CANCELLED | OUTPATIENT
Start: 2018-11-09

## 2018-11-14 ENCOUNTER — APPOINTMENT (OUTPATIENT)
Dept: CARDIOLOGY | Facility: CLINIC | Age: 48
End: 2018-11-14
Attending: INTERNAL MEDICINE
Payer: COMMERCIAL

## 2018-11-14 ENCOUNTER — HOSPITAL ENCOUNTER (OUTPATIENT)
Facility: CLINIC | Age: 48
Discharge: HOME | End: 2018-11-14
Attending: INTERNAL MEDICINE | Admitting: INTERNAL MEDICINE
Payer: COMMERCIAL

## 2018-11-14 ENCOUNTER — APPOINTMENT (OUTPATIENT)
Dept: GENERAL RADIOLOGY | Facility: CLINIC | Age: 48
End: 2018-11-14
Attending: INTERNAL MEDICINE
Payer: COMMERCIAL

## 2018-11-14 VITALS
DIASTOLIC BLOOD PRESSURE: 87 MMHG | WEIGHT: 243.7 LBS | BODY MASS INDEX: 34.12 KG/M2 | RESPIRATION RATE: 18 BRPM | HEART RATE: 57 BPM | OXYGEN SATURATION: 99 % | HEIGHT: 71 IN | TEMPERATURE: 97.7 F | SYSTOLIC BLOOD PRESSURE: 131 MMHG

## 2018-11-14 DIAGNOSIS — I42.2 HYPERTROPHIC CARDIOMYOPATHY (H): ICD-10-CM

## 2018-11-14 DIAGNOSIS — I48.0 PAROXYSMAL ATRIAL FIBRILLATION (H): ICD-10-CM

## 2018-11-14 LAB
ANION GAP SERPL CALCULATED.3IONS-SCNC: 6 MMOL/L (ref 3–14)
BUN SERPL-MCNC: 14 MG/DL (ref 7–30)
CALCIUM SERPL-MCNC: 8.9 MG/DL (ref 8.5–10.1)
CHLORIDE SERPL-SCNC: 108 MMOL/L (ref 94–109)
CO2 SERPL-SCNC: 27 MMOL/L (ref 20–32)
CREAT SERPL-MCNC: 1 MG/DL (ref 0.66–1.25)
ERYTHROCYTE [DISTWIDTH] IN BLOOD BY AUTOMATED COUNT: 12.5 % (ref 10–15)
GFR SERPL CREATININE-BSD FRML MDRD: 79 ML/MIN/1.7M2
GLUCOSE SERPL-MCNC: 104 MG/DL (ref 70–99)
HCT VFR BLD AUTO: 42.7 % (ref 40–53)
HGB BLD-MCNC: 14.8 G/DL (ref 13.3–17.7)
MCH RBC QN AUTO: 31.5 PG (ref 26.5–33)
MCHC RBC AUTO-ENTMCNC: 34.7 G/DL (ref 31.5–36.5)
MCV RBC AUTO: 91 FL (ref 78–100)
PLATELET # BLD AUTO: 192 10E9/L (ref 150–450)
POTASSIUM SERPL-SCNC: 3.9 MMOL/L (ref 3.4–5.3)
RBC # BLD AUTO: 4.7 10E12/L (ref 4.4–5.9)
SODIUM SERPL-SCNC: 141 MMOL/L (ref 133–144)
WBC # BLD AUTO: 9.2 10E9/L (ref 4–11)

## 2018-11-14 PROCEDURE — 27210995 ZZH RX 272: Performed by: INTERNAL MEDICINE

## 2018-11-14 PROCEDURE — 40000852 ZZH STATISTIC HEART CATH LAB OR EP LAB

## 2018-11-14 PROCEDURE — 99152 MOD SED SAME PHYS/QHP 5/>YRS: CPT | Performed by: INTERNAL MEDICINE

## 2018-11-14 PROCEDURE — 25000128 H RX IP 250 OP 636: Performed by: INTERNAL MEDICINE

## 2018-11-14 PROCEDURE — 40000986 XR CHEST 2 VW

## 2018-11-14 PROCEDURE — 27210795 ZZH PAD DEFIB QUICK CR4

## 2018-11-14 PROCEDURE — 33249 INSJ/RPLCMT DEFIB W/LEAD(S): CPT | Mod: Q0

## 2018-11-14 PROCEDURE — 27210784 ZZH KIT PACEMAKER CR8

## 2018-11-14 PROCEDURE — 99153 MOD SED SAME PHYS/QHP EA: CPT

## 2018-11-14 PROCEDURE — 99152 MOD SED SAME PHYS/QHP 5/>YRS: CPT

## 2018-11-14 PROCEDURE — 40000235 ZZH STATISTIC TELEMETRY

## 2018-11-14 PROCEDURE — C1722 AICD, SINGLE CHAMBER: HCPCS

## 2018-11-14 PROCEDURE — 33249 INSJ/RPLCMT DEFIB W/LEAD(S): CPT | Performed by: INTERNAL MEDICINE

## 2018-11-14 PROCEDURE — 80048 BASIC METABOLIC PNL TOTAL CA: CPT | Performed by: INTERNAL MEDICINE

## 2018-11-14 PROCEDURE — C1777 LEAD, AICD, ENDO SINGLE COIL: HCPCS

## 2018-11-14 PROCEDURE — 85027 COMPLETE CBC AUTOMATED: CPT | Performed by: INTERNAL MEDICINE

## 2018-11-14 PROCEDURE — 25000125 ZZHC RX 250: Performed by: INTERNAL MEDICINE

## 2018-11-14 RX ORDER — LIDOCAINE HYDROCHLORIDE AND EPINEPHRINE 10; 10 MG/ML; UG/ML
10-30 INJECTION, SOLUTION INFILTRATION; PERINEURAL
Status: DISCONTINUED | OUTPATIENT
Start: 2018-11-14 | End: 2018-11-14 | Stop reason: HOSPADM

## 2018-11-14 RX ORDER — PROTAMINE SULFATE 10 MG/ML
1-5 INJECTION, SOLUTION INTRAVENOUS
Status: DISCONTINUED | OUTPATIENT
Start: 2018-11-14 | End: 2018-11-14 | Stop reason: HOSPADM

## 2018-11-14 RX ORDER — NALOXONE HYDROCHLORIDE 0.4 MG/ML
0.4 INJECTION, SOLUTION INTRAMUSCULAR; INTRAVENOUS; SUBCUTANEOUS EVERY 5 MIN PRN
Status: DISCONTINUED | OUTPATIENT
Start: 2018-11-14 | End: 2018-11-14 | Stop reason: HOSPADM

## 2018-11-14 RX ORDER — LIDOCAINE 40 MG/G
CREAM TOPICAL
Status: DISCONTINUED | OUTPATIENT
Start: 2018-11-14 | End: 2018-11-14 | Stop reason: HOSPADM

## 2018-11-14 RX ORDER — MORPHINE SULFATE 2 MG/ML
1-2 INJECTION, SOLUTION INTRAMUSCULAR; INTRAVENOUS EVERY 5 MIN PRN
Status: DISCONTINUED | OUTPATIENT
Start: 2018-11-14 | End: 2018-11-14 | Stop reason: HOSPADM

## 2018-11-14 RX ORDER — ADENOSINE 3 MG/ML
6-12 INJECTION, SOLUTION INTRAVENOUS EVERY 5 MIN PRN
Status: DISCONTINUED | OUTPATIENT
Start: 2018-11-14 | End: 2018-11-14 | Stop reason: HOSPADM

## 2018-11-14 RX ORDER — DOBUTAMINE HYDROCHLORIDE 200 MG/100ML
5-40 INJECTION INTRAVENOUS CONTINUOUS PRN
Status: DISCONTINUED | OUTPATIENT
Start: 2018-11-14 | End: 2018-11-14 | Stop reason: HOSPADM

## 2018-11-14 RX ORDER — FUROSEMIDE 10 MG/ML
20-100 INJECTION INTRAMUSCULAR; INTRAVENOUS
Status: DISCONTINUED | OUTPATIENT
Start: 2018-11-14 | End: 2018-11-14 | Stop reason: HOSPADM

## 2018-11-14 RX ORDER — ATROPINE SULFATE 0.1 MG/ML
.5-1 INJECTION INTRAVENOUS
Status: DISCONTINUED | OUTPATIENT
Start: 2018-11-14 | End: 2018-11-14 | Stop reason: HOSPADM

## 2018-11-14 RX ORDER — BUPIVACAINE HYDROCHLORIDE 2.5 MG/ML
10-30 INJECTION, SOLUTION EPIDURAL; INFILTRATION; INTRACAUDAL
Status: DISCONTINUED | OUTPATIENT
Start: 2018-11-14 | End: 2018-11-14 | Stop reason: HOSPADM

## 2018-11-14 RX ORDER — LIDOCAINE HYDROCHLORIDE 10 MG/ML
10-30 INJECTION, SOLUTION EPIDURAL; INFILTRATION; INTRACAUDAL; PERINEURAL
Status: DISCONTINUED | OUTPATIENT
Start: 2018-11-14 | End: 2018-11-14 | Stop reason: HOSPADM

## 2018-11-14 RX ORDER — KETOROLAC TROMETHAMINE 30 MG/ML
15-30 INJECTION, SOLUTION INTRAMUSCULAR; INTRAVENOUS
Status: DISCONTINUED | OUTPATIENT
Start: 2018-11-14 | End: 2018-11-14 | Stop reason: HOSPADM

## 2018-11-14 RX ORDER — FLUMAZENIL 0.1 MG/ML
0.2 INJECTION, SOLUTION INTRAVENOUS
Status: DISCONTINUED | OUTPATIENT
Start: 2018-11-14 | End: 2018-11-14 | Stop reason: HOSPADM

## 2018-11-14 RX ORDER — HEPARIN SODIUM 1000 [USP'U]/ML
1000-10000 INJECTION, SOLUTION INTRAVENOUS; SUBCUTANEOUS EVERY 5 MIN PRN
Status: DISCONTINUED | OUTPATIENT
Start: 2018-11-14 | End: 2018-11-14 | Stop reason: HOSPADM

## 2018-11-14 RX ORDER — IBUTILIDE FUMARATE 1 MG/10ML
0.01 INJECTION, SOLUTION INTRAVENOUS
Status: DISCONTINUED | OUTPATIENT
Start: 2018-11-14 | End: 2018-11-14 | Stop reason: HOSPADM

## 2018-11-14 RX ORDER — FENTANYL CITRATE 50 UG/ML
25-50 INJECTION, SOLUTION INTRAMUSCULAR; INTRAVENOUS
Status: DISCONTINUED | OUTPATIENT
Start: 2018-11-14 | End: 2018-11-14 | Stop reason: HOSPADM

## 2018-11-14 RX ORDER — ONDANSETRON 2 MG/ML
4 INJECTION INTRAMUSCULAR; INTRAVENOUS EVERY 4 HOURS PRN
Status: DISCONTINUED | OUTPATIENT
Start: 2018-11-14 | End: 2018-11-14 | Stop reason: HOSPADM

## 2018-11-14 RX ORDER — SODIUM CHLORIDE 450 MG/100ML
INJECTION, SOLUTION INTRAVENOUS CONTINUOUS
Status: DISCONTINUED | OUTPATIENT
Start: 2018-11-14 | End: 2018-11-14 | Stop reason: HOSPADM

## 2018-11-14 RX ORDER — DIPHENHYDRAMINE HYDROCHLORIDE 50 MG/ML
25-50 INJECTION INTRAMUSCULAR; INTRAVENOUS
Status: DISCONTINUED | OUTPATIENT
Start: 2018-11-14 | End: 2018-11-14 | Stop reason: HOSPADM

## 2018-11-14 RX ORDER — OXYCODONE AND ACETAMINOPHEN 5; 325 MG/1; MG/1
1 TABLET ORAL EVERY 4 HOURS PRN
Status: DISCONTINUED | OUTPATIENT
Start: 2018-11-14 | End: 2018-11-14 | Stop reason: HOSPADM

## 2018-11-14 RX ORDER — LORAZEPAM 2 MG/ML
.5-2 INJECTION INTRAMUSCULAR EVERY 10 MIN PRN
Status: DISCONTINUED | OUTPATIENT
Start: 2018-11-14 | End: 2018-11-14 | Stop reason: HOSPADM

## 2018-11-14 RX ORDER — NALOXONE HYDROCHLORIDE 0.4 MG/ML
.1-.4 INJECTION, SOLUTION INTRAMUSCULAR; INTRAVENOUS; SUBCUTANEOUS
Status: DISCONTINUED | OUTPATIENT
Start: 2018-11-14 | End: 2018-11-14 | Stop reason: HOSPADM

## 2018-11-14 RX ORDER — PROTAMINE SULFATE 10 MG/ML
5-40 INJECTION, SOLUTION INTRAVENOUS EVERY 10 MIN PRN
Status: DISCONTINUED | OUTPATIENT
Start: 2018-11-14 | End: 2018-11-14 | Stop reason: HOSPADM

## 2018-11-14 RX ORDER — IBUTILIDE FUMARATE 1 MG/10ML
1 INJECTION, SOLUTION INTRAVENOUS
Status: DISCONTINUED | OUTPATIENT
Start: 2018-11-14 | End: 2018-11-14 | Stop reason: HOSPADM

## 2018-11-14 RX ORDER — CEFAZOLIN SODIUM 2 G/100ML
2 INJECTION, SOLUTION INTRAVENOUS
Status: COMPLETED | OUTPATIENT
Start: 2018-11-14 | End: 2018-11-14

## 2018-11-14 RX ADMIN — MIDAZOLAM 1 MG: 1 INJECTION INTRAMUSCULAR; INTRAVENOUS at 12:54

## 2018-11-14 RX ADMIN — CEFAZOLIN SODIUM 2 G: 2 INJECTION, SOLUTION INTRAVENOUS at 12:30

## 2018-11-14 RX ADMIN — FENTANYL CITRATE 150 MCG: 50 INJECTION, SOLUTION INTRAMUSCULAR; INTRAVENOUS at 12:48

## 2018-11-14 RX ADMIN — BACITRACIN 25000 UNITS: 5000 INJECTION, POWDER, FOR SOLUTION INTRAMUSCULAR at 12:48

## 2018-11-14 RX ADMIN — FENTANYL CITRATE 50 MCG: 50 INJECTION, SOLUTION INTRAMUSCULAR; INTRAVENOUS at 12:54

## 2018-11-14 RX ADMIN — MIDAZOLAM 2 MG: 1 INJECTION INTRAMUSCULAR; INTRAVENOUS at 12:49

## 2018-11-14 RX ADMIN — SODIUM CHLORIDE: 4.5 INJECTION, SOLUTION INTRAVENOUS at 10:10

## 2018-11-14 NOTE — DISCHARGE INSTRUCTIONS
ICD Implant Discharge Instructions    After you go home:      Have an adult stay with you until tomorrow.    You may resume your normal diet.       For 24 hours - due to the sedation you received:    Relax and take it easy.    Do NOT make any important or legal decisions.    Do NOT drive or operate machines at home or at work.    Do NOT drink alcohol.    Care of Chest Incision:      Keep the bandage on at least 3 days. You may remove the dressing on Saturday 11/17. Change it only if it gets loose or soaked. If you need to change it, use 4x4-inch gauze and a large clear bandage.     If there is a pressure dressing (gauze & tape) - 24 hours after your procedure you may remove ONLY the top dressing. Leave the bottom dressing on.    Leave the strips of tape on. They will fall off on their own, or we will remove them at your first check-up.    Check your wound daily for signs of infection, such as increased redness, severe swelling or draining. Fever may also be a sign of infection. Call us if you see any of these signs.    If there are no signs of infection, you may shower after the bandage comes off in 3 days. If you take a tub bath, keep the wound dry.    No soaking the incision (swimming pool, bathtub, hot tub) for 2 weeks.    You may have mild to medium pain for 3 to 5 days. Take Acetaminophen (Tylenol) or Ibuprofen (Advil) for the pain. If the pain persists or is severe, call us.    Activity:      For at least 2 weeks: Do not raise your elbow above your shoulder. You can begin to use your arm as it feels comfortable to you.    Do not use arm on implant side to lift more than 10 pounds for 2 weeks.    In 6 to 8 weeks: You may begin to golf, play tennis, swim and do similar activities.    No driving for one day & limit to necessary driving for one week. Please talk to your doctor for specific recommendations.    Bleeding:      If you start bleeding from the incision site, sit down and press firmly on the site for 10  minutes.     Once bleeding stops, call [facility] as soon as you can.    Call 911 right away if you have heavy bleeding or bleeding that does not stop.      Medicines:      Take your medications, including blood thinners, unless your provider tells you not to.    If you have stopped any medicines, check with your provider about when to restart them.    Follow Up Appointments:      Follow up with Device Clinic at [facility] of patient preference in 7-10 days.    Call the clinic if:      You have a large or growing hard lump around the site.    The site is red, swollen, hot or tender.    Blood or fluid is draining from the site.    You have chills or a fever greater than 101 F (38 C).    You feel dizzy or light-headed.    Questions or concerns.    Telling others about your device:      Before you leave the hospital, you will receive a temporary ID card. A permanent card will be mailed to you about 6 to 8 weeks later. Always carry the ID card with you. It has important details about your device.    You may also get a Medical Alert bracelet or tag that says you have a defibrillator (ICD).  Go to www.[email or web address].     Always tell doctors, dentists and other care providers that you have a device implanted in you.    Let us know before you plan any surgeries. Your care team must take special steps to keep you safe during certain procedures. These steps will depend on the type of device you have. Your provider will need to see your ID card. They may need to call us for instructions.    Device Safety:      Please refer to device  s booklet for further information.          [facility]:    [phone] [facility] (7 days a week)

## 2018-11-14 NOTE — DISCHARGE SUMMARY
Patient discharged to home by wheelchair at 4:25 PM 11/14/18.  Medication regimen discussed with patient and patient verbalizes understanding.  Diet and activity and wound care discussed with patient.  Upcoming appointments reviewed.  No questions at this time. Patient has received pacemaker temp card.  Pacemaker interrogation complete.  Chest xray complete with no negative findings. Patient denies pain, numbness, tingling.  Cms intact all extremities.  Incision dressing CDI.

## 2018-11-14 NOTE — PROGRESS NOTES
Consent yet to be signed.    IV started and labs reviewed.       Pulses charted.  Patient not taking aspirin.  Patient is not taking Metformin.  Patient is accompanied by Mery - spouse  Pre procedure plan of care reviewed with patient prior to procedure. All questions answered and patient verbalizes acceptance of plan.

## 2018-11-14 NOTE — IP AVS SNAPSHOT
MRN:[id number]                      After Visit Summary   11/14/2018    Matthew Still    MRN: [id number]           Visit Information        Department      11/14/2018  9:37 AM [facility]          Review of your medicines      CONTINUE these medicines which may have CHANGED, or have new prescriptions. If we are uncertain of the size of tablets/capsules you have at home, strength may be listed as something that might have changed.        Dose / Directions    ELIQUIS 5 MG tablet   This may have changed:  These instructions start on 11/16/2018. If you are unsure what to do until then, ask your doctor or other care provider.   Used for:  Paroxysmal atrial fibrillation (H)   Generic drug:  apixaban ANTICOAGULANT        Dose:  5 mg   Start taking on:  11/16/2018   Take 1 tablet (5 mg) by mouth 2 times daily   Quantity:  180 tablet   Refills:  3         CONTINUE these medicines which have NOT CHANGED        Dose / Directions    amLODIPine 5 MG tablet   Commonly known as:  NORVASC   Used for:  Benign essential hypertension        Dose:  5 mg   Take 1 tablet (5 mg) by mouth daily   Quantity:  90 tablet   Refills:  3       atorvastatin 80 MG tablet   Commonly known as:  LIPITOR   Used for:  Hyperlipidemia LDL goal <70        Dose:  80 mg   Take 1 tablet (80 mg) by mouth daily   Quantity:  90 tablet   Refills:  3       metoprolol succinate 50 MG 24 hr tablet   Commonly known as:  TOPROL XL   Used for:  NSVT (nonsustained ventricular tachycardia) (H)        Dose:  50 mg   Take 1 tablet (50 mg) by mouth daily   Quantity:  90 tablet   Refills:  3       VITAMIN C PO        Refills:  0                Protect others around you: Learn how to safely use, store and throw away your medicines at www.disposemymeds.org.         Follow-ups after your visit        Your next 10 appointments already scheduled     Nov 20, 2018  7:30 AM CST   Pacemaker Check with [facility] ([facility])    [address] [id number] [phone]               Care Instructions        After Care Instructions     Discharge Instructions - Follow up with Device Check RN        Follow up with Device Check RN in 7-10 days.            Discharge Instructions - Keep incision dry for 72 hours       Keep incision dry for 72 hours (unless Derma Torres was applied)            Discharge Instructions - No soaking incision for 2 weeks       No soaking incision (swimming pool, bathtub, hot tub) for 2 weeks.                  Further instructions from your care team       ICD Implant Discharge Instructions    After you go home:      Have an adult stay with you until tomorrow.    You may resume your normal diet.       For 24 hours - due to the sedation you received:    Relax and take it easy.    Do NOT make any important or legal decisions.    Do NOT drive or operate machines at home or at work.    Do NOT drink alcohol.    Care of Chest Incision:      Keep the bandage on at least 3 days. You may remove the dressing on Saturday 11/17. Change it only if it gets loose or soaked. If you need to change it, use 4x4-inch gauze and a large clear bandage.     If there is a pressure dressing (gauze & tape) - 24 hours after your procedure you may remove ONLY the top dressing. Leave the bottom dressing on.    Leave the strips of tape on. They will fall off on their own, or we will remove them at your first check-up.    Check your wound daily for signs of infection, such as increased redness, severe swelling or draining. Fever may also be a sign of infection. Call us if you see any of these signs.    If there are no signs of infection, you may shower after the bandage comes off in 3 days. If you take a tub bath, keep the wound dry.    No soaking the incision (swimming pool, bathtub, hot tub) for 2 weeks.    You may have mild to medium pain for 3 to 5 days. Take Acetaminophen (Tylenol) or Ibuprofen  (Advil) for the pain. If the pain persists or is severe, call us.    Activity:      For at least 2 weeks: Do not raise your elbow above your shoulder. You can begin to use your arm as it feels comfortable to you.    Do not use arm on implant side to lift more than 10 pounds for 2 weeks.    In 6 to 8 weeks: You may begin to golf, play tennis, swim and do similar activities.    No driving for one day & limit to necessary driving for one week. Please talk to your doctor for specific recommendations.    Bleeding:      If you start bleeding from the incision site, sit down and press firmly on the site for 10 minutes.     Once bleeding stops, call [facility] as soon as you can.    Call 911 right away if you have heavy bleeding or bleeding that does not stop.      Medicines:      Take your medications, including blood thinners, unless your provider tells you not to.    If you have stopped any medicines, check with your provider about when to restart them.    Follow Up Appointments:      Follow up with Device Clinic at [facility] of patient preference in 7-10 days.    Call the clinic if:      You have a large or growing hard lump around the site.    The site is red, swollen, hot or tender.    Blood or fluid is draining from the site.    You have chills or a fever greater than 101 F (38 C).    You feel dizzy or light-headed.    Questions or concerns.    Telling others about your device:      Before you leave the hospital, you will receive a temporary ID card. A permanent card will be mailed to you about 6 to 8 weeks later. Always carry the ID card with you. It has important details about your device.    You may also get a Medical Alert bracelet or tag that says you have a defibrillator (ICD).  Go to [email or web address].     Always tell doctors, dentists and other care providers that you have a device implanted in you.    Let us know before you plan any surgeries. Your care team must take special steps to keep you  "safe during certain procedures. These steps will depend on the type of device you have. Your provider will need to see your ID card. They may need to call us for instructions.    Device Safety:      Please refer to device  s booklet for further information.          [facility] [place]:    [phone] UMP (7 days a week)           Additional Information About Your Visit        MyChart Information     [employer] lets you send messages to your doctor, view your test results, renew your prescriptions, schedule appointments and more. To sign up, go to [email or web address] . Click on \"Log in\" on the left side of the screen, which will take you to the Welcome page. Then click on \"Sign up Now\" on the right side of the page.     You will be asked to enter the access code listed below, as well as some personal information. Please follow the directions to create your username and password.     Your access code is: [email or web address]  Expires: 2019  2:31 PM     Your access code will  in 90 days. If you need help or a new code, please call your [place] clinic or [phone].        Care EveryWhere ID     This is your Care EveryWhere ID. This could be used by other organizations to access your [facility] medical records  [id number]        Your Vitals Were     Blood Pressure Pulse Temperature Respirations Height Weight    126/88 57 97.7  F (36.5  C) (Oral) 18 1.803 m (5' 11\") 110.5 kg (243 lb 11.2 oz)    Pulse Oximetry BMI (Body Mass Index)                99% 33.99 kg/m2           Primary Care Provider Office Phone # Fax #    [facility] [phone]      Equal Access to Services     ARCHIE MOSCOSO AH: Hadii jordyn castillo [place], waaxda luqadaha, qaybta kaalmada adeegyada, kika juan. So [facility] [phone].    ATENCIÓN: Si habla español, tiene a villeda disposición servicios gratuitos de asistencia lingüística. Llame al " [phone].    We comply with applicable federal civil rights laws and [place] laws. We do not discriminate on the basis of race, color, national origin, age, disability, sex, sexual orientation, or gender identity.            Thank you!     Thank you for choosing [facility] for your care. Our goal is always to provide you with excellent care. Hearing back from our patients is one way we can continue to improve our services. Please take a few minutes to complete the written survey that you may receive in the mail after you visit with us. Thank you!             Medication List: This is a list of all your medications and when to take them. Check marks below indicate your daily home schedule. Keep this list as a reference.      Medications           Morning Afternoon Evening Bedtime As Needed    amLODIPine 5 MG tablet   Commonly known as:  NORVASC   Take 1 tablet (5 mg) by mouth daily                                atorvastatin 80 MG tablet   Commonly known as:  LIPITOR   Take 1 tablet (80 mg) by mouth daily                                ELIQUIS 5 MG tablet   Take 1 tablet (5 mg) by mouth 2 times daily   Start taking on:  11/16/2018   Generic drug:  apixaban ANTICOAGULANT                                metoprolol succinate 50 MG 24 hr tablet   Commonly known as:  TOPROL XL   Take 1 tablet (50 mg) by mouth daily                                VITAMIN C PO

## 2018-11-14 NOTE — IP AVS SNAPSHOT
[place] [address]    [place] [address]    Phone:  [phone]                                       After Visit Summary   11/14/2018    Matthew Still    MRN: [id number]           After Visit Summary Signature Page     I have received my discharge instructions, and my questions have been answered. I have discussed any challenges I see with this plan with the nurse or doctor.    ..........................................................................................................................................  Patient/Patient Representative Signature      ..........................................................................................................................................  Patient Representative Print Name and Relationship to Patient    ..................................................               ................................................  Date                                   Time    ..........................................................................................................................................  Reviewed by Signature/Title    ...................................................              ..............................................  Date                                               Time          22EPIC Rev 08/18

## 2018-11-14 NOTE — PROGRESS NOTES
Uneventful implantation of a single chamber ICD. Resume Eliquis this Friday. Home in a few hours provided no pneumo and nl device check.

## 2018-11-15 ENCOUNTER — DOCUMENTATION ONLY (OUTPATIENT)
Dept: CARDIOLOGY | Facility: CLINIC | Age: 48
End: 2018-11-15

## 2018-11-15 NOTE — PROGRESS NOTES
Post device implant discharge phone call.    Reviewed the following:  No raising arm above shoulder on the side of implant for 3 weeks  Remove outer dressing 3 days after implant. May shower after outer dressing removed. Leave steri-strips in place, will be removed at 1 week device check  No driving for: 1 week   Watch for redness, drainage, warmth, or fever. Call device clinic if any signs of infection.     1 week device check scheduled: 11/20/18    Pt states understanding of all instructions.

## 2018-11-15 NOTE — PROCEDURES
PROCEDURES PERFORMED:   1. Implantation of a single-chamber implantable cardioverter defibrillator (ICD)  2. Conscious sedation.   3. Cardiac fluoroscopy    INDICATION: Hypertrophic cardiomyopathy, NSVT and syncope    HISTORY OF PRESENT ILLNESS: This is a 48 year old year-old patient with a history of apical HCI noted to frequent NSVT and have a history of recurrent near-syncope and syncope who is referred for an ICD as secondary prevention against sudden cardiac death. Risks of the procedure was discussed before the procedure including but not limited to vascular injury, infection, pneumothorax, and cardiac perforation.    METHOD: I determined this patient to be an appropriate candidate for the planned sedation and procedure and have reassessed the patient immediately prior to sedation and procedure. The patient was prepped and draped in the usual manner. The procedure was performed under conscious sedation for 30 minutes. 3 mg of Versed and 200 mcg of Fentanyl were used. Heart rate, BP, respiration, oxygen saturation, and patient responses were monitored throughout the procedure with the RN assistance. Intravenous antibiotic was given prior to making an incision. 1% lidocaine was infiltrated into the left axillary vein area. This vein was access using the modified Seldinger's technique. An incision was then made with a #15 blade. The left cephalic vein was identified and A sheath was then glide over the wire into the vein. A lead was then advanced into the heart and was fixed into the right ventricular apical area. Appropriate sensing and threshold were obtained. There was no diaphragmatic stimulation with high output pacing. The sheath was then peeled away. The lead was then secured to the pectoralis muscle fascia using O-Ethibond sutures.     A pocket was then fashioned and was irrigated with bacitracin solution. The leads were then attached to the device and placed in the pocket.  The pocket was then closed  with 2-0 and 4-0 Vicryl sutures. Steri-Strips and an OpSite dressing were then placed over the incision.  The patient was then transferred back to the Care Suites in stable condition.         COMPLICATIONS: None.    Fluoroscopy (minutes): 1.5    CONCLUSION:  1. Uneventful implantation of a single-chamber implantable cardioverter defibrillator    Vik Tang MD

## 2018-11-20 ENCOUNTER — DOCUMENTATION ONLY (OUTPATIENT)
Dept: CARDIOLOGY | Facility: CLINIC | Age: 48
End: 2018-11-20

## 2018-11-20 ENCOUNTER — HOSPITAL ENCOUNTER (OUTPATIENT)
Dept: CARDIOLOGY | Facility: CLINIC | Age: 48
Discharge: HOME | End: 2018-11-20
Attending: INTERNAL MEDICINE | Admitting: INTERNAL MEDICINE
Payer: COMMERCIAL

## 2018-11-20 PROCEDURE — 93288 INTERROG EVL PM/LDLS PM IP: CPT

## 2018-11-20 PROCEDURE — 93289 INTERROG DEVICE EVAL HEART: CPT | Mod: 26 | Performed by: INTERNAL MEDICINE

## 2018-11-20 PROCEDURE — 93289 INTERROG DEVICE EVAL HEART: CPT

## 2018-11-20 NOTE — PROGRESS NOTES
[employer] Intica VR 7 Day Post ICD Device Check/[place]  : 0 %  Mode: VVI/40 bpm        Underlying Rhythm: SR  Heart Rate: Histogram shows HR mostly 60-80 bpm  Sensing: Stable    Pacing Threshold: Stable    Impedance: WNL  Battery Status: TONYA  Incision/Complications: Steri strips removed. Incision has healed well with no indication of hematoma or infection. Edges well approximated  Atrial Arrhythmia: NONE  Ventricular Arrhythmia: NONE  ATP: NONE    Shocks: NONE  Setting Change: NONE    Care Plan: RTC in 6 weeks. He takes Eliquis for PAF sml

## 2018-11-23 ENCOUNTER — TELEPHONE (OUTPATIENT)
Dept: MEDSURG UNIT | Facility: CLINIC | Age: 48
End: 2018-11-23

## 2018-11-26 NOTE — TELEPHONE ENCOUNTER
Central Prior Authorization Team   Phone: [phone]      PA Initiation    Medication: apixaban ANTICOAGULANT (ELIQUIS) 5 MG tablet  Insurance Company: [employer] - Phone [phone] Fax [phone]  Pharmacy Filling the Rx: [facility] [place], [place] - [address]  Filling Pharmacy Phone: [phone]  Filling Pharmacy Fax:    Start Date: 11/26/2018

## 2018-11-28 NOTE — TELEPHONE ENCOUNTER
Prior Authorization Approval    Authorization Effective Date: 10/29/2018  Authorization Expiration Date: 11/28/2019  Medication: apixaban ANTICOAGULANT (ELIQUIS) 5 MG tablet- APPROVED  Approved Dose/Quantity:   Reference #:     Insurance Company: [employer] - Phone [phone] Fax [phone]  Expected CoPay:       CoPay Card Available:      Foundation Assistance Needed:    Which Pharmacy is filling the prescription (Not needed for infusion/clinic administered): [facility] [place], [place] - [address] [place] [address]  Pharmacy Notified: Yes  Patient Notified: Yes

## 2019-01-08 ENCOUNTER — HOSPITAL ENCOUNTER (OUTPATIENT)
Dept: CARDIOLOGY | Facility: CLINIC | Age: 49
End: 2019-01-08
Attending: INTERNAL MEDICINE
Payer: COMMERCIAL

## 2019-01-08 DIAGNOSIS — I42.9 CARDIOMYOPATHY (H): ICD-10-CM

## 2019-01-08 LAB
ICDO DEVICE COMMENTS: NORMAL
MDC_IDC_LEAD_IMPLANT_DT: NORMAL
MDC_IDC_LEAD_LOCATION: NORMAL
MDC_IDC_LEAD_LOCATION_DETAIL_1: NORMAL
MDC_IDC_LEAD_MFG: NORMAL
MDC_IDC_LEAD_MODEL: NORMAL
MDC_IDC_LEAD_SERIAL: NORMAL
MDC_IDC_MSMT_BATTERY_STATUS: NORMAL
MDC_IDC_MSMT_BATTERY_VOLTAGE: 3.12 V
MDC_IDC_MSMT_CAP_CHARGE_ENERGY: 40 J
MDC_IDC_MSMT_CAP_CHARGE_TIME: 9 S
MDC_IDC_MSMT_CAP_CHARGE_TYPE: NORMAL
MDC_IDC_MSMT_LEADCHNL_RA_SENSING_INTR_AMPL: 4.3 MV
MDC_IDC_MSMT_LEADCHNL_RV_IMPEDANCE_VALUE: 509 OHM
MDC_IDC_MSMT_LEADCHNL_RV_PACING_THRESHOLD_AMPLITUDE: 0.5 V
MDC_IDC_MSMT_LEADCHNL_RV_PACING_THRESHOLD_PULSEWIDTH: 0.4 MS
MDC_IDC_MSMT_LEADCHNL_RV_SENSING_INTR_AMPL: 22 MV
MDC_IDC_PG_IMPLANT_DTM: NORMAL
MDC_IDC_PG_MFG: NORMAL
MDC_IDC_PG_MODEL: NORMAL
MDC_IDC_PG_SERIAL: NORMAL
MDC_IDC_PG_TYPE: NORMAL
MDC_IDC_SESS_CLINIC_NAME: NORMAL
MDC_IDC_SESS_DTM: NORMAL
MDC_IDC_SESS_REPROGRAMMED: NORMAL
MDC_IDC_SESS_TYPE: NORMAL
MDC_IDC_SET_BRADY_AT_MODE_SWITCH_MODE: NORMAL
MDC_IDC_SET_BRADY_HYSTRATE: 40 {BEATS}/MIN
MDC_IDC_SET_BRADY_LOWRATE: 40 {BEATS}/MIN
MDC_IDC_SET_BRADY_MODE: NORMAL
MDC_IDC_SET_BRADY_NIGHT_RATE: 40 {BEATS}/MIN
MDC_IDC_SET_CRT_PACED_CHAMBERS: NORMAL
MDC_IDC_SET_LEADCHNL_LV_PACING_ANODE_ELECTRODE_1: NORMAL
MDC_IDC_SET_LEADCHNL_LV_PACING_ANODE_LOCATION_1: NORMAL
MDC_IDC_SET_LEADCHNL_LV_PACING_CATHODE_ELECTRODE_1: NORMAL
MDC_IDC_SET_LEADCHNL_LV_PACING_CATHODE_LOCATION_1: NORMAL
MDC_IDC_SET_LEADCHNL_LV_PACING_POLARITY: NORMAL
MDC_IDC_SET_LEADCHNL_LV_SENSING_ANODE_ELECTRODE_1: NORMAL
MDC_IDC_SET_LEADCHNL_LV_SENSING_ANODE_LOCATION_1: NORMAL
MDC_IDC_SET_LEADCHNL_LV_SENSING_CATHODE_ELECTRODE_1: NORMAL
MDC_IDC_SET_LEADCHNL_LV_SENSING_CATHODE_LOCATION_1: NORMAL
MDC_IDC_SET_LEADCHNL_LV_SENSING_POLARITY: NORMAL
MDC_IDC_SET_LEADCHNL_RA_PACING_ANODE_ELECTRODE_1: NORMAL
MDC_IDC_SET_LEADCHNL_RA_PACING_ANODE_LOCATION_1: NORMAL
MDC_IDC_SET_LEADCHNL_RA_PACING_CATHODE_ELECTRODE_1: NORMAL
MDC_IDC_SET_LEADCHNL_RA_PACING_CATHODE_LOCATION_1: NORMAL
MDC_IDC_SET_LEADCHNL_RA_PACING_POLARITY: NORMAL
MDC_IDC_SET_LEADCHNL_RA_SENSING_POLARITY: NORMAL
MDC_IDC_SET_LEADCHNL_RA_SENSING_SENSITIVITY: 0.4 MV
MDC_IDC_SET_LEADCHNL_RV_PACING_AMPLITUDE: 3.5 V
MDC_IDC_SET_LEADCHNL_RV_PACING_POLARITY: NORMAL
MDC_IDC_SET_LEADCHNL_RV_PACING_PULSEWIDTH: 0.4 MS
MDC_IDC_SET_LEADCHNL_RV_SENSING_POLARITY: NORMAL
MDC_IDC_SET_LEADCHNL_RV_SENSING_SENSITIVITY: 0.8 MV
MDC_IDC_SET_ZONE_DETECTION_INTERVAL: 250 MS
MDC_IDC_SET_ZONE_DETECTION_INTERVAL: 300 MS
MDC_IDC_SET_ZONE_DETECTION_INTERVAL: 350 MS
MDC_IDC_SET_ZONE_TYPE: NORMAL
MDC_IDC_STAT_AT_MODE_SW_COUNT: 0
MDC_IDC_STAT_BRADY_RV_PERCENT_PACED: 0 %
MDC_IDC_STAT_EPISODE_RECENT_COUNT: 0
MDC_IDC_STAT_EPISODE_TYPE: NORMAL
MDC_IDC_STAT_TACHYTHERAPY_SHOCKS_ABORTED_TOTAL: 0
MDC_IDC_STAT_TACHYTHERAPY_SHOCKS_DELIVERED_RECENT: 0
MDC_IDC_STAT_TACHYTHERAPY_SHOCKS_DELIVERED_TOTAL: 0

## 2019-01-08 PROCEDURE — 93282 PRGRMG EVAL IMPLANTABLE DFB: CPT

## 2019-01-08 PROCEDURE — 93282 PRGRMG EVAL IMPLANTABLE DFB: CPT | Mod: 26 | Performed by: INTERNAL MEDICINE

## 2019-02-20 NOTE — PROGRESS NOTES
CARDIOLOGY VISIT    REASON FOR VISIT: afib, HCM    SUBJECTIVE:  48-year-old male seen for atrial fibrillation and apical hypertrophic cardiomyopathy.      He reports a history of paroxysmal A. fib dating back about 20 years.  He has had several cardioversions in the past.     In November 2017 he presented to the [facility] with atrial fibrillation and chest pain with troponin 0.13.  Angiogram showed normal left main, mild disease of LAD, 60% D1 (FFR 0.88), normal circumflex, dominant RCA with minimal disease.   Echo showed EF 65%, moderate mostly concentric LVH with slight prominence of the septum, moderate left atrial enlargement, no significant valve disease, no outflow obstruction.       Cardiac MRI December 2017 showed EF 70%, mild to moderate mid ventricular hypertrophy and severe apical hypertrophy consistent with apical hypertrophic cardiomyopathy, normal RV, diffuse patchy mid wall enhancement of the anterior and apical segments, total scar burden 11%, trivial MR.       Transesophageal echo December 2017 showed EF 60%, severe apical hypertrophy consistent with hypertrophic cardiomyopathy, normal RV, mild left atrial enlargement, thrombus in the left atrial appendage, mild MR, mild-to-moderate TR.       In February 2018 he underwent atrial fibrillation ablation including wide circumferential pulmonary vein isolation and SVC isolation.  30 day event monitor April 2018 showed sinus rhythm, no A. fib.  8-day Zio patch in September 2018 showed sinus rhythm, no A. fib, 8 beat run of VT correlating with symptoms.    In October 2018 he had an episode of syncope.  He then underwent single-chamber ICD implantation ([employer]ron[employer] Intica 7 VR-T ProMRI).     Device interrogation January 2019 showed 0% pacing, sinus rhythm, 2 episodes nonsustained VT, duration 8 and 9 beats, no therapy, 10 years battery.     He has been doing well in the past few months.  He has occasional palpitations similar to when he has had nonsustained VT  in the past.  He has no syncope or lower extremity edema.  His main complaint is some generalized fatigue and sleepiness.  He sleeps about 7 hours at night, he gets up at 4 AM for his job.    MEDICATIONS:  Current Outpatient Medications   Medication     amLODIPine (NORVASC) 5 MG tablet     apixaban ANTICOAGULANT (ELIQUIS) 5 MG tablet     Ascorbic Acid (VITAMIN C PO)     atorvastatin (LIPITOR) 80 MG tablet     metoprolol succinate (TOPROL XL) 50 MG 24 hr tablet     No current facility-administered medications for this visit.        ALLERGIES:  No Known Allergies    REVIEW OF SYSTEMS:  Constitutional:  No weight loss, fever, chills, weakness or fatigue.  HEENT:  Eyes:  No visual loss, blurred vision, double vision or yellow sclerae. No hearing loss, sneezing, congestion, runny nose or sore throat.  Skin:  No rash or itching.  Cardiovascular: per HPI  Respiratory: per HPI  GI:  No anorexia, nausea, vomiting or diarrhea. No abdominal pain or blood.  :  No dysurea, hematuria  Neurologic:  No headache, dizziness, syncope, paralysis, ataxia, numbness or tingling in the extremities. No change in bowel or bladder control.  Musculoskeletal:  No muscle, back pain, joint pain or stiffness.  Hematologic:  No anemia, bleeding or bruising.  Lymphatics:  No enlarged nodes. No history of splenectomy.  Psychiatric:  No history of depression or anxiety.  Endocrine:  No reports of sweating, cold or heat intolerance. No polyuria or polydipsia.  Allergies:  No history of asthma, hives, eczema or rhinitis.    PHYSICAL EXAM:  /84   Pulse 60   Wt 112.5 kg (248 lb)   SpO2 97%   BMI 34.59 kg/m    Constitutional: awake, alert, no distress  Eyes: PERRL, sclera nonicteric  ENT: trachea midline  Respiratory: Lungs clear  Cardiovascular: Regular rate and rhythm, no murmurs  GI: nondistended, nontender, bowel sounds present  Lymph/Hematologic: no lymphadenopathy  Skin: dry, no rash  Musculoskeletal: good muscle tone, strength 5/5 in  upper and lower extremities  Neurologic: no focal deficits  Neuropsychiatric: appropriate affact    DATA:  Lab: February 2019: Cholesterol 141, HDL 36, LDL 89, triglycerides 78    ASSESSMENT:  48-year-old male seen for follow-up of apical hypertrophic cardiomyopathy.  Clinically he is doing well.  He has occasional very short runs of nonsustained VT which he typically feels.  However he has had no sustained VT and there has been no device therapies.    He probably has sleep apnea with his fatigue and sleepiness.  He declined further evaluation at this point.    There has been no documented A. fib since his ablation in February 2018.  If on his next device check there is no A. fib, he will stop Eliquis.    RECOMMENDATIONS:  1.  Apical hypertrophic cardiomyopathy  -Repeat echo in 6 months    2.  Nonsustained VT  -Continue metoprolol  - ICD in place    3.  History atrial fibrillation, status post A. fib ablation  -If no A. fib on device check in April, he will discontinue Eliquis and start aspirin 81 mg daily    4.  Dyslipidemia  - Check lipids today, continue atorvastatin    Follow-up in 6 months with echo.    Brice Harrison MD  Cardiology - [facility]  Pager:  [phone]  Text Page  February 20, 2019

## 2019-02-21 ENCOUNTER — OFFICE VISIT (OUTPATIENT)
Dept: CARDIOLOGY | Facility: CLINIC | Age: 49
End: 2019-02-21
Payer: COMMERCIAL

## 2019-02-21 VITALS
WEIGHT: 248 LBS | HEART RATE: 60 BPM | SYSTOLIC BLOOD PRESSURE: 132 MMHG | DIASTOLIC BLOOD PRESSURE: 84 MMHG | BODY MASS INDEX: 34.59 KG/M2 | OXYGEN SATURATION: 97 %

## 2019-02-21 DIAGNOSIS — I10 BENIGN ESSENTIAL HYPERTENSION: ICD-10-CM

## 2019-02-21 DIAGNOSIS — I42.2 APICAL VARIANT HYPERTROPHIC CARDIOMYOPATHY (H): ICD-10-CM

## 2019-02-21 DIAGNOSIS — I48.0 PAROXYSMAL ATRIAL FIBRILLATION (H): ICD-10-CM

## 2019-02-21 DIAGNOSIS — E78.5 HYPERLIPIDEMIA LDL GOAL <70: ICD-10-CM

## 2019-02-21 DIAGNOSIS — I47.29 NSVT (NONSUSTAINED VENTRICULAR TACHYCARDIA) (H): ICD-10-CM

## 2019-02-21 LAB
ALT SERPL W P-5'-P-CCNC: 102 U/L (ref 0–70)
CHOLEST SERPL-MCNC: 141 MG/DL
HDLC SERPL-MCNC: 36 MG/DL
LDLC SERPL CALC-MCNC: 89 MG/DL
NONHDLC SERPL-MCNC: 105 MG/DL
TRIGL SERPL-MCNC: 78 MG/DL

## 2019-02-21 PROCEDURE — 84460 ALANINE AMINO (ALT) (SGPT): CPT | Performed by: INTERNAL MEDICINE

## 2019-02-21 PROCEDURE — 99214 OFFICE O/P EST MOD 30 MIN: CPT | Performed by: INTERNAL MEDICINE

## 2019-02-21 PROCEDURE — 36415 COLL VENOUS BLD VENIPUNCTURE: CPT | Performed by: INTERNAL MEDICINE

## 2019-02-21 PROCEDURE — 80061 LIPID PANEL: CPT | Performed by: INTERNAL MEDICINE

## 2019-02-21 RX ORDER — ATORVASTATIN CALCIUM 80 MG/1
80 TABLET, FILM COATED ORAL DAILY
Qty: 90 TABLET | Refills: 3 | Status: SHIPPED | OUTPATIENT
Start: 2019-02-21 | End: 2020-03-10

## 2019-02-21 RX ORDER — AMLODIPINE BESYLATE 5 MG/1
5 TABLET ORAL DAILY
Qty: 90 TABLET | Refills: 3 | Status: SHIPPED | OUTPATIENT
Start: 2019-02-21 | End: 2020-01-09

## 2019-02-21 RX ORDER — METOPROLOL SUCCINATE 50 MG/1
50 TABLET, EXTENDED RELEASE ORAL DAILY
Qty: 90 TABLET | Refills: 3 | Status: SHIPPED | OUTPATIENT
Start: 2019-02-21 | End: 2020-01-09

## 2019-02-21 NOTE — LETTER
2/21/2019    [facility]  [address] [address]    RE: Matthew Still       Dear Colleague,    I had the pleasure of seeing Matthew Still in the [facility].    CARDIOLOGY VISIT    REASON FOR VISIT: afib, HCM    SUBJECTIVE:  48-year-old male seen for atrial fibrillation and apical hypertrophic cardiomyopathy.      He reports a history of paroxysmal A. fib dating back about 20 years.  He has had several cardioversions in the past.     In November 2017 he presented to the [facility] with atrial fibrillation and chest pain with troponin 0.13.  Angiogram showed normal left main, mild disease of LAD, 60% D1 (FFR 0.88), normal circumflex, dominant RCA with minimal disease.   Echo showed EF 65%, moderate mostly concentric LVH with slight prominence of the septum, moderate left atrial enlargement, no significant valve disease, no outflow obstruction.       Cardiac MRI December 2017 showed EF 70%, mild to moderate mid ventricular hypertrophy and severe apical hypertrophy consistent with apical hypertrophic cardiomyopathy, normal RV, diffuse patchy mid wall enhancement of the anterior and apical segments, total scar burden 11%, trivial MR.       Transesophageal echo December 2017 showed EF 60%, severe apical hypertrophy consistent with hypertrophic cardiomyopathy, normal RV, mild left atrial enlargement, thrombus in the left atrial appendage, mild MR, mild-to-moderate TR.       In February 2018 he underwent atrial fibrillation ablation including wide circumferential pulmonary vein isolation and SVC isolation.  30 day event monitor April 2018 showed sinus rhythm, no A. fib.  8-day Zio patch in September 2018 showed sinus rhythm, no A. fib, 8 beat run of VT correlating with symptoms.    In October 2018 he had an episode of syncope.  He then underwent single-chamber ICD implantation (Biotronik Intica 7 VR-T ProMRI).     Device interrogation January 2019 showed 0% pacing,  sinus rhythm, 2 episodes nonsustained VT, duration 8 and 9 beats, no therapy, 10 years battery.     He has been doing well in the past few months.  He has occasional palpitations similar to when he has had nonsustained VT in the past.  He has no syncope or lower extremity edema.  His main complaint is some generalized fatigue and sleepiness.  He sleeps about 7 hours at night, he gets up at 4 AM for his job.    MEDICATIONS:  Current Outpatient Medications   Medication     amLODIPine (NORVASC) 5 MG tablet     apixaban ANTICOAGULANT (ELIQUIS) 5 MG tablet     Ascorbic Acid (VITAMIN C PO)     atorvastatin (LIPITOR) 80 MG tablet     metoprolol succinate (TOPROL XL) 50 MG 24 hr tablet     No current facility-administered medications for this visit.        ALLERGIES:  No Known Allergies    REVIEW OF SYSTEMS:  Constitutional:  No weight loss, fever, chills, weakness or fatigue.  HEENT:  Eyes:  No visual loss, blurred vision, double vision or yellow sclerae. No hearing loss, sneezing, congestion, runny nose or sore throat.  Skin:  No rash or itching.  Cardiovascular: per HPI  Respiratory: per HPI  GI:  No anorexia, nausea, vomiting or diarrhea. No abdominal pain or blood.  :  No dysurea, hematuria  Neurologic:  No headache, dizziness, syncope, paralysis, ataxia, numbness or tingling in the extremities. No change in bowel or bladder control.  Musculoskeletal:  No muscle, back pain, joint pain or stiffness.  Hematologic:  No anemia, bleeding or bruising.  Lymphatics:  No enlarged nodes. No history of splenectomy.  Psychiatric:  No history of depression or anxiety.  Endocrine:  No reports of sweating, cold or heat intolerance. No polyuria or polydipsia.  Allergies:  No history of asthma, hives, eczema or rhinitis.    PHYSICAL EXAM:  /84   Pulse 60   Wt 112.5 kg (248 lb)   SpO2 97%   BMI 34.59 kg/m     Constitutional: awake, alert, no distress  Eyes: PERRL, sclera nonicteric  ENT: trachea midline  Respiratory: Lungs  clear  Cardiovascular: Regular rate and rhythm, no murmurs  GI: nondistended, nontender, bowel sounds present  Lymph/Hematologic: no lymphadenopathy  Skin: dry, no rash  Musculoskeletal: good muscle tone, strength 5/5 in upper and lower extremities  Neurologic: no focal deficits  Neuropsychiatric: appropriate affact    DATA:  Lab: February 2019: Cholesterol 141, HDL 36, LDL 89, triglycerides 78    ASSESSMENT:  48-year-old male seen for follow-up of apical hypertrophic cardiomyopathy.  Clinically he is doing well.  He has occasional very short runs of nonsustained VT which he typically feels.  However he has had no sustained VT and there has been no device therapies.    He probably has sleep apnea with his fatigue and sleepiness.  He declined further evaluation at this point.    There has been no documented A. fib since his ablation in February 2018.  If on his next device check there is no A. fib, he will stop Eliquis.    RECOMMENDATIONS:  1.  Apical hypertrophic cardiomyopathy  -Repeat echo in 6 months    2.  Nonsustained VT  -Continue metoprolol  - ICD in place    3.  History atrial fibrillation, status post A. fib ablation  -If no A. fib on device check in April, he will discontinue Eliquis and start aspirin 81 mg daily    4.  Dyslipidemia  - Check lipids today, continue atorvastatin    Follow-up in 6 months with echo.    Brice Harrison MD  Cardiology - [facility]  Pager:  [phone]  Text Page  February 20, 2019    Thank you for allowing me to participate in the care of your patient.    Sincerely,     Brice Harrison MD     [facility]

## 2019-02-21 NOTE — LETTER
2/21/2019    [facility]  [address] [address]    RE: Matthew Still       Dear Colleague,    I had the pleasure of seeing Matthew Still in the [facility].    CARDIOLOGY VISIT    REASON FOR VISIT: afib, HCM    SUBJECTIVE:  48-year-old male seen for atrial fibrillation and apical hypertrophic cardiomyopathy.      He reports a history of paroxysmal A. fib dating back about 20 years.  He has had several cardioversions in the past.     In November 2017 he presented to the [facility] with atrial fibrillation and chest pain with troponin 0.13.  Angiogram showed normal left main, mild disease of LAD, 60% D1 (FFR 0.88), normal circumflex, dominant RCA with minimal disease.   Echo showed EF 65%, moderate mostly concentric LVH with slight prominence of the septum, moderate left atrial enlargement, no significant valve disease, no outflow obstruction.       Cardiac MRI December 2017 showed EF 70%, mild to moderate mid ventricular hypertrophy and severe apical hypertrophy consistent with apical hypertrophic cardiomyopathy, normal RV, diffuse patchy mid wall enhancement of the anterior and apical segments, total scar burden 11%, trivial MR.       Transesophageal echo December 2017 showed EF 60%, severe apical hypertrophy consistent with hypertrophic cardiomyopathy, normal RV, mild left atrial enlargement, thrombus in the left atrial appendage, mild MR, mild-to-moderate TR.       In February 2018 he underwent atrial fibrillation ablation including wide circumferential pulmonary vein isolation and SVC isolation.  30 day event monitor April 2018 showed sinus rhythm, no A. fib.  8-day Zio patch in September 2018 showed sinus rhythm, no A. fib, 8 beat run of VT correlating with symptoms.    In October 2018 he had an episode of syncope.  He then underwent single-chamber ICD implantation (Biotronik Intica 7 VR-T ProMRI).     Device interrogation January 2019 showed 0% pacing,  sinus rhythm, 2 episodes nonsustained VT, duration 8 and 9 beats, no therapy, 10 years battery.     He has been doing well in the past few months.  He has occasional palpitations similar to when he has had nonsustained VT in the past.  He has no syncope or lower extremity edema.  His main complaint is some generalized fatigue and sleepiness.  He sleeps about 7 hours at night, he gets up at 4 AM for his job.    MEDICATIONS:  Current Outpatient Medications   Medication     amLODIPine (NORVASC) 5 MG tablet     apixaban ANTICOAGULANT (ELIQUIS) 5 MG tablet     Ascorbic Acid (VITAMIN C PO)     atorvastatin (LIPITOR) 80 MG tablet     metoprolol succinate (TOPROL XL) 50 MG 24 hr tablet     No current facility-administered medications for this visit.        ALLERGIES:  No Known Allergies    REVIEW OF SYSTEMS:  Constitutional:  No weight loss, fever, chills, weakness or fatigue.  HEENT:  Eyes:  No visual loss, blurred vision, double vision or yellow sclerae. No hearing loss, sneezing, congestion, runny nose or sore throat.  Skin:  No rash or itching.  Cardiovascular: per HPI  Respiratory: per HPI  GI:  No anorexia, nausea, vomiting or diarrhea. No abdominal pain or blood.  :  No dysurea, hematuria  Neurologic:  No headache, dizziness, syncope, paralysis, ataxia, numbness or tingling in the extremities. No change in bowel or bladder control.  Musculoskeletal:  No muscle, back pain, joint pain or stiffness.  Hematologic:  No anemia, bleeding or bruising.  Lymphatics:  No enlarged nodes. No history of splenectomy.  Psychiatric:  No history of depression or anxiety.  Endocrine:  No reports of sweating, cold or heat intolerance. No polyuria or polydipsia.  Allergies:  No history of asthma, hives, eczema or rhinitis.    PHYSICAL EXAM:  /84   Pulse 60   Wt 112.5 kg (248 lb)   SpO2 97%   BMI 34.59 kg/m     Constitutional: awake, alert, no distress  Eyes: PERRL, sclera nonicteric  ENT: trachea midline  Respiratory: Lungs  clear  Cardiovascular: Regular rate and rhythm, no murmurs  GI: nondistended, nontender, bowel sounds present  Lymph/Hematologic: no lymphadenopathy  Skin: dry, no rash  Musculoskeletal: good muscle tone, strength 5/5 in upper and lower extremities  Neurologic: no focal deficits  Neuropsychiatric: appropriate affact    DATA:  Lab: February 2019: Cholesterol 141, HDL 36, LDL 89, triglycerides 78    ASSESSMENT:  48-year-old male seen for follow-up of apical hypertrophic cardiomyopathy.  Clinically he is doing well.  He has occasional very short runs of nonsustained VT which he typically feels.  However he has had no sustained VT and there has been no device therapies.    He probably has sleep apnea with his fatigue and sleepiness.  He declined further evaluation at this point.    There has been no documented A. fib since his ablation in February 2018.  If on his next device check there is no A. fib, he will stop Eliquis.    RECOMMENDATIONS:  1.  Apical hypertrophic cardiomyopathy  -Repeat echo in 6 months    2.  Nonsustained VT  -Continue metoprolol  - ICD in place    3.  History atrial fibrillation, status post A. fib ablation  -If no A. fib on device check in April, he will discontinue Eliquis and start aspirin 81 mg daily    4.  Dyslipidemia  - Check lipids today, continue atorvastatin    Follow-up in 6 months with echo.    Brice Harrison MD  Cardiology - [facility]  Pager:  [phone]  Text Page  February 20, 2019      Thank you for allowing me to participate in the care of your patient.      Sincerely,     Brice Harrison MD     [facility]    cc:   Brice Harrison MD  [facility]  [address] [place] [address]

## 2019-04-30 ENCOUNTER — ANCILLARY PROCEDURE (OUTPATIENT)
Dept: CARDIOLOGY | Facility: CLINIC | Age: 49
End: 2019-04-30
Payer: COMMERCIAL

## 2019-04-30 DIAGNOSIS — Z95.810 ICD (IMPLANTABLE CARDIOVERTER-DEFIBRILLATOR) IN PLACE: ICD-10-CM

## 2019-04-30 PROCEDURE — 93295 DEV INTERROG REMOTE 1/2/MLT: CPT | Performed by: INTERNAL MEDICINE

## 2019-04-30 PROCEDURE — 93296 REM INTERROG EVL PM/IDS: CPT | Performed by: INTERNAL MEDICINE

## 2019-05-07 LAB
MDC_IDC_LEAD_IMPLANT_DT: NORMAL
MDC_IDC_LEAD_LOCATION: NORMAL
MDC_IDC_LEAD_LOCATION_DETAIL_1: NORMAL
MDC_IDC_LEAD_MFG: NORMAL
MDC_IDC_LEAD_MODEL: NORMAL
MDC_IDC_LEAD_SERIAL: NORMAL
MDC_IDC_MSMT_BATTERY_REMAINING_PERCENTAGE: 100 %
MDC_IDC_MSMT_BATTERY_RRT_TRIGGER: NORMAL
MDC_IDC_MSMT_BATTERY_STATUS: NORMAL
MDC_IDC_MSMT_BATTERY_VOLTAGE: 3.12 V
MDC_IDC_MSMT_CAP_CHARGE_DTM: NORMAL
MDC_IDC_MSMT_CAP_CHARGE_ENERGY: 40 J
MDC_IDC_MSMT_CAP_CHARGE_TIME: 9 S
MDC_IDC_MSMT_CAP_CHARGE_TYPE: NORMAL
MDC_IDC_MSMT_LEADCHNL_RA_LEAD_CHANNEL_STATUS: NORMAL
MDC_IDC_MSMT_LEADCHNL_RV_IMPEDANCE_VALUE: 498 OHM
MDC_IDC_MSMT_LEADCHNL_RV_LEAD_CHANNEL_STATUS: NORMAL
MDC_IDC_PG_IMPLANT_DTM: NORMAL
MDC_IDC_PG_MFG: NORMAL
MDC_IDC_PG_MODEL: NORMAL
MDC_IDC_PG_SERIAL: NORMAL
MDC_IDC_PG_TYPE: NORMAL
MDC_IDC_SESS_CLINIC_NAME: NORMAL
MDC_IDC_SESS_DTM: NORMAL
MDC_IDC_SESS_REPROGRAMMED: NO
MDC_IDC_SESS_TYPE: NORMAL
MDC_IDC_SET_BRADY_LOWRATE: 40 {BEATS}/MIN
MDC_IDC_SET_BRADY_MODE: NORMAL
MDC_IDC_SET_LEADCHNL_RA_SENSING_POLARITY: NORMAL
MDC_IDC_SET_LEADCHNL_RA_SENSING_SENSITIVITY: 0.4 MV
MDC_IDC_SET_LEADCHNL_RV_PACING_AMPLITUDE: 3.5 V
MDC_IDC_SET_LEADCHNL_RV_PACING_POLARITY: NORMAL
MDC_IDC_SET_LEADCHNL_RV_PACING_PULSEWIDTH: 0.4 MS
MDC_IDC_SET_LEADCHNL_RV_SENSING_ADAPTATION_MODE: NORMAL
MDC_IDC_SET_LEADCHNL_RV_SENSING_POLARITY: NORMAL
MDC_IDC_SET_LEADCHNL_RV_SENSING_SENSITIVITY: 0.8 MV
MDC_IDC_SET_ZONE_DETECTION_BEATS_DENOMINATOR: 40 {BEATS}
MDC_IDC_SET_ZONE_DETECTION_BEATS_NUMERATOR: 30 {BEATS}
MDC_IDC_SET_ZONE_DETECTION_INTERVAL: 250 MS
MDC_IDC_SET_ZONE_DETECTION_INTERVAL: 300 MS
MDC_IDC_SET_ZONE_TYPE: NORMAL
MDC_IDC_SET_ZONE_TYPE: NORMAL
MDC_IDC_STAT_AT_BURDEN_PERCENT: 0 %
MDC_IDC_STAT_AT_DTM_END: NORMAL
MDC_IDC_STAT_AT_DTM_START: NORMAL
MDC_IDC_STAT_BRADY_AS_VP_PERCENT: 0 %
MDC_IDC_STAT_BRADY_AS_VS_PERCENT: 100 %
MDC_IDC_STAT_BRADY_DTM_END: NORMAL
MDC_IDC_STAT_BRADY_DTM_START: NORMAL
MDC_IDC_STAT_BRADY_RV_PERCENT_PACED: 0 %
MDC_IDC_STAT_CRT_DTM_END: NORMAL
MDC_IDC_STAT_CRT_DTM_START: NORMAL
MDC_IDC_STAT_EPISODE_TOTAL_COUNT: 0
MDC_IDC_STAT_EPISODE_TOTAL_COUNT_DTM_END: NORMAL
MDC_IDC_STAT_EPISODE_TOTAL_COUNT_DTM_START: NORMAL
MDC_IDC_STAT_EPISODE_TYPE: NORMAL
MDC_IDC_STAT_TACHYTHERAPY_ATP_DELIVERED_TOTAL: 0
MDC_IDC_STAT_TACHYTHERAPY_SHOCKS_ABORTED_TOTAL: 0
MDC_IDC_STAT_TACHYTHERAPY_SHOCKS_DELIVERED_TOTAL: 0
MDC_IDC_STAT_TACHYTHERAPY_TOTAL_DTM_END: NORMAL
MDC_IDC_STAT_TACHYTHERAPY_TOTAL_DTM_START: NORMAL

## 2019-05-21 ENCOUNTER — DOCUMENTATION ONLY (OUTPATIENT)
Dept: CARDIOLOGY | Facility: CLINIC | Age: 49
End: 2019-05-21

## 2019-06-06 NOTE — RESULT ENCOUNTER NOTE
06-Jun-2019 17:35 Results noted: lipids improved but ALT elevated. Will discuss with Dr Harrison for recommendations.

## 2019-06-24 DIAGNOSIS — I48.0 PAROXYSMAL ATRIAL FIBRILLATION (H): ICD-10-CM

## 2019-06-24 NOTE — TELEPHONE ENCOUNTER
Per Jodi JUAREZ's routing comment:  Hi,   Please see documentation.   Can you please do a courtesy check and forward result to me?   I will pass it along to Dr. Harrison.   Thank you, Jodi     Pt has a Biotronik ICD. Remote monitor updates daily, last update was 6/23/2019 at 7pm. There have been no arrhythmias recorded since 5/20/2019 when there was an alert for NSVT lasting 11 beats at 's bpm.     Will send back to Jodi JUAREZ.

## 2019-06-24 NOTE — TELEPHONE ENCOUNTER
"Refill request for Eliquis received. Reviewed Dr. Harrison last OV note, 2/21/2019. If no AF identified on April device check, may discontinue Eliquis. April 30th 2019 device check report reviewed. No AF identified. Episodes of SVT seen. Pt is on Metoprolol XL 50 mg every day. I contacted patient per phone to discuss the stopping of Eliquis. He states these \"episodes\" described as \"racing heart, pounding heart\" have increased over the last few days and asks if we can look to see what was going on. I requested he not change anything at this point and I will pass this message along to device clinic and then inform Dr. Harrison with result/recommendations. Maria L Torres, RN Cardiology at [facility] June 24, 2019, 10:26 AM  "

## 2019-06-24 NOTE — TELEPHONE ENCOUNTER
OK to stop Eliquis since there is no afib.  He has not had any SVT since 5-20, so for now would keep metoprolol the same.    Leonid    ADDENDUM: Pt called back to discuss stopping Eliquis and continuing metoprolol. He verbalized understanding and agreed with plan. Med list updated. Yary Tello RN Cardiology June 24, 2019, 3:20 PM

## 2019-07-30 ENCOUNTER — ANCILLARY PROCEDURE (OUTPATIENT)
Dept: CARDIOLOGY | Facility: CLINIC | Age: 49
End: 2019-07-30
Attending: INTERNAL MEDICINE
Payer: COMMERCIAL

## 2019-07-30 DIAGNOSIS — Z95.810 ICD (IMPLANTABLE CARDIOVERTER-DEFIBRILLATOR) IN PLACE: ICD-10-CM

## 2019-07-30 PROCEDURE — 93296 REM INTERROG EVL PM/IDS: CPT | Performed by: INTERNAL MEDICINE

## 2019-07-30 PROCEDURE — 93295 DEV INTERROG REMOTE 1/2/MLT: CPT | Performed by: INTERNAL MEDICINE

## 2019-08-06 LAB
MDC_IDC_LEAD_IMPLANT_DT: NORMAL
MDC_IDC_LEAD_LOCATION: NORMAL
MDC_IDC_LEAD_LOCATION_DETAIL_1: NORMAL
MDC_IDC_LEAD_MFG: NORMAL
MDC_IDC_LEAD_MODEL: NORMAL
MDC_IDC_LEAD_SERIAL: NORMAL
MDC_IDC_MSMT_BATTERY_REMAINING_PERCENTAGE: 100 %
MDC_IDC_MSMT_BATTERY_RRT_TRIGGER: NORMAL
MDC_IDC_MSMT_BATTERY_STATUS: NORMAL
MDC_IDC_MSMT_BATTERY_VOLTAGE: 3.11 V
MDC_IDC_MSMT_CAP_CHARGE_DTM: NORMAL
MDC_IDC_MSMT_CAP_CHARGE_ENERGY: 40 J
MDC_IDC_MSMT_CAP_CHARGE_TIME: 9.1 S
MDC_IDC_MSMT_CAP_CHARGE_TYPE: NORMAL
MDC_IDC_MSMT_LEADCHNL_RA_LEAD_CHANNEL_STATUS: NORMAL
MDC_IDC_MSMT_LEADCHNL_RV_IMPEDANCE_VALUE: 500 OHM
MDC_IDC_MSMT_LEADCHNL_RV_LEAD_CHANNEL_STATUS: NORMAL
MDC_IDC_PG_IMPLANT_DTM: NORMAL
MDC_IDC_PG_MFG: NORMAL
MDC_IDC_PG_MODEL: NORMAL
MDC_IDC_PG_SERIAL: NORMAL
MDC_IDC_PG_TYPE: NORMAL
MDC_IDC_SESS_CLINIC_NAME: NORMAL
MDC_IDC_SESS_DTM: NORMAL
MDC_IDC_SESS_REPROGRAMMED: NO
MDC_IDC_SESS_TYPE: NORMAL
MDC_IDC_SET_BRADY_LOWRATE: 40 {BEATS}/MIN
MDC_IDC_SET_BRADY_MODE: NORMAL
MDC_IDC_SET_LEADCHNL_RA_SENSING_POLARITY: NORMAL
MDC_IDC_SET_LEADCHNL_RA_SENSING_SENSITIVITY: 0.4 MV
MDC_IDC_SET_LEADCHNL_RV_PACING_AMPLITUDE: 3.5 V
MDC_IDC_SET_LEADCHNL_RV_PACING_POLARITY: NORMAL
MDC_IDC_SET_LEADCHNL_RV_PACING_PULSEWIDTH: 0.4 MS
MDC_IDC_SET_LEADCHNL_RV_SENSING_ADAPTATION_MODE: NORMAL
MDC_IDC_SET_LEADCHNL_RV_SENSING_POLARITY: NORMAL
MDC_IDC_SET_LEADCHNL_RV_SENSING_SENSITIVITY: 0.8 MV
MDC_IDC_SET_ZONE_DETECTION_BEATS_DENOMINATOR: 40 {BEATS}
MDC_IDC_SET_ZONE_DETECTION_BEATS_NUMERATOR: 30 {BEATS}
MDC_IDC_SET_ZONE_DETECTION_INTERVAL: 250 MS
MDC_IDC_SET_ZONE_DETECTION_INTERVAL: 300 MS
MDC_IDC_SET_ZONE_TYPE: NORMAL
MDC_IDC_SET_ZONE_TYPE: NORMAL
MDC_IDC_STAT_AT_BURDEN_PERCENT: 0 %
MDC_IDC_STAT_AT_DTM_END: NORMAL
MDC_IDC_STAT_AT_DTM_START: NORMAL
MDC_IDC_STAT_BRADY_AS_VP_PERCENT: 0 %
MDC_IDC_STAT_BRADY_AS_VS_PERCENT: 100 %
MDC_IDC_STAT_BRADY_DTM_END: NORMAL
MDC_IDC_STAT_BRADY_DTM_START: NORMAL
MDC_IDC_STAT_BRADY_RV_PERCENT_PACED: 0 %
MDC_IDC_STAT_CRT_DTM_END: NORMAL
MDC_IDC_STAT_CRT_DTM_START: NORMAL
MDC_IDC_STAT_EPISODE_TOTAL_COUNT: 0
MDC_IDC_STAT_EPISODE_TOTAL_COUNT_DTM_END: NORMAL
MDC_IDC_STAT_EPISODE_TOTAL_COUNT_DTM_START: NORMAL
MDC_IDC_STAT_EPISODE_TYPE: NORMAL
MDC_IDC_STAT_TACHYTHERAPY_ATP_DELIVERED_TOTAL: 0
MDC_IDC_STAT_TACHYTHERAPY_SHOCKS_ABORTED_TOTAL: 0
MDC_IDC_STAT_TACHYTHERAPY_SHOCKS_DELIVERED_TOTAL: 0
MDC_IDC_STAT_TACHYTHERAPY_TOTAL_DTM_END: NORMAL
MDC_IDC_STAT_TACHYTHERAPY_TOTAL_DTM_START: NORMAL

## 2019-10-31 ENCOUNTER — ANCILLARY PROCEDURE (OUTPATIENT)
Dept: CARDIOLOGY | Facility: CLINIC | Age: 49
End: 2019-10-31
Attending: INTERNAL MEDICINE
Payer: COMMERCIAL

## 2019-10-31 DIAGNOSIS — Z95.810 ICD (IMPLANTABLE CARDIOVERTER-DEFIBRILLATOR) IN PLACE: ICD-10-CM

## 2019-10-31 DIAGNOSIS — I42.9 CARDIOMYOPATHY (H): Primary | ICD-10-CM

## 2019-10-31 PROCEDURE — 93296 REM INTERROG EVL PM/IDS: CPT | Performed by: INTERNAL MEDICINE

## 2019-10-31 PROCEDURE — 93295 DEV INTERROG REMOTE 1/2/MLT: CPT | Performed by: INTERNAL MEDICINE

## 2019-11-04 LAB
MDC_IDC_LEAD_IMPLANT_DT: NORMAL
MDC_IDC_LEAD_LOCATION: NORMAL
MDC_IDC_LEAD_LOCATION_DETAIL_1: NORMAL
MDC_IDC_LEAD_MFG: NORMAL
MDC_IDC_LEAD_MODEL: NORMAL
MDC_IDC_LEAD_SERIAL: NORMAL
MDC_IDC_MSMT_BATTERY_REMAINING_PERCENTAGE: 100 %
MDC_IDC_MSMT_BATTERY_RRT_TRIGGER: NORMAL
MDC_IDC_MSMT_BATTERY_STATUS: NORMAL
MDC_IDC_MSMT_BATTERY_VOLTAGE: 3.11 V
MDC_IDC_MSMT_CAP_CHARGE_DTM: NORMAL
MDC_IDC_MSMT_CAP_CHARGE_ENERGY: 40 J
MDC_IDC_MSMT_CAP_CHARGE_TIME: 9.3 S
MDC_IDC_MSMT_CAP_CHARGE_TYPE: NORMAL
MDC_IDC_MSMT_LEADCHNL_RA_LEAD_CHANNEL_STATUS: NORMAL
MDC_IDC_MSMT_LEADCHNL_RV_IMPEDANCE_VALUE: 502 OHM
MDC_IDC_MSMT_LEADCHNL_RV_LEAD_CHANNEL_STATUS: NORMAL
MDC_IDC_PG_IMPLANT_DTM: NORMAL
MDC_IDC_PG_MFG: NORMAL
MDC_IDC_PG_MODEL: NORMAL
MDC_IDC_PG_SERIAL: NORMAL
MDC_IDC_PG_TYPE: NORMAL
MDC_IDC_SESS_CLINIC_NAME: NORMAL
MDC_IDC_SESS_DTM: NORMAL
MDC_IDC_SESS_REPROGRAMMED: NO
MDC_IDC_SESS_TYPE: NORMAL
MDC_IDC_SET_BRADY_LOWRATE: 40 {BEATS}/MIN
MDC_IDC_SET_BRADY_MODE: NORMAL
MDC_IDC_SET_LEADCHNL_RA_SENSING_POLARITY: NORMAL
MDC_IDC_SET_LEADCHNL_RA_SENSING_SENSITIVITY: 0.4 MV
MDC_IDC_SET_LEADCHNL_RV_PACING_AMPLITUDE: 3.5 V
MDC_IDC_SET_LEADCHNL_RV_PACING_POLARITY: NORMAL
MDC_IDC_SET_LEADCHNL_RV_PACING_PULSEWIDTH: 0.4 MS
MDC_IDC_SET_LEADCHNL_RV_SENSING_ADAPTATION_MODE: NORMAL
MDC_IDC_SET_LEADCHNL_RV_SENSING_POLARITY: NORMAL
MDC_IDC_SET_LEADCHNL_RV_SENSING_SENSITIVITY: 0.8 MV
MDC_IDC_SET_ZONE_DETECTION_BEATS_DENOMINATOR: 40 {BEATS}
MDC_IDC_SET_ZONE_DETECTION_BEATS_NUMERATOR: 30 {BEATS}
MDC_IDC_SET_ZONE_DETECTION_INTERVAL: 250 MS
MDC_IDC_SET_ZONE_DETECTION_INTERVAL: 300 MS
MDC_IDC_SET_ZONE_TYPE: NORMAL
MDC_IDC_SET_ZONE_TYPE: NORMAL
MDC_IDC_STAT_AT_BURDEN_PERCENT: 0 %
MDC_IDC_STAT_AT_DTM_END: NORMAL
MDC_IDC_STAT_AT_DTM_START: NORMAL
MDC_IDC_STAT_BRADY_AS_VP_PERCENT: 0 %
MDC_IDC_STAT_BRADY_AS_VS_PERCENT: 100 %
MDC_IDC_STAT_BRADY_DTM_END: NORMAL
MDC_IDC_STAT_BRADY_DTM_START: NORMAL
MDC_IDC_STAT_BRADY_RV_PERCENT_PACED: 0 %
MDC_IDC_STAT_CRT_DTM_END: NORMAL
MDC_IDC_STAT_CRT_DTM_START: NORMAL
MDC_IDC_STAT_EPISODE_TOTAL_COUNT: 0
MDC_IDC_STAT_EPISODE_TOTAL_COUNT_DTM_END: NORMAL
MDC_IDC_STAT_EPISODE_TOTAL_COUNT_DTM_START: NORMAL
MDC_IDC_STAT_EPISODE_TYPE: NORMAL
MDC_IDC_STAT_TACHYTHERAPY_ATP_DELIVERED_TOTAL: 0
MDC_IDC_STAT_TACHYTHERAPY_SHOCKS_ABORTED_TOTAL: 0
MDC_IDC_STAT_TACHYTHERAPY_SHOCKS_DELIVERED_TOTAL: 0
MDC_IDC_STAT_TACHYTHERAPY_TOTAL_DTM_END: NORMAL
MDC_IDC_STAT_TACHYTHERAPY_TOTAL_DTM_START: NORMAL

## 2019-12-31 ENCOUNTER — DOCUMENTATION ONLY (OUTPATIENT)
Dept: CARDIOLOGY | Facility: CLINIC | Age: 49
End: 2019-12-31

## 2019-12-31 ENCOUNTER — HOSPITAL ENCOUNTER (OUTPATIENT)
Dept: CARDIOLOGY | Facility: CLINIC | Age: 49
Discharge: HOME | End: 2019-12-31
Attending: INTERNAL MEDICINE | Admitting: INTERNAL MEDICINE
Payer: COMMERCIAL

## 2019-12-31 ENCOUNTER — HOSPITAL ENCOUNTER (OUTPATIENT)
Dept: CARDIOLOGY | Facility: CLINIC | Age: 49
Discharge: HOME | End: 2019-12-31
Attending: FAMILY MEDICINE | Admitting: FAMILY MEDICINE
Payer: COMMERCIAL

## 2019-12-31 DIAGNOSIS — I42.9 CARDIOMYOPATHY (H): ICD-10-CM

## 2019-12-31 DIAGNOSIS — I42.2 APICAL VARIANT HYPERTROPHIC CARDIOMYOPATHY (H): ICD-10-CM

## 2019-12-31 DIAGNOSIS — Z95.810 ICD (IMPLANTABLE CARDIOVERTER-DEFIBRILLATOR) IN PLACE: ICD-10-CM

## 2019-12-31 DIAGNOSIS — I47.29 NSVT (NONSUSTAINED VENTRICULAR TACHYCARDIA) (H): ICD-10-CM

## 2019-12-31 PROCEDURE — 93282 PRGRMG EVAL IMPLANTABLE DFB: CPT

## 2019-12-31 PROCEDURE — 40000264 ECHOCARDIOGRAM COMPLETE

## 2019-12-31 PROCEDURE — 93306 TTE W/DOPPLER COMPLETE: CPT | Mod: 26 | Performed by: INTERNAL MEDICINE

## 2019-12-31 PROCEDURE — 93282 PRGRMG EVAL IMPLANTABLE DFB: CPT | Mod: 26 | Performed by: INTERNAL MEDICINE

## 2019-12-31 PROCEDURE — 25500064 ZZH RX 255 OP 636: Performed by: FAMILY MEDICINE

## 2019-12-31 RX ADMIN — HUMAN ALBUMIN MICROSPHERES AND PERFLUTREN 2 ML: 10; .22 INJECTION, SOLUTION INTRAVENOUS at 10:33

## 2019-12-31 NOTE — TELEPHONE ENCOUNTER
Biotronik Intica SICD; annual threshold.     1 NSVT episode recorded at 2100 (to account for clock being off, this occurred at about 3pm); rate 220 bpm for 14 beats.  PT DESCRIBES A NEAR SYNCOPAL EPISODE AT WORK AT AROUND 13:30PM WITH ANOTHER EPISODE NOT AS SEVERE AT 1600. NO DOCUMENTED EPISODE AT THESE TIMES.  Pt sees Magalis Martin NP on 1-3-2020. Norvasc 5mg and Toprol XL 50mg. SueLangenbrunnerRN

## 2020-01-06 LAB
MDC_IDC_LEAD_IMPLANT_DT: NORMAL
MDC_IDC_LEAD_LOCATION: NORMAL
MDC_IDC_LEAD_LOCATION_DETAIL_1: NORMAL
MDC_IDC_LEAD_MFG: NORMAL
MDC_IDC_LEAD_MODEL: NORMAL
MDC_IDC_LEAD_SERIAL: NORMAL
MDC_IDC_MSMT_BATTERY_STATUS: NORMAL
MDC_IDC_MSMT_BATTERY_VOLTAGE: 3.11 V
MDC_IDC_MSMT_CAP_CHARGE_ENERGY: 40 J
MDC_IDC_MSMT_CAP_CHARGE_TIME: 9 S
MDC_IDC_MSMT_CAP_CHARGE_TYPE: NORMAL
MDC_IDC_MSMT_LEADCHNL_RA_SENSING_INTR_AMPL: 4.9 MV
MDC_IDC_MSMT_LEADCHNL_RV_IMPEDANCE_VALUE: 509 OHM
MDC_IDC_MSMT_LEADCHNL_RV_PACING_THRESHOLD_AMPLITUDE: 0.5 V
MDC_IDC_MSMT_LEADCHNL_RV_PACING_THRESHOLD_PULSEWIDTH: 0.5 MS
MDC_IDC_MSMT_LEADCHNL_RV_SENSING_INTR_AMPL: 19.9 MV
MDC_IDC_PG_IMPLANT_DTM: NORMAL
MDC_IDC_PG_MFG: NORMAL
MDC_IDC_PG_MODEL: NORMAL
MDC_IDC_PG_SERIAL: NORMAL
MDC_IDC_PG_TYPE: NORMAL
MDC_IDC_SESS_CLINIC_NAME: NORMAL
MDC_IDC_SESS_DTM: NORMAL
MDC_IDC_SESS_REPROGRAMMED: NORMAL
MDC_IDC_SESS_TYPE: NORMAL
MDC_IDC_SET_BRADY_AT_MODE_SWITCH_MODE: NORMAL
MDC_IDC_SET_BRADY_HYSTRATE: 40 {BEATS}/MIN
MDC_IDC_SET_BRADY_LOWRATE: 40 {BEATS}/MIN
MDC_IDC_SET_BRADY_MODE: NORMAL
MDC_IDC_SET_BRADY_NIGHT_RATE: 40 {BEATS}/MIN
MDC_IDC_SET_CRT_PACED_CHAMBERS: NORMAL
MDC_IDC_SET_LEADCHNL_LV_PACING_ANODE_ELECTRODE_1: NORMAL
MDC_IDC_SET_LEADCHNL_LV_PACING_ANODE_LOCATION_1: NORMAL
MDC_IDC_SET_LEADCHNL_LV_PACING_CATHODE_ELECTRODE_1: NORMAL
MDC_IDC_SET_LEADCHNL_LV_PACING_CATHODE_LOCATION_1: NORMAL
MDC_IDC_SET_LEADCHNL_LV_PACING_POLARITY: NORMAL
MDC_IDC_SET_LEADCHNL_LV_SENSING_ANODE_ELECTRODE_1: NORMAL
MDC_IDC_SET_LEADCHNL_LV_SENSING_ANODE_LOCATION_1: NORMAL
MDC_IDC_SET_LEADCHNL_LV_SENSING_CATHODE_ELECTRODE_1: NORMAL
MDC_IDC_SET_LEADCHNL_LV_SENSING_CATHODE_LOCATION_1: NORMAL
MDC_IDC_SET_LEADCHNL_LV_SENSING_POLARITY: NORMAL
MDC_IDC_SET_LEADCHNL_RA_PACING_ANODE_ELECTRODE_1: NORMAL
MDC_IDC_SET_LEADCHNL_RA_PACING_ANODE_LOCATION_1: NORMAL
MDC_IDC_SET_LEADCHNL_RA_PACING_CATHODE_ELECTRODE_1: NORMAL
MDC_IDC_SET_LEADCHNL_RA_PACING_CATHODE_LOCATION_1: NORMAL
MDC_IDC_SET_LEADCHNL_RA_PACING_POLARITY: NORMAL
MDC_IDC_SET_LEADCHNL_RA_SENSING_POLARITY: NORMAL
MDC_IDC_SET_LEADCHNL_RA_SENSING_SENSITIVITY: 0.4 MV
MDC_IDC_SET_LEADCHNL_RV_PACING_AMPLITUDE: 1.75 V
MDC_IDC_SET_LEADCHNL_RV_PACING_POLARITY: NORMAL
MDC_IDC_SET_LEADCHNL_RV_PACING_PULSEWIDTH: 0.4 MS
MDC_IDC_SET_LEADCHNL_RV_SENSING_POLARITY: NORMAL
MDC_IDC_SET_LEADCHNL_RV_SENSING_SENSITIVITY: 0.8 MV
MDC_IDC_SET_ZONE_DETECTION_INTERVAL: 250 MS
MDC_IDC_SET_ZONE_DETECTION_INTERVAL: 300 MS
MDC_IDC_SET_ZONE_DETECTION_INTERVAL: 350 MS
MDC_IDC_SET_ZONE_TYPE: NORMAL
MDC_IDC_STAT_AT_MODE_SW_COUNT: 0
MDC_IDC_STAT_BRADY_RV_PERCENT_PACED: 0 %
MDC_IDC_STAT_EPISODE_RECENT_COUNT: 0
MDC_IDC_STAT_EPISODE_TYPE: NORMAL
MDC_IDC_STAT_TACHYTHERAPY_SHOCKS_ABORTED_TOTAL: 0
MDC_IDC_STAT_TACHYTHERAPY_SHOCKS_DELIVERED_RECENT: 0
MDC_IDC_STAT_TACHYTHERAPY_SHOCKS_DELIVERED_TOTAL: 0

## 2020-01-09 ENCOUNTER — OFFICE VISIT (OUTPATIENT)
Dept: CARDIOLOGY | Facility: CLINIC | Age: 50
End: 2020-01-09
Attending: INTERNAL MEDICINE
Payer: COMMERCIAL

## 2020-01-09 VITALS
DIASTOLIC BLOOD PRESSURE: 89 MMHG | WEIGHT: 259 LBS | SYSTOLIC BLOOD PRESSURE: 153 MMHG | BODY MASS INDEX: 36.12 KG/M2 | OXYGEN SATURATION: 99 % | HEART RATE: 67 BPM

## 2020-01-09 DIAGNOSIS — I47.29 NSVT (NONSUSTAINED VENTRICULAR TACHYCARDIA) (H): ICD-10-CM

## 2020-01-09 DIAGNOSIS — I42.2 APICAL VARIANT HYPERTROPHIC CARDIOMYOPATHY (H): ICD-10-CM

## 2020-01-09 DIAGNOSIS — I25.118 CORONARY ARTERY DISEASE OF NATIVE ARTERY OF NATIVE HEART WITH STABLE ANGINA PECTORIS (H): Primary | ICD-10-CM

## 2020-01-09 DIAGNOSIS — I10 BENIGN ESSENTIAL HYPERTENSION: ICD-10-CM

## 2020-01-09 PROCEDURE — 99214 OFFICE O/P EST MOD 30 MIN: CPT | Performed by: NURSE PRACTITIONER

## 2020-01-09 RX ORDER — SPIRONOLACTONE 25 MG/1
12.5 TABLET ORAL DAILY
Qty: 30 TABLET | Refills: 3 | Status: SHIPPED | OUTPATIENT
Start: 2020-01-09 | End: 2020-01-29

## 2020-01-09 RX ORDER — METOPROLOL SUCCINATE 100 MG/1
100 TABLET, EXTENDED RELEASE ORAL DAILY
Qty: 30 TABLET | Refills: 3 | Status: SHIPPED | OUTPATIENT
Start: 2020-01-09 | End: 2020-02-13

## 2020-01-09 RX ORDER — AMLODIPINE BESYLATE 5 MG/1
2.5 TABLET ORAL DAILY
Qty: 30 TABLET | Refills: 3 | COMMUNITY
Start: 2020-01-09 | End: 2020-03-10

## 2020-01-09 NOTE — PATIENT INSTRUCTIONS
Medication Changes:  1. INCREASE metoprolol xl to 100 mg at bedtime   2. START spironolactone 12.5 mg daily in the AM   3. DECREASE amlodipine 2.5 mg daily     Recommendations:  1. Compression stockings   2. Nurse blood pressure check and bring your cuff   3. Check blood pressure at least 1 hour after medications. Call the clinic if your blood pressure is consistently greater than 130/80.   4. Check daily weights and call the clinic if your weight has increased more than 2 lbs in one day or 5 lbs in one week.  5. Your provider has recommended you have a sleep consult referral & sleep study done. To schedule this appointment, please call the number below where you would like your referral and study completed. [facility]: [phone]; [place]: [phone]; [facility]: [phone];     Follow-up:  1.  Nonfasting lab in 1-2 weeks (BMP and BNP)  2. Exercise nuclear stress test - hold metoprolol   3. See Magalis PASCAL for cardiology follow up at [facility]: 1 month     Cardiology Scheduling~[phone]  Cardiology Clinic RNs~[phone] (Siena Mcgarry RN and Yary Tello RN)

## 2020-01-09 NOTE — PROGRESS NOTES
Cardiology Clinic Progress Note  Matthew Still MRN# [id number]   YOB: 1970 Age: 49 year old      Reason For Visit:  Annual follow-up    Primary Cardiologist:   Dr. Harrison           History of Presenting Illness:      Matthew Still is a pleasant 49 year old patient with a past cardiac history significant for paroxysmal atrial fibrillation, apical hypertrophic cardiomyopathy.    He reports a history of paroxysmal atrial fibrillation since his 30s.  He has undergone several cardioversions for this.  In November 2017 he presented with atrial fibrillation and chest pain.  Coronary angiogram showed 60% stenosis in the D1 with negative FFR with otherwise mild disease.  Echocardiogram showed EF 65%, moderate concentric LVH with slight prominence of the septum, no outflow obstruction, no significant valvular disease.  Cardiac MRI December 2017 showed EF 70%, mild to moderate mid ventricular hypertrophy and severe apical hypertrophy consistent with apical hypertrophic cardiomyopathy, normal RV, diffuse patchy mid wall enhancement of the anterior and apical segments with total scar burden 11%.  ÁNGELA December 2017 showed EF 60%, severe apical hypertrophy consistent with hypertrophic cardiomyopathy, normal RV, thrombus was seen in the left atrial appendage, and mild to moderate TR.  In February 2018 he underwent atrial fibrillation ablation with wide circumferential PVI and SVC isolation.  Following 30-day event monitor showed no further atrial fibrillation.  ZIO Patch in September 2018 also showed no atrial fibrillation but had an 8 beat run of VT which was symptomatic.  In October 2018 he had a syncopal episode and underwent a single-chamber ICD.    Pt was last seen by Dr. Harrison in February 2019.  Device check showed 2 episodes of NSVT lasting 8-9 beats without any ATP or shock.  The patient noted occasional palpitations but had no further syncope.  He reported generalized fatigue.  Sleep medicine  consultation was recommended but patient declined.  Dr. Harrison recommended discontinuing Eliquis if there was no further atrial fibrillation on his next device check.    Pt presents today for annual follow-up.  Device check 12/2019 showing no ventricular pacing, stable histogram, no atrial arrhythmias, one episode of NSVT lasting 14 beats not requiring any ATP or shock.  Patient described a near syncopal episode at work that day but not at the exact time of the NSVT.  Echocardiogram 12/2019 with normal EF, severe apical hypertrophy consistent with apical variant hypertrophic cardiomyopathy, borderline dilated ascending aorta, no significant valvular disease.  These results were reviewed with Dr. Harrison who noted that the images were stable from the prior year.    Since he was last seen, he has discontinued Eliquis as there was no further atrial fibrillation on device check.  He has started aspirin 81 mg daily.  His weight over the prior year has gone up 11 pounds.  He has complaints of dyspnea on exertion at work and while walking into the clinic today.  He also has complaints of near syncope occurring on average every 3 months.  It is unclear if these are correlating with his episodes of NSVT.  He tells me that at his last device check, the clock on his ICD was off by 6 hours. It looks like they have calibrated the timing now.  He also reports palpitations with chest discomfort. His blood pressure is elevated today. Patient reports no PND, orthopnea, syncope, edema, heart racing.    Current Cardiac Medications   Amlodipine 5 mg daily  Atorvastatin 80 mg daily  Metoprolol XL 50 mg daily                   Assessment and Plan:       Plan  Medication Changes:  1. INCREASE metoprolol xl to 100 mg at bedtime for NSVT/near syncope and HTN  2. START spironolactone 12.5 mg daily in the AM for HTN   3. DECREASE amlodipine 2.5 mg daily d/t edema     Recommendations:  1. Compression stockings   2. Nurse blood pressure  check and bring your cuff   3. Check blood pressure at least 1 hour after medications. Call the clinic if your blood pressure is consistently greater than 130/80.   4. Check daily weights and call the clinic if your weight has increased more than 2 lbs in one day or 5 lbs in one week.  5. Sleep medicine consult for fatigue, sleepiness, overweight, and h/o a.fib     Follow-up:  1.  Nonfasting lab in 1-2 weeks (BMP and BNP)  2. Exercise nuclear stress test - hold metoprolol   3. See Magalis PASCAL for cardiology follow up at [facility]: 1 month to reassess HTN, review stress test, discuss lipids, assess LE edema       1. Nonobstructive CAD    Angio 2017 with 60% stenosis in the D1 with negative FFR with otherwise mild disease    ABREU     Continue statin, beta-blocker, CCB, start ASA      2. Apical hypertrophic cardiomyopathy    Diagnosed 2017    Had syncope with sustained VT and underwent ICD    No signs of heart failure    Continue beta-blocker      3. Paroxysmal atrial fibrillation    S/p A. fib ablation 2018    No further atrial fibrillation seen on monitoring or device checks    Anticoagulation discontinued and will continue aspirin 81 mg daily      4. NSVT    Prior episode of syncope and underwent ICD placement 2018    Short episodes of NSVT correlating with palpitations, on device checks but no further sustained and has not needed therapies with ICD    Continue metoprolol    Follow with device clinic      5. Hyperlipidemia    Last LDL 89 on 2/2019    Continue atorvastatin 80 mg daily    Consider starting zetia       6. HTN    Not well controlled    Continue amlodipine, metoprolol, and start spironolactone      7.  ABREU and near syncope     ABREU possibly angina vs deconditiong vs heart failure vs lung disease from second hand smoke     Near syncope possbily NSVT episodes vs possible carotid stenosis - no significant bradycardia or other arrhthymias on device check     Consider carotid ultrasound if near syncope  not correlating with NSVT on future device check         Thank you for allowing me to participate in this delightful patient's care.      This note was completed in part using Dragon voice recognition software. Although reviewed after completion, some word and grammatical errors may occur.    Magalis Anderson, HORTENSIA CNP, APRN, CNP           Data:   All laboratory data reviewed        HPI and Plan:   See dictation    Orders Placed This Encounter   Procedures     Basic metabolic panel     N terminal pro BNP outpatient     Follow-Up with Cardiac Advanced Practice Provider     SLEEP EVALUATION & MANAGEMENT REFERRAL - [facility]  [phone] (Age 15 and up)       Orders Placed This Encounter   Medications     aspirin (ASA) 81 MG tablet     Sig: Take 1 tablet (81 mg) by mouth daily     aspirin (ASA) 81 MG EC tablet     Sig: Take 1 tablet (81 mg) by mouth daily     Dispense:  90 tablet     Refill:  3       There are no discontinued medications.      Encounter Diagnoses   Name Primary?     Apical variant hypertrophic cardiomyopathy (H)      NSVT (nonsustained ventricular tachycardia) (H)      Coronary artery disease of native artery of native heart with stable angina pectoris (H) Yes     Benign essential hypertension        CURRENT MEDICATIONS:  Current Outpatient Medications   Medication Sig Dispense Refill     aspirin (ASA) 81 MG EC tablet Take 1 tablet (81 mg) by mouth daily 90 tablet 3     aspirin (ASA) 81 MG tablet Take 1 tablet (81 mg) by mouth daily       amLODIPine (NORVASC) 5 MG tablet Take 1 tablet (5 mg) by mouth daily 90 tablet 3     Ascorbic Acid (VITAMIN C PO)        atorvastatin (LIPITOR) 80 MG tablet Take 1 tablet (80 mg) by mouth daily 90 tablet 3     metoprolol succinate ER (TOPROL XL) 50 MG 24 hr tablet Take 1 tablet (50 mg) by mouth daily 90 tablet 3       ALLERGIES   No Known Allergies    PAST MEDICAL HISTORY:  No past medical history on file.    PAST  SURGICAL HISTORY:  No past surgical history on file.    FAMILY HISTORY:  Family History   Problem Relation Age of Onset     Coronary Artery Disease Mother         a fib, ,MI     Heart Failure Mother         CHF     Other Cancer Father      Diabetes Father      Coronary Artery Disease Paternal Grandmother      Coronary Artery Disease Paternal Grandfather        SOCIAL HISTORY:  Social History     Socioeconomic History     Marital status:      Spouse name: None     Number of children: None     Years of education: None     Highest education level: None   Occupational History     None   Social Needs     Financial resource strain: None     Food insecurity:     Worry: None     Inability: None     Transportation needs:     Medical: None     Non-medical: None   Tobacco Use     Smoking status: Never Smoker     Smokeless tobacco: Former User   Substance and Sexual Activity     Alcohol use: Yes     Comment: 1 drink per month     Drug use: No     Sexual activity: Yes   Lifestyle     Physical activity:     Days per week: None     Minutes per session: None     Stress: None   Relationships     Social connections:     Talks on phone: None     Gets together: None     Attends [employer] service: None     Active member of club or organization: None     Attends meetings of clubs or organizations: None     Relationship status: None     Intimate partner violence:     Fear of current or ex partner: None     Emotionally abused: None     Physically abused: None     Forced sexual activity: None   Other Topics Concern     Parent/sibling w/ CABG, MI or angioplasty before 65F 55M? Yes     Comment: Mother MI age 45   Social History Narrative     None       Review of Systems:  Skin:  Negative       Eyes:  Negative   vision going black  ENT:  Negative      Respiratory:  Positive for dyspnea on exertion     Cardiovascular:    Positive for;palpitations;fatigue;lightheadedness;dizziness;chest pain;edema    Gastroenterology: Negative  melena;hematochezia    Genitourinary:  Negative      Musculoskeletal:  Negative      Neurologic:  Positive for headaches    Psychiatric:  Negative      Heme/Lymph/Imm:  Negative      Endocrine:  Negative        Physical Exam:  Vitals: BP (!) 153/89   Pulse 67   Wt 117.5 kg (259 lb)   SpO2 99%   BMI 36.12 kg/m      Constitutional:  cooperative, alert and oriented, well developed, well nourished, in no acute distress        Skin:  warm and dry to the touch          Head:  normocephalic        Eyes:  sclera white        Lymph:      ENT:  no pallor or cyanosis        Neck:  no stiffness        Respiratory:  clear to auscultation;normal symmetry         Cardiac: regular rhythm;normal S1 and S2   distant heart sounds            pulses full and equal                                   R groin site ecchymosis    GI:  abdomen soft obese      Extremities and Muscular Skeletal:      bilateral LE edema;1+;pitting          Neurological:  no gross motor deficits;affect appropriate        Psych:  Alert and Oriented x 3        CC  Brice Harrison MD  [facility]  [address] [address]

## 2020-01-09 NOTE — LETTER
1/9/2020    [facility]  [address] [id number]    RE: Matthew Still       Dear Colleague,    I had the pleasure of seeing Matthew Still in the [facility].    Cardiology Clinic Progress Note  Matthew Still MRN# [id number]   YOB: 1970 Age: 49 year old      Reason For Visit:  Annual follow-up    Primary Cardiologist:   Dr. Harrison           History of Presenting Illness:      Matthew Still is a pleasant 49 year old patient with a past cardiac history significant for paroxysmal atrial fibrillation, apical hypertrophic cardiomyopathy.    He reports a history of paroxysmal atrial fibrillation since his 30s.  He has undergone several cardioversions for this.  In November 2017 he presented with atrial fibrillation and chest pain.  Coronary angiogram showed 60% stenosis in the D1 with negative FFR with otherwise mild disease.  Echocardiogram showed EF 65%, moderate concentric LVH with slight prominence of the septum, no outflow obstruction, no significant valvular disease.  Cardiac MRI December 2017 showed EF 70%, mild to moderate mid ventricular hypertrophy and severe apical hypertrophy consistent with apical hypertrophic cardiomyopathy, normal RV, diffuse patchy mid wall enhancement of the anterior and apical segments with total scar burden 11%.  ÁNGELA December 2017 showed EF 60%, severe apical hypertrophy consistent with hypertrophic cardiomyopathy, normal RV, thrombus was seen in the left atrial appendage, and mild to moderate TR.  In February 2018 he underwent atrial fibrillation ablation with wide circumferential PVI and SVC isolation.  Following 30-day event monitor showed no further atrial fibrillation.  ZIO Patch in September 2018 also showed no atrial fibrillation but had an 8 beat run of VT which was symptomatic.  In October 2018 he had a syncopal episode and underwent a single-chamber ICD.    Pt was last seen by Dr. Harrison in  February 2019.  Device check showed 2 episodes of NSVT lasting 8-9 beats without any ATP or shock.  The patient noted occasional palpitations but had no further syncope.  He reported generalized fatigue.  Sleep medicine consultation was recommended but patient declined.  Dr. Harrison recommended discontinuing Eliquis if there was no further atrial fibrillation on his next device check.    Pt presents today for annual follow-up.  Device check 12/2019 showing no ventricular pacing, stable histogram, no atrial arrhythmias, one episode of NSVT lasting 14 beats not requiring any ATP or shock.  Patient described a near syncopal episode at work that day but not at the exact time of the NSVT.  Echocardiogram 12/2019 with normal EF, severe apical hypertrophy consistent with apical variant hypertrophic cardiomyopathy, borderline dilated ascending aorta, no significant valvular disease.  These results were reviewed with Dr. Harrison who noted that the images were stable from the prior year.    Since he was last seen, he has discontinued Eliquis as there was no further atrial fibrillation on device check.  He has started aspirin 81 mg daily.  His weight over the prior year has gone up 11 pounds.  He has complaints of dyspnea on exertion at work and while walking into the clinic today.  He also has complaints of near syncope occurring on average every 3 months.  It is unclear if these are correlating with his episodes of NSVT.  He tells me that at his last device check, the clock on his ICD was off by 6 hours. It looks like they have calibrated the timing now.  He also reports palpitations with chest discomfort. His blood pressure is elevated today. Patient reports no PND, orthopnea, syncope, edema, heart racing.    Current Cardiac Medications   Amlodipine 5 mg daily  Atorvastatin 80 mg daily  Metoprolol XL 50 mg daily                   Assessment and Plan:       Plan  Medication Changes:  1. INCREASE metoprolol xl to 100 mg at  bedtime for NSVT/near syncope and HTN  2. START spironolactone 12.5 mg daily in the AM for HTN   3. DECREASE amlodipine 2.5 mg daily d/t edema     Recommendations:  1. Compression stockings   2. Nurse blood pressure check and bring your cuff   3. Check blood pressure at least 1 hour after medications. Call the clinic if your blood pressure is consistently greater than 130/80.   4. Check daily weights and call the clinic if your weight has increased more than 2 lbs in one day or 5 lbs in one week.  5. Sleep medicine consult for fatigue, sleepiness, overweight, and h/o a.fib     Follow-up:  1.  Nonfasting lab in 1-2 weeks (BMP and BNP)  2. Exercise nuclear stress test - hold metoprolol   3. See Magalis PASCAL for cardiology follow up at [facility]: 1 month to reassess HTN, review stress test, discuss lipids, assess LE edema       1. Nonobstructive CAD    Angio 2017 with 60% stenosis in the D1 with negative FFR with otherwise mild disease    ABREU     Continue statin, beta-blocker, CCB, start ASA      2. Apical hypertrophic cardiomyopathy    Diagnosed 2017    Had syncope with sustained VT and underwent ICD    No signs of heart failure    Continue beta-blocker      3. Paroxysmal atrial fibrillation    S/p A. fib ablation 2018    No further atrial fibrillation seen on monitoring or device checks    Anticoagulation discontinued and will continue aspirin 81 mg daily      4. NSVT    Prior episode of syncope and underwent ICD placement 2018    Short episodes of NSVT correlating with palpitations, on device checks but no further sustained and has not needed therapies with ICD    Continue metoprolol    Follow with device clinic      5. Hyperlipidemia    Last LDL 89 on 2/2019    Continue atorvastatin 80 mg daily    Consider starting zetia       6. HTN    Not well controlled    Continue amlodipine, metoprolol, and start spironolactone      7.  ABREU and near syncope     ABREU possibly angina vs deconditiong vs heart failure vs lung  disease from second hand smoke     Near syncope possbily NSVT episodes vs possible carotid stenosis - no significant bradycardia or other arrhthymias on device check     Consider carotid ultrasound if near syncope not correlating with NSVT on future device check         Thank you for allowing me to participate in this delightful patient's care.      This note was completed in part using Dragon voice recognition software. Although reviewed after completion, some word and grammatical errors may occur.    Magalis Anderson, APRN CNP, APRN, CNP           Data:   All laboratory data reviewed        HPI and Plan:   See dictation    Orders Placed This Encounter   Procedures     Basic metabolic panel     N terminal pro BNP outpatient     Follow-Up with Cardiac Advanced Practice Provider     SLEEP EVALUATION & MANAGEMENT REFERRAL - [facility] [place]  [phone] (Age 15 and up)       Orders Placed This Encounter   Medications     aspirin (ASA) 81 MG tablet     Sig: Take 1 tablet (81 mg) by mouth daily     aspirin (ASA) 81 MG EC tablet     Sig: Take 1 tablet (81 mg) by mouth daily     Dispense:  90 tablet     Refill:  3       There are no discontinued medications.      Encounter Diagnoses   Name Primary?     Apical variant hypertrophic cardiomyopathy (H)      NSVT (nonsustained ventricular tachycardia) (H)      Coronary artery disease of native artery of native heart with stable angina pectoris (H) Yes     Benign essential hypertension        CURRENT MEDICATIONS:  Current Outpatient Medications   Medication Sig Dispense Refill     aspirin (ASA) 81 MG EC tablet Take 1 tablet (81 mg) by mouth daily 90 tablet 3     aspirin (ASA) 81 MG tablet Take 1 tablet (81 mg) by mouth daily       amLODIPine (NORVASC) 5 MG tablet Take 1 tablet (5 mg) by mouth daily 90 tablet 3     Ascorbic Acid (VITAMIN C PO)        atorvastatin (LIPITOR) 80 MG tablet Take 1 tablet (80 mg) by mouth daily 90 tablet 3      metoprolol succinate ER (TOPROL XL) 50 MG 24 hr tablet Take 1 tablet (50 mg) by mouth daily 90 tablet 3       ALLERGIES   No Known Allergies    PAST MEDICAL HISTORY:  No past medical history on file.    PAST SURGICAL HISTORY:  No past surgical history on file.    FAMILY HISTORY:  Family History   Problem Relation Age of Onset     Coronary Artery Disease Mother         a fib, ,MI     Heart Failure Mother         CHF     Other Cancer Father      Diabetes Father      Coronary Artery Disease Paternal Grandmother      Coronary Artery Disease Paternal Grandfather        SOCIAL HISTORY:  Social History     Socioeconomic History     Marital status:      Spouse name: None     Number of children: None     Years of education: None     Highest education level: None   Occupational History     None   Social Needs     Financial resource strain: None     Food insecurity:     Worry: None     Inability: None     Transportation needs:     Medical: None     Non-medical: None   Tobacco Use     Smoking status: Never Smoker     Smokeless tobacco: Former User   Substance and Sexual Activity     Alcohol use: Yes     Comment: 1 drink per month     Drug use: No     Sexual activity: Yes   Lifestyle     Physical activity:     Days per week: None     Minutes per session: None     Stress: None   Relationships     Social connections:     Talks on phone: None     Gets together: None     Attends Orthodox service: None     Active member of club or organization: None     Attends meetings of clubs or organizations: None     Relationship status: None     Intimate partner violence:     Fear of current or ex partner: None     Emotionally abused: None     Physically abused: None     Forced sexual activity: None   Other Topics Concern     Parent/sibling w/ CABG, MI or angioplasty before 65F 55M? Yes     Comment: Mother MI age 45   Social History Narrative     None       Review of Systems:  Skin:  Negative       Eyes:  Negative   vision going  black  ENT:  Negative      Respiratory:  Positive for dyspnea on exertion     Cardiovascular:    Positive for;palpitations;fatigue;lightheadedness;dizziness;chest pain;edema    Gastroenterology: Negative melena;hematochezia    Genitourinary:  Negative      Musculoskeletal:  Negative      Neurologic:  Positive for headaches    Psychiatric:  Negative      Heme/Lymph/Imm:  Negative      Endocrine:  Negative        Physical Exam:  Vitals: BP (!) 153/89   Pulse 67   Wt 117.5 kg (259 lb)   SpO2 99%   BMI 36.12 kg/m       Constitutional:  cooperative, alert and oriented, well developed, well nourished, in no acute distress        Skin:  warm and dry to the touch          Head:  normocephalic        Eyes:  sclera white        Lymph:      ENT:  no pallor or cyanosis        Neck:  no stiffness        Respiratory:  clear to auscultation;normal symmetry         Cardiac: regular rhythm;normal S1 and S2   distant heart sounds            pulses full and equal                                   R groin site ecchymosis    GI:  abdomen soft obese      Extremities and Muscular Skeletal:      bilateral LE edema;1+;pitting          Neurological:  no gross motor deficits;affect appropriate        Psych:  Alert and Oriented x 3        CC  Brice Harrison MD  [facility]  [address] [place] [address]                Thank you for allowing me to participate in the care of your patient.      Sincerely,     HORTENSIA R[facility]

## 2020-01-27 ENCOUNTER — HOSPITAL ENCOUNTER (OUTPATIENT)
Dept: NUCLEAR MEDICINE | Facility: CLINIC | Age: 50
Setting detail: NUCLEAR MEDICINE
Discharge: HOME | End: 2020-01-27
Attending: NURSE PRACTITIONER | Admitting: NURSE PRACTITIONER
Payer: COMMERCIAL

## 2020-01-27 VITALS — BODY MASS INDEX: 36.26 KG/M2 | WEIGHT: 259 LBS | HEIGHT: 71 IN

## 2020-01-27 DIAGNOSIS — I25.118 CORONARY ARTERY DISEASE OF NATIVE ARTERY OF NATIVE HEART WITH STABLE ANGINA PECTORIS (H): ICD-10-CM

## 2020-01-27 DIAGNOSIS — I42.2 APICAL VARIANT HYPERTROPHIC CARDIOMYOPATHY (H): ICD-10-CM

## 2020-01-27 LAB
ANION GAP SERPL CALCULATED.3IONS-SCNC: 5 MMOL/L (ref 3–14)
BUN SERPL-MCNC: 20 MG/DL (ref 7–30)
CALCIUM SERPL-MCNC: 8.8 MG/DL (ref 8.5–10.1)
CHLORIDE SERPL-SCNC: 111 MMOL/L (ref 94–109)
CO2 SERPL-SCNC: 27 MMOL/L (ref 20–32)
CREAT SERPL-MCNC: 0.96 MG/DL (ref 0.66–1.25)
CV STRESS MAX HR HE: 131
GFR SERPL CREATININE-BSD FRML MDRD: >90 ML/MIN/{1.73_M2}
GLUCOSE SERPL-MCNC: 121 MG/DL (ref 70–99)
NT-PROBNP SERPL-MCNC: 1558 PG/ML (ref 0–125)
POTASSIUM SERPL-SCNC: 4.4 MMOL/L (ref 3.4–5.3)
RATE PRESSURE PRODUCT: NORMAL
SODIUM SERPL-SCNC: 143 MMOL/L (ref 133–144)
STRESS ANGINA INDEX: 1
STRESS ECHO BASELINE DIASTOLIC HE: 90
STRESS ECHO BASELINE HR: 63
STRESS ECHO BASELINE SYSTOLIC BP: 140
STRESS ECHO CALCULATED PERCENT HR: 78 %
STRESS ECHO LAST STRESS DIASTOLIC BP: 100
STRESS ECHO LAST STRESS SYSTOLIC BP: 214
STRESS ECHO POST ESTIMATED WORKLOAD: 7.3 METS
STRESS ECHO POST EXERCISE DUR MIN: 6 MIN
STRESS ECHO POST EXERCISE DUR SEC: 50 SEC
STRESS ECHO TARGET HR: 169

## 2020-01-27 PROCEDURE — 34300033 ZZH RX 343: Performed by: NURSE PRACTITIONER

## 2020-01-27 PROCEDURE — 83880 ASSAY OF NATRIURETIC PEPTIDE: CPT | Performed by: NURSE PRACTITIONER

## 2020-01-27 PROCEDURE — 78452 HT MUSCLE IMAGE SPECT MULT: CPT | Mod: 26 | Performed by: INTERNAL MEDICINE

## 2020-01-27 PROCEDURE — A9502 TC99M TETROFOSMIN: HCPCS | Performed by: NURSE PRACTITIONER

## 2020-01-27 PROCEDURE — 36415 COLL VENOUS BLD VENIPUNCTURE: CPT | Performed by: NURSE PRACTITIONER

## 2020-01-27 PROCEDURE — 93018 CV STRESS TEST I&R ONLY: CPT | Performed by: INTERNAL MEDICINE

## 2020-01-27 PROCEDURE — 80048 BASIC METABOLIC PNL TOTAL CA: CPT | Performed by: NURSE PRACTITIONER

## 2020-01-27 PROCEDURE — 78452 HT MUSCLE IMAGE SPECT MULT: CPT

## 2020-01-27 PROCEDURE — 93016 CV STRESS TEST SUPVJ ONLY: CPT

## 2020-01-27 PROCEDURE — 93017 CV STRESS TEST TRACING ONLY: CPT

## 2020-01-27 RX ADMIN — TETROFOSMIN 38.5 MCI.: 1.38 INJECTION, POWDER, LYOPHILIZED, FOR SOLUTION INTRAVENOUS at 09:59

## 2020-01-27 RX ADMIN — TETROFOSMIN 12.8 MCI.: 1.38 INJECTION, POWDER, LYOPHILIZED, FOR SOLUTION INTRAVENOUS at 08:25

## 2020-01-27 ASSESSMENT — MIFFLIN-ST. JEOR: SCORE: 2051.95

## 2020-01-27 NOTE — RESULT ENCOUNTER NOTE
Disc results and recommendations with patient. He is going to check with his supervisor to see if he can get off for follow up on 1/29/20 at 1pm. Advised to do no strenuous exercise between now and follow up and if he has any chest pain/SOB, he is to go to ED. Pt verbalized understanding and agreed with plan.

## 2020-01-27 NOTE — PROGRESS NOTES
"Pt here for NM exercise stress test today.  On resting EKG, pt having new T wave inversions in Leads III and V3 when compared with EKG from 2018, other leads were similar in comparison.  Dr Mancera notified and test continued.  At peak exercise, patient developed chest \"pressure/weight\" 3/10, resolved by 2 minutes recovery.  Stated breathing feeling tight in recovery after CP resolved, oxygen saturations 98%.  Pt reports these symptoms are similar to symptoms he has had with increased activity at work.  Dr Mancera notified, pt ok to go home.   "

## 2020-01-27 NOTE — RESULT ENCOUNTER NOTE
Results noted: BNP elevated; electrolytes and kidney function WNL. Done after starting spironolactone 12.5 mg QD on 1/9/20. Previous BNP was 1376 in 11/2017. Will discuss with Magalis Martin NP for recommendations. Follow up 2/10/20

## 2020-01-29 ENCOUNTER — OFFICE VISIT (OUTPATIENT)
Dept: CARDIOLOGY | Facility: CLINIC | Age: 50
End: 2020-01-29
Attending: NURSE PRACTITIONER
Payer: COMMERCIAL

## 2020-01-29 VITALS
SYSTOLIC BLOOD PRESSURE: 146 MMHG | WEIGHT: 258 LBS | BODY MASS INDEX: 36.12 KG/M2 | OXYGEN SATURATION: 97 % | DIASTOLIC BLOOD PRESSURE: 81 MMHG | HEART RATE: 71 BPM | HEIGHT: 71 IN

## 2020-01-29 DIAGNOSIS — I25.118 CORONARY ARTERY DISEASE OF NATIVE ARTERY OF NATIVE HEART WITH STABLE ANGINA PECTORIS (H): Primary | ICD-10-CM

## 2020-01-29 DIAGNOSIS — E78.5 HYPERLIPIDEMIA LDL GOAL <70: ICD-10-CM

## 2020-01-29 DIAGNOSIS — I47.29 NSVT (NONSUSTAINED VENTRICULAR TACHYCARDIA) (H): ICD-10-CM

## 2020-01-29 DIAGNOSIS — I10 BENIGN ESSENTIAL HYPERTENSION: ICD-10-CM

## 2020-01-29 DIAGNOSIS — I48.0 PAROXYSMAL ATRIAL FIBRILLATION (H): ICD-10-CM

## 2020-01-29 DIAGNOSIS — I42.2 APICAL VARIANT HYPERTROPHIC CARDIOMYOPATHY (H): ICD-10-CM

## 2020-01-29 DIAGNOSIS — R06.09 DOE (DYSPNEA ON EXERTION): ICD-10-CM

## 2020-01-29 PROCEDURE — 99215 OFFICE O/P EST HI 40 MIN: CPT | Performed by: NURSE PRACTITIONER

## 2020-01-29 RX ORDER — ASPIRIN 81 MG/1
81 TABLET ORAL DAILY
Status: CANCELLED | OUTPATIENT
Start: 2020-01-29 | End: 2020-01-31

## 2020-01-29 RX ORDER — SODIUM CHLORIDE 9 MG/ML
INJECTION, SOLUTION INTRAVENOUS CONTINUOUS
Status: CANCELLED | OUTPATIENT
Start: 2020-01-29

## 2020-01-29 RX ORDER — LIDOCAINE 40 MG/G
CREAM TOPICAL
Status: CANCELLED | OUTPATIENT
Start: 2020-01-29

## 2020-01-29 RX ORDER — SPIRONOLACTONE 25 MG/1
25 TABLET ORAL DAILY
Qty: 90 TABLET | Refills: 3 | Status: SHIPPED | OUTPATIENT
Start: 2020-01-29 | End: 2020-10-21

## 2020-01-29 ASSESSMENT — MIFFLIN-ST. JEOR: SCORE: 2047.41

## 2020-01-29 NOTE — LETTER
1/29/2020    [facility]  [address] [address]    RE: Matthew Still       Dear Colleague,    I had the pleasure of seeing Matthew Still in the [facility].    Cardiology Clinic Progress Note  Matthew Still MRN# [id number]   YOB: 1970 Age: 49 year old      Reason For Visit:  Angiogram H&P   Primary Cardiologist:   Dr. Harrison           History of Presenting Illness:      Matthew Still is a pleasant 49 year old patient with a past cardiac history significant for:  paroxysmal atrial fibrillation, since his 30s.  He has undergone several cardioversions for this  apical hypertrophic cardiomyopathy s/p ICD  hypertension  hyperlipidemia  CAD 60% D1 with neg FFR 2017     In November 2017 he presented with atrial fibrillation and chest pain.  Coronary angiogram showed 60% stenosis in the D1 with negative FFR with otherwise mild disease.  Echocardiogram showed EF 65%, moderate concentric LVH with slight prominence of the septum, no outflow obstruction, no significant valvular disease.  Cardiac MRI December 2017 showed EF 70%, mild to moderate mid ventricular hypertrophy and severe apical hypertrophy consistent with apical hypertrophic cardiomyopathy, normal RV, diffuse patchy mid wall enhancement of the anterior and apical segments with total scar burden 11%.  ÁNGELA December 2017 showed EF 60%, severe apical hypertrophy consistent with hypertrophic cardiomyopathy, normal RV, thrombus was seen in the left atrial appendage, and mild to moderate TR.  In February 2018 he underwent atrial fibrillation ablation with wide circumferential PVI and SVC isolation.  Following 30-day event monitor showed no further atrial fibrillation.  ZIO Patch in September 2018 also showed no atrial fibrillation but had an 8 beat run of VT which was symptomatic.  In October 2018 he had a syncopal episode and underwent a single-chamber ICD.    Pt was last seen by   Hunter in February 2019.  Device check showed 2 episodes of NSVT lasting 8-9 beats without any ATP or shock.  The patient noted occasional palpitations but had no further syncope.  He reported generalized fatigue.  Sleep medicine consultation was recommended but patient declined.  Dr. Harrison recommended discontinuing Eliquis if there was no further atrial fibrillation on his next device check.    Patient was last seen by me on 1/9/2020.  Device check from December 2019 showed no atrial arrhythmias and one episode of NSVT lasting 14 beats but not requiring ATP or shock.  He had a near syncopal episode the same day as his NSVT but possibly not at the same time. Echocardiogram 12/2019 stable with normal EF, severe apical hypertrophy consistent with apical variant hypertrophic cardiomyopathy, borderline dilated ascending aorta, no significant valvular disease.  His weight was up 11 pounds and complained of dyspnea on exertion at work and while walking to the clinic.  He had episodes of presyncope approximately every 3 months.  He reported palpitations with chest discomfort and was hypertensive.  His amlodipine was decreased due to lower extremity edema.  Metoprolol was increased for NSVT and hypertension.  He was started on spironolactone for hypertension.  Sleep medicine consult was again recommended.  He was set up for stress test.    Pt presents today for angiogram H&P.  BMP after starting spironolactone shows normal renal function and electrolytes.  BNP was mildly elevated at 1558.  Prior BNP in 2017 was 1376.  He underwent exercise nuclear stress test on 1/27/2020 which showed TID and ischemic defects in the apical, mid and distal anterior, anterior, and lateral walls.  The patient also experienced chest pain with exercise.  These results were reviewed with his cardiologist who recommended proceeding with angiogram.  These results were reviewed with him today.     Since he was last seen, his dyspnea on exertion  and chest pressure have been stable and unchanged.  He does some lifting at work and I have asked him to not do any heavy lifting or exert himself until his angiogram.  He denies any side effects after starting spironolactone or increasing metoprolol.  After decreasing amlodipine, his lower extremity edema has significantly improved.  Blood pressure today remains mildly elevated and he is agreeable to further increasing spironolactone.  We will wait until after his angiogram to have him set up sleep medicine consultation.  Weight today in the clinic is down 1 pound and he denies any heart failure symptoms. Patient reports no PND, orthopnea, syncope, edema, heart racing.      Data:  Exercise nuclear stress test 1/2020   The nuclear stress test is abnormal.     TID is present visually and quantitatively.     There is a small area of moderate ischemia in the apical segment(s) of the left ventricle.     There is a medium sized area of moderate ischemia in the mid to distal anterior segment(s) of the left ventricle.     There is a small area of moderate ischemia in the mid to distal anterolateral segment(s) of the left ventricle.     The above is the software interpretation.  This pt has a large ischemic defect affecting the apical, mid and distal anterior. anterolateral and lateral walls     Left ventricular function is normal. Distal anterior and apical hypokinesis noted in the post stress images.     The patient's exercise capacity is moderately impaired.     Non diagnostic EKG secondary to LVH.  Pt had 3/10 chest pain with exercise.      Current Cardiac Medications   Amlodipine 2.5 mg daily  Atorvastatin 80 mg daily  Metoprolol  mg daily  Spironolactone 12.5 mg daily                   Assessment and Plan:       Plan  1. INCREASE spironolactone to 25 mg daily (1 tablet) for HTN    Recommendations:  1. Coronary angiogram     Follow-up:  1. BMP and lipids prior to angiogram   2. See Magalis Martin NP for  cardiology follow up at [facility]: 1-2 weeks after angiogram to reassess HTN       Risks and benefits of left heart catheterization and coronary angiogram were discussed with the patient in detail. Including death, MI, stroke, hematoma, possible urgent bypass surgery for failed PCI, use of stents, possible peripheral vascular complications, use of FFR in clinical-decision making, arrhythmia, possible percutaneous coronary intervention, and alternative of medical therapy alone. The risks discussed include risk of heart attack or risk bleeding requiring surgery or transfusion of less than 1%. The risk of stroke or needing emergency surgery is less than 1 in 500. We discussed that contrast is used during the procedure which can potentially damage the kidney. Additionally we discussed the risk of contrast induced allergic reaction, renal dysfunction, and vascular complications. I have discussed the need for DAPT if stenting is required and there are no contraindications for this. No history of bleeding problems or current bleeding, and no scheduled surgeries or procedures in the next year. Patient understands and wishes to proceed with it.  Contrast allergy: No   Blood thinner: no   ASA: Yes   H/o CABG: No  DM: No   Creat/GFR: WNL 1/2020  Bleeding concerns: no   Upcoming surgeries/procedures: No         1. CAD    Angio 2017 with 60% stenosis in the D1 with negative FFR with otherwise mild disease    Abnormal stress 1/2020     ABREU and chest pressure stable     Continue statin, beta-blocker, CCB, ASA      2. Apical hypertrophic cardiomyopathy    Diagnosed 2017    Had syncope with sustained VT and underwent ICD    No signs of heart failure    Continue beta-blocker      3. Paroxysmal atrial fibrillation    S/p A. fib ablation 2018    No further atrial fibrillation seen on monitoring or device checks    Anticoagulation discontinued and will continue aspirin 81 mg daily      4. NSVT    Prior episode of syncope and  underwent ICD placement 2018    Short episodes of NSVT correlating with palpitations, on device checks but no further sustained and has not needed therapies with ICD    Continue metoprolol    Follow with device clinic      5. Hyperlipidemia    Last LDL 89 on 2/2019    Continue atorvastatin 80 mg daily    Consider starting zetia       6. HTN    Not well controlled     Continue amlodipine, metoprolol, and spironolactone      7.  ABREU and near syncope     ABREU possibly angina vs deconditiong vs heart failure vs lung disease from second hand smoke     Near syncope possbily NSVT episodes vs possible carotid stenosis - no significant bradycardia or other arrhthymias on device check     Consider carotid ultrasound if near syncope not correlating with NSVT on future device check    Consider trial of lasix if he continues with ABREU and elevated BNP          Thank you for allowing me to participate in this delightful patient's care.      This note was completed in part using Dragon voice recognition software. Although reviewed after completion, some word and grammatical errors may occur.    Magalis Anderson, APRN CNP, APRN, CNP           Data:   All laboratory data reviewed        HPI and Plan:   See dictation    No orders of the defined types were placed in this encounter.      Orders Placed This Encounter   Medications     spironolactone (ALDACTONE) 25 MG tablet     Sig: Take 1 tablet (25 mg) by mouth daily     Dispense:  90 tablet     Refill:  3       Medications Discontinued During This Encounter   Medication Reason     spironolactone (ALDACTONE) 25 MG tablet          Encounter Diagnoses   Name Primary?     Apical variant hypertrophic cardiomyopathy (H)      Coronary artery disease of native artery of native heart with stable angina pectoris (H) Yes     Benign essential hypertension      NSVT (nonsustained ventricular tachycardia) (H)      Paroxysmal atrial fibrillation (H)      Hyperlipidemia LDL goal <70       ABREU (dyspnea on exertion)        CURRENT MEDICATIONS:  Current Outpatient Medications   Medication Sig Dispense Refill     amLODIPine (NORVASC) 5 MG tablet Take 0.5 tablets (2.5 mg) by mouth daily 30 tablet 3     Ascorbic Acid (VITAMIN C PO)        aspirin (ASA) 81 MG tablet Take 1 tablet (81 mg) by mouth daily       atorvastatin (LIPITOR) 80 MG tablet Take 1 tablet (80 mg) by mouth daily 90 tablet 3     metoprolol succinate ER (TOPROL-XL) 100 MG 24 hr tablet Take 1 tablet (100 mg) by mouth daily 30 tablet 3     spironolactone (ALDACTONE) 25 MG tablet Take 1 tablet (25 mg) by mouth daily 90 tablet 3       ALLERGIES   No Known Allergies    PAST MEDICAL HISTORY:  No past medical history on file.    PAST SURGICAL HISTORY:  No past surgical history on file.    FAMILY HISTORY:  Family History   Problem Relation Age of Onset     Coronary Artery Disease Mother         a fib, ,MI     Heart Failure Mother         CHF     Other Cancer Father      Diabetes Father      Coronary Artery Disease Paternal Grandmother      Coronary Artery Disease Paternal Grandfather        SOCIAL HISTORY:  Social History     Socioeconomic History     Marital status:      Spouse name: None     Number of children: None     Years of education: None     Highest education level: None   Occupational History     None   Social Needs     Financial resource strain: None     Food insecurity:     Worry: None     Inability: None     Transportation needs:     Medical: None     Non-medical: None   Tobacco Use     Smoking status: Never Smoker     Smokeless tobacco: Former User   Substance and Sexual Activity     Alcohol use: Yes     Comment: 1 drink per month     Drug use: No     Sexual activity: Yes   Lifestyle     Physical activity:     Days per week: None     Minutes per session: None     Stress: None   Relationships     Social connections:     Talks on phone: None     Gets together: None     Attends Baptism service: None     Active member of club or  "organization: None     Attends meetings of clubs or organizations: None     Relationship status: None     Intimate partner violence:     Fear of current or ex partner: None     Emotionally abused: None     Physically abused: None     Forced sexual activity: None   Other Topics Concern     Parent/sibling w/ CABG, MI or angioplasty before 65F 55M? Yes     Comment: Mother MI age 45   Social History Narrative     None       Review of Systems:  Skin:  Positive for scaling     Eyes:  Positive for glasses    ENT:  Negative      Respiratory:  Positive for dyspnea on exertion     Cardiovascular:    Positive for;lower extremity symptoms;edema;fatigue;lightheadedness;dizziness    Gastroenterology: Negative      Genitourinary:  Negative      Musculoskeletal:  Negative      Neurologic:  Positive for headaches;numbness or tingling of feet    Psychiatric:  Negative      Heme/Lymph/Imm:  Negative      Endocrine:  Negative        Physical Exam:  Vitals: BP (!) 146/81 (BP Location: Right arm, Patient Position: Sitting, Cuff Size: Adult Large)   Pulse 71   Ht 1.803 m (5' 11\")   Wt 117 kg (258 lb)   SpO2 97%   BMI 35.98 kg/m       Constitutional:  cooperative, alert and oriented, well developed, well nourished, in no acute distress        Skin:  warm and dry to the touch          Head:  normocephalic        Eyes:  sclera white        Lymph:      ENT:  no pallor or cyanosis        Neck:  no stiffness        Respiratory:  clear to auscultation;normal symmetry         Cardiac: regular rhythm;normal S1 and S2   distant heart sounds            pulses full and equal                                   R groin site ecchymosis    GI:  abdomen soft obese      Extremities and Muscular Skeletal:  no edema              Neurological:  no gross motor deficits;affect appropriate        Psych:  Alert and Oriented x 3          Thank you for allowing me to participate in the care of your patient.    Sincerely,     HORTENSIA Rdz " CNP     [facility]

## 2020-01-29 NOTE — PATIENT INSTRUCTIONS
"Medication Changes:  1. INCREASE spironolactone to 25 mg daily (1 tablet)    Recommendations:  1. Coronary angiogram to be done at [facility] on Thursday February 6, 2020. Please arrive at 8:00am. If you need to contact [facility] for any reason, please call [phone] option #2.    Follow-up:  1. BMP and lipids prior to angiogram   2. See Magalis Martin NP for cardiology follow up at [facility] on Thursday February 13, 2020 at 3:00pm    Cardiology Scheduling~[phone]  Cardiology Clinic RNs~[phone] (Siena Mcgarry RN and Yary Tello RN)    ANGIOGRAM  [facility]   [place], [place]   Contact [facility] scheduling if needed at [phone].      1. In preparation for your procedure, we require that you do the following:  a. Nothing to eat or drink after midnight if your procedure is before 12 noon.  b. Take your usual morning medications with a small sip of water immediately upon arising on the morning of your procedure unless outlined below:    Aspirin:  o If you currently take Aspirin, continue taking your same dose.  2. You will be unable to drive after your procedure; please arrange to have someone drive you home. Make sure that there is a responsible adult with you for 24 hours after your procedure. Your procedure will be cancelled if you do not have transportation home or someone staying with you for 24 hrs.   3.  Your procedure will be done at [facility]. Please park in the  [address]  on the west side of [facility] on [address]. Take the skyway over [facility] to the hospital. Please check in on the first floor, \"[facility] Welcome Desk\" which is one floor down from the skyway level.   4. If you have any questions about your upcoming procedure, please contact nursing at [facility] at [phone] or at [facility] at [phone].    "

## 2020-01-29 NOTE — PROGRESS NOTES
Cardiology Clinic Progress Note  Matthew Still MRN# [id number]   YOB: 1970 Age: 49 year old      Reason For Visit:  Angiogram H&P   Primary Cardiologist:   Dr. Harrison           History of Presenting Illness:      Matthew Still is a pleasant 49 year old patient with a past cardiac history significant for:  paroxysmal atrial fibrillation, since his 30s.  He has undergone several cardioversions for this  apical hypertrophic cardiomyopathy s/p ICD  hypertension  hyperlipidemia  CAD 60% D1 with neg FFR 2017     In November 2017 he presented with atrial fibrillation and chest pain.  Coronary angiogram showed 60% stenosis in the D1 with negative FFR with otherwise mild disease.  Echocardiogram showed EF 65%, moderate concentric LVH with slight prominence of the septum, no outflow obstruction, no significant valvular disease.  Cardiac MRI December 2017 showed EF 70%, mild to moderate mid ventricular hypertrophy and severe apical hypertrophy consistent with apical hypertrophic cardiomyopathy, normal RV, diffuse patchy mid wall enhancement of the anterior and apical segments with total scar burden 11%.  ÁNGELA December 2017 showed EF 60%, severe apical hypertrophy consistent with hypertrophic cardiomyopathy, normal RV, thrombus was seen in the left atrial appendage, and mild to moderate TR.  In February 2018 he underwent atrial fibrillation ablation with wide circumferential PVI and SVC isolation.  Following 30-day event monitor showed no further atrial fibrillation.  ZIO Patch in September 2018 also showed no atrial fibrillation but had an 8 beat run of VT which was symptomatic.  In October 2018 he had a syncopal episode and underwent a single-chamber ICD.    Pt was last seen by Dr. Harrison in February 2019.  Device check showed 2 episodes of NSVT lasting 8-9 beats without any ATP or shock.  The patient noted occasional palpitations but had no further syncope.  He reported generalized fatigue.  Sleep  medicine consultation was recommended but patient declined.  Dr. Harrison recommended discontinuing Eliquis if there was no further atrial fibrillation on his next device check.    Patient was last seen by me on 1/9/2020.  Device check from December 2019 showed no atrial arrhythmias and one episode of NSVT lasting 14 beats but not requiring ATP or shock.  He had a near syncopal episode the same day as his NSVT but possibly not at the same time. Echocardiogram 12/2019 stable with normal EF, severe apical hypertrophy consistent with apical variant hypertrophic cardiomyopathy, borderline dilated ascending aorta, no significant valvular disease.  His weight was up 11 pounds and complained of dyspnea on exertion at work and while walking to the clinic.  He had episodes of presyncope approximately every 3 months.  He reported palpitations with chest discomfort and was hypertensive.  His amlodipine was decreased due to lower extremity edema.  Metoprolol was increased for NSVT and hypertension.  He was started on spironolactone for hypertension.  Sleep medicine consult was again recommended.  He was set up for stress test.    Pt presents today for angiogram H&P.  BMP after starting spironolactone shows normal renal function and electrolytes.  BNP was mildly elevated at 1558.  Prior BNP in 2017 was 1376.  He underwent exercise nuclear stress test on 1/27/2020 which showed TID and ischemic defects in the apical, mid and distal anterior, anterior, and lateral walls.  The patient also experienced chest pain with exercise.  These results were reviewed with his cardiologist who recommended proceeding with angiogram.  These results were reviewed with him today.     Since he was last seen, his dyspnea on exertion and chest pressure have been stable and unchanged.  He does some lifting at work and I have asked him to not do any heavy lifting or exert himself until his angiogram.  He denies any side effects after starting  spironolactone or increasing metoprolol.  After decreasing amlodipine, his lower extremity edema has significantly improved.  Blood pressure today remains mildly elevated and he is agreeable to further increasing spironolactone.  We will wait until after his angiogram to have him set up sleep medicine consultation.  Weight today in the clinic is down 1 pound and he denies any heart failure symptoms. Patient reports no PND, orthopnea, syncope, edema, heart racing.      Data:  Exercise nuclear stress test 1/2020   The nuclear stress test is abnormal.     TID is present visually and quantitatively.     There is a small area of moderate ischemia in the apical segment(s) of the left ventricle.     There is a medium sized area of moderate ischemia in the mid to distal anterior segment(s) of the left ventricle.     There is a small area of moderate ischemia in the mid to distal anterolateral segment(s) of the left ventricle.     The above is the software interpretation.  This pt has a large ischemic defect affecting the apical, mid and distal anterior. anterolateral and lateral walls     Left ventricular function is normal. Distal anterior and apical hypokinesis noted in the post stress images.     The patient's exercise capacity is moderately impaired.     Non diagnostic EKG secondary to LVH.  Pt had 3/10 chest pain with exercise.      Current Cardiac Medications   Amlodipine 2.5 mg daily  Atorvastatin 80 mg daily  Metoprolol  mg daily  Spironolactone 12.5 mg daily                   Assessment and Plan:       Plan  1. INCREASE spironolactone to 25 mg daily (1 tablet) for HTN    Recommendations:  1. Coronary angiogram     Follow-up:  1. BMP and lipids prior to angiogram   2. See Magalis Martin NP for cardiology follow up at [facility]: 1-2 weeks after angiogram to reassess HTN       Risks and benefits of left heart catheterization and coronary angiogram were discussed with the patient in detail. Including  death, MI, stroke, hematoma, possible urgent bypass surgery for failed PCI, use of stents, possible peripheral vascular complications, use of FFR in clinical-decision making, arrhythmia, possible percutaneous coronary intervention, and alternative of medical therapy alone. The risks discussed include risk of heart attack or risk bleeding requiring surgery or transfusion of less than 1%. The risk of stroke or needing emergency surgery is less than 1 in 500. We discussed that contrast is used during the procedure which can potentially damage the kidney. Additionally we discussed the risk of contrast induced allergic reaction, renal dysfunction, and vascular complications. I have discussed the need for DAPT if stenting is required and there are no contraindications for this. No history of bleeding problems or current bleeding, and no scheduled surgeries or procedures in the next year. Patient understands and wishes to proceed with it.  Contrast allergy: No   Blood thinner: no   ASA: Yes   H/o CABG: No  DM: No   Creat/GFR: WNL 1/2020  Bleeding concerns: no   Upcoming surgeries/procedures: No         1. CAD    Angio 2017 with 60% stenosis in the D1 with negative FFR with otherwise mild disease    Abnormal stress 1/2020     ABREU and chest pressure stable     Continue statin, beta-blocker, CCB, ASA      2. Apical hypertrophic cardiomyopathy    Diagnosed 2017    Had syncope with sustained VT and underwent ICD    No signs of heart failure    Continue beta-blocker      3. Paroxysmal atrial fibrillation    S/p A. fib ablation 2018    No further atrial fibrillation seen on monitoring or device checks    Anticoagulation discontinued and will continue aspirin 81 mg daily      4. NSVT    Prior episode of syncope and underwent ICD placement 2018    Short episodes of NSVT correlating with palpitations, on device checks but no further sustained and has not needed therapies with ICD    Continue metoprolol    Follow with device  clinic      5. Hyperlipidemia    Last LDL 89 on 2/2019    Continue atorvastatin 80 mg daily    Consider starting zetia       6. HTN    Not well controlled     Continue amlodipine, metoprolol, and spironolactone      7.  ABREU and near syncope     ABREU possibly angina vs deconditiong vs heart failure vs lung disease from second hand smoke     Near syncope possbily NSVT episodes vs possible carotid stenosis - no significant bradycardia or other arrhthymias on device check     Consider carotid ultrasound if near syncope not correlating with NSVT on future device check    Consider trial of lasix if he continues with ABREU and elevated BNP          Thank you for allowing me to participate in this delightful patient's care.      This note was completed in part using Dragon voice recognition software. Although reviewed after completion, some word and grammatical errors may occur.    Magalis Anderson, HORTENSIA CNP, APRN, CNP           Data:   All laboratory data reviewed        HPI and Plan:   See dictation    No orders of the defined types were placed in this encounter.      Orders Placed This Encounter   Medications     spironolactone (ALDACTONE) 25 MG tablet     Sig: Take 1 tablet (25 mg) by mouth daily     Dispense:  90 tablet     Refill:  3       Medications Discontinued During This Encounter   Medication Reason     spironolactone (ALDACTONE) 25 MG tablet          Encounter Diagnoses   Name Primary?     Apical variant hypertrophic cardiomyopathy (H)      Coronary artery disease of native artery of native heart with stable angina pectoris (H) Yes     Benign essential hypertension      NSVT (nonsustained ventricular tachycardia) (H)      Paroxysmal atrial fibrillation (H)      Hyperlipidemia LDL goal <70      ABREU (dyspnea on exertion)        CURRENT MEDICATIONS:  Current Outpatient Medications   Medication Sig Dispense Refill     amLODIPine (NORVASC) 5 MG tablet Take 0.5 tablets (2.5 mg) by mouth daily 30 tablet 3      Ascorbic Acid (VITAMIN C PO)        aspirin (ASA) 81 MG tablet Take 1 tablet (81 mg) by mouth daily       atorvastatin (LIPITOR) 80 MG tablet Take 1 tablet (80 mg) by mouth daily 90 tablet 3     metoprolol succinate ER (TOPROL-XL) 100 MG 24 hr tablet Take 1 tablet (100 mg) by mouth daily 30 tablet 3     spironolactone (ALDACTONE) 25 MG tablet Take 1 tablet (25 mg) by mouth daily 90 tablet 3       ALLERGIES   No Known Allergies    PAST MEDICAL HISTORY:  No past medical history on file.    PAST SURGICAL HISTORY:  No past surgical history on file.    FAMILY HISTORY:  Family History   Problem Relation Age of Onset     Coronary Artery Disease Mother         a fib, ,MI     Heart Failure Mother         CHF     Other Cancer Father      Diabetes Father      Coronary Artery Disease Paternal Grandmother      Coronary Artery Disease Paternal Grandfather        SOCIAL HISTORY:  Social History     Socioeconomic History     Marital status:      Spouse name: None     Number of children: None     Years of education: None     Highest education level: None   Occupational History     None   Social Needs     Financial resource strain: None     Food insecurity:     Worry: None     Inability: None     Transportation needs:     Medical: None     Non-medical: None   Tobacco Use     Smoking status: Never Smoker     Smokeless tobacco: Former User   Substance and Sexual Activity     Alcohol use: Yes     Comment: 1 drink per month     Drug use: No     Sexual activity: Yes   Lifestyle     Physical activity:     Days per week: None     Minutes per session: None     Stress: None   Relationships     Social connections:     Talks on phone: None     Gets together: None     Attends Synagogue service: None     Active member of club or organization: None     Attends meetings of clubs or organizations: None     Relationship status: None     Intimate partner violence:     Fear of current or ex partner: None     Emotionally abused: None      "Physically abused: None     Forced sexual activity: None   Other Topics Concern     Parent/sibling w/ CABG, MI or angioplasty before 65F 55M? Yes     Comment: Mother MI age 45   Social History Narrative     None       Review of Systems:  Skin:  Positive for scaling     Eyes:  Positive for glasses    ENT:  Negative      Respiratory:  Positive for dyspnea on exertion     Cardiovascular:    Positive for;lower extremity symptoms;edema;fatigue;lightheadedness;dizziness    Gastroenterology: Negative      Genitourinary:  Negative      Musculoskeletal:  Negative      Neurologic:  Positive for headaches;numbness or tingling of feet    Psychiatric:  Negative      Heme/Lymph/Imm:  Negative      Endocrine:  Negative        Physical Exam:  Vitals: BP (!) 146/81 (BP Location: Right arm, Patient Position: Sitting, Cuff Size: Adult Large)   Pulse 71   Ht 1.803 m (5' 11\")   Wt 117 kg (258 lb)   SpO2 97%   BMI 35.98 kg/m      Constitutional:  cooperative, alert and oriented, well developed, well nourished, in no acute distress        Skin:  warm and dry to the touch          Head:  normocephalic        Eyes:  sclera white        Lymph:      ENT:  no pallor or cyanosis        Neck:  no stiffness        Respiratory:  clear to auscultation;normal symmetry         Cardiac: regular rhythm;normal S1 and S2   distant heart sounds            pulses full and equal                                   R groin site ecchymosis    GI:  abdomen soft obese      Extremities and Muscular Skeletal:  no edema              Neurological:  no gross motor deficits;affect appropriate        Psych:  Alert and Oriented x 3        CC  Brice Harrison MD  [facility]  [address] [address]              "

## 2020-02-06 ENCOUNTER — HOSPITAL ENCOUNTER (OUTPATIENT)
Facility: CLINIC | Age: 50
Discharge: HOME | End: 2020-02-06
Admitting: INTERNAL MEDICINE
Payer: COMMERCIAL

## 2020-02-06 ENCOUNTER — SURGERY (OUTPATIENT)
Age: 50
End: 2020-02-06
Payer: COMMERCIAL

## 2020-02-06 VITALS
SYSTOLIC BLOOD PRESSURE: 128 MMHG | OXYGEN SATURATION: 96 % | WEIGHT: 257.8 LBS | HEIGHT: 71 IN | BODY MASS INDEX: 36.09 KG/M2 | HEART RATE: 59 BPM | DIASTOLIC BLOOD PRESSURE: 81 MMHG | TEMPERATURE: 97.4 F | RESPIRATION RATE: 18 BRPM

## 2020-02-06 DIAGNOSIS — R06.09 DOE (DYSPNEA ON EXERTION): ICD-10-CM

## 2020-02-06 DIAGNOSIS — I25.118 CORONARY ARTERY DISEASE OF NATIVE ARTERY OF NATIVE HEART WITH STABLE ANGINA PECTORIS (H): ICD-10-CM

## 2020-02-06 DIAGNOSIS — R94.39 ABNORMAL CARDIOVASCULAR STRESS TEST: Primary | ICD-10-CM

## 2020-02-06 DIAGNOSIS — I25.10 ATHEROSCLEROSIS OF NATIVE CORONARY ARTERY OF NATIVE HEART WITHOUT ANGINA PECTORIS: ICD-10-CM

## 2020-02-06 PROBLEM — Z98.890 STATUS POST CORONARY ANGIOGRAM: Status: ACTIVE | Noted: 2020-02-06

## 2020-02-06 LAB
ALT SERPL W P-5'-P-CCNC: 47 U/L (ref 0–70)
ANION GAP SERPL CALCULATED.3IONS-SCNC: 3 MMOL/L (ref 3–14)
APTT PPP: 27 SEC (ref 22–37)
BUN SERPL-MCNC: 14 MG/DL (ref 7–30)
CALCIUM SERPL-MCNC: 8.9 MG/DL (ref 8.5–10.1)
CHLORIDE SERPL-SCNC: 111 MMOL/L (ref 94–109)
CHOLEST SERPL-MCNC: 151 MG/DL
CO2 SERPL-SCNC: 27 MMOL/L (ref 20–32)
CREAT SERPL-MCNC: 0.94 MG/DL (ref 0.66–1.25)
ERYTHROCYTE [DISTWIDTH] IN BLOOD BY AUTOMATED COUNT: 12.4 % (ref 10–15)
GFR SERPL CREATININE-BSD FRML MDRD: >90 ML/MIN/{1.73_M2}
GLUCOSE SERPL-MCNC: 119 MG/DL (ref 70–99)
HCT VFR BLD AUTO: 43.5 % (ref 40–53)
HDLC SERPL-MCNC: 33 MG/DL
HGB BLD-MCNC: 14.8 G/DL (ref 13.3–17.7)
INR PPP: 0.98 (ref 0.86–1.14)
LDLC SERPL CALC-MCNC: 95 MG/DL
MCH RBC QN AUTO: 30.6 PG (ref 26.5–33)
MCHC RBC AUTO-ENTMCNC: 34 G/DL (ref 31.5–36.5)
MCV RBC AUTO: 90 FL (ref 78–100)
NONHDLC SERPL-MCNC: 118 MG/DL
PLATELET # BLD AUTO: 215 10E9/L (ref 150–450)
POTASSIUM SERPL-SCNC: 4.3 MMOL/L (ref 3.4–5.3)
RBC # BLD AUTO: 4.83 10E12/L (ref 4.4–5.9)
SODIUM SERPL-SCNC: 141 MMOL/L (ref 133–144)
TRIGL SERPL-MCNC: 115 MG/DL
WBC # BLD AUTO: 8.5 10E9/L (ref 4–11)

## 2020-02-06 PROCEDURE — 93571 IV DOP VEL&/PRESS C FLO 1ST: CPT | Performed by: INTERNAL MEDICINE

## 2020-02-06 PROCEDURE — 93454 CORONARY ARTERY ANGIO S&I: CPT | Mod: 26 | Performed by: INTERNAL MEDICINE

## 2020-02-06 PROCEDURE — C1769 GUIDE WIRE: HCPCS | Performed by: INTERNAL MEDICINE

## 2020-02-06 PROCEDURE — 99152 MOD SED SAME PHYS/QHP 5/>YRS: CPT | Performed by: INTERNAL MEDICINE

## 2020-02-06 PROCEDURE — 93010 ELECTROCARDIOGRAM REPORT: CPT | Performed by: INTERNAL MEDICINE

## 2020-02-06 PROCEDURE — 85027 COMPLETE CBC AUTOMATED: CPT

## 2020-02-06 PROCEDURE — 25000132 ZZH RX MED GY IP 250 OP 250 PS 637: Performed by: NURSE PRACTITIONER

## 2020-02-06 PROCEDURE — C1894 INTRO/SHEATH, NON-LASER: HCPCS | Performed by: INTERNAL MEDICINE

## 2020-02-06 PROCEDURE — 93454 CORONARY ARTERY ANGIO S&I: CPT | Performed by: INTERNAL MEDICINE

## 2020-02-06 PROCEDURE — 40000235 ZZH STATISTIC TELEMETRY

## 2020-02-06 PROCEDURE — 85610 PROTHROMBIN TIME: CPT

## 2020-02-06 PROCEDURE — 25000128 H RX IP 250 OP 636: Performed by: INTERNAL MEDICINE

## 2020-02-06 PROCEDURE — 40000852 ZZH STATISTIC HEART CATH LAB OR EP LAB

## 2020-02-06 PROCEDURE — 80048 BASIC METABOLIC PNL TOTAL CA: CPT

## 2020-02-06 PROCEDURE — 36415 COLL VENOUS BLD VENIPUNCTURE: CPT

## 2020-02-06 PROCEDURE — 93571 IV DOP VEL&/PRESS C FLO 1ST: CPT | Mod: 26 | Performed by: INTERNAL MEDICINE

## 2020-02-06 PROCEDURE — C1887 CATHETER, GUIDING: HCPCS | Performed by: INTERNAL MEDICINE

## 2020-02-06 PROCEDURE — 84460 ALANINE AMINO (ALT) (SGPT): CPT

## 2020-02-06 PROCEDURE — 27210794 ZZH OR GENERAL SUPPLY STERILE: Performed by: INTERNAL MEDICINE

## 2020-02-06 PROCEDURE — 25800030 ZZH RX IP 258 OP 636: Performed by: NURSE PRACTITIONER

## 2020-02-06 PROCEDURE — 80061 LIPID PANEL: CPT

## 2020-02-06 PROCEDURE — 25000125 ZZHC RX 250: Performed by: INTERNAL MEDICINE

## 2020-02-06 PROCEDURE — 99153 MOD SED SAME PHYS/QHP EA: CPT | Performed by: INTERNAL MEDICINE

## 2020-02-06 PROCEDURE — 93005 ELECTROCARDIOGRAM TRACING: CPT

## 2020-02-06 PROCEDURE — 85730 THROMBOPLASTIN TIME PARTIAL: CPT

## 2020-02-06 RX ORDER — ADENOSINE 3 MG/ML
INJECTION, SOLUTION INTRAVENOUS
Status: DISCONTINUED | OUTPATIENT
Start: 2020-02-06 | End: 2020-02-06 | Stop reason: HOSPADM

## 2020-02-06 RX ORDER — VERAPAMIL HYDROCHLORIDE 2.5 MG/ML
INJECTION, SOLUTION INTRAVENOUS
Status: DISCONTINUED | OUTPATIENT
Start: 2020-02-06 | End: 2020-02-06 | Stop reason: HOSPADM

## 2020-02-06 RX ORDER — NALOXONE HYDROCHLORIDE 0.4 MG/ML
.1-.4 INJECTION, SOLUTION INTRAMUSCULAR; INTRAVENOUS; SUBCUTANEOUS
Status: DISCONTINUED | OUTPATIENT
Start: 2020-02-06 | End: 2020-02-06 | Stop reason: HOSPADM

## 2020-02-06 RX ORDER — ATROPINE SULFATE 0.1 MG/ML
0.5 INJECTION INTRAVENOUS EVERY 5 MIN PRN
Status: DISCONTINUED | OUTPATIENT
Start: 2020-02-06 | End: 2020-02-06 | Stop reason: HOSPADM

## 2020-02-06 RX ORDER — LIDOCAINE 40 MG/G
CREAM TOPICAL
Status: DISCONTINUED | OUTPATIENT
Start: 2020-02-06 | End: 2020-02-06 | Stop reason: HOSPADM

## 2020-02-06 RX ORDER — FENTANYL CITRATE 50 UG/ML
INJECTION, SOLUTION INTRAMUSCULAR; INTRAVENOUS
Status: DISCONTINUED | OUTPATIENT
Start: 2020-02-06 | End: 2020-02-06 | Stop reason: HOSPADM

## 2020-02-06 RX ORDER — ACETAMINOPHEN 325 MG/1
650 TABLET ORAL EVERY 4 HOURS PRN
Status: DISCONTINUED | OUTPATIENT
Start: 2020-02-06 | End: 2020-02-06 | Stop reason: HOSPADM

## 2020-02-06 RX ORDER — ASPIRIN 81 MG/1
81 TABLET ORAL DAILY
Status: DISCONTINUED | OUTPATIENT
Start: 2020-02-06 | End: 2020-02-06 | Stop reason: HOSPADM

## 2020-02-06 RX ORDER — FENTANYL CITRATE 50 UG/ML
25-50 INJECTION, SOLUTION INTRAMUSCULAR; INTRAVENOUS
Status: DISCONTINUED | OUTPATIENT
Start: 2020-02-06 | End: 2020-02-06 | Stop reason: HOSPADM

## 2020-02-06 RX ORDER — HEPARIN SODIUM 1000 [USP'U]/ML
INJECTION, SOLUTION INTRAVENOUS; SUBCUTANEOUS
Status: DISCONTINUED | OUTPATIENT
Start: 2020-02-06 | End: 2020-02-06 | Stop reason: HOSPADM

## 2020-02-06 RX ORDER — SODIUM CHLORIDE 9 MG/ML
INJECTION, SOLUTION INTRAVENOUS CONTINUOUS
Status: DISCONTINUED | OUTPATIENT
Start: 2020-02-06 | End: 2020-02-06 | Stop reason: HOSPADM

## 2020-02-06 RX ORDER — FLUMAZENIL 0.1 MG/ML
0.2 INJECTION, SOLUTION INTRAVENOUS
Status: DISCONTINUED | OUTPATIENT
Start: 2020-02-06 | End: 2020-02-06 | Stop reason: HOSPADM

## 2020-02-06 RX ORDER — NALOXONE HYDROCHLORIDE 0.4 MG/ML
.2-.4 INJECTION, SOLUTION INTRAMUSCULAR; INTRAVENOUS; SUBCUTANEOUS
Status: DISCONTINUED | OUTPATIENT
Start: 2020-02-06 | End: 2020-02-06 | Stop reason: HOSPADM

## 2020-02-06 RX ORDER — NITROGLYCERIN 5 MG/ML
VIAL (ML) INTRAVENOUS
Status: DISCONTINUED | OUTPATIENT
Start: 2020-02-06 | End: 2020-02-06 | Stop reason: HOSPADM

## 2020-02-06 RX ADMIN — ASPIRIN 81 MG: 81 TABLET, DELAYED RELEASE ORAL at 09:18

## 2020-02-06 RX ADMIN — MIDAZOLAM 1 MG: 1 INJECTION INTRAMUSCULAR; INTRAVENOUS at 10:31

## 2020-02-06 RX ADMIN — FENTANYL CITRATE 50 MCG: 50 INJECTION, SOLUTION INTRAMUSCULAR; INTRAVENOUS at 10:31

## 2020-02-06 RX ADMIN — ADENOSINE 168 MCG: 3 INJECTION, SOLUTION INTRAVENOUS at 10:45

## 2020-02-06 RX ADMIN — MIDAZOLAM 1 MG: 1 INJECTION INTRAMUSCULAR; INTRAVENOUS at 10:13

## 2020-02-06 RX ADMIN — FENTANYL CITRATE 50 MCG: 50 INJECTION, SOLUTION INTRAMUSCULAR; INTRAVENOUS at 10:13

## 2020-02-06 RX ADMIN — SODIUM CHLORIDE: 9 INJECTION, SOLUTION INTRAVENOUS at 08:59

## 2020-02-06 RX ADMIN — NITROGLYCERIN 400 MCG: 5 INJECTION, SOLUTION INTRAVENOUS at 10:19

## 2020-02-06 RX ADMIN — MIDAZOLAM 1 MG: 1 INJECTION INTRAMUSCULAR; INTRAVENOUS at 10:15

## 2020-02-06 RX ADMIN — HEPARIN SODIUM 5000 UNITS: 1000 INJECTION, SOLUTION INTRAVENOUS; SUBCUTANEOUS at 10:18

## 2020-02-06 RX ADMIN — VERAPAMIL HYDROCHLORIDE 2.5 MG: 2.5 INJECTION, SOLUTION INTRAVENOUS at 10:18

## 2020-02-06 RX ADMIN — FENTANYL CITRATE 50 MCG: 50 INJECTION, SOLUTION INTRAMUSCULAR; INTRAVENOUS at 10:15

## 2020-02-06 RX ADMIN — LIDOCAINE HYDROCHLORIDE 1 ML: 10 INJECTION, SOLUTION EPIDURAL; INFILTRATION; INTRACAUDAL; PERINEURAL at 10:15

## 2020-02-06 ASSESSMENT — MIFFLIN-ST. JEOR: SCORE: 2046.5

## 2020-02-06 NOTE — PROGRESS NOTES
Care Suites Post Procedure Note    Patient Information  Name: Matthew Still  Age: 49 year old    Post Procedure  Procedure:angiogram  Time patient returned to Care Suites: 1100  Concerns/abnormal assessment: no  If abnormal assessment, provider notified: N/A  Plan/Other: monitor - review discontinue paperwork and DC    Jacobo Brito RN

## 2020-02-06 NOTE — PRE-PROCEDURE
GENERAL PRE-PROCEDURE:   Procedure:  CAG possible PCI  Date/Time:  2/6/2020 9:57 AM    Verbal consent obtained?: Yes    Written consent obtained?: Yes    Risks and benefits: Risks, benefits and alternatives were discussed    Patient states understanding of procedure being performed: Yes    Patient's understanding of procedure matches consent: Yes    Procedure consent matches procedure scheduled: Yes    Expected level of sedation:  Moderate  ASA Class:  Class 3- Severe systemic disease, definite functional limitations  Lungs:  Lungs clear with good breath sounds bilaterally  Heart:  Normal heart sounds and rate  History & Physical reviewed:  History and physical reviewed and no updates needed    I have examined the patient, reviewed the history, medications and pre procedural tests. He has a known history of  Apical HCM, CAD sp CAG in 2017 showing a moderate lesion in D1 with normal IFR.  He complains of dyspnea and atypical chest discomfort. A recent nuclear scan was suggestive of multivessel CAD and multiple perfusion defects and TID. . I have explained to the patient the risks of death, MI, stroke, hematoma, possible urgent bypass surgery for failed PCI, use of stents, thienopyridine agents, possible peripheral vascular complications, arrhythmia, the use of FFR in clinical decision-making and alternative of medical therapy alone in regards to left heart catheterization, left ventriculography, coronary angiography, and possible percutaneous coronary intervention. The patient voiced understanding and wishes to proceed. The patient has a good left radial pulse, normal ulnar pulse and a normal Geraldo's sign. His right radial pulse is diminished. We will plan a left radial approach. .

## 2020-02-06 NOTE — PROGRESS NOTES
Care Suites Admission Nursing Note    Patient Information  Name: Matthew Still  Age: 49 year old  Reason for admission: left heart cath  Care Suites arrival time: 0820    Patient Admission/Assessment   Pre-procedure assessment complete: YES  If abnormal assessment/labs, provider notified: N/A  NPO: YES  Medications held per instructions/orders: YES  Consent: per MD  Patient oriented to room: YES  Education/questions answered: YES  Plan/other: proceed as planned    Discharge Planning  Accompanied by: wife. Dad and daughter  Discharge name/phone number:Breanna [phone] wife  Overnight post sedation caregiver: Armando  Discharge location: home    Umer Grimaldo RN

## 2020-02-06 NOTE — PROCEDURES
[facility]    Procedure: *Cath without PCI  Date/Time: 2/6/2020 10:52 AM  Performed by: Daniel Miranda MD  Authorized by: Daniel Miranda MD     UNIVERSAL PROTOCOL   Site Marked: Yes  Prior Images Obtained and Reviewed:  Yes  Required items: Required blood products, implants, devices and special equipment available    Patient identity confirmed:  Verbally with patient  Patient was reevaluated immediately before administering moderate or deep sedation or anesthesia  Confirmation Checklist:  Patient's identity using two indicators  Time out: Immediately prior to the procedure a time out was called    Universal Protocol: the Joint Commission Universal Protocol was followed    Preparation: Patient was prepped and draped in usual sterile fashion           ANESTHESIA    Anesthesia: Local infiltration  Local Anesthetic:  Lidocaine 1% without epinephrine      SEDATION    Patient Sedated: Yes    Sedation:  Midazolam and fentanyl  Vital signs: Vital signs monitored during sedation    PROCEDURE   Patient Tolerance:  Patient tolerated the procedure well with no immediate complications    Length of time physician/provider present for 1:1 monitoring during sedation: 30    Procedure  1) CAG  2) FFR D1 0.90    Approach LTR  Complications none  Indication dyspnea chest pain, HCM nuclear stress test markedly abnormal    Findings    LMCA, LAD CX, RCA atheromatous changes no focal stenosis    D1  60% FFR 0.90    Assess I suspect stress test abnormalities due to his HCM    Recommendation Medical treatment  Life style intervention    Ruben

## 2020-02-06 NOTE — DISCHARGE INSTRUCTIONS
Cardiac Angiogram Discharge Instructions - Radial    After you go home:      Have an adult stay with you until tomorrow.    Drink extra fluids for 2 days.    You may resume your normal diet.    No smoking       For 24 hours - due to the sedation you received:    Relax and take it easy.    Do NOT make any important or legal decisions.    Do NOT drive or operate machines at home or at work.    Do NOT drink alcohol.    Care of Wrist Puncture Site:      For the first 24 hrs - check the puncture site every 1-2 hours while awake.    It is normal to have soreness at the puncture site and mild tingling in your hand for up to 3 days.    Remove the bandaid after 24 hours. If there is minor oozing, apply another bandaid and remove it after 12 hours.    You may shower tomorrow.  Do NOT take a bath, or use a hot tub or pool for at least 3 days. Do NOT scrub the site. Do not use lotion or powder near the puncture site.           Activity:        For 2 days:     do not use your hand or arm to support your weight (such as rising from a chair)     do not bend your wrist (such as lifting a garage door).    do not lift more than 5 pounds or exercise your arm (such as tennis, golf or bowling).    Do NOT do any heavy activity such as exercise, lifting, or straining.     Bleeding:      If you start bleeding from the site in your wrist, sit down and press firmly on/above the site for 10 minutes.     Once bleeding stops, keep arm still for 2 hours.     Call [facility] as soon as you can.       Call 911 right away if you have heavy bleeding or bleeding that does not stop.      Medicines:      If you have stopped any medicines, check with your provider about when to restart them.    Follow Up Appointments:      Follow up with [facility] Nurse Practitioner at [facility] of patient preference in 7-10 days.    Call the clinic if:      You have a large or growing hard lump around the site.    The site is red, swollen, hot or  tender.    Blood or fluid is draining from the site.    You have chills or a fever greater than 101 F (38 C).    Your arm feels numb, cool or changes color.    You have hives, a rash or unusual itching.    Any questions or concerns.          [facility] [place]:    [phone] UM (7 days a week)

## 2020-02-06 NOTE — PROGRESS NOTES
Care Suites Discharge Nursing Note    Patient Information  Name: Mtathew Still  Age: 49 year old    Discharge Education:  Discharge instructions reviewed: YES  Additional education/resources provided: none  Patient/patient representative verbalizes understanding: YES  Patient discharging on new medications: NO  Medication education completed: YES    Discharge Plans:   Discharge location: home  Discharge ride contacted: YES  Approximate discharge time: 1400    Discharge Criteria:  Discharge criteria met and vital signs stable: YES    Patient Belongs:  Patient belongings returned to patient: YES    Umer Grimaldo RN

## 2020-02-11 LAB — INTERPRETATION ECG - MUSE: NORMAL

## 2020-02-13 ENCOUNTER — OFFICE VISIT (OUTPATIENT)
Dept: CARDIOLOGY | Facility: CLINIC | Age: 50
End: 2020-02-13
Payer: COMMERCIAL

## 2020-02-13 ENCOUNTER — TELEPHONE (OUTPATIENT)
Dept: CARDIOLOGY | Facility: CLINIC | Age: 50
End: 2020-02-13

## 2020-02-13 VITALS
HEART RATE: 56 BPM | OXYGEN SATURATION: 98 % | BODY MASS INDEX: 35.7 KG/M2 | SYSTOLIC BLOOD PRESSURE: 137 MMHG | DIASTOLIC BLOOD PRESSURE: 88 MMHG | WEIGHT: 256 LBS

## 2020-02-13 DIAGNOSIS — R06.09 DOE (DYSPNEA ON EXERTION): Primary | ICD-10-CM

## 2020-02-13 DIAGNOSIS — I25.10 CORONARY ARTERY DISEASE INVOLVING NATIVE CORONARY ARTERY OF NATIVE HEART WITHOUT ANGINA PECTORIS: ICD-10-CM

## 2020-02-13 DIAGNOSIS — R55 PRE-SYNCOPE: Primary | ICD-10-CM

## 2020-02-13 DIAGNOSIS — R55 PRE-SYNCOPE: ICD-10-CM

## 2020-02-13 DIAGNOSIS — I42.2 APICAL VARIANT HYPERTROPHIC CARDIOMYOPATHY (H): ICD-10-CM

## 2020-02-13 DIAGNOSIS — I50.31 ACUTE DIASTOLIC HEART FAILURE (H): ICD-10-CM

## 2020-02-13 DIAGNOSIS — I10 BENIGN ESSENTIAL HYPERTENSION: ICD-10-CM

## 2020-02-13 DIAGNOSIS — I47.29 NSVT (NONSUSTAINED VENTRICULAR TACHYCARDIA) (H): ICD-10-CM

## 2020-02-13 PROCEDURE — 99215 OFFICE O/P EST HI 40 MIN: CPT | Performed by: NURSE PRACTITIONER

## 2020-02-13 RX ORDER — METOPROLOL SUCCINATE 100 MG/1
150 TABLET, EXTENDED RELEASE ORAL DAILY
Qty: 135 TABLET | Refills: 3 | Status: SHIPPED | OUTPATIENT
Start: 2020-02-13 | End: 2021-03-04

## 2020-02-13 RX ORDER — FUROSEMIDE 20 MG
20 TABLET ORAL DAILY
Qty: 5 TABLET | Refills: 0 | Status: SHIPPED | OUTPATIENT
Start: 2020-02-13 | End: 2020-04-07

## 2020-02-13 NOTE — LETTER
2/13/2020    [facility]  [address] [address]    RE: Matthew Still       Dear Colleague,    I had the pleasure of seeing Matthew Still in the [facility].    Cardiology Clinic Progress Note  Matthew Still MRN# [id number]   YOB: 1970 Age: 49 year old      Primary Cardiologist:   Dr. Harrison           History of Presenting Illness:      Matthew Still is a pleasant 49 year old patient with a past cardiac history significant for:  paroxysmal atrial fibrillation, since his 30s.  He has undergone several cardioversions for this  apical hypertrophic cardiomyopathy s/p ICD  hypertension  hyperlipidemia  CAD 60% D1 with neg FFR 2017     In November 2017, he was seen for atrial fibrillation and chest pain.  Coronary angiogram showed moderate nonobstructive disease with 60% stenosis in the D1 with negative FFR and otherwise mild disease.  Echocardiogram showed moderate concentric LVH with slight prominence of the septum but no outflow obstruction, normal EF, no significant valvular disease.  Cardiac MRI in December 2017 was consistent with apical hypertrophic cardiomyopathy.  ÁNGELA December 2017 showed thrombus in the left atrial appendage.  He underwent atrial fibrillation ablation in February 2018.  Follow-up ZIO Patch showed no further atrial fibrillation but patient had an 8 beat run of VT which he was symptomatic with.  Later in October 2018 he had a syncopal episode and subsequently underwent single-chamber ICD.  Given that he had no further atrial fibrillation, his Eliquis was discontinued.  Echocardiogram from December 2019 was stable with normal EF, apical hypertrophic cardiomyopathy and no significant valvular disease.  His amlodipine was previously decreased due to lower extremity edema.  His antihypertensives have been uptitrated.  He declined sleep medicine consultation.  In January 2020 he had complaints of dyspnea on exertion and  was found to have abnormal stress test.    Pt presents today for angiogram follow-up.  Coronary angiogram 2/6/2020 showing no culprit lesion and no indication for revascularization, he has moderate narrowing in the D1/ramus with normal FFR, no change since 2017.  These results were reviewed with him today.     Since he was last seen, he has continued with dyspnea on exertion associated with chest pressure.  He also feels the chest discomfort with his episodes of presyncope.  He had another 1 of these episodes yesterday while at work.  I do not see any alerts from his device but will have him send a remote device check.  He denies any side effects after increasing spironolactone.  BMP shows normal renal function and electrolytes.  Weights have been stable.  His left radial angiogram site is without bleeding, ecchymosis, hematoma, or bruit. Patient reports no PND, orthopnea, syncope, edema, heart racing.      Current Cardiac Medications   Amlodipine 2.5 mg daily  Atorvastatin 80 mg daily  Metoprolol  mg daily  Spironolactone 25 mg daily                   Assessment and Plan:       Plan  1. START lasix 20 mg daily for 5 days for ABREU   2. INCREASE metoprolol xl to 150 mg daily for HTN and NSVT     Recommendations:  1. Check daily weights and call the clinic if your weight has increased more than 2 lbs in one day or 5 lbs in one week.  2. Check blood pressure at least 1 hour after medications. Call the clinic if your blood pressure is consistently greater than 130/80.     Follow-up:  1.  Call after 1 week of lasix to see how shortness of breath is doing - if not improved, then PCP follow-up   2. See Dr. Harrison for cardiology follow up at [place]: 1-2 months.        1. CAD    Angio 2017 with 60% stenosis in the D1 with negative FFR with otherwise mild disease    Abnormal stress 1/2020     No angina     Continue statin, beta-blocker, CCB, ASA      2. Apical hypertrophic cardiomyopathy    Diagnosed  2017    Had syncope with sustained VT and underwent ICD    ABREU     Continue beta-blocker, start lasix       3. Paroxysmal atrial fibrillation    S/p A. fib ablation 2018    No further atrial fibrillation seen on monitoring or device checks    Anticoagulation discontinued and will continue aspirin 81 mg daily      4. NSVT    Prior episode of syncope and underwent ICD placement 2018    Short episodes of NSVT correlating with palpitations, on device checks but no further sustained and has not needed therapies with ICD    Continue metoprolol    Follow with device clinic      5. Hyperlipidemia    Last LDL 89 on 2/2019    Continue atorvastatin 80 mg daily    Consider starting zetia       6. HTN    Not well controlled     Continue amlodipine, metoprolol, and spironolactone      7.  ABREU and near syncope     ABREU possibly deconditiong vs heart failure vs lung disease from second hand smoke     Near syncope possbily NSVT episodes vs possible carotid stenosis - no significant bradycardia or other arrhthymias on device checks    Consider carotid ultrasound if near syncope not correlating with NSVT on future device check    trial of lasix for ABREU with mildly elevated BNP       Greater than 50% of visit spent in face-to-face counseling, 40 mins.         Thank you for allowing me to participate in this delightful patient's care.      This note was completed in part using Dragon voice recognition software. Although reviewed after completion, some word and grammatical errors may occur.    Magalis Anderson, APRN CNP, APRN, CNP           Data:   All laboratory data reviewed        HPI and Plan:   See dictation    Orders Placed This Encounter   Procedures     Follow-Up with Cardiologist       Orders Placed This Encounter   Medications     metoprolol succinate ER (TOPROL-XL) 100 MG 24 hr tablet     Sig: Take 1.5 tablets (150 mg) by mouth daily     Dispense:  135 tablet     Refill:  3     furosemide (LASIX) 20 MG tablet      Sig: Take 1 tablet (20 mg) by mouth daily     Dispense:  5 tablet     Refill:  0       Medications Discontinued During This Encounter   Medication Reason     metoprolol succinate ER (TOPROL-XL) 100 MG 24 hr tablet          Encounter Diagnoses   Name Primary?     NSVT (nonsustained ventricular tachycardia) (H)      ABREU (dyspnea on exertion) Yes     Benign essential hypertension      Coronary artery disease involving native coronary artery of native heart without angina pectoris      Apical variant hypertrophic cardiomyopathy (H)      Acute diastolic heart failure (H)      Pre-syncope        CURRENT MEDICATIONS:  Current Outpatient Medications   Medication Sig Dispense Refill     amLODIPine (NORVASC) 5 MG tablet Take 0.5 tablets (2.5 mg) by mouth daily 30 tablet 3     Ascorbic Acid (VITAMIN C PO)        aspirin (ASA) 81 MG tablet Take 1 tablet (81 mg) by mouth daily       atorvastatin (LIPITOR) 80 MG tablet Take 1 tablet (80 mg) by mouth daily 90 tablet 3     furosemide (LASIX) 20 MG tablet Take 1 tablet (20 mg) by mouth daily 5 tablet 0     metoprolol succinate ER (TOPROL-XL) 100 MG 24 hr tablet Take 1.5 tablets (150 mg) by mouth daily 135 tablet 3     spironolactone (ALDACTONE) 25 MG tablet Take 1 tablet (25 mg) by mouth daily 90 tablet 3       ALLERGIES   No Known Allergies    PAST MEDICAL HISTORY:  Past Medical History:   Diagnosis Date     Heart disease      Hypertension        PAST SURGICAL HISTORY:  Past Surgical History:   Procedure Laterality Date     CV CORONARY ANGIOGRAM N/A 2/6/2020    Procedure: Coronary Angiogram;  Surgeon: Daniel Miranda MD;  Location: [facility] CARDIAC CATH LAB     CV FRACTIONAL FLOW RESERVE N/A 2/6/2020    Procedure: Fractional Flow [place];  Surgeon: Daniel Miranda MD;  Location:  HEART CARDIAC CATH LAB       FAMILY HISTORY:  Family History   Problem Relation Age of Onset     Coronary Artery Disease Mother         a fib, ,MI     Heart Failure Mother         CHF      Other Cancer Father      Diabetes Father      Coronary Artery Disease Paternal Grandmother      Coronary Artery Disease Paternal Grandfather        SOCIAL HISTORY:  Social History     Socioeconomic History     Marital status:      Spouse name: None     Number of children: None     Years of education: None     Highest education level: None   Occupational History     None   Social Needs     Financial resource strain: None     Food insecurity:     Worry: None     Inability: None     Transportation needs:     Medical: None     Non-medical: None   Tobacco Use     Smoking status: Never Smoker     Smokeless tobacco: Former User   Substance and Sexual Activity     Alcohol use: Yes     Comment: 1 drink per month     Drug use: No     Sexual activity: Yes   Lifestyle     Physical activity:     Days per week: None     Minutes per session: None     Stress: None   Relationships     Social connections:     Talks on phone: None     Gets together: None     Attends Gnosticist service: None     Active member of club or organization: None     Attends meetings of clubs or organizations: None     Relationship status: None     Intimate partner violence:     Fear of current or ex partner: None     Emotionally abused: None     Physically abused: None     Forced sexual activity: None   Other Topics Concern     Parent/sibling w/ CABG, MI or angioplasty before 65F 55M? Yes     Comment: Mother MI age 45   Social History Narrative     None       Review of Systems:  Skin:  Positive for scaling     Eyes:  Positive for glasses vision going black  ENT:  Negative      Respiratory:  Positive for dyspnea on exertion     Cardiovascular:    Positive for;lower extremity symptoms;edema;fatigue;lightheadedness;dizziness;chest pain;palpitations    Gastroenterology: Negative      Genitourinary:  Negative      Musculoskeletal:  Negative      Neurologic:  Positive for headaches;numbness or tingling of feet    Psychiatric:  Negative      Heme/Lymph/Imm:   Negative      Endocrine:  Negative        Physical Exam:  Vitals: /88   Pulse 56   Wt 116.1 kg (256 lb)   SpO2 98%   BMI 35.70 kg/m       Constitutional:  cooperative, alert and oriented, well developed, well nourished, in no acute distress obese      Skin:  warm and dry to the touch          Head:  normocephalic        Eyes:  sclera white        Lymph:      ENT:  no pallor or cyanosis        Neck:  no stiffness        Respiratory:  clear to auscultation;normal symmetry         Cardiac: regular rhythm;normal S1 and S2   distant heart sounds            pulses full and equal                                 left radial bruit (-) R groin site ecchymosis    GI:  abdomen soft obese      Extremities and Muscular Skeletal:      bilateral LE edema;trace          Neurological:  no gross motor deficits;affect appropriate        Psych:  Alert and Oriented x 3        CC  Brice Harrison MD  [facility] [[email or web address]] HEART CARE  [address] [place] [address]                  Thank you for allowing me to participate in the care of your patient.    Sincerely,     HORTENSIA Rdz [facility]

## 2020-02-13 NOTE — TELEPHONE ENCOUNTER
I called the device clinic to see if he had any episodes of NSVT yesterday.  They reported that he had no episodes that were reported on his defibrilator.  Therefore, his episode of presyncope did not correlate with any arrhythmias.  Can you please have him schedule a carotid artery ultrasound to assess for any carotid artery stenosis.   HORTENSIA Rdz CNP    ADDENDUM:  Spoke with pt.  Pt verbalized understanding and will call to schedule.  FARA STARKS RN on 2/13/2020 at 1:33 PM

## 2020-02-13 NOTE — PATIENT INSTRUCTIONS
Medication Changes:  1. START lasix 20 mg daily for 5 days   2. INCREASE metoprolol xl to 150 mg daily     Recommendations:  1. Check daily weights and call the clinic if your weight has increased more than 2 lbs in one day or 5 lbs in one week.  2. Check blood pressure at least 1 hour after medications. Call the clinic if your blood pressure is consistently greater than 130/80.       Follow-up:  1.  Call after 1 week of lasix to see how shortness of breath is doing   2. See Dr. Harrison for cardiology follow up at [facility]: 1-2 months.    Cardiology Scheduling~[phone]  Cardiology Clinic RNs~[phone] (Siena Mcgarry RN and Yary Tello RN)

## 2020-02-13 NOTE — PROGRESS NOTES
Cardiology Clinic Progress Note  Matthew Still MRN# [id number]   YOB: 1970 Age: 49 year old      Primary Cardiologist:   Dr. Harrison           History of Presenting Illness:      Matthew Still is a pleasant 49 year old patient with a past cardiac history significant for:  paroxysmal atrial fibrillation, since his 30s.  He has undergone several cardioversions for this  apical hypertrophic cardiomyopathy s/p ICD  hypertension  hyperlipidemia  CAD 60% D1 with neg FFR 2017     In November 2017, he was seen for atrial fibrillation and chest pain.  Coronary angiogram showed moderate nonobstructive disease with 60% stenosis in the D1 with negative FFR and otherwise mild disease.  Echocardiogram showed moderate concentric LVH with slight prominence of the septum but no outflow obstruction, normal EF, no significant valvular disease.  Cardiac MRI in December 2017 was consistent with apical hypertrophic cardiomyopathy.  ÁNGELA December 2017 showed thrombus in the left atrial appendage.  He underwent atrial fibrillation ablation in February 2018.  Follow-up ZIO Patch showed no further atrial fibrillation but patient had an 8 beat run of VT which he was symptomatic with.  Later in October 2018 he had a syncopal episode and subsequently underwent single-chamber ICD.  Given that he had no further atrial fibrillation, his Eliquis was discontinued.  Echocardiogram from December 2019 was stable with normal EF, apical hypertrophic cardiomyopathy and no significant valvular disease.  His amlodipine was previously decreased due to lower extremity edema.  His antihypertensives have been uptitrated.  He declined sleep medicine consultation.  In January 2020 he had complaints of dyspnea on exertion and was found to have abnormal stress test.    Pt presents today for angiogram follow-up.  Coronary angiogram 2/6/2020 showing no culprit lesion and no indication for revascularization, he has moderate narrowing in the D1/ramus  with normal FFR, no change since 2017.  These results were reviewed with him today.     Since he was last seen, he has continued with dyspnea on exertion associated with chest pressure.  He also feels the chest discomfort with his episodes of presyncope.  He had another 1 of these episodes yesterday while at work.  I do not see any alerts from his device but will have him send a remote device check.  He denies any side effects after increasing spironolactone.  BMP shows normal renal function and electrolytes.  Weights have been stable.  His left radial angiogram site is without bleeding, ecchymosis, hematoma, or bruit. Patient reports no PND, orthopnea, syncope, edema, heart racing.      Current Cardiac Medications   Amlodipine 2.5 mg daily  Atorvastatin 80 mg daily  Metoprolol  mg daily  Spironolactone 25 mg daily                   Assessment and Plan:       Plan  1. START lasix 20 mg daily for 5 days for ABREU   2. INCREASE metoprolol xl to 150 mg daily for HTN and NSVT     Recommendations:  1. Check daily weights and call the clinic if your weight has increased more than 2 lbs in one day or 5 lbs in one week.  2. Check blood pressure at least 1 hour after medications. Call the clinic if your blood pressure is consistently greater than 130/80.     Follow-up:  1.  Call after 1 week of lasix to see how shortness of breath is doing - if not improved, then PCP follow-up   2. See Dr. Harrison for cardiology follow up at [place]: 1-2 months.        1. CAD    Angio 2017 with 60% stenosis in the D1 with negative FFR with otherwise mild disease    Abnormal stress 1/2020     No angina     Continue statin, beta-blocker, CCB, ASA      2. Apical hypertrophic cardiomyopathy    Diagnosed 2017    Had syncope with sustained VT and underwent ICD    ABREU     Continue beta-blocker, start lasix       3. Paroxysmal atrial fibrillation    S/p A. fib ablation 2018    No further atrial fibrillation seen on monitoring or device  checks    Anticoagulation discontinued and will continue aspirin 81 mg daily      4. NSVT    Prior episode of syncope and underwent ICD placement 2018    Short episodes of NSVT correlating with palpitations, on device checks but no further sustained and has not needed therapies with ICD    Continue metoprolol    Follow with device clinic      5. Hyperlipidemia    Last LDL 89 on 2/2019    Continue atorvastatin 80 mg daily    Consider starting zetia       6. HTN    Not well controlled     Continue amlodipine, metoprolol, and spironolactone      7.  ABREU and near syncope     ABREU possibly deconditiong vs heart failure vs lung disease from second hand smoke     Near syncope possbily NSVT episodes vs possible carotid stenosis - no significant bradycardia or other arrhthymias on device checks    Consider carotid ultrasound if near syncope not correlating with NSVT on future device check    trial of lasix for ABREU with mildly elevated BNP       Greater than 50% of visit spent in face-to-face counseling, 40 mins.         Thank you for allowing me to participate in this delightful patient's care.      This note was completed in part using Dragon voice recognition software. Although reviewed after completion, some word and grammatical errors may occur.    Magalis Anderson, APRN CNP, APRN, CNP           Data:   All laboratory data reviewed        HPI and Plan:   See dictation    Orders Placed This Encounter   Procedures     Follow-Up with Cardiologist       Orders Placed This Encounter   Medications     metoprolol succinate ER (TOPROL-XL) 100 MG 24 hr tablet     Sig: Take 1.5 tablets (150 mg) by mouth daily     Dispense:  135 tablet     Refill:  3     furosemide (LASIX) 20 MG tablet     Sig: Take 1 tablet (20 mg) by mouth daily     Dispense:  5 tablet     Refill:  0       Medications Discontinued During This Encounter   Medication Reason     metoprolol succinate ER (TOPROL-XL) 100 MG 24 hr tablet          Encounter  Diagnoses   Name Primary?     NSVT (nonsustained ventricular tachycardia) (H)      ABREU (dyspnea on exertion) Yes     Benign essential hypertension      Coronary artery disease involving native coronary artery of native heart without angina pectoris      Apical variant hypertrophic cardiomyopathy (H)      Acute diastolic heart failure (H)      Pre-syncope        CURRENT MEDICATIONS:  Current Outpatient Medications   Medication Sig Dispense Refill     amLODIPine (NORVASC) 5 MG tablet Take 0.5 tablets (2.5 mg) by mouth daily 30 tablet 3     Ascorbic Acid (VITAMIN C PO)        aspirin (ASA) 81 MG tablet Take 1 tablet (81 mg) by mouth daily       atorvastatin (LIPITOR) 80 MG tablet Take 1 tablet (80 mg) by mouth daily 90 tablet 3     furosemide (LASIX) 20 MG tablet Take 1 tablet (20 mg) by mouth daily 5 tablet 0     metoprolol succinate ER (TOPROL-XL) 100 MG 24 hr tablet Take 1.5 tablets (150 mg) by mouth daily 135 tablet 3     spironolactone (ALDACTONE) 25 MG tablet Take 1 tablet (25 mg) by mouth daily 90 tablet 3       ALLERGIES   No Known Allergies    PAST MEDICAL HISTORY:  Past Medical History:   Diagnosis Date     Heart disease      Hypertension        PAST SURGICAL HISTORY:  Past Surgical History:   Procedure Laterality Date     CV CORONARY ANGIOGRAM N/A 2/6/2020    Procedure: Coronary Angiogram;  Surgeon: Daniel Miranda MD;  Location: [facility] CARDIAC CATH LAB     CV FRACTIONAL FLOW RESERVE N/A 2/6/2020    Procedure: Fractional Flow [place];  Surgeon: Daniel Miranda MD;  Location:  HEART CARDIAC CATH LAB       FAMILY HISTORY:  Family History   Problem Relation Age of Onset     Coronary Artery Disease Mother         a fib, ,MI     Heart Failure Mother         CHF     Other Cancer Father      Diabetes Father      Coronary Artery Disease Paternal Grandmother      Coronary Artery Disease Paternal Grandfather        SOCIAL HISTORY:  Social History     Socioeconomic History     Marital status:       Spouse name: None     Number of children: None     Years of education: None     Highest education level: None   Occupational History     None   Social Needs     Financial resource strain: None     Food insecurity:     Worry: None     Inability: None     Transportation needs:     Medical: None     Non-medical: None   Tobacco Use     Smoking status: Never Smoker     Smokeless tobacco: Former User   Substance and Sexual Activity     Alcohol use: Yes     Comment: 1 drink per month     Drug use: No     Sexual activity: Yes   Lifestyle     Physical activity:     Days per week: None     Minutes per session: None     Stress: None   Relationships     Social connections:     Talks on phone: None     Gets together: None     Attends Restorationist service: None     Active member of club or organization: None     Attends meetings of clubs or organizations: None     Relationship status: None     Intimate partner violence:     Fear of current or ex partner: None     Emotionally abused: None     Physically abused: None     Forced sexual activity: None   Other Topics Concern     Parent/sibling w/ CABG, MI or angioplasty before 65F 55M? Yes     Comment: Mother MI age 45   Social History Narrative     None       Review of Systems:  Skin:  Positive for scaling     Eyes:  Positive for glasses vision going black  ENT:  Negative      Respiratory:  Positive for dyspnea on exertion     Cardiovascular:    Positive for;lower extremity symptoms;edema;fatigue;lightheadedness;dizziness;chest pain;palpitations    Gastroenterology: Negative      Genitourinary:  Negative      Musculoskeletal:  Negative      Neurologic:  Positive for headaches;numbness or tingling of feet    Psychiatric:  Negative      Heme/Lymph/Imm:  Negative      Endocrine:  Negative        Physical Exam:  Vitals: /88   Pulse 56   Wt 116.1 kg (256 lb)   SpO2 98%   BMI 35.70 kg/m      Constitutional:  cooperative, alert and oriented, well developed, well nourished, in no  acute distress obese      Skin:  warm and dry to the touch          Head:  normocephalic        Eyes:  sclera white        Lymph:      ENT:  no pallor or cyanosis        Neck:  no stiffness        Respiratory:  clear to auscultation;normal symmetry         Cardiac: regular rhythm;normal S1 and S2   distant heart sounds            pulses full and equal                                 left radial bruit (-) R groin site ecchymosis    GI:  abdomen soft obese      Extremities and Muscular Skeletal:      bilateral LE edema;trace          Neurological:  no gross motor deficits;affect appropriate        Psych:  Alert and Oriented x 3        CC  Brice Harrison MD  [facility] HEART CARE  [address] [address]

## 2020-02-15 ENCOUNTER — HOSPITAL ENCOUNTER (OUTPATIENT)
Dept: ULTRASOUND IMAGING | Facility: CLINIC | Age: 50
Discharge: HOME | End: 2020-02-15
Attending: NURSE PRACTITIONER | Admitting: NURSE PRACTITIONER
Payer: COMMERCIAL

## 2020-02-15 DIAGNOSIS — R55 PRE-SYNCOPE: ICD-10-CM

## 2020-02-15 PROCEDURE — 93880 EXTRACRANIAL BILAT STUDY: CPT

## 2020-02-17 ENCOUNTER — TELEPHONE (OUTPATIENT)
Dept: CARDIOLOGY | Facility: CLINIC | Age: 50
End: 2020-02-17

## 2020-02-17 NOTE — TELEPHONE ENCOUNTER
Called pt re: normal Carotid US Result.    Last OV 2/13/20 to start Lasix for 5 days and increase metoprolol    Pt states he has been taking Lasix - this is his 4th day- and his SOB is better.  Weight is down to 252# (baseline is 255#).  He has been taking increased dose of metoprolol.  Last near syncopal episode 2/12/20.    Advised to call if weight increases and/or SOB worsening. Pt verbalized understanding.    Appt made with Dr Harrison for 4/7/20.    Will route update to Magalis Martin NP

## 2020-03-10 ENCOUNTER — TELEPHONE (OUTPATIENT)
Dept: CARDIOLOGY | Facility: CLINIC | Age: 50
End: 2020-03-10

## 2020-03-10 DIAGNOSIS — E78.5 HYPERLIPIDEMIA LDL GOAL <70: ICD-10-CM

## 2020-03-10 DIAGNOSIS — R06.09 DOE (DYSPNEA ON EXERTION): Primary | ICD-10-CM

## 2020-03-10 DIAGNOSIS — I10 BENIGN ESSENTIAL HYPERTENSION: ICD-10-CM

## 2020-03-10 RX ORDER — AMLODIPINE BESYLATE 2.5 MG/1
2.5 TABLET ORAL DAILY
Qty: 90 TABLET | Refills: 3 | Status: SHIPPED | OUTPATIENT
Start: 2020-03-10 | End: 2020-03-16

## 2020-03-10 RX ORDER — AMLODIPINE BESYLATE 5 MG/1
2.5 TABLET ORAL DAILY
Qty: 30 TABLET | Refills: 3 | Status: CANCELLED | OUTPATIENT
Start: 2020-03-10

## 2020-03-10 RX ORDER — ATORVASTATIN CALCIUM 80 MG/1
80 TABLET, FILM COATED ORAL DAILY
Qty: 90 TABLET | Refills: 3 | Status: CANCELLED | OUTPATIENT
Start: 2020-03-10

## 2020-03-10 RX ORDER — ATORVASTATIN CALCIUM 80 MG/1
80 TABLET, FILM COATED ORAL DAILY
Qty: 90 TABLET | Refills: 3 | Status: SHIPPED | OUTPATIENT
Start: 2020-03-10 | End: 2020-03-16

## 2020-03-10 NOTE — TELEPHONE ENCOUNTER
Wife called in for refills. Stated they would like a refill of lasix as it really helped his ABREU and now that it is gone, he is starting to get winded again. Will discuss with Magalis Martin NP for recommendations. Yary Tello RN Cardiology March 10, 2020, 11:47 AM    ADDENDUM:   Are his weights going up? Ok to restart prior dose of lasix 20 mg daily. Check bmp in 1 week. He has follow-up in April with Dr. Harrison already.   HORTENSIA Rdz CNP    ADDENDUM:  LM to callback.  FARA STARKS RN on 3/11/2020 at 12:59 PM    Attempted call. LM to call back  FARA STARKS RN on 3/11/2020 at 3:35 PM    ADDENDUM:  Spoke with pt.  He is not weighing himself and states LE edema back to as bad as it was before starting Lasix.  Lasix Rx sent - pt verbalized understanding to have lab check in 1 week.  FARA STARKS RN on 3/12/2020 at 3:00 PM

## 2020-03-12 RX ORDER — FUROSEMIDE 20 MG
20 TABLET ORAL DAILY
Qty: 90 TABLET | Refills: 3 | Status: SHIPPED | OUTPATIENT
Start: 2020-03-12 | End: 2021-03-04

## 2020-03-16 RX ORDER — AMLODIPINE BESYLATE 2.5 MG/1
2.5 TABLET ORAL DAILY
Qty: 90 TABLET | Refills: 3 | Status: SHIPPED | OUTPATIENT
Start: 2020-03-16 | End: 2020-10-21 | Stop reason: DRUGHIGH

## 2020-03-16 RX ORDER — ATORVASTATIN CALCIUM 80 MG/1
80 TABLET, FILM COATED ORAL DAILY
Qty: 90 TABLET | Refills: 3 | Status: SHIPPED | OUTPATIENT
Start: 2020-03-16 | End: 2021-03-04

## 2020-03-31 ENCOUNTER — ANCILLARY PROCEDURE (OUTPATIENT)
Dept: CARDIOLOGY | Facility: CLINIC | Age: 50
End: 2020-03-31
Attending: INTERNAL MEDICINE
Payer: COMMERCIAL

## 2020-03-31 DIAGNOSIS — Z95.810 ICD (IMPLANTABLE CARDIOVERTER-DEFIBRILLATOR), SINGLE, IN SITU: Primary | ICD-10-CM

## 2020-03-31 DIAGNOSIS — Z95.810 ICD (IMPLANTABLE CARDIOVERTER-DEFIBRILLATOR), SINGLE, IN SITU: ICD-10-CM

## 2020-03-31 DIAGNOSIS — Z95.810 ICD (IMPLANTABLE CARDIOVERTER-DEFIBRILLATOR) IN PLACE: ICD-10-CM

## 2020-03-31 PROCEDURE — 93296 REM INTERROG EVL PM/IDS: CPT | Performed by: INTERNAL MEDICINE

## 2020-03-31 PROCEDURE — 93295 DEV INTERROG REMOTE 1/2/MLT: CPT | Performed by: INTERNAL MEDICINE

## 2020-04-01 ENCOUNTER — CARE COORDINATION (OUTPATIENT)
Dept: CARDIOLOGY | Facility: CLINIC | Age: 50
End: 2020-04-01

## 2020-04-01 NOTE — PROGRESS NOTES
Disc with patient changing visit on 4/7/20 with Dr Harrison to phone visit. Pt agreeable. Appt changed. Yary Tello RN Cardiology April 1, 2020, 12:12 PM

## 2020-04-03 LAB
MDC_IDC_LEAD_IMPLANT_DT: NORMAL
MDC_IDC_LEAD_LOCATION: NORMAL
MDC_IDC_LEAD_LOCATION_DETAIL_1: NORMAL
MDC_IDC_LEAD_MFG: NORMAL
MDC_IDC_LEAD_MODEL: NORMAL
MDC_IDC_LEAD_SERIAL: NORMAL
MDC_IDC_MSMT_BATTERY_REMAINING_PERCENTAGE: 100 %
MDC_IDC_MSMT_BATTERY_RRT_TRIGGER: NORMAL
MDC_IDC_MSMT_BATTERY_STATUS: NORMAL
MDC_IDC_MSMT_BATTERY_VOLTAGE: 3.11 V
MDC_IDC_MSMT_CAP_CHARGE_DTM: NORMAL
MDC_IDC_MSMT_CAP_CHARGE_ENERGY: 40 J
MDC_IDC_MSMT_CAP_CHARGE_TIME: 9.3 S
MDC_IDC_MSMT_CAP_CHARGE_TYPE: NORMAL
MDC_IDC_MSMT_LEADCHNL_RA_LEAD_CHANNEL_STATUS: NORMAL
MDC_IDC_MSMT_LEADCHNL_RV_IMPEDANCE_VALUE: 502 OHM
MDC_IDC_MSMT_LEADCHNL_RV_LEAD_CHANNEL_STATUS: NORMAL
MDC_IDC_PG_IMPLANT_DTM: NORMAL
MDC_IDC_PG_MFG: NORMAL
MDC_IDC_PG_MODEL: NORMAL
MDC_IDC_PG_SERIAL: NORMAL
MDC_IDC_PG_TYPE: NORMAL
MDC_IDC_SESS_CLINIC_NAME: NORMAL
MDC_IDC_SESS_DTM: NORMAL
MDC_IDC_SESS_REPROGRAMMED: NO
MDC_IDC_SESS_TYPE: NORMAL
MDC_IDC_SET_BRADY_LOWRATE: 40 {BEATS}/MIN
MDC_IDC_SET_BRADY_MODE: NORMAL
MDC_IDC_SET_LEADCHNL_RA_SENSING_POLARITY: NORMAL
MDC_IDC_SET_LEADCHNL_RA_SENSING_SENSITIVITY: 0.4 MV
MDC_IDC_SET_LEADCHNL_RV_PACING_AMPLITUDE: 1.8 V
MDC_IDC_SET_LEADCHNL_RV_PACING_POLARITY: NORMAL
MDC_IDC_SET_LEADCHNL_RV_PACING_PULSEWIDTH: 0.4 MS
MDC_IDC_SET_LEADCHNL_RV_SENSING_ADAPTATION_MODE: NORMAL
MDC_IDC_SET_LEADCHNL_RV_SENSING_POLARITY: NORMAL
MDC_IDC_SET_LEADCHNL_RV_SENSING_SENSITIVITY: 0.8 MV
MDC_IDC_SET_ZONE_DETECTION_BEATS_DENOMINATOR: 40 {BEATS}
MDC_IDC_SET_ZONE_DETECTION_BEATS_NUMERATOR: 30 {BEATS}
MDC_IDC_SET_ZONE_DETECTION_INTERVAL: 250 MS
MDC_IDC_SET_ZONE_DETECTION_INTERVAL: 300 MS
MDC_IDC_SET_ZONE_TYPE: NORMAL
MDC_IDC_SET_ZONE_TYPE: NORMAL
MDC_IDC_STAT_AT_BURDEN_PERCENT: 0 %
MDC_IDC_STAT_AT_DTM_END: NORMAL
MDC_IDC_STAT_AT_DTM_START: NORMAL
MDC_IDC_STAT_BRADY_AS_VP_PERCENT: 0 %
MDC_IDC_STAT_BRADY_AS_VS_PERCENT: 100 %
MDC_IDC_STAT_BRADY_DTM_END: NORMAL
MDC_IDC_STAT_BRADY_DTM_START: NORMAL
MDC_IDC_STAT_BRADY_RV_PERCENT_PACED: 0 %
MDC_IDC_STAT_CRT_DTM_END: NORMAL
MDC_IDC_STAT_CRT_DTM_START: NORMAL
MDC_IDC_STAT_EPISODE_TOTAL_COUNT: 0
MDC_IDC_STAT_EPISODE_TOTAL_COUNT_DTM_END: NORMAL
MDC_IDC_STAT_EPISODE_TOTAL_COUNT_DTM_START: NORMAL
MDC_IDC_STAT_EPISODE_TYPE: NORMAL
MDC_IDC_STAT_TACHYTHERAPY_ATP_DELIVERED_TOTAL: 0
MDC_IDC_STAT_TACHYTHERAPY_SHOCKS_ABORTED_TOTAL: 0
MDC_IDC_STAT_TACHYTHERAPY_SHOCKS_DELIVERED_TOTAL: 0
MDC_IDC_STAT_TACHYTHERAPY_TOTAL_DTM_END: NORMAL
MDC_IDC_STAT_TACHYTHERAPY_TOTAL_DTM_START: NORMAL

## 2020-04-07 ENCOUNTER — VIRTUAL VISIT (OUTPATIENT)
Dept: CARDIOLOGY | Facility: CLINIC | Age: 50
End: 2020-04-07
Payer: COMMERCIAL

## 2020-04-07 VITALS
WEIGHT: 246.3 LBS | DIASTOLIC BLOOD PRESSURE: 89 MMHG | HEART RATE: 62 BPM | SYSTOLIC BLOOD PRESSURE: 136 MMHG | BODY MASS INDEX: 34.35 KG/M2

## 2020-04-07 DIAGNOSIS — I42.2 APICAL VARIANT HYPERTROPHIC CARDIOMYOPATHY (H): Primary | ICD-10-CM

## 2020-04-07 PROCEDURE — 99214 OFFICE O/P EST MOD 30 MIN: CPT | Mod: TEL | Performed by: INTERNAL MEDICINE

## 2020-04-07 NOTE — PATIENT INSTRUCTIONS
1.  Overall it seems like your heart issues are quite stable.  If the lightheaded episodes become more frequent or start lasting longer, I would like you to wear a 7-day ZIO monitor.  It is possible that there is some short rhythm issue of the heart that is not being recorded by the defibrillator.    2.  Continue the furosemide.  This seems to be helping keep the extra fluid off.  If you notice your weight is trending upward and you have more persistent leg swelling, call the clinic and we can adjust the dose of the water pill.    3.  We would like to see you back in the clinic in about 4 months time.  We will send you a reminder closer to them.

## 2020-04-07 NOTE — PROGRESS NOTES
"Wellness Screening Tool    Symptom Screening:    Do you have one of the following new symptoms:      Fever or reported chills?  No    A new cough (started within the past 14 days)? No    Shortness of breath (started within the past 14 days)? No    Nausea, vomiting or diarrhea? No    Within the past 3 weeks, have you been exposed to someone with a known positive illness below?      COVID - 19 (known or suspected) No    Chicken pox?  No    Measles? No    Pertussis? No        Patient notified of visitor restriction: Yes    Patient's appointment status: Telephone Visit     Tamara Fritz MA  4/7/2020 10:47 AM    Matthew Still is a 49 year old male who is being evaluated via a billable telephone visit.      The patient has been notified of following:     \"This telephone visit will be conducted via a call between you and your physician/provider. We have found that certain health care needs can be provided without the need for a physical exam.  This service lets us provide the care you need with a short phone conversation.  If a prescription is necessary we can send it directly to your pharmacy.  If lab work is needed we can place an order for that and you can then stop by our lab to have the test done at a later time.    If during the course of the call the physician/provider feels a telephone visit is not appropriate, you will not be charged for this service.\"     Patient has given verbal consent for Telephone visit?  Yes    Matthew Still complains of    Chief Complaint   Patient presents with     RECHECK     CAD     Shortness of Breath     Improved       I have reviewed and updated the patient's Past Medical History, Social History, Family History and Medication List.    ALLERGIES  Patient has no known allergies.    Additional provider notes:   49-year-old male seen for atrial fibrillation and apical hypertrophic cardiomyopathy.       He reports a history of paroxysmal A. fib dating back about 20 years.  He has " had several cardioversions in the past.      In November 2017 he presented to the [facility] with atrial fibrillation and chest pain with troponin 0.13.  Angiogram showed normal left main, mild disease of LAD, 60% D1 (FFR 0.88), normal circumflex, dominant RCA with minimal disease.   Echo showed EF 65%, moderate mostly concentric LVH with slight prominence of the septum, moderate left atrial enlargement, no significant valve disease, no outflow obstruction.        Cardiac MRI December 2017 showed EF 70%, mild to moderate mid ventricular hypertrophy and severe apical hypertrophy consistent with apical hypertrophic cardiomyopathy, normal RV, diffuse patchy mid wall enhancement of the anterior and apical segments, total scar burden 11%, trivial MR.        Transesophageal echo December 2017 showed EF 60%, severe apical hypertrophy consistent with hypertrophic cardiomyopathy, normal RV, mild left atrial enlargement, thrombus in the left atrial appendage, mild MR, mild-to-moderate TR.        In February 2018 he underwent atrial fibrillation ablation including wide circumferential pulmonary vein isolation and SVC isolation.  30 day event monitor April 2018 showed sinus rhythm, no A. fib.  8-day Zio patch in September 2018 showed sinus rhythm, no A. fib, 8 beat run of VT correlating with symptoms.     In October 2018 he had an episode of syncope.  He then underwent single-chamber ICD implantation (Biotronik Intica 7 VR-T ProMRI).     Echo December 2019 showed EF 60%, moderate concentric LVH with obliteration of left apical cavity due to apical hypertrophy, normal RV, no valve disease.     Nuclear stress January 2020 showed moderate anterior ischemia.  Coronary angiogram showed moderate first diagonal lesion with normal FFR, otherwise no obstructive coronary disease.     Carotid ultrasound February 2020 showed less than 50% stenosis bilaterally.     Device interrogation March 2020 showed 0% ventricular pacing, no arrhythmias,  normal function.     Overall he has been feeling okay.  His breathing improved and his lower extremity edema resolved after starting furosemide in February.  He continues to have episodes 2 or 3 times a week where he feels a little lightheaded, he describes his heart stopping.  He has had some tunnel vision, but no syncope.  This will last a few seconds.  He has minimal chest pain when he exerts himself harder, but nothing at rest.  Weight is around 246 pounds.    Assessment:  49-year-old male seen for apical hypertrophic cardiomyopathy.  Overall he is doing fairly well.  He continues to have some episodes where he feels lightheaded.  It has been hard to pinpoint a correlation with any nonsustained VT or rhythm issue.  He would prefer to hold off on his ZIO monitor for now, it could be possible there are short arrhythmia episodes that are not being recorded by his defibrillator.  However he is on good medical therapy.    He had some fluid retention that seemed to resolve with furosemide.  He will stay on it.  Weight of 246 is probably close to his dry weight.    Recommendations  1.  Apical hypertrophic cardiomyopathy  -Continue current medications  -Continue furosemide for fluid retention    2.  ICD with history of nonsustained VT  -Next device check in June 2020  -If lightheaded episodes become more frequent or more prolonged, then wear a 7-day ZIO monitor    3.  Nonocclusive coronary artery disease  -Continue medical management    Follow-up in clinic in 4 months.      Phone call duration: 13 minutes    Brice Harrison MD  Cardiology - [facility]  Pager: [phone]  Text Page  April 7, 2020

## 2020-06-30 ENCOUNTER — ANCILLARY PROCEDURE (OUTPATIENT)
Dept: CARDIOLOGY | Facility: CLINIC | Age: 50
End: 2020-06-30
Attending: INTERNAL MEDICINE
Payer: COMMERCIAL

## 2020-06-30 DIAGNOSIS — Z95.810 ICD (IMPLANTABLE CARDIOVERTER-DEFIBRILLATOR) IN PLACE: ICD-10-CM

## 2020-06-30 DIAGNOSIS — Z95.810 ICD (IMPLANTABLE CARDIOVERTER-DEFIBRILLATOR), SINGLE, IN SITU: ICD-10-CM

## 2020-06-30 PROCEDURE — 93295 DEV INTERROG REMOTE 1/2/MLT: CPT | Performed by: INTERNAL MEDICINE

## 2020-06-30 PROCEDURE — 93296 REM INTERROG EVL PM/IDS: CPT | Performed by: INTERNAL MEDICINE

## 2020-07-02 LAB
MDC_IDC_LEAD_IMPLANT_DT: NORMAL
MDC_IDC_LEAD_LOCATION: NORMAL
MDC_IDC_LEAD_LOCATION_DETAIL_1: NORMAL
MDC_IDC_LEAD_MFG: NORMAL
MDC_IDC_LEAD_MODEL: NORMAL
MDC_IDC_LEAD_SERIAL: NORMAL
MDC_IDC_PG_IMPLANT_DTM: NORMAL
MDC_IDC_PG_MFG: NORMAL
MDC_IDC_PG_MODEL: NORMAL
MDC_IDC_PG_SERIAL: NORMAL
MDC_IDC_PG_TYPE: NORMAL
MDC_IDC_SESS_CLINIC_NAME: NORMAL
MDC_IDC_SESS_DTM: NORMAL
MDC_IDC_SESS_TYPE: NORMAL

## 2020-09-29 ENCOUNTER — ANCILLARY PROCEDURE (OUTPATIENT)
Dept: CARDIOLOGY | Facility: CLINIC | Age: 50
End: 2020-09-29
Attending: INTERNAL MEDICINE
Payer: COMMERCIAL

## 2020-09-29 DIAGNOSIS — Z95.810 DUAL ICD (IMPLANTABLE CARDIOVERTER-DEFIBRILLATOR) IN PLACE: ICD-10-CM

## 2020-09-29 PROCEDURE — 93295 DEV INTERROG REMOTE 1/2/MLT: CPT | Performed by: INTERNAL MEDICINE

## 2020-09-29 PROCEDURE — 93296 REM INTERROG EVL PM/IDS: CPT | Performed by: INTERNAL MEDICINE

## 2020-10-01 LAB
MDC_IDC_LEAD_IMPLANT_DT: NORMAL
MDC_IDC_LEAD_LOCATION: NORMAL
MDC_IDC_LEAD_LOCATION_DETAIL_1: NORMAL
MDC_IDC_LEAD_MFG: NORMAL
MDC_IDC_LEAD_MODEL: NORMAL
MDC_IDC_LEAD_SERIAL: NORMAL
MDC_IDC_MSMT_CAP_CHARGE_TYPE: NORMAL
MDC_IDC_MSMT_LEADCHNL_RV_SENSING_INTR_AMPL: 20 MV
MDC_IDC_PG_IMPLANT_DTM: NORMAL
MDC_IDC_PG_MFG: NORMAL
MDC_IDC_PG_MODEL: NORMAL
MDC_IDC_PG_SERIAL: NORMAL
MDC_IDC_PG_TYPE: NORMAL
MDC_IDC_SESS_CLINIC_NAME: NORMAL
MDC_IDC_SESS_DTM: NORMAL
MDC_IDC_SESS_TYPE: NORMAL
MDC_IDC_SET_ZONE_TYPE: NORMAL

## 2020-10-15 ENCOUNTER — TELEPHONE (OUTPATIENT)
Dept: CARDIOLOGY | Facility: CLINIC | Age: 50
End: 2020-10-15

## 2020-10-15 NOTE — TELEPHONE ENCOUNTER
Patient called device clinic back. Patient stated he was sleeping at the time of the episode but it did wake him up. Patient stated after it happened he was able to fall back asleep as any VT episodes tend to make him tired. Patient stated he is use to this and that the symptoms always pass.    Patient stated he did take all of his medications as prescribed and stated that he would call if he feels symptoms again in the near future or feels that the episodes are occurring more frequently.

## 2020-10-15 NOTE — TELEPHONE ENCOUNTER
Remote alert for NSVT episode. EGM shows 10 second of VT with rate in the 170s although termination of the episode is not available on the EGM. No therapy given. Episode occurred today at 12:46am. Left voicemail for patient asking that he call back if he was awake and/or symptomatic.

## 2020-10-19 DIAGNOSIS — I42.2 APICAL VARIANT HYPERTROPHIC CARDIOMYOPATHY (H): ICD-10-CM

## 2020-10-19 LAB
ANION GAP SERPL CALCULATED.3IONS-SCNC: 4 MMOL/L (ref 3–14)
BUN SERPL-MCNC: 16 MG/DL (ref 7–30)
CALCIUM SERPL-MCNC: 8.8 MG/DL (ref 8.5–10.1)
CHLORIDE SERPL-SCNC: 110 MMOL/L (ref 94–109)
CO2 SERPL-SCNC: 28 MMOL/L (ref 20–32)
CREAT SERPL-MCNC: 1.04 MG/DL (ref 0.66–1.25)
GFR SERPL CREATININE-BSD FRML MDRD: 82 ML/MIN/{1.73_M2}
GLUCOSE SERPL-MCNC: 126 MG/DL (ref 70–99)
NT-PROBNP SERPL-MCNC: 1024 PG/ML (ref 0–125)
POTASSIUM SERPL-SCNC: 4 MMOL/L (ref 3.4–5.3)
SODIUM SERPL-SCNC: 142 MMOL/L (ref 133–144)

## 2020-10-19 PROCEDURE — 36415 COLL VENOUS BLD VENIPUNCTURE: CPT | Performed by: INTERNAL MEDICINE

## 2020-10-19 PROCEDURE — 80048 BASIC METABOLIC PNL TOTAL CA: CPT | Performed by: INTERNAL MEDICINE

## 2020-10-19 PROCEDURE — 83880 ASSAY OF NATRIURETIC PEPTIDE: CPT | Performed by: INTERNAL MEDICINE

## 2020-10-20 NOTE — RESULT ENCOUNTER NOTE
BNP elevated but improved from previous; kidney function and electrolytes stable. Follow up with Dr Harrison on 10/21/20

## 2020-10-21 ENCOUNTER — VIRTUAL VISIT (OUTPATIENT)
Dept: CARDIOLOGY | Facility: CLINIC | Age: 50
End: 2020-10-21
Attending: INTERNAL MEDICINE
Payer: COMMERCIAL

## 2020-10-21 VITALS — BODY MASS INDEX: 34.87 KG/M2 | WEIGHT: 250 LBS

## 2020-10-21 DIAGNOSIS — I42.2 APICAL VARIANT HYPERTROPHIC CARDIOMYOPATHY (H): ICD-10-CM

## 2020-10-21 DIAGNOSIS — I10 BENIGN ESSENTIAL HYPERTENSION: Primary | ICD-10-CM

## 2020-10-21 PROCEDURE — 99214 OFFICE O/P EST MOD 30 MIN: CPT | Mod: 95 | Performed by: INTERNAL MEDICINE

## 2020-10-21 RX ORDER — AMLODIPINE BESYLATE 10 MG/1
10 TABLET ORAL DAILY
Qty: 90 TABLET | Refills: 3 | Status: SHIPPED | OUTPATIENT
Start: 2020-10-21 | End: 2020-11-18

## 2020-10-21 NOTE — PROGRESS NOTES
"Matthew Still is a 50 year old male who is being evaluated via a billable video visit.      The patient has been notified of following:     \"This video visit will be conducted via a call between you and your physician/provider. We have found that certain health care needs can be provided without the need for an in-person physical exam.  This service lets us provide the care you need with a video conversation.  If a prescription is necessary we can send it directly to your pharmacy.  If lab work is needed we can place an order for that and you can then stop by our lab to have the test done at a later time.    Video visits are billed at different rates depending on your insurance coverage.  Please reach out to your insurance provider with any questions.    If during the course of the call the physician/provider feels a video visit is not appropriate, you will not be charged for this service.\"    Patient has given verbal consent for Video visit? Yes  How would you like to obtain your AVS? MyChart  If you are dropped from the video visit, the video invite should be resent to: Text to cell phone: [phone]  Will anyone else be joining your video visit? No      Tamara Fritz MA 10/21/2020 8:28 AM      Video-Visit Details      Additional provider notes:  50-year-old male seen for atrial fibrillation and apical hypertrophic cardiomyopathy.        He reports a history of paroxysmal A. fib dating back about 20 years.  He has had several cardioversions in the past.       In November 2017 he presented with atrial fibrillation and chest pain with troponin 0.13.  Angiogram showed normal left main, mild disease of LAD, 60% D1 (FFR 0.88), normal circumflex, dominant RCA with minimal disease.   Echo showed EF 65%, moderate mostly concentric LVH with slight prominence of the septum, moderate left atrial enlargement, no significant valve disease, no outflow obstruction.         Cardiac MRI December 2017 showed EF 70%, mild " to moderate mid ventricular hypertrophy and severe apical hypertrophy consistent with apical hypertrophic cardiomyopathy, normal RV, diffuse patchy mid wall enhancement of the anterior and apical segments, total scar burden 11%, trivial MR.         Transesophageal echo December 2017 showed EF 60%, severe apical hypertrophy consistent with hypertrophic cardiomyopathy, normal RV, mild left atrial enlargement, thrombus in the left atrial appendage, mild MR, mild-to-moderate TR.         In February 2018 he underwent atrial fibrillation ablation including wide circumferential pulmonary vein isolation and SVC isolation.  30 day event monitor April 2018 showed sinus rhythm, no A. fib.  8-day Zio patch in September 2018 showed sinus rhythm, no A. fib, 8 beat run of VT correlating with symptoms.      In October 2018 he had an episode of syncope.  He then underwent single-chamber ICD implantation (Biotronik Intica 7 VR-T ProMRI).      Echo December 2019 showed EF 60%, moderate concentric LVH with obliteration of left apical cavity due to apical hypertrophy, normal RV, no valve disease.      Nuclear stress January 2020 showed moderate anterior ischemia.  Coronary angiogram showed moderate first diagonal lesion with normal FFR, otherwise no obstructive coronary disease.      Carotid ultrasound February 2020 showed less than 50% stenosis bilaterally.      Device interrogation September 2020 showed less than 1% pacing, underlying rhythm sinus, no arrhythmias.    On October 15 a 10-second episode of ventricular tachycardia was noted at night.  He woke up feeling a squeezing sensation in his chest, this resolved in about 10 seconds.    Recently he has had more the squeezing episodes, up to 5 or 6 times per day.  Blood pressure has been 140 on average with heart rate between 55 and 70.  He has felt more tired and fatigued.  Over the summer he was able to ride his bike 1 mile, but felt dyspneic.  Weight is stable at 250 pounds.  He  denies lower extremity edema.  He does complain of some breast tenderness and is wondering if this could be from spironolactone.    Exam:  General Appearance: no distress, normal body habitus, upright.  ENT/Mouth: membranes moist, no nasal discharge or bleeding gums. Normal head shape, no evidence of injury or laceration.  EYES: no scleral icterus, normal conjunctivae  Neck: no evidence of thyromegaly. Supple  Chest/Lungs: No audible wheezing equal chest wall expansion. Non labored breathing. No cough.  Cardiovascular: No evidence of elevated jugular venous pressure.   Abdomen: No observed jaundice.  Extremities: no cyanosis or clubbing noted.  Skin: no xanthelasma, normal skin collar. No evidence of facial lacerations.  Neurologic: Normal arm motion bilateral, no tremors. No evidence of focal defect.  Psychiatric: alert and oriented x3, calm    The rest of a comprehensive physical examination is deferred due to PHE (public health emergency) video visit restrictions.    Data:  October 2020: Potassium 4.0, creatinine 1.0, NT proBNP 1024  Recent Labs   Lab Test 02/06/20  0845 02/21/19  0924   CHOL 151 141   HDL 33* 36*   LDL 95 89   TRIG 115 78       Agree with ROS as above.    Assessment:  50-year-old male seen for follow-up of paroxysmal A. fib and apical hypertrophic cardiomyopathy.  The squeezing sensation in his chest occurs quite frequently, however VT episodes have only been recorded infrequently on his device interrogations.  3-day ZIO monitor will be ordered to see if he is having short episodes of VT or other arrhythmia that might be causing the symptoms.  Angina is a possibility, he did have a moderate diagonal lesion on angiogram about 1 year ago.    He may be having some breast tenderness from spironolactone, therefore it will be discontinued.  Amlodipine will be increased.  Echo will also be done to follow-up on ejection fraction and hypertrophy.    Recommendations:  1.  Apical hypertrophic  cardiomyopathy with history nonsustained VT  -3-day ZIO monitor to see if there is any arrhythmia correlating with the chest squeezing  -If higher VT burden noted, refer to EP  -Echocardiogram    2.  Hypertension   -Discontinue spironolactone due to breast tenderness  -Increase amlodipine to 10 mg daily    3.  Paroxysmal atrial fibrillation, status post afib ablation  -Doubtful he is having recurrence, Zio monitor as above     Follow-up with [facility] in 6 weeks.  Address blood pressure and review of the tests.      Video-Visit Details    Type of service:  Video Visit    Video Start Time: 9:25 AM  Video End Time (time video stopped): 9:41 AM    Originating Location (pt. Location):  Home    Distant Location (provider location):  Home    Mode of Communication:  Video Conference via [employer]    A total of 25 minutes was spent with clinic visit, including chart review and coordinating care, >50% of time was spent talking with patient.    Brice Harrison MD  Cardiology [facility]  Pager: [phone]  Text Page  October 21, 2020     Addendum:  Device interrogation showed A. fib recurred on October 22.  EKG on October 27 showed A. fib, rate 64.  Spoke with patient, he continues to have fatigue, heart rate ranges from 60s to 120.    We talked about the options for A. fib treatment including attempt at better rate control versus cardioversion.  A ÁNGELA guided cardioversion was recommended.  He needs to get back on anticoagulation.  He will start Eliquis if it is covered by his insurance.  Echo and ZIO monitor are scheduled for tomorrow.  He will follow up with EP to discuss more long-term issues with his A. fib management.    Brice Harrison MD  Cardiology [facility]  Pager: [phone]  Text Page  October 27, 2020    Addendum:  The risk and benefit of a ÁNGELA and cardioversion was discussed with him duringthe telephone visit on October 27.  He understands and wishes to proceed with the procedure.    Brice Harrison  MD  Cardiology - [facility] [[email or web address]] Heart  Pager: [phone]  Text Page  November 2, 2020

## 2020-10-21 NOTE — LETTER
"10/21/2020    [facility]  [address] [address]    RE: Matthew Still       Dear Colleague,    I had the pleasure of seeing Matthew Still in the [facility].    Matthew Still is a 50 year old male who is being evaluated via a billable video visit.      The patient has been notified of following:     \"This video visit will be conducted via a call between you and your physician/provider. We have found that certain health care needs can be provided without the need for an in-person physical exam.  This service lets us provide the care you need with a video conversation.  If a prescription is necessary we can send it directly to your pharmacy.  If lab work is needed we can place an order for that and you can then stop by our lab to have the test done at a later time.    Video visits are billed at different rates depending on your insurance coverage.  Please reach out to your insurance provider with any questions.    If during the course of the call the physician/provider feels a video visit is not appropriate, you will not be charged for this service.\"    Patient has given verbal consent for Video visit? Yes  How would you like to obtain your AVS? MyChart  If you are dropped from the video visit, the video invite should be resent to: Text to cell phone: [phone]  Will anyone else be joining your video visit? No      Tamara Fritz MA 10/21/2020 8:28 AM      Video-Visit Details      Additional provider notes:  50-year-old male seen for atrial fibrillation and apical hypertrophic cardiomyopathy.        He reports a history of paroxysmal A. fib dating back about 20 years.  He has had several cardioversions in the past.       In November 2017 he presented with atrial fibrillation and chest pain with troponin 0.13.  Angiogram showed normal left main, mild disease of LAD, 60% D1 (FFR 0.88), normal circumflex, dominant RCA with minimal disease.   Echo " showed EF 65%, moderate mostly concentric LVH with slight prominence of the septum, moderate left atrial enlargement, no significant valve disease, no outflow obstruction.         Cardiac MRI December 2017 showed EF 70%, mild to moderate mid ventricular hypertrophy and severe apical hypertrophy consistent with apical hypertrophic cardiomyopathy, normal RV, diffuse patchy mid wall enhancement of the anterior and apical segments, total scar burden 11%, trivial MR.         Transesophageal echo December 2017 showed EF 60%, severe apical hypertrophy consistent with hypertrophic cardiomyopathy, normal RV, mild left atrial enlargement, thrombus in the left atrial appendage, mild MR, mild-to-moderate TR.         In February 2018 he underwent atrial fibrillation ablation including wide circumferential pulmonary vein isolation and SVC isolation.  30 day event monitor April 2018 showed sinus rhythm, no A. fib.  8-day Zio patch in September 2018 showed sinus rhythm, no A. fib, 8 beat run of VT correlating with symptoms.      In October 2018 he had an episode of syncope.  He then underwent single-chamber ICD implantation (Biotronik Intica 7 VR-T ProMRI).      Echo December 2019 showed EF 60%, moderate concentric LVH with obliteration of left apical cavity due to apical hypertrophy, normal RV, no valve disease.      Nuclear stress January 2020 showed moderate anterior ischemia.  Coronary angiogram showed moderate first diagonal lesion with normal FFR, otherwise no obstructive coronary disease.      Carotid ultrasound February 2020 showed less than 50% stenosis bilaterally.      Device interrogation September 2020 showed less than 1% pacing, underlying rhythm sinus, no arrhythmias.    On October 15 a 10-second episode of ventricular tachycardia was noted at night.  He woke up feeling a squeezing sensation in his chest, this resolved in about 10 seconds.    Recently he has had more the squeezing episodes, up to 5 or 6 times per  day.  Blood pressure has been 140 on average with heart rate between 55 and 70.  He has felt more tired and fatigued.  Over the summer he was able to ride his bike 1 mile, but felt dyspneic.  Weight is stable at 250 pounds.  He denies lower extremity edema.  He does complain of some breast tenderness and is wondering if this could be from spironolactone.    Exam:  General Appearance: no distress, normal body habitus, upright.  ENT/Mouth: membranes moist, no nasal discharge or bleeding gums. Normal head shape, no evidence of injury or laceration.  EYES: no scleral icterus, normal conjunctivae  Neck: no evidence of thyromegaly. Supple  Chest/Lungs: No audible wheezing equal chest wall expansion. Non labored breathing. No cough.  Cardiovascular: No evidence of elevated jugular venous pressure.   Abdomen: No observed jaundice.  Extremities: no cyanosis or clubbing noted.  Skin: no xanthelasma, normal skin collar. No evidence of facial lacerations.  Neurologic: Normal arm motion bilateral, no tremors. No evidence of focal defect.  Psychiatric: alert and oriented x3, calm    The rest of a comprehensive physical examination is deferred due to [facility] (public health emergency) video visit restrictions.    Data:  October 2020: Potassium 4.0, creatinine 1.0, NT proBNP 1024  Recent Labs   Lab Test 02/06/20  0845 02/21/19  0924   CHOL 151 141   HDL 33* 36*   LDL 95 89   TRIG 115 78       Agree with ROS as above.    Assessment:  50-year-old male seen for follow-up of paroxysmal A. fib and apical hypertrophic cardiomyopathy.  The squeezing sensation in his chest occurs quite frequently, however VT episodes have only been recorded infrequently on his device interrogations.  3-day ZIO monitor will be ordered to see if he is having short episodes of VT or other arrhythmia that might be causing the symptoms.  Angina is a possibility, he did have a moderate diagonal lesion on angiogram about 1 year ago.    He may be having some breast  tenderness from spironolactone, therefore it will be discontinued.  Amlodipine will be increased.  Echo will also be done to follow-up on ejection fraction and hypertrophy.    Recommendations:  1.  Apical hypertrophic cardiomyopathy with history nonsustained VT  -3-day ZIO monitor to see if there is any arrhythmia correlating with the chest squeezing  -If higher VT burden noted, refer to EP  -Echocardiogram    2.  Hypertension   -Discontinue spironolactone due to breast tenderness  -Increase amlodipine to 10 mg daily    3.  Paroxysmal atrial fibrillation, status post afib ablation  -Doubtful he is having recurrence, Zio monitor as above     Follow-up with [facility] in 6 weeks.  Address blood pressure and review of the tests.      Video-Visit Details    Type of service:  Video Visit    Video Start Time: 9:25 AM  Video End Time (time video stopped): 9:41 AM    Originating Location (pt. Location):  Home    Distant Location (provider location):  Home    Mode of Communication:  Video Conference via [employer]    A total of 25 minutes was spent with clinic visit, including chart review and coordinating care, >50% of time was spent talking with patient.    Brice Harrison MD  Cardiology - [facility]  Pager: [phone]  Text Page  October 21, 2020      Thank you for allowing me to participate in the care of your patient.    Sincerely,     Brice Harrison MD     [facility]

## 2020-10-21 NOTE — PATIENT INSTRUCTIONS
It was a pleasure speaking with you during our recent video visit.  It is hard to say what your chest squeezing symptoms may be from.  I have placed an order for a ZIO monitor.  This will tell if there is V tach or other heart arrhythmia that occurs during the exact time of the squeezing symptoms.  I would also like to do another echocardiogram to follow-up on the heart function.    Your blood pressure is running a little high.  You can discontinue the spironolactone, it may cause some breast tenderness.  Increase your amlodipine to 10 mg daily.  A new prescription has been sent to your pharmacist.    We will call you as test results become available.  We would like to see you back in about 6 weeks to follow-up on your blood pressure and symptoms.

## 2020-10-23 ENCOUNTER — TELEPHONE (OUTPATIENT)
Dept: CARDIOLOGY | Facility: CLINIC | Age: 50
End: 2020-10-23

## 2020-10-23 DIAGNOSIS — I48.0 PAROXYSMAL ATRIAL FIBRILLATION (H): Primary | ICD-10-CM

## 2020-10-23 NOTE — LETTER
" [facility]  [address]  [place] [address]  Phone: [phone]  Fax: [phone]       You are scheduled for a ÁNGELA guided cardioversion on Monday November 2nd, arrival time 6:30AM    You will need a COVID test within 4 days of your procedure.  They will call you to set this up.  If you have not been contacted within 3 days, call [phone] to schedule.     Start Eliquis 5 mg twice daily. They co-pay is $35 a month with the first month free.  You can apply online for a co-pay card to see if they will re-issue a new card.  [email or web address]    You have a VIRTUAL follow up appointment with Magalis Martin NP on November 18th at 3:45PM    You have a VIRTUAL appointment with Dr Tang on December 1st at 3:30PM      ____Your procedure will be done at [facility]. Please park in the \"Skyway Ramp\" on the west side of [facility] on [address]. Take the skyway over [facility] to the hospital. Please check in on the first floor, which is one floor down from the skyway level.    Please follow the instructions below:  1. In preparation for your cardioversion you will need to be anticoagulated.      If you are taking a \"Novel Anticoagulant\", please continue to take this medication daily as directed.     You will continue to take this medication after your procedure.  2. The morning of your cardioversion we require that you do the following:    NOTHING to eat or drink after midnight the night before your procedure.    Take your medications immediately upon arising with a small sip of water.    Hold your diuretic medication(s) until you return home from your procedure.  3. You will be unable to drive or make important legal decisions for 24 hours after your procedure. You will need a  home and a responsible adult to stay with you for 24 hours post procedure. Your appointment may be cancelled if you do not have a  or responsible adult to stay with you.  4. If " you have any questions please contact your cardiology RN at [phone]. 5. Follow up with any cardiology PA-C or NP 7-10 days post procedure with an EKG to be done the same day, 15 minutes prior to your appointment.

## 2020-10-23 NOTE — TELEPHONE ENCOUNTER
Alert received for long atrial episode detected. Patient has 2 atrial episodes logged starting on 10/22/2020 suggesting Afib with controlled V rates in the 70's. Last episode is logged starting on 10/22/20 at 1501 and was still ongoing at time of alert on 10/23/20 at 0106. Unfortunately patient unable to send manual device transmission with [employer] devices so will continue to monitor and assess for additional alerts.      Will send FYI to Dr. Harrison as patient had Afib ablation in February 2018 and it appears this is the first logged episode of Afib since then. Patient did recently have an appointment with Dr. Harrison on 10/21/20 for annual follow-up.

## 2020-10-25 NOTE — TELEPHONE ENCOUNTER
Yary,    This patient is back in afib on device interrogation.  I just had an appointment with him last week.  Can you have him restart Eliquis 5mg bid.  I ordered a Zio, he can still have this.  Also have him check his HR at home and call the clinic if it is running over 100.  He should have an QUAN f/u in about two weeks to review.    Thanks,  Leonid

## 2020-10-26 ENCOUNTER — TELEPHONE (OUTPATIENT)
Dept: CARDIOLOGY | Facility: CLINIC | Age: 50
End: 2020-10-26

## 2020-10-26 DIAGNOSIS — I48.0 PAROXYSMAL ATRIAL FIBRILLATION (H): Primary | ICD-10-CM

## 2020-10-26 NOTE — TELEPHONE ENCOUNTER
"Pt's wife called stating pt recently increased amlodipine to 10mg daily from 2.5mg.      States pt feeling \"terrible\".  HR is racing even at rest, >100, states BP is high but unable to give actual readings.  Also endorses much fatigue and lightheadedness.    Pt takes amlodipine 10mg once in the early AM before work.    On 10/21/20 visit Discontinued spironolactone due to breast tenderness -Increased amlodipine to 10 mg daily    Will route to Dr Harrison for recommendations.  FARA STARKS RN on 10/26/2020 at 3:10 PM    "

## 2020-10-26 NOTE — TELEPHONE ENCOUNTER
"I would like him to come in for and EKG.  Per pacemaker check he is back in afib, this could be the cause of his symptoms rather than the amlodipine.    Leonid    ADDENDUM:  Spoke with pt's wife.  Will see if pt can come in tomorrow morning for EKG.  Tentatively scheduled for 7:45.  FARA STARKS RN on 10/26/2020 at 3:57 PM    ADDENDUM:  PT will come in tomorrow 7:45 for EKG.  Advised to take AM meds and NPO after midnight.  Wife will accompany JIC needing procedure/ride home.  FARA STARKS RN on 10/26/2020 at 4:02 PM    ADDENDUM:  PT had EKG this AM - AFIB rate 64.  Pt states he is only taking 5mg of amlodipine as he \"could hardly function on 10mg\".  He endorses lighthededness especially with arms above head.  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 States BP ususally in the 110s.  Currently on ASA (eliquis in the past).  Pt has echo and Zio scheduled for tomorrow 10/27  Advised to do light duty if he has to go to work and keep activity low until Dr Harrison able to review. Will call with plan of care.  Will route to Dr Harrison for recommendations.  FARA STARKS RN on 10/27/2020 at 8:33 AM        "

## 2020-10-27 ENCOUNTER — HOSPITAL ENCOUNTER (OUTPATIENT)
Dept: CARDIOLOGY | Facility: CLINIC | Age: 50
Discharge: HOME | End: 2020-10-27
Attending: INTERNAL MEDICINE | Admitting: INTERNAL MEDICINE
Payer: COMMERCIAL

## 2020-10-27 DIAGNOSIS — I48.0 PAROXYSMAL ATRIAL FIBRILLATION (H): ICD-10-CM

## 2020-10-27 PROCEDURE — 93005 ELECTROCARDIOGRAM TRACING: CPT

## 2020-10-27 PROCEDURE — 93010 ELECTROCARDIOGRAM REPORT: CPT | Performed by: INTERNAL MEDICINE

## 2020-10-27 NOTE — TELEPHONE ENCOUNTER
----- Message from Brice Harrison MD sent at 10/27/2020 12:23 PM CDT -----  I spoke with patient today, I recommended a ÁNGELA guided cardioversion.  Can you help arrange this?     He has not yet started anticoagulation, I would like him to start Eliquis 5 mg twice daily.  He is not sure what his pharmacy co-pay would be, not sure if you or a pharmacist could look into this.    He should still get his echo and ZIO monitor tomorrow.    Can you arrange follow-up with Dr. Tang at some point to discuss long-term A. fib management.  Also follow-up with QUAN within 2 weeks for general cardiology review.    ADDENDUM:  Eliquis ordered.  $35 co-pay  ÁNGELA/DCCV Scheduled for 11/2/20, arrival 0630  COVID Test to schedule  Follow up QUAN 11/18/20  Follow up EP 12/1/20    To schedule EKG prior to 11/18/20 appointment.    Reviewed prep.  Pt verbalized understanding.  FARA STARKS RN on 10/27/2020 at 1:36 PM

## 2020-10-28 ENCOUNTER — HOSPITAL ENCOUNTER (OUTPATIENT)
Dept: CARDIOLOGY | Facility: CLINIC | Age: 50
End: 2020-10-28
Attending: INTERNAL MEDICINE
Payer: COMMERCIAL

## 2020-10-28 ENCOUNTER — DOCUMENTATION ONLY (OUTPATIENT)
Dept: CARDIOLOGY | Facility: CLINIC | Age: 50
End: 2020-10-28

## 2020-10-28 DIAGNOSIS — I42.2 APICAL VARIANT HYPERTROPHIC CARDIOMYOPATHY (H): ICD-10-CM

## 2020-10-28 PROCEDURE — 0298T LEADLESS EKG MONITOR 3 TO 14 DAYS: CPT | Performed by: INTERNAL MEDICINE

## 2020-10-28 PROCEDURE — 255N000002 HC RX 255 OP 636: Performed by: INTERNAL MEDICINE

## 2020-10-28 PROCEDURE — 0296T LEADLESS EKG MONITOR 3 TO 14 DAYS: CPT

## 2020-10-28 PROCEDURE — 93306 TTE W/DOPPLER COMPLETE: CPT | Mod: 26 | Performed by: INTERNAL MEDICINE

## 2020-10-28 RX ADMIN — HUMAN ALBUMIN MICROSPHERES AND PERFLUTREN 2 ML: 10; .22 INJECTION, SOLUTION INTRAVENOUS at 07:55

## 2020-10-28 NOTE — PROGRESS NOTES
"[employer] ICD    Patient remains in a persistent AF> He is going to wear a ziopatch to determine overall rate control. Appears controlled at this time however pt states it sometimes feels like it is \"racing\" even at rest. He is on Eliquis. Follow up scheduled with EP on Dec 1st for long term plan of persistent AF. Patient has prior history of AF ablation in 2018. Dr Harrison also set up ÁNGELA/cardioversion for Nov 2. ANN Bustamante  "

## 2020-10-29 DIAGNOSIS — I48.0 PAROXYSMAL ATRIAL FIBRILLATION (H): ICD-10-CM

## 2020-10-29 PROCEDURE — U0003 INFECTIOUS AGENT DETECTION BY NUCLEIC ACID (DNA OR RNA); SEVERE ACUTE RESPIRATORY SYNDROME CORONAVIRUS 2 (SARS-COV-2) (CORONAVIRUS DISEASE [COVID-19]), AMPLIFIED PROBE TECHNIQUE, MAKING USE OF HIGH THROUGHPUT TECHNOLOGIES AS DESCRIBED BY CMS-2020-01-R: HCPCS | Performed by: INTERNAL MEDICINE

## 2020-10-30 LAB
SARS-COV-2 RNA SPEC QL NAA+PROBE: NOT DETECTED
SPECIMEN SOURCE: NORMAL

## 2020-11-02 ENCOUNTER — ANESTHESIA (OUTPATIENT)
Dept: SURGERY | Facility: CLINIC | Age: 50
End: 2020-11-02
Payer: COMMERCIAL

## 2020-11-02 ENCOUNTER — HOSPITAL ENCOUNTER (OUTPATIENT)
Facility: CLINIC | Age: 50
Discharge: HOME | End: 2020-11-02
Admitting: INTERNAL MEDICINE
Payer: COMMERCIAL

## 2020-11-02 ENCOUNTER — HOSPITAL ENCOUNTER (OUTPATIENT)
Dept: CARDIOLOGY | Facility: CLINIC | Age: 50
End: 2020-11-02
Attending: INTERNAL MEDICINE
Payer: COMMERCIAL

## 2020-11-02 ENCOUNTER — ANESTHESIA EVENT (OUTPATIENT)
Dept: SURGERY | Facility: CLINIC | Age: 50
End: 2020-11-02
Payer: COMMERCIAL

## 2020-11-02 VITALS
TEMPERATURE: 97.7 F | RESPIRATION RATE: 18 BRPM | DIASTOLIC BLOOD PRESSURE: 70 MMHG | HEIGHT: 71 IN | OXYGEN SATURATION: 96 % | BODY MASS INDEX: 35.64 KG/M2 | HEART RATE: 57 BPM | WEIGHT: 254.6 LBS | SYSTOLIC BLOOD PRESSURE: 111 MMHG

## 2020-11-02 DIAGNOSIS — I48.0 PAROXYSMAL ATRIAL FIBRILLATION (H): ICD-10-CM

## 2020-11-02 LAB
INR PPP: 1.15 (ref 0.86–1.14)
MAGNESIUM SERPL-MCNC: 2.2 MG/DL (ref 1.6–2.3)
POTASSIUM SERPL-SCNC: 4.4 MMOL/L (ref 3.4–5.3)

## 2020-11-02 PROCEDURE — 83735 ASSAY OF MAGNESIUM: CPT

## 2020-11-02 PROCEDURE — 999N000184 HC STATISTIC TELEMETRY

## 2020-11-02 PROCEDURE — 93325 DOPPLER ECHO COLOR FLOW MAPG: CPT | Mod: 26 | Performed by: INTERNAL MEDICINE

## 2020-11-02 PROCEDURE — 93320 DOPPLER ECHO COMPLETE: CPT | Mod: 26 | Performed by: INTERNAL MEDICINE

## 2020-11-02 PROCEDURE — 93005 ELECTROCARDIOGRAM TRACING: CPT

## 2020-11-02 PROCEDURE — 250N000009 HC RX 250

## 2020-11-02 PROCEDURE — 93325 DOPPLER ECHO COLOR FLOW MAPG: CPT

## 2020-11-02 PROCEDURE — 370N000001 HC ANESTHESIA TECHNICAL FEE, 1ST 30 MIN

## 2020-11-02 PROCEDURE — 258N000003 HC RX IP 258 OP 636: Performed by: NURSE ANESTHETIST, CERTIFIED REGISTERED

## 2020-11-02 PROCEDURE — 93010 ELECTROCARDIOGRAM REPORT: CPT | Performed by: INTERNAL MEDICINE

## 2020-11-02 PROCEDURE — 93312 ECHO TRANSESOPHAGEAL: CPT | Mod: 26 | Performed by: INTERNAL MEDICINE

## 2020-11-02 PROCEDURE — 999N000010 HC STATISTIC ANES STAT CODE-CRNA PER MINUTE

## 2020-11-02 PROCEDURE — 999N000183 HC STATISTIC TEE INCLUDES SEDATION

## 2020-11-02 PROCEDURE — 250N000009 HC RX 250: Performed by: NURSE ANESTHETIST, CERTIFIED REGISTERED

## 2020-11-02 PROCEDURE — 85610 PROTHROMBIN TIME: CPT

## 2020-11-02 PROCEDURE — 92960 CARDIOVERSION ELECTRIC EXT: CPT | Performed by: INTERNAL MEDICINE

## 2020-11-02 PROCEDURE — 250N000011 HC RX IP 250 OP 636: Performed by: NURSE ANESTHETIST, CERTIFIED REGISTERED

## 2020-11-02 PROCEDURE — 84132 ASSAY OF SERUM POTASSIUM: CPT

## 2020-11-02 PROCEDURE — 370N000002 HC ANESTHESIA TECHNICAL FEE, EACH ADDTL 15 MIN

## 2020-11-02 PROCEDURE — 92960 CARDIOVERSION ELECTRIC EXT: CPT

## 2020-11-02 RX ORDER — GLYCOPYRROLATE 0.2 MG/ML
0.1 INJECTION, SOLUTION INTRAMUSCULAR; INTRAVENOUS ONCE
Status: COMPLETED | OUTPATIENT
Start: 2020-11-02 | End: 2020-11-02

## 2020-11-02 RX ORDER — DEXTROSE MONOHYDRATE 25 G/50ML
9.5 INJECTION, SOLUTION INTRAVENOUS
Status: DISCONTINUED | OUTPATIENT
Start: 2020-11-02 | End: 2020-11-02 | Stop reason: HOSPADM

## 2020-11-02 RX ORDER — ATROPINE SULFATE 0.1 MG/ML
.5-1 INJECTION INTRAVENOUS
Status: DISCONTINUED | OUTPATIENT
Start: 2020-11-02 | End: 2020-11-02 | Stop reason: HOSPADM

## 2020-11-02 RX ORDER — MAGNESIUM SULFATE HEPTAHYDRATE 40 MG/ML
2 INJECTION, SOLUTION INTRAVENOUS
Status: DISCONTINUED | OUTPATIENT
Start: 2020-11-02 | End: 2020-11-02 | Stop reason: HOSPADM

## 2020-11-02 RX ORDER — LIDOCAINE HYDROCHLORIDE 20 MG/ML
INJECTION, SOLUTION INFILTRATION; PERINEURAL PRN
Status: DISCONTINUED | OUTPATIENT
Start: 2020-11-02 | End: 2020-11-02

## 2020-11-02 RX ORDER — POTASSIUM CHLORIDE 1500 MG/1
40 TABLET, EXTENDED RELEASE ORAL
Status: DISCONTINUED | OUTPATIENT
Start: 2020-11-02 | End: 2020-11-02 | Stop reason: HOSPADM

## 2020-11-02 RX ORDER — LIDOCAINE HYDROCHLORIDE 40 MG/ML
1.5 SOLUTION TOPICAL ONCE
Status: DISCONTINUED | OUTPATIENT
Start: 2020-11-02 | End: 2020-11-02 | Stop reason: HOSPADM

## 2020-11-02 RX ORDER — FENTANYL CITRATE 50 UG/ML
25 INJECTION, SOLUTION INTRAMUSCULAR; INTRAVENOUS
Status: DISCONTINUED | OUTPATIENT
Start: 2020-11-02 | End: 2020-11-02 | Stop reason: HOSPADM

## 2020-11-02 RX ORDER — METOPROLOL TARTRATE 1 MG/ML
2.5-5 INJECTION, SOLUTION INTRAVENOUS
Status: DISCONTINUED | OUTPATIENT
Start: 2020-11-02 | End: 2020-11-02 | Stop reason: HOSPADM

## 2020-11-02 RX ORDER — FLUMAZENIL 0.1 MG/ML
0.2 INJECTION, SOLUTION INTRAVENOUS
Status: DISCONTINUED | OUTPATIENT
Start: 2020-11-02 | End: 2020-11-02 | Stop reason: HOSPADM

## 2020-11-02 RX ORDER — SODIUM CHLORIDE 9 MG/ML
1000 INJECTION, SOLUTION INTRAVENOUS CONTINUOUS
Status: DISCONTINUED | OUTPATIENT
Start: 2020-11-02 | End: 2020-11-02 | Stop reason: HOSPADM

## 2020-11-02 RX ORDER — ONDANSETRON 2 MG/ML
INJECTION INTRAMUSCULAR; INTRAVENOUS PRN
Status: DISCONTINUED | OUTPATIENT
Start: 2020-11-02 | End: 2020-11-02

## 2020-11-02 RX ORDER — PROPOFOL 10 MG/ML
INJECTION, EMULSION INTRAVENOUS PRN
Status: DISCONTINUED | OUTPATIENT
Start: 2020-11-02 | End: 2020-11-02

## 2020-11-02 RX ORDER — POTASSIUM CHLORIDE 1500 MG/1
20 TABLET, EXTENDED RELEASE ORAL
Status: DISCONTINUED | OUTPATIENT
Start: 2020-11-02 | End: 2020-11-02 | Stop reason: HOSPADM

## 2020-11-02 RX ORDER — NALOXONE HYDROCHLORIDE 0.4 MG/ML
.1-.4 INJECTION, SOLUTION INTRAMUSCULAR; INTRAVENOUS; SUBCUTANEOUS
Status: DISCONTINUED | OUTPATIENT
Start: 2020-11-02 | End: 2020-11-02 | Stop reason: HOSPADM

## 2020-11-02 RX ORDER — FENTANYL CITRATE 50 UG/ML
50 INJECTION, SOLUTION INTRAMUSCULAR; INTRAVENOUS ONCE
Status: DISCONTINUED | OUTPATIENT
Start: 2020-11-02 | End: 2020-11-02 | Stop reason: HOSPADM

## 2020-11-02 RX ORDER — SODIUM CHLORIDE, SODIUM LACTATE, POTASSIUM CHLORIDE, CALCIUM CHLORIDE 600; 310; 30; 20 MG/100ML; MG/100ML; MG/100ML; MG/100ML
INJECTION, SOLUTION INTRAVENOUS CONTINUOUS PRN
Status: DISCONTINUED | OUTPATIENT
Start: 2020-11-02 | End: 2020-11-02

## 2020-11-02 RX ORDER — LIDOCAINE 50 MG/G
OINTMENT TOPICAL ONCE
Status: DISCONTINUED | OUTPATIENT
Start: 2020-11-02 | End: 2020-11-02 | Stop reason: HOSPADM

## 2020-11-02 RX ADMIN — SODIUM CHLORIDE, POTASSIUM CHLORIDE, SODIUM LACTATE AND CALCIUM CHLORIDE: 600; 310; 30; 20 INJECTION, SOLUTION INTRAVENOUS at 14:02

## 2020-11-02 RX ADMIN — PROPOFOL 30 MG: 10 INJECTION, EMULSION INTRAVENOUS at 14:07

## 2020-11-02 RX ADMIN — PROPOFOL 10 MG: 10 INJECTION, EMULSION INTRAVENOUS at 14:09

## 2020-11-02 RX ADMIN — PROPOFOL 10 MG: 10 INJECTION, EMULSION INTRAVENOUS at 14:11

## 2020-11-02 RX ADMIN — LIDOCAINE HYDROCHLORIDE 100 MG: 20 INJECTION, SOLUTION INFILTRATION; PERINEURAL at 14:05

## 2020-11-02 RX ADMIN — PROPOFOL 30 MG: 10 INJECTION, EMULSION INTRAVENOUS at 14:06

## 2020-11-02 RX ADMIN — PROPOFOL 60 MG: 10 INJECTION, EMULSION INTRAVENOUS at 14:05

## 2020-11-02 RX ADMIN — PROPOFOL 20 MG: 10 INJECTION, EMULSION INTRAVENOUS at 14:25

## 2020-11-02 RX ADMIN — GLYCOPYRROLATE 0.1 MG: 0.2 INJECTION, SOLUTION INTRAMUSCULAR; INTRAVENOUS at 13:00

## 2020-11-02 RX ADMIN — ONDANSETRON 4 MG: 2 INJECTION INTRAMUSCULAR; INTRAVENOUS at 14:23

## 2020-11-02 RX ADMIN — PROPOFOL 20 MG: 10 INJECTION, EMULSION INTRAVENOUS at 14:32

## 2020-11-02 RX ADMIN — PROPOFOL 20 MG: 10 INJECTION, EMULSION INTRAVENOUS at 14:17

## 2020-11-02 RX ADMIN — PROPOFOL 10 MG: 10 INJECTION, EMULSION INTRAVENOUS at 14:20

## 2020-11-02 RX ADMIN — PROPOFOL 20 MG: 10 INJECTION, EMULSION INTRAVENOUS at 14:13

## 2020-11-02 RX ADMIN — TOPICAL ANESTHETIC 1 SPRAY: 200 SPRAY DENTAL; PERIODONTAL at 14:02

## 2020-11-02 RX ADMIN — PROPOFOL 20 MG: 10 INJECTION, EMULSION INTRAVENOUS at 14:15

## 2020-11-02 RX ADMIN — PROPOFOL 30 MG: 10 INJECTION, EMULSION INTRAVENOUS at 14:24

## 2020-11-02 ASSESSMENT — LIFESTYLE VARIABLES: TOBACCO_USE: 0

## 2020-11-02 ASSESSMENT — MIFFLIN-ST. JEOR: SCORE: 2026.99

## 2020-11-02 ASSESSMENT — ENCOUNTER SYMPTOMS: DYSRHYTHMIAS: 1

## 2020-11-02 NOTE — DISCHARGE INSTRUCTIONS
ÁNGELA  (Transesophageal Echocardiogram)  with Cardioversion Discharge Instructions    After you go home:      Have an adult stay with you for 6 hours.       For 24 hours - due to the sedation you received:    Relax and take it easy.    Do NOT make any important or legal decisions.    Do NOT drive or operate machines at home or at work.    Do NOT drink alcohol.    Diet:      You may resume your normal diet, but no scratchy foods for two days.    If your throat is sore, eat cold, bland or soft foods.    You may have heartburn if the tube used in the exam entered your stomach.  If so:   - Do not eat acidic and spicy foods.   - Do not eat three hours before bedtime.  Clear liquids are okay.   - When lying down, use two pillows to raise your head.    Medicines:      Take your medications, including blood thinners, unless your provider tells you not to.    If you have stopped any medicines, check with your provider about when to restart them.    You may take Tylenol (Acetaminophen) if your throat is sore.    You may take antacids if you have heartburn.      Follow Up Appointments:      Follow up with your cardiologist at [facility] of patient preference as instructed.    Follow up with your primary care provider as needed.    If you ve had a cardioversion:    The skin on your chest or back may feel tender for 48 hours.  If your skin is tender, you may:    Use a cold pack on the site. Never use ice directly on your skin. Use the cold pack for 20 minutes. Remove it for at least 30 minutes before re-using.    Apply 1% hydrocortisone cream to the skin (sold at drug stores)    Take Advil (Ibuprofen) or Tylenol (Acetaminophen).      Call the clinic if:      You have heartburn that is severe or lasts more than 72 hours.    You have a sore throat that feels worse after 72 hours.    You have shortness of breath, neck pain, chest pain, fever, chills, coughing up blood, or other unusual signs.    Call your cardiologist right  away if you have an irregular heartbeat, shortness of breath or feel dizzy.    Questions or concerns      [facility] [place]:    [phone] UMP (7 days a week)

## 2020-11-02 NOTE — PROCEDURES
[facility]    Procedure: *Cardioversion    Date/Time: 11/2/2020 2:36 PM  Performed by: Andre Danielle MD  Authorized by: Andre Danielle MD     UNIVERSAL PROTOCOL   Site Marked: Yes  Prior Images Obtained and Reviewed:  Yes  Required items: Required blood products, implants, devices and special equipment available    Patient identity confirmed:  Verbally with patient  Patient was reevaluated immediately before administering moderate or deep sedation or anesthesia  Confirmation Checklist:  Patient's identity using two indicators  Time out: Immediately prior to the procedure a time out was called    Universal Protocol: the Joint Commission Universal Protocol was followed    Preparation: Patient was prepped and draped in usual sterile fashion           ANESTHESIA  Anesthesia was administered and monitored by anesthesiology.  See anesthesia documentation for details.    PROCEDURE DETAILS  Cardioversion basis: elective  Pre-procedure rhythm: atrial fibrillation  Patient position: patient was placed in a supine position  Chest area: chest area exposed  Electrodes: pads  Electrodes placed: anterior-posterior  Number of attempts: 1  Post-procedure rhythm: normal sinus rhythm    PROCEDURE   Patient Tolerance:  Patient tolerated the procedure well with no immediate complications    Length of time physician/provider present for 1:1 monitoring during sedation: 0    Direct Current Cardioversion Procedure Note    Indication:     Atrial Fibrillation    Procedure note:     After opening informed consent, direct current cardioversion was performed with 200 biphasic joules in a synchronized fashion using anterior and posterior pads. Manual pressure  was applied to the anterior pad and position of the pads were adjusted as needed to improve success rate. There were no apparent complications. Anesthesia was provided by the anesthetist.     Results:     Successful cardioversion using 200 biphasic  joules in a synchronized fashion.  Patient to continue anticoagulation for at least 4 weeks or longer as recommended by the primary referring cardiologist.

## 2020-11-02 NOTE — PROGRESS NOTES
Care Suites Procedure Nursing Note    Patient Information  Name: Matthew Still  Age: 50 year old    Procedure  Patient taken to PACU via cart. Patient is alert and oriented. Pt denies difficulty swallowing, has sleep apnea, procedure done with anesthesia. No dentures or loose teeth. Patient has been NPO sufficient amount of time. MD Sedation Assessment completed. Consent obtained. Time out completed.    ÁNGELA: Pt tolerated well. VSS. Sedation and airway management per CRNA     CDV: Propofol used for sedation by CRNA. Cardiologist gave one shock @ 200 joules and patient converted to SR. See rhythm strips. [employer] rep present and checked device immediately after with no issues found. After sufficient recovery time patient was transferred to Care Suites per cart. Resp even & unlabored upon transfer. Patient has stable vital signs. Wife present in pt's room. Pt's VS remained stable, pt tolerated ambulation and oral fluids. PIV removed cath tip intact, clean dressing applied.   Plan/Other: Discharge / post procedure instructions given to pt w/ verbal understanding received. All questions & concerns addressed. Pt discharged per to private vehicle. All personal belongings sent with pt.   Procedure start time: 1405  Procedure complete time: 1500  Concerns/abnormal assessment: None  If abnormal assessment, provider notified: N/A    Lashawn Milligan RN

## 2020-11-02 NOTE — ANESTHESIA POSTPROCEDURE EVALUATION
Patient: Matthew Still    Procedure(s):  CARDIOVERSION (FOLLOWING ÁNGELA AT 0930)    Diagnosis:A-fib (H) [I48.91]  Diagnosis Additional Information: No value filed.    Anesthesia Type:  General    Note:  Anesthesia Post Evaluation    Patient location during evaluation: PACU  Patient participation: Able to fully participate in evaluation  Level of consciousness: awake  Pain management: adequate  Airway patency: patent  Cardiovascular status: acceptable  Respiratory status: acceptable  Hydration status: acceptable  PONV: none             Last vitals:  Vitals:    11/02/20 1450 11/02/20 1455 11/02/20 1500   BP: 105/62 99/64 102/66   Pulse: 59 59 60   Resp:      Temp:      SpO2: 96% 96% 98%         Electronically Signed By: Reza Lee MD  November 2, 2020  3:06 PM

## 2020-11-02 NOTE — ANESTHESIA PREPROCEDURE EVALUATION
Anesthesia Pre-Procedure Evaluation    Patient: Matthew Still   MRN: [id number] : 1970          Preoperative Diagnosis: A-fib (H) [I48.91]    Procedure(s):  CARDIOVERSION (FOLLOWING ÁNGELA AT 0930)    Past Medical History:   Diagnosis Date     Atrial fibrillation (H)      Coronary artery disease      Heart disease      Hyperlipidemia      Hypertension      Past Surgical History:   Procedure Laterality Date     ANESTHESIA CARDIOVERSION N/A 2020    Procedure: CARDIOVERSION (FOLLOWING ÁNGELA AT 0930);  Surgeon: GENERIC ANESTHESIA PROVIDER;  Location:  OR     CV CORONARY ANGIOGRAM N/A 2020    Procedure: Coronary Angiogram;  Surgeon: Daniel Miranda MD;  Location:  HEART CARDIAC CATH LAB     CV FRACTIONAL FLOW RESERVE N/A 2020    Procedure: Fractional Flow [place];  Surgeon: Daniel Miranda MD;  Location:  HEART CARDIAC CATH LAB       Anesthesia Evaluation     . Pt has had prior anesthetic.     No history of anesthetic complications          ROS/MED HX    ENT/Pulmonary:      (-) tobacco use and sleep apnea   Neurologic:       Cardiovascular:     (+) hypertension--CAD, angina--. : . . . :. dysrhythmias a-fib, .       METS/Exercise Tolerance:  >4 METS   Hematologic:         Musculoskeletal:         GI/Hepatic:        (-) GERD and liver disease   Renal/Genitourinary:      (-) renal disease   Endo:     (+) Obesity, .   (-) Type II DM and thyroid disease   Psychiatric:         Infectious Disease:         Malignancy:         Other:                                 Lab Results   Component Value Date    WBC 8.5 2020    HGB 14.8 2020    HCT 43.5 2020     2020     10/19/2020    POTASSIUM 4.4 2020    CHLORIDE 110 (H) 10/19/2020    CO2 28 10/19/2020    BUN 16 10/19/2020    CR 1.04 10/19/2020     (H) 10/19/2020    BARRON 8.8 10/19/2020    MAG 2.2 2020    ALBUMIN 3.8 2017    PROTTOTAL 7.4 2017    ALT 47 2020    AST 18 2017     "ALKPHOS 67 11/14/2017    BILITOTAL 0.2 11/14/2017    PTT 27 02/06/2020    INR 1.15 (H) 11/02/2020       Preop Vitals  BP Readings from Last 3 Encounters:   11/02/20 102/66   04/07/20 136/89   02/13/20 137/88    Pulse Readings from Last 3 Encounters:   11/02/20 60   04/07/20 62   02/13/20 56      Resp Readings from Last 3 Encounters:   11/02/20 18   02/06/20 18   11/14/18 18    SpO2 Readings from Last 3 Encounters:   11/02/20 98%   02/13/20 98%   02/06/20 96%      Temp Readings from Last 1 Encounters:   11/02/20 36.5  C (97.7  F) (Oral)    Ht Readings from Last 1 Encounters:   11/02/20 1.803 m (5' 11\")      Wt Readings from Last 1 Encounters:   11/02/20 115.5 kg (254 lb 9.6 oz)    Estimated body mass index is 35.51 kg/m  as calculated from the following:    Height as of this encounter: 1.803 m (5' 11\").    Weight as of this encounter: 115.5 kg (254 lb 9.6 oz).       Anesthesia Plan      History & Physical Review  History and physical reviewed and following examination; no interval change.    ASA Status:  3 .    NPO Status:  > 8 hours    Plan for General            Postoperative Care  Postoperative pain management:  IV analgesics.      Consents  Anesthetic plan, risks, benefits and alternatives discussed with:  Patient and Spouse..                 Reza Lee MD  "

## 2020-11-02 NOTE — ANESTHESIA CARE TRANSFER NOTE
Patient: Matthew Still    Procedure(s):  CARDIOVERSION (FOLLOWING ÁNGELA AT 0930)    Diagnosis: A-fib (H) [I48.91]  Diagnosis Additional Information: No value filed.    Anesthesia Type:   No value filed.     Note:  Airway :Nasal Cannula  Patient transferred to:Phase II  Comments: At end of procedure, spontaneous respirations, patient alert to voice, able to follow commands. Oxygen via nasal cannula at 3 liters per minute to PACU. Oxygen tubing connected to wall O2 in PACU, SpO2, NiBP, and EKG monitors and alarms on and functioning, report on patient's clinical status given to PACU RN, RN questions answered.Handoff Report: Identifed the Patient, Identified the Reponsible Provider, Reviewed the pertinent medical history, Discussed the surgical course, Reviewed Intra-OP anesthesia mangement and issues during anesthesia, Set expectations for post-procedure period and Allowed opportunity for questions and acknowledgement of understanding      Vitals: (Last set prior to Anesthesia Care Transfer)    CRNA VITALS  11/2/2020 1410 - 11/2/2020 1440      11/2/2020             Pulse:  64    Ht Rate:  64    SpO2:  94 %    Resp Rate (observed):  (!) 0    Resp Rate (set):  10                Electronically Signed By: HORTENSIA Smart CRNA  November 2, 2020  2:40 PM

## 2020-11-02 NOTE — PROGRESS NOTES
Care Suites Admission Nursing Note    Patient Information  Name: Matthew Still  Age: 50 year old  Reason for admission: ÁNGELA with cardioversion  Care Suites arrival time: 0740    Visitor Information  Name: Breanna  Informed of visitor restrictions: Yes  1 visitor allowed per patient   Visitor must screen negative for COVID symptoms   Visitor must wear a mask  Waiting rooms closed to visitors    Patient Admission/Assessment   Pre-procedure assessment complete: Yes  If abnormal assessment/labs, provider notified: N/A  NPO: Yes  Medications held per instructions/orders: N/A  Consent: obtained  Patient oriented to room: Yes  Education/questions answered: Yes  Plan/other: To PACU for ÁNGELA/Cardioversion    Discharge Planning  Discharge name/phone number: Breanna [phone]  Overnight post sedation caregiver: Breanna  Discharge location: home    PATIENT/VISITOR WELLNESS SCREENING    Step 1 Patient Screening    1. In the last month, have you been in contact with someone who was confirmed or suspected to have Coronavirus/COVID-19? No    2. Do you have the following symptoms?  Fever/Chills? No   Cough? No   Shortness of breath? No   New loss of taste or smell? No  Sore throat? No  Muscle or body aches? No  Headaches? No  Fatigue? No  Vomiting or diarrhea? No    Step 2 Visitor Screening    1. Name of Visitor (1 visitor per patient): Breanna    2. In the last month, have you been in contact with someone who was confirmed or suspected to have Coronavirus/COVID-19? No    3. Do you have the following symptoms?  Fever/Chills? No   Cough? No   Shortness of breath? No   Skin rash? No   Loss of taste or smell? No  Sore throat? No  Runny or stuffy nose? No  Muscle or body aches? No  Headaches? No  Fatigue? No  Vomiting or diarrhea? No    If the visitor has positive symptoms, notify supervisor/manger  Per policy, the visitor will need to leave the facility     Step 3 Refer to logic grid below for actions    NO  SYMPTOM(S)    ACTIONS:  1. Standard rooming process  2. Provider to assess per normal protocol  3. Implement precautions as needed and per guidelines     POSITIVE SYMPTOM(S)  If positive for ANY of the following symptoms: fever, cough, shortness of breath, rash    ACTION:  1. Continue to have the patient wear a mask   2. Room patient as soon as possible  3. Don appropriate PPE when entering room  4. Provider evaluation      Lashawn Milligan RN

## 2020-11-03 NOTE — RESULT ENCOUNTER NOTE
EF >70%; no RADHA thrombus noted; negative bubble study; mild to mod TR. Done prior to DCCV. Follow up with Magalis Martin NP on 11/18/20

## 2020-11-05 LAB — INTERPRETATION ECG - MUSE: NORMAL

## 2020-11-06 LAB — INTERPRETATION ECG - MUSE: NORMAL

## 2020-11-17 ENCOUNTER — HOSPITAL ENCOUNTER (OUTPATIENT)
Dept: CARDIOLOGY | Facility: CLINIC | Age: 50
Discharge: HOME | End: 2020-11-17
Attending: INTERNAL MEDICINE | Admitting: INTERNAL MEDICINE
Payer: COMMERCIAL

## 2020-11-17 DIAGNOSIS — I48.0 PAROXYSMAL ATRIAL FIBRILLATION (H): ICD-10-CM

## 2020-11-17 PROCEDURE — 93005 ELECTROCARDIOGRAM TRACING: CPT

## 2020-11-17 PROCEDURE — 93010 ELECTROCARDIOGRAM REPORT: CPT | Performed by: INTERNAL MEDICINE

## 2020-11-18 ENCOUNTER — VIRTUAL VISIT (OUTPATIENT)
Dept: CARDIOLOGY | Facility: CLINIC | Age: 50
End: 2020-11-18
Attending: INTERNAL MEDICINE
Payer: COMMERCIAL

## 2020-11-18 VITALS — WEIGHT: 246 LBS | BODY MASS INDEX: 34.31 KG/M2

## 2020-11-18 DIAGNOSIS — E78.5 HYPERLIPIDEMIA LDL GOAL <70: ICD-10-CM

## 2020-11-18 DIAGNOSIS — I10 BENIGN ESSENTIAL HYPERTENSION: ICD-10-CM

## 2020-11-18 DIAGNOSIS — I42.2 APICAL VARIANT HYPERTROPHIC CARDIOMYOPATHY (H): ICD-10-CM

## 2020-11-18 DIAGNOSIS — I47.29 NSVT (NONSUSTAINED VENTRICULAR TACHYCARDIA) (H): ICD-10-CM

## 2020-11-18 DIAGNOSIS — I48.0 PAROXYSMAL ATRIAL FIBRILLATION (H): Primary | ICD-10-CM

## 2020-11-18 DIAGNOSIS — I25.10 CORONARY ARTERY DISEASE INVOLVING NATIVE CORONARY ARTERY OF NATIVE HEART WITHOUT ANGINA PECTORIS: ICD-10-CM

## 2020-11-18 DIAGNOSIS — Z95.810 ICD (IMPLANTABLE CARDIOVERTER-DEFIBRILLATOR), SINGLE, IN SITU: ICD-10-CM

## 2020-11-18 PROCEDURE — 99214 OFFICE O/P EST MOD 30 MIN: CPT | Mod: 95 | Performed by: NURSE PRACTITIONER

## 2020-11-18 RX ORDER — AMLODIPINE BESYLATE 5 MG/1
5 TABLET ORAL DAILY
Qty: 90 TABLET | Refills: 3 | Status: SHIPPED | OUTPATIENT
Start: 2020-11-18 | End: 2021-03-04

## 2020-11-18 NOTE — LETTER
"11/18/2020    [facility]  [address] [address]    RE: Matthew Still       Dear Colleague,    I had the pleasure of seeing Matthew Still in the [facility].    Matthew Still is a 50 year old male who is being evaluated via a billable telephone visit.      The patient has been notified of following:     \"This telephone visit will be conducted via a call between you and your physician/provider. We have found that certain health care needs can be provided without the need for a physical exam.  This service lets us provide the care you need with a short phone conversation.  If a prescription is necessary we can send it directly to your pharmacy.  If lab work is needed we can place an order for that and you can then stop by our lab to have the test done at a later time.    Telephone visits are billed at different rates depending on your insurance coverage. During this emergency period, for some insurers they may be billed the same as an in-person visit.  Please reach out to your insurance provider with any questions.    If during the course of the call the physician/provider feels a telephone visit is not appropriate, you will not be charged for this service.\"    Patient has given verbal consent for Telephone visit?  Yes    What phone number would you like to be contacted at? [phone]    How would you like to obtain your AVS? Mail a copy    Phone call duration: 16 minutes    HORTENSIA Rdz [facility]        Cardiology Clinic Progress Note  Matthew Still MRN# [id number]   YOB: 1970 Age: 50 year old      Primary Cardiologist:   Dr. Harrison           History of Presenting Illness:      Matthew Still is a pleasant 50 year old patient with a past cardiac history significant for:  1. paroxysmal atrial fibrillation, since his 30s.  He has undergone several cardioversions for this  2. apical hypertrophic cardiomyopathy s/p " ICD  3. hypertension  4. hyperlipidemia  5. CAD 60% D1 with neg FFR 2017     In November 2017, he was seen for atrial fibrillation and chest pain.  Coronary angiogram showed moderate nonobstructive disease with 60% stenosis in the D1 with negative FFR and otherwise mild disease.  Echocardiogram showed moderate concentric LVH with slight prominence of the septum but no outflow obstruction, normal EF, no significant valvular disease.  Cardiac MRI December 2017 was consistent with apical hypertrophic cardiomyopathy.  ÁNGELA December 2017 showed thrombus in the left atrial appendage.  He underwent atrial fibrillation ablation in February 2018.  Follow-up ZIO Patch showed no further atrial fibrillation but patient had an 8 beat run of VT which he was symptomatic with.  Later in October 2018 he had a syncopal episode and subsequently underwent single-chamber ICD.  Given that he had no further atrial fibrillation, his Eliquis was discontinued.  Echocardiogram from December 2019 was stable with normal EF, apical hypertrophic cardiomyopathy and no significant valvular disease.  His amlodipine was previously decreased due to lower extremity edema.  His antihypertensives have been uptitrated.  He declined sleep medicine consultation.  In January 2020 he had complaints of dyspnea on exertion and was found to have abnormal stress test. Coronary angiogram 2/6/2020 showing no culprit lesion and no indication for revascularization, he has moderate narrowing in the D1/ramus with normal FFR, no change since 2017.    Carotid ultrasound February 2020 with less than 50% stenosis bilaterally.  Device interrogation September 2020 showing less than 1% pacing with underlying sinus rhythm and no ventricular arrhythmias.  In October 2020 he was noted to have a 10-second episode of ventricular tachycardia at night and had will get up with a squeezing sensation in his chest that resolved after approximately 10 seconds.    Patient was last seen by  "Dr. Harrison in October 2020.  He continued to note the \"squeezing\" sensation 5-6 times per day.  Blood pressures were mildly elevated and he noted breast tenderness with spironolactone, so amlodipine was increased to 10 mg daily.  He had more fatigue and tiredness.  Over the summer, he was able to ride his bike for 1 mile but felt dyspneic.  On device interrogation, Dr. Harrison noted that he was back in atrial fibrillation and anticoagulation was restarted.  He recommended a ÁNGELA cardioversion.  Zio patch showed persistent atrial fibrillation with average heart rate 75 bpm and 2 runs of NSVT longest lasting 12 beats.    Pt presents today for cardioversion follow-up. He underwent ÁNGELA cardioversion on 11/2/2020 ÁNGELA showing EF greater than 70%, no left atrial appendage thrombus, negative bubble study, mild to moderate TR. He converted to sinus rhythm after 1 shock.  EKG today showed sinus rhythm.  These results were reviewed with him today.    After increasing amlodipine up to 10 mg daily, he had a blood pressure of 100/60 which he did not feel well with.  After that, he decreased it to 5 mg daily which was still an increase from his prior 2.5 mg daily.  Blood pressures have been below 130 systolic and he feels better on this dose.  He denies any significant lower extremity edema.  After his cardioversion, he has not had any significant episodes of atrial fibrillation but does still feel occasional palpitations.  He is unsure exactly what these are, but they are currently tolerable.  He unfortunately just lost a close relative due to them ophelia COVID-19. Patient reports no chest pain, SOB, PND, orthopnea, syncope, edema, heart racing.      Current Cardiac Medications   Amlodipine 10 mg daily- Pt taking 5 mg daily   Eliquis 5 mg twice daily  Aspirin 81 mg daily  Lasix 20 mg daily  Metoprolol  mg daily  Atorvastatin 80 mg daily                   Assessment and Plan:       Plan  1. Check blood pressure at " least 1 hour after medications. Call the clinic if your blood pressure is consistently greater than 130/80.   2. Call if you think you are back in atrial fibrillation     Follow-up:  1. See Dr. Tang for cardiology follow up at [facility]: 12/1 video visit   2. See Dr. Harrison Feb 2021.        1. CAD    Angio 2017 with 60% stenosis in the D1 with negative FFR with otherwise mild disease    Angio 2020 no changes     No angina     Continue statin, beta-blocker, CCB, ASA      2. Apical hypertrophic cardiomyopathy    Diagnosed 2017    Had syncope with sustained VT and underwent ICD    Continue beta-blocker, lasix       3. Paroxysmal atrial fibrillation    Present since age 28    H/o multiple cardioversions    S/p A. fib ablation 2018    Recurrent atrial fibrillation s/p cardioversion 11/2020    Elevated [id number] score    Continue metoprolol for rate control and Eliquis for anticoagulation      4. NSVT    Prior episode of syncope and underwent ICD placement 2018    Short episodes of NSVT correlating with palpitations, on device checks but no further sustained and has not needed therapies with ICD    Continue metoprolol    Follow with device clinic      5. Hyperlipidemia    Last LDL 95 2/2020    Continue atorvastatin 80 mg daily    Consider starting zetia       6. HTN    controlled      Continue amlodipine, metoprolol    Breast tenderness with spironolactone           Thank you for allowing me to participate in this delightful patient's care.      This note was completed in part using Dragon voice recognition software. Although reviewed after completion, some word and grammatical errors may occur.    Magalis Anderson, HORTENSIA CNP, APRN, CNP           Data:   All laboratory data reviewed        HPI and Plan:   See dictation    Orders Placed This Encounter   Procedures     Follow-Up with Cardiologist       Orders Placed This Encounter   Medications     amLODIPine (NORVASC) 5 MG tablet     Sig: Take 1  tablet (5 mg) by mouth daily     Dispense:  90 tablet     Refill:  3       Medications Discontinued During This Encounter   Medication Reason     amLODIPine (NORVASC) 10 MG tablet          Encounter Diagnoses   Name Primary?     Apical variant hypertrophic cardiomyopathy (H)      Benign essential hypertension      Paroxysmal atrial fibrillation (H) Yes     Hyperlipidemia LDL goal <70      NSVT (nonsustained ventricular tachycardia) (H)      Coronary artery disease involving native coronary artery of native heart without angina pectoris      ICD (implantable cardioverter-defibrillator), single, in situ        CURRENT MEDICATIONS:  Current Outpatient Medications   Medication Sig Dispense Refill     amLODIPine (NORVASC) 5 MG tablet Take 1 tablet (5 mg) by mouth daily 90 tablet 3     apixaban ANTICOAGULANT (ELIQUIS) 5 MG tablet Take 1 tablet (5 mg) by mouth 2 times daily 60 tablet 3     Ascorbic Acid (VITAMIN C PO)        aspirin (ASA) 81 MG tablet Take 1 tablet (81 mg) by mouth daily       atorvastatin (LIPITOR) 80 MG tablet Take 1 tablet (80 mg) by mouth daily 90 tablet 3     furosemide (LASIX) 20 MG tablet Take 1 tablet (20 mg) by mouth daily 90 tablet 3     metoprolol succinate ER (TOPROL-XL) 100 MG 24 hr tablet Take 1.5 tablets (150 mg) by mouth daily 135 tablet 3       ALLERGIES   No Known Allergies    PAST MEDICAL HISTORY:  Past Medical History:   Diagnosis Date     Atrial fibrillation (H)      Coronary artery disease      Heart disease      Hyperlipidemia      Hypertension        PAST SURGICAL HISTORY:  Past Surgical History:   Procedure Laterality Date     ANESTHESIA CARDIOVERSION N/A 11/2/2020    Procedure: CARDIOVERSION (FOLLOWING ÁNGELA AT 0930);  Surgeon: GENERIC ANESTHESIA PROVIDER;  Location:  OR     CV CORONARY ANGIOGRAM N/A 2/6/2020    Procedure: Coronary Angiogram;  Surgeon: Daniel Miranda MD;  Location:  HEART CARDIAC CATH LAB     CV FRACTIONAL FLOW RESERVE N/A 2/6/2020    Procedure:  Fractional Flow [place];  Surgeon: Daniel Miranda MD;  Location:  HEART CARDIAC CATH LAB       FAMILY HISTORY:  Family History   Problem Relation Age of Onset     Coronary Artery Disease Mother         a fib, ,MI     Heart Failure Mother         CHF     Other Cancer Father      Diabetes Father      Coronary Artery Disease Paternal Grandmother      Coronary Artery Disease Paternal Grandfather        SOCIAL HISTORY:  Social History     Socioeconomic History     Marital status:      Spouse name: None     Number of children: None     Years of education: None     Highest education level: None   Occupational History     None   Social Needs     Financial resource strain: None     Food insecurity     Worry: None     Inability: None     Transportation needs     Medical: None     Non-medical: None   Tobacco Use     Smoking status: Never Smoker     Smokeless tobacco: Former User   Substance and Sexual Activity     Alcohol use: Yes     Comment: 1 drink per month     Drug use: No     Sexual activity: Yes   Lifestyle     Physical activity     Days per week: None     Minutes per session: None     Stress: None   Relationships     Social connections     Talks on phone: None     Gets together: None     Attends [employer] service: None     Active member of club or organization: None     Attends meetings of clubs or organizations: None     Relationship status: None     Intimate partner violence     Fear of current or ex partner: None     Emotionally abused: None     Physically abused: None     Forced sexual activity: None   Other Topics Concern     Parent/sibling w/ CABG, MI or angioplasty before 65F 55M? Yes     Comment: Mother MI age 45   Social History Narrative     None       Review of Systems:  Skin:  Positive for rash     Eyes:  Positive for visual blurring vision going black  ENT:  Negative      Respiratory:  Positive for dyspnea on exertion;shortness of breath     Cardiovascular:    Positive  for;palpitations;lower extremity symptoms;edema    Gastroenterology: Positive for excessive gas or bloating    Genitourinary:  Negative      Musculoskeletal:  Negative      Neurologic:  Positive for numbness or tingling of feet    Psychiatric:  Negative      Heme/Lymph/Imm:  Negative      Endocrine:  Negative        Physical Exam:  Vitals: Wt 111.6 kg (246 lb)   BMI 34.31 kg/m      Constitutional:           Skin:             Head:           Eyes:           Lymph:      ENT:           Neck:           Respiratory:            Cardiac:                                                           GI:           Extremities and Muscular Skeletal:                 Neurological:           Psych:             Thank you for allowing me to participate in the care of your patient.    Sincerely,     HORTENSIA Rdz [facility]

## 2020-11-18 NOTE — PATIENT INSTRUCTIONS
Medication Changes:  None     Recommendations:  1. Check blood pressure at least 1 hour after medications. Call the clinic if your blood pressure is consistently greater than 130/80.   2. Call if you think you are back in atrial fibrillation       Follow-up:  1. See Dr. Tang for cardiology follow up at [place]: 12/1 video visit   2. See Dr. Harrison Feb 2021. Call in Dec. to schedule.     Cardiology Scheduling~[phone]  Cardiology Clinic RNs~[phone] (Siena Mcgarry RN and Yary Tello RN)

## 2020-11-18 NOTE — PROGRESS NOTES
"Matthew Still is a 50 year old male who is being evaluated via a billable telephone visit.      The patient has been notified of following:     \"This telephone visit will be conducted via a call between you and your physician/provider. We have found that certain health care needs can be provided without the need for a physical exam.  This service lets us provide the care you need with a short phone conversation.  If a prescription is necessary we can send it directly to your pharmacy.  If lab work is needed we can place an order for that and you can then stop by our lab to have the test done at a later time.    Telephone visits are billed at different rates depending on your insurance coverage. During this emergency period, for some insurers they may be billed the same as an in-person visit.  Please reach out to your insurance provider with any questions.    If during the course of the call the physician/provider feels a telephone visit is not appropriate, you will not be charged for this service.\"    Patient has given verbal consent for Telephone visit?  Yes    What phone number would you like to be contacted at? [phone]    How would you like to obtain your AVS? Mail a copy    Phone call duration: 16 minutes    [facility]        Cardiology Clinic Progress Note  Matthew Still MRN# [id number]   YOB: 1970 Age: 50 year old      Primary Cardiologist:   Dr. Harrison           History of Presenting Illness:      Matthew Still is a pleasant 50 year old patient with a past cardiac history significant for:  1. paroxysmal atrial fibrillation, since his 30s.  He has undergone several cardioversions for this  2. apical hypertrophic cardiomyopathy s/p ICD  3. hypertension  4. hyperlipidemia  5. CAD 60% D1 with neg FFR 2017     In November 2017, he was seen for atrial fibrillation and chest pain.  Coronary angiogram showed moderate nonobstructive disease with 60% stenosis in " "the D1 with negative FFR and otherwise mild disease.  Echocardiogram showed moderate concentric LVH with slight prominence of the septum but no outflow obstruction, normal EF, no significant valvular disease.  Cardiac MRI December 2017 was consistent with apical hypertrophic cardiomyopathy.  ÁNGELA December 2017 showed thrombus in the left atrial appendage.  He underwent atrial fibrillation ablation in February 2018.  Follow-up ZIO Patch showed no further atrial fibrillation but patient had an 8 beat run of VT which he was symptomatic with.  Later in October 2018 he had a syncopal episode and subsequently underwent single-chamber ICD.  Given that he had no further atrial fibrillation, his Eliquis was discontinued.  Echocardiogram from December 2019 was stable with normal EF, apical hypertrophic cardiomyopathy and no significant valvular disease.  His amlodipine was previously decreased due to lower extremity edema.  His antihypertensives have been uptitrated.  He declined sleep medicine consultation.  In January 2020 he had complaints of dyspnea on exertion and was found to have abnormal stress test. Coronary angiogram 2/6/2020 showing no culprit lesion and no indication for revascularization, he has moderate narrowing in the D1/ramus with normal FFR, no change since 2017.    Carotid ultrasound February 2020 with less than 50% stenosis bilaterally.  Device interrogation September 2020 showing less than 1% pacing with underlying sinus rhythm and no ventricular arrhythmias.  In October 2020 he was noted to have a 10-second episode of ventricular tachycardia at night and had will get up with a squeezing sensation in his chest that resolved after approximately 10 seconds.    Patient was last seen by Dr. Harrison in October 2020.  He continued to note the \"squeezing\" sensation 5-6 times per day.  Blood pressures were mildly elevated and he noted breast tenderness with spironolactone, so amlodipine was increased to 10 mg " daily.  He had more fatigue and tiredness.  Over the summer, he was able to ride his bike for 1 mile but felt dyspneic.  On device interrogation, Dr. Harrison noted that he was back in atrial fibrillation and anticoagulation was restarted.  He recommended a ÁNGELA cardioversion.  Zio patch showed persistent atrial fibrillation with average heart rate 75 bpm and 2 runs of NSVT longest lasting 12 beats.    Pt presents today for cardioversion follow-up. He underwent ÁNGELA cardioversion on 11/2/2020 ÁNGELA showing EF greater than 70%, no left atrial appendage thrombus, negative bubble study, mild to moderate TR. He converted to sinus rhythm after 1 shock.  EKG today showed sinus rhythm.  These results were reviewed with him today.    After increasing amlodipine up to 10 mg daily, he had a blood pressure of 100/60 which he did not feel well with.  After that, he decreased it to 5 mg daily which was still an increase from his prior 2.5 mg daily.  Blood pressures have been below 130 systolic and he feels better on this dose.  He denies any significant lower extremity edema.  After his cardioversion, he has not had any significant episodes of atrial fibrillation but does still feel occasional palpitations.  He is unsure exactly what these are, but they are currently tolerable.  He unfortunately just lost a close relative due to them ophelia COVID-19. Patient reports no chest pain, SOB, PND, orthopnea, syncope, edema, heart racing.      Current Cardiac Medications   Amlodipine 10 mg daily- Pt taking 5 mg daily   Eliquis 5 mg twice daily  Aspirin 81 mg daily  Lasix 20 mg daily  Metoprolol  mg daily  Atorvastatin 80 mg daily                   Assessment and Plan:       Plan  1. Check blood pressure at least 1 hour after medications. Call the clinic if your blood pressure is consistently greater than 130/80.   2. Call if you think you are back in atrial fibrillation     Follow-up:  1. See Dr. Tang for cardiology follow up at  [place]: 12/1 video visit   2. See Dr. Harrison Feb 2021.        1. CAD    Angio 2017 with 60% stenosis in the D1 with negative FFR with otherwise mild disease    Angio 2020 no changes     No angina     Continue statin, beta-blocker, CCB, ASA      2. Apical hypertrophic cardiomyopathy    Diagnosed 2017    Had syncope with sustained VT and underwent ICD    Continue beta-blocker, lasix       3. Paroxysmal atrial fibrillation    Present since age 28    H/o multiple cardioversions    S/p A. fib ablation 2018    Recurrent atrial fibrillation s/p cardioversion 11/2020    Elevated [id number] score    Continue metoprolol for rate control and Eliquis for anticoagulation      4. NSVT    Prior episode of syncope and underwent ICD placement 2018    Short episodes of NSVT correlating with palpitations, on device checks but no further sustained and has not needed therapies with ICD    Continue metoprolol    Follow with device clinic      5. Hyperlipidemia    Last LDL 95 2/2020    Continue atorvastatin 80 mg daily    Consider starting zetia       6. HTN    controlled      Continue amlodipine, metoprolol    Breast tenderness with spironolactone           Thank you for allowing me to participate in this delightful patient's care.      This note was completed in part using Dragon voice recognition software. Although reviewed after completion, some word and grammatical errors may occur.    Magalis Anderson, HORTENSIA CNP, APRN, CNP           Data:   All laboratory data reviewed        HPI and Plan:   See dictation    Orders Placed This Encounter   Procedures     Follow-Up with Cardiologist       Orders Placed This Encounter   Medications     amLODIPine (NORVASC) 5 MG tablet     Sig: Take 1 tablet (5 mg) by mouth daily     Dispense:  90 tablet     Refill:  3       Medications Discontinued During This Encounter   Medication Reason     amLODIPine (NORVASC) 10 MG tablet          Encounter Diagnoses   Name Primary?      Apical variant hypertrophic cardiomyopathy (H)      Benign essential hypertension      Paroxysmal atrial fibrillation (H) Yes     Hyperlipidemia LDL goal <70      NSVT (nonsustained ventricular tachycardia) (H)      Coronary artery disease involving native coronary artery of native heart without angina pectoris      ICD (implantable cardioverter-defibrillator), single, in situ        CURRENT MEDICATIONS:  Current Outpatient Medications   Medication Sig Dispense Refill     amLODIPine (NORVASC) 5 MG tablet Take 1 tablet (5 mg) by mouth daily 90 tablet 3     apixaban ANTICOAGULANT (ELIQUIS) 5 MG tablet Take 1 tablet (5 mg) by mouth 2 times daily 60 tablet 3     Ascorbic Acid (VITAMIN C PO)        aspirin (ASA) 81 MG tablet Take 1 tablet (81 mg) by mouth daily       atorvastatin (LIPITOR) 80 MG tablet Take 1 tablet (80 mg) by mouth daily 90 tablet 3     furosemide (LASIX) 20 MG tablet Take 1 tablet (20 mg) by mouth daily 90 tablet 3     metoprolol succinate ER (TOPROL-XL) 100 MG 24 hr tablet Take 1.5 tablets (150 mg) by mouth daily 135 tablet 3       ALLERGIES   No Known Allergies    PAST MEDICAL HISTORY:  Past Medical History:   Diagnosis Date     Atrial fibrillation (H)      Coronary artery disease      Heart disease      Hyperlipidemia      Hypertension        PAST SURGICAL HISTORY:  Past Surgical History:   Procedure Laterality Date     ANESTHESIA CARDIOVERSION N/A 11/2/2020    Procedure: CARDIOVERSION (FOLLOWING ÁNGELA AT 0930);  Surgeon: GENERIC ANESTHESIA PROVIDER;  Location:  OR     CV CORONARY ANGIOGRAM N/A 2/6/2020    Procedure: Coronary Angiogram;  Surgeon: Daniel Miranda MD;  Location:  HEART CARDIAC CATH LAB     CV FRACTIONAL FLOW RESERVE N/A 2/6/2020    Procedure: Fractional Flow Burdine;  Surgeon: Daniel Miranda MD;  Location:  HEART CARDIAC CATH LAB       FAMILY HISTORY:  Family History   Problem Relation Age of Onset     Coronary Artery Disease Mother         a fib, ,MI     Heart  Failure Mother         CHF     Other Cancer Father      Diabetes Father      Coronary Artery Disease Paternal Grandmother      Coronary Artery Disease Paternal Grandfather        SOCIAL HISTORY:  Social History     Socioeconomic History     Marital status:      Spouse name: None     Number of children: None     Years of education: None     Highest education level: None   Occupational History     None   Social Needs     Financial resource strain: None     Food insecurity     Worry: None     Inability: None     Transportation needs     Medical: None     Non-medical: None   Tobacco Use     Smoking status: Never Smoker     Smokeless tobacco: Former User   Substance and Sexual Activity     Alcohol use: Yes     Comment: 1 drink per month     Drug use: No     Sexual activity: Yes   Lifestyle     Physical activity     Days per week: None     Minutes per session: None     Stress: None   Relationships     Social connections     Talks on phone: None     Gets together: None     Attends Yazdanism service: None     Active member of club or organization: None     Attends meetings of clubs or organizations: None     Relationship status: None     Intimate partner violence     Fear of current or ex partner: None     Emotionally abused: None     Physically abused: None     Forced sexual activity: None   Other Topics Concern     Parent/sibling w/ CABG, MI or angioplasty before 65F 55M? Yes     Comment: Mother MI age 45   Social History Narrative     None       Review of Systems:  Skin:  Positive for rash     Eyes:  Positive for visual blurring vision going black  ENT:  Negative      Respiratory:  Positive for dyspnea on exertion;shortness of breath     Cardiovascular:    Positive for;palpitations;lower extremity symptoms;edema    Gastroenterology: Positive for excessive gas or bloating    Genitourinary:  Negative      Musculoskeletal:  Negative      Neurologic:  Positive for numbness or tingling of feet    Psychiatric:  Negative       Heme/Lymph/Imm:  Negative      Endocrine:  Negative        Physical Exam:  Vitals: Wt 111.6 kg (246 lb)   BMI 34.31 kg/m      Constitutional:           Skin:             Head:           Eyes:           Lymph:      ENT:           Neck:           Respiratory:            Cardiac:                                                           GI:           Extremities and Muscular Skeletal:                 Neurological:           Psych:           CC  Brice Harrison MD  [facility]  [address] [place] [address]

## 2020-12-01 ENCOUNTER — VIRTUAL VISIT (OUTPATIENT)
Dept: CARDIOLOGY | Facility: CLINIC | Age: 50
End: 2020-12-01
Payer: COMMERCIAL

## 2020-12-01 VITALS
SYSTOLIC BLOOD PRESSURE: 123 MMHG | WEIGHT: 243 LBS | BODY MASS INDEX: 33.89 KG/M2 | DIASTOLIC BLOOD PRESSURE: 71 MMHG | HEART RATE: 58 BPM

## 2020-12-01 DIAGNOSIS — I48.0 PAROXYSMAL ATRIAL FIBRILLATION (H): Primary | ICD-10-CM

## 2020-12-01 PROCEDURE — 99213 OFFICE O/P EST LOW 20 MIN: CPT | Mod: 95 | Performed by: INTERNAL MEDICINE

## 2020-12-01 NOTE — PROGRESS NOTES
"Matthew Still is a 50 year old male who is being evaluated via a billable video visit.      The patient has been notified of following:     \"This video visit will be conducted via a call between you and your physician/provider. We have found that certain health care needs can be provided without the need for an in-person physical exam.  This service lets us provide the care you need with a video conversation.  If a prescription is necessary we can send it directly to your pharmacy.  If lab work is needed we can place an order for that and you can then stop by our lab to have the test done at a later time.    Video visits are billed at different rates depending on your insurance coverage.  Please reach out to your insurance provider with any questions.    If during the course of the call the physician/provider feels a video visit is not appropriate, you will not be charged for this service.\"    Patient has given verbal consent for Video visit? Yes  How would you like to obtain your AVS? Mail a copy  If you are dropped from the video visit, the video invite should be resent to: Text to cell phone: [phone]  Will anyone else be joining your video visit? No        Video-Visit Details    Type of service:  Video Visit    Video Start Time: 2:46PM  End Time: 2:56 PM    Originating Location (pt. Location): Home    Distant Location (provider location):  [facility]     Platform used for Video Visit: Doximity   General:  no apparent distress, normal body habitus, sitting upright.  ENT/Mouth:  membranes moist, no nasal discharge.  Normal head shape, no apparent injury or laceration.  Eyes:  no scleral icterus, normal conjunctivae.  No observed jaundice.  Neck:  no apparent neck swelling.   Chest/Lungs:  No breathing difficulty while speaking.  No audible wheezing.  No cough during conversation.  Cardiovascular:  No obviously elevated jugular venous pressure.  No apparent edema bilaterally in LE. "   Abdomen:  no obvious abdominal distention.   Extremities:  no apparent cyanosis.  Skin:  no xanthelasma.  No facial lacerations.  Neurologic:  Normal arm motion bilateral, no tremors.    Psychiatric:  Alert and oriented x3, calm demeanor    The rest of the comprehensive physical examination is deferred due to public health emergency video visit restrictions.    Dictated:[id number]  Vik Clancy MD

## 2020-12-01 NOTE — LETTER
12/1/2020    [facility]  [address] [address]    RE: Matthew Still       Dear Colleague,    I had the pleasure of seeing Matthew Still in the [facility].    Service Date: 12/01/2020      HISTORY OF PRESENT ILLNESS:  It was my pleasure to see Matthew today for follow-up of a recent cardioversion of his atrial fibrillation.  As you know, Matthew is a delightful 50-year-old gentleman with a history of symptomatic persistent atrial fibrillation in the background of apical hypertrophy which was diagnosed several years ago.  MRI did confirm diffuse patchy mid-wall enhancement with overall scar burden of 11% and the thickest measurement was at 24 mm.      In light of his symptomatic atrial fibrillation, I recommended a catheter-based ablation rather than subject him to a long-term antiarrhythmic drug.  The patient underwent isolation of pulmonary veins and SVC in 02/2018.  Subsequent EKG and long-term monitoring showed no evidence of atrial fibrillation.      Over the interim, the patient eventually received a single-chamber ICD, given that he had a syncopal episode and in the background of hypertrophic cardiomyopathy and evidence of nonsustained VT, it was appropriate to implant one.  Fortunately, since then, he has not had any ventricular arrhythmia resulting in ICD therapy.      A month ago, he was noted to be in atrial fibrillation with symptoms of fatigue and malaise.  His ventricular rate was not that fast at 64 beats per minute.  He underwent a ÁNGELA-guided cardioversion on 11/02 and remained in sinus rhythm since then.  He took his blood pressure and heart rate today with a systolic in the 120s and a heart rate in the 60s.  Overall, Matthew is doing a lot better since the cardioversion.      We discussed at length about the risk of having recurrent atrial fibrillation despite a successful ablation, which is not unexpected given the persistent nature of his  "atrial fibrillation.  Since this was the first episode that he had, over almost 3 years since the ablation, I would recommend close observation at this point in time, with him continuing his beta blocker and his Eliquis for at least 3 months and reassess.  I encouraged the patient to invest in a heart rate monitor such as a Fitbit, so we can have an early warning of his AFib recurrence in the future.  If it does come back, then we should consider a repeat ablation.  I would favor continuation of Eliquis for long-term, given the patient with apical hypertrophy, given that the risk of CVA is higher in folks without this condition, independent of atrial fibrillation burden.         JESUS OTT MD             D: 2020   T: 2020   MT: al      Name:     MATTHEW STILL   MRN:      [id number]        Account:      [id number]   :      1970           Service Date: 2020      Document: [id number]        Matthew Still is a 50 year old male who is being evaluated via a billable video visit.      The patient has been notified of following:     \"This video visit will be conducted via a call between you and your physician/provider. We have found that certain health care needs can be provided without the need for an in-person physical exam.  This service lets us provide the care you need with a video conversation.  If a prescription is necessary we can send it directly to your pharmacy.  If lab work is needed we can place an order for that and you can then stop by our lab to have the test done at a later time.    Video visits are billed at different rates depending on your insurance coverage.  Please reach out to your insurance provider with any questions.    If during the course of the call the physician/provider feels a video visit is not appropriate, you will not be charged for this service.\"    Patient has given verbal consent for Video visit? Yes  How would you like to obtain your AVS? Mail a " copy  If you are dropped from the video visit, the video invite should be resent to: Text to cell phone: [phone]  Will anyone else be joining your video visit? No        Video-Visit Details    Type of service:  Video Visit    Video Start Time: 2:46PM  End Time: 2:56 PM    Originating Location (pt. Location): Home    Distant Location (provider location):  [facility]     Platform used for Video Visit: Doximity   General:  no apparent distress, normal body habitus, sitting upright.  ENT/Mouth:  membranes moist, no nasal discharge.  Normal head shape, no apparent injury or laceration.  Eyes:  no scleral icterus, normal conjunctivae.  No observed jaundice.  Neck:  no apparent neck swelling.   Chest/Lungs:  No breathing difficulty while speaking.  No audible wheezing.  No cough during conversation.  Cardiovascular:  No obviously elevated jugular venous pressure.  No apparent edema bilaterally in LE.   Abdomen:  no obvious abdominal distention.   Extremities:  no apparent cyanosis.  Skin:  no xanthelasma.  No facial lacerations.  Neurologic:  Normal arm motion bilateral, no tremors.    Psychiatric:  Alert and oriented x3, calm demeanor    The rest of the comprehensive physical examination is deferred due to public health emergency video visit restrictions.        Thank you for allowing me to participate in the care of your patient.    Sincerely,     Vik Clancy MD     [facility]

## 2020-12-02 NOTE — PROGRESS NOTES
Service Date: 12/01/2020      HISTORY OF PRESENT ILLNESS:  It was my pleasure to see Matthew today for follow-up of a recent cardioversion of his atrial fibrillation.  As you know, Matthew is a delightful 50-year-old gentleman with a history of symptomatic persistent atrial fibrillation in the background of apical hypertrophy which was diagnosed several years ago.  MRI did confirm diffuse patchy mid-wall enhancement with overall scar burden of 11% and the thickest measurement was at 24 mm.      In light of his symptomatic atrial fibrillation, I recommended a catheter-based ablation rather than subject him to a long-term antiarrhythmic drug.  The patient underwent isolation of pulmonary veins and SVC in 02/2018.  Subsequent EKG and long-term monitoring showed no evidence of atrial fibrillation.      Over the interim, the patient eventually received a single-chamber ICD, given that he had a syncopal episode and in the background of hypertrophic cardiomyopathy and evidence of nonsustained VT, it was appropriate to implant one.  Fortunately, since then, he has not had any ventricular arrhythmia resulting in ICD therapy.      A month ago, he was noted to be in atrial fibrillation with symptoms of fatigue and malaise.  His ventricular rate was not that fast at 64 beats per minute.  He underwent a ÁNGELA-guided cardioversion on 11/02 and remained in sinus rhythm since then.  He took his blood pressure and heart rate today with a systolic in the 120s and a heart rate in the 60s.  Overall, Matthew is doing a lot better since the cardioversion.      We discussed at length about the risk of having recurrent atrial fibrillation despite a successful ablation, which is not unexpected given the persistent nature of his atrial fibrillation.  Since this was the first episode that he had, over almost 3 years since the ablation, I would recommend close observation at this point in time, with him continuing his beta blocker and his Eliquis for  at least 3 months and reassess.  I encouraged the patient to invest in a heart rate monitor such as a Fitbit, so we can have an early warning of his AFib recurrence in the future.  If it does come back, then we should consider a repeat ablation.  I would favor continuation of Eliquis for long-term, given the patient with apical hypertrophy, given that the risk of CVA is higher in folks without this condition, independent of atrial fibrillation burden.         JESUS OTT MD             D: 2020   T: 2020   MT: al      Name:     TAMARA CENTENO   MRN:      [id number]        Account:      [id number]   :      1970           Service Date: 2020      Document: [id number]

## 2020-12-29 ENCOUNTER — ANCILLARY PROCEDURE (OUTPATIENT)
Dept: CARDIOLOGY | Facility: CLINIC | Age: 50
End: 2020-12-29
Attending: INTERNAL MEDICINE
Payer: COMMERCIAL

## 2020-12-29 DIAGNOSIS — I42.9 CARDIOMYOPATHY (H): Primary | ICD-10-CM

## 2020-12-29 DIAGNOSIS — Z95.810 ICD (IMPLANTABLE CARDIOVERTER-DEFIBRILLATOR) IN PLACE: ICD-10-CM

## 2020-12-29 PROCEDURE — 93296 REM INTERROG EVL PM/IDS: CPT | Performed by: INTERNAL MEDICINE

## 2020-12-29 PROCEDURE — 93295 DEV INTERROG REMOTE 1/2/MLT: CPT | Performed by: INTERNAL MEDICINE

## 2021-01-18 LAB
MDC_IDC_LEAD_IMPLANT_DT: NORMAL
MDC_IDC_LEAD_LOCATION: NORMAL
MDC_IDC_LEAD_LOCATION_DETAIL_1: NORMAL
MDC_IDC_LEAD_MFG: NORMAL
MDC_IDC_LEAD_MODEL: NORMAL
MDC_IDC_LEAD_SERIAL: NORMAL
MDC_IDC_MSMT_BATTERY_REMAINING_PERCENTAGE: 100 %
MDC_IDC_MSMT_BATTERY_RRT_TRIGGER: NORMAL
MDC_IDC_MSMT_BATTERY_STATUS: NORMAL
MDC_IDC_MSMT_BATTERY_VOLTAGE: 3.12 V
MDC_IDC_MSMT_CAP_CHARGE_DTM: NORMAL
MDC_IDC_MSMT_CAP_CHARGE_ENERGY: 40 J
MDC_IDC_MSMT_CAP_CHARGE_TIME: 9.4 S
MDC_IDC_MSMT_CAP_CHARGE_TYPE: NORMAL
MDC_IDC_MSMT_LEADCHNL_RA_LEAD_CHANNEL_STATUS: NORMAL
MDC_IDC_MSMT_LEADCHNL_RV_IMPEDANCE_VALUE: 500 OHM
MDC_IDC_MSMT_LEADCHNL_RV_LEAD_CHANNEL_STATUS: NORMAL
MDC_IDC_PG_IMPLANT_DTM: NORMAL
MDC_IDC_PG_MFG: NORMAL
MDC_IDC_PG_MODEL: NORMAL
MDC_IDC_PG_SERIAL: NORMAL
MDC_IDC_PG_TYPE: NORMAL
MDC_IDC_SESS_CLINIC_NAME: NORMAL
MDC_IDC_SESS_DTM: NORMAL
MDC_IDC_SESS_REPROGRAMMED: NO
MDC_IDC_SESS_TYPE: NORMAL
MDC_IDC_SET_BRADY_LOWRATE: 40 {BEATS}/MIN
MDC_IDC_SET_BRADY_MODE: NORMAL
MDC_IDC_SET_LEADCHNL_RA_SENSING_POLARITY: NORMAL
MDC_IDC_SET_LEADCHNL_RA_SENSING_SENSITIVITY: 0.4 MV
MDC_IDC_SET_LEADCHNL_RV_PACING_AMPLITUDE: 1.8 V
MDC_IDC_SET_LEADCHNL_RV_PACING_POLARITY: NORMAL
MDC_IDC_SET_LEADCHNL_RV_PACING_PULSEWIDTH: 0.4 MS
MDC_IDC_SET_LEADCHNL_RV_SENSING_ADAPTATION_MODE: NORMAL
MDC_IDC_SET_LEADCHNL_RV_SENSING_POLARITY: NORMAL
MDC_IDC_SET_LEADCHNL_RV_SENSING_SENSITIVITY: 0.8 MV
MDC_IDC_SET_ZONE_DETECTION_BEATS_DENOMINATOR: 40 {BEATS}
MDC_IDC_SET_ZONE_DETECTION_BEATS_NUMERATOR: 30 {BEATS}
MDC_IDC_SET_ZONE_DETECTION_INTERVAL: 250 MS
MDC_IDC_SET_ZONE_DETECTION_INTERVAL: 300 MS
MDC_IDC_SET_ZONE_TYPE: NORMAL
MDC_IDC_SET_ZONE_TYPE: NORMAL
MDC_IDC_STAT_AT_BURDEN_PERCENT: 0 %
MDC_IDC_STAT_AT_DTM_END: NORMAL
MDC_IDC_STAT_AT_DTM_START: NORMAL
MDC_IDC_STAT_BRADY_AS_VP_PERCENT: 0 %
MDC_IDC_STAT_BRADY_AS_VS_PERCENT: 100 %
MDC_IDC_STAT_BRADY_DTM_END: NORMAL
MDC_IDC_STAT_BRADY_DTM_START: NORMAL
MDC_IDC_STAT_BRADY_RV_PERCENT_PACED: 0 %
MDC_IDC_STAT_CRT_DTM_END: NORMAL
MDC_IDC_STAT_CRT_DTM_START: NORMAL
MDC_IDC_STAT_EPISODE_TOTAL_COUNT: 0
MDC_IDC_STAT_EPISODE_TOTAL_COUNT: 26
MDC_IDC_STAT_EPISODE_TOTAL_COUNT_DTM_END: NORMAL
MDC_IDC_STAT_EPISODE_TOTAL_COUNT_DTM_START: NORMAL
MDC_IDC_STAT_EPISODE_TYPE: NORMAL
MDC_IDC_STAT_TACHYTHERAPY_ATP_DELIVERED_TOTAL: 0
MDC_IDC_STAT_TACHYTHERAPY_SHOCKS_ABORTED_TOTAL: 0
MDC_IDC_STAT_TACHYTHERAPY_SHOCKS_DELIVERED_TOTAL: 0
MDC_IDC_STAT_TACHYTHERAPY_TOTAL_DTM_END: NORMAL
MDC_IDC_STAT_TACHYTHERAPY_TOTAL_DTM_START: NORMAL

## 2021-03-04 DIAGNOSIS — I48.0 PAROXYSMAL ATRIAL FIBRILLATION (H): ICD-10-CM

## 2021-03-04 DIAGNOSIS — R06.09 DOE (DYSPNEA ON EXERTION): ICD-10-CM

## 2021-03-04 DIAGNOSIS — I10 BENIGN ESSENTIAL HYPERTENSION: ICD-10-CM

## 2021-03-04 DIAGNOSIS — E78.5 HYPERLIPIDEMIA LDL GOAL <70: ICD-10-CM

## 2021-03-04 DIAGNOSIS — I47.29 NSVT (NONSUSTAINED VENTRICULAR TACHYCARDIA) (H): ICD-10-CM

## 2021-03-04 RX ORDER — ATORVASTATIN CALCIUM 80 MG/1
80 TABLET, FILM COATED ORAL DAILY
Qty: 90 TABLET | Refills: 3 | Status: SHIPPED | OUTPATIENT
Start: 2021-03-04 | End: 2021-04-05

## 2021-03-04 RX ORDER — FUROSEMIDE 20 MG
20 TABLET ORAL DAILY
Qty: 90 TABLET | Refills: 3 | Status: SHIPPED | OUTPATIENT
Start: 2021-03-04 | End: 2021-04-05

## 2021-03-04 RX ORDER — AMLODIPINE BESYLATE 5 MG/1
5 TABLET ORAL DAILY
Qty: 90 TABLET | Refills: 3 | Status: SHIPPED | OUTPATIENT
Start: 2021-03-04 | End: 2022-03-28

## 2021-03-04 RX ORDER — METOPROLOL SUCCINATE 100 MG/1
150 TABLET, EXTENDED RELEASE ORAL DAILY
Qty: 135 TABLET | Refills: 3 | Status: SHIPPED | OUTPATIENT
Start: 2021-03-04 | End: 2022-03-28

## 2021-03-18 NOTE — PROGRESS NOTES
CARDIOLOGY VISIT    REASON FOR VISIT: afib, HCM    SUBJECTIVE:  50-year-old male seen for atrial fibrillation and apical hypertrophic cardiomyopathy.        He reports a history of paroxysmal A. fib dating back about 20 years.  He has had several cardioversions in the past.       In November 2017 he presented with atrial fibrillation and chest pain with troponin 0.13.  Angiogram showed normal left main, mild disease of LAD, 60% D1 (FFR 0.88), normal circumflex, dominant RCA with minimal disease.   Echo showed EF 65%, moderate mostly concentric LVH with slight prominence of the septum.         Cardiac MRI December 2017 showed EF 70%, mild to moderate mid ventricular hypertrophy and severe apical hypertrophy consistent with apical hypertrophic cardiomyopathy, normal RV, diffuse patchy mid wall enhancement of the anterior and apical segments, total scar burden 11%, trivial MR.             February 2018 he underwent atrial fibrillation ablation including wide circumferential pulmonary vein isolation and SVC isolation.      October 2018 he had an episode of syncope.  He then underwent single-chamber ICD implantation (Biotronik Intica 7 VR-T ProMRI).      Echo December 2019 showed EF 60%, moderate concentric LVH with obliteration of left apical cavity due to apical hypertrophy, normal RV, no valve disease.      Nuclear stress January 2020 showed moderate anterior ischemia.  Coronary angiogram showed moderate first diagonal lesion with normal FFR, otherwise no obstructive coronary disease.      Carotid ultrasound February 2020 showed less than 50% stenosis bilaterally.      ÁNGELA November 2020 showed EF 70%, apical hypertrophy, 1-2+ TR.     He was seen by EP December 2020.  They recommend continuing metoprolol at least 3 months and preferably Eliquis long-term.  He underwent successful cardioversion.     Device interrogation December 2020 showed 0% pacing, one episode nonsustained VT, no atrial arrhythmias.     He has had  some exertional dyspnea, but no chest pain.  He continues to have some intermittent palpitations lasting a few seconds, but nothing sustained.  He does not think he has gone back into A. fib.  Resting heart rate tends to be in the 60s with blood pressure between 120 and 130.    MEDICATIONS:  Current Outpatient Medications   Medication     amLODIPine (NORVASC) 5 MG tablet     apixaban ANTICOAGULANT (ELIQUIS) 5 MG tablet     Ascorbic Acid (VITAMIN C PO)     aspirin (ASA) 81 MG tablet     atorvastatin (LIPITOR) 80 MG tablet     furosemide (LASIX) 20 MG tablet     metoprolol succinate ER (TOPROL-XL) 100 MG 24 hr tablet     No current facility-administered medications for this visit.        ALLERGIES:  No Known Allergies    REVIEW OF SYSTEMS:  Constitutional:  No weight loss, fever, chills, weakness or fatigue.  HEENT:  Eyes:  No visual loss, blurred vision, double vision or yellow sclerae. No hearing loss, sneezing, congestion, runny nose or sore throat.  Skin:  No rash or itching.  Cardiovascular: per HPI  Respiratory: per HPI  GI:  No anorexia, nausea, vomiting or diarrhea. No abdominal pain or blood.  :  No dysurea, hematuria  Neurologic:  No headache, dizziness, syncope, paralysis, ataxia, numbness or tingling in the extremities. No change in bowel or bladder control.  Musculoskeletal:  No muscle, back pain, joint pain or stiffness.  Hematologic:  No anemia, bleeding or bruising.  Lymphatics:  No enlarged nodes. No history of splenectomy.  Psychiatric:  No history of depression or anxiety.  Endocrine:  No reports of sweating, cold or heat intolerance. No polyuria or polydipsia.  Allergies:  No history of asthma, hives, eczema or rhinitis.    PHYSICAL EXAM:  /72   Pulse 65   Wt 115.7 kg (255 lb)   BMI 35.57 kg/m      Constitutional: awake, alert, no distress  Eyes: PERRL, sclera nonicteric  ENT: trachea midline  Respiratory: Lungs clear  Cardiovascular: Regular rate and rhythm, no murmurs  GI:  nondistended, nontender, bowel sounds present  Lymph/Hematologic: no lymphadenopathy  Skin: dry, no rash  Musculoskeletal: good muscle tone, strength 5/5 in upper and lower extremities  Neurologic: no focal deficits  Neuropsychiatric: appropriate affact    DATA:  Lab:   Recent Labs   Lab Test 02/06/20  0845 02/21/19  0924   CHOL 151 141   HDL 33* 36*   LDL 95 89   TRIG 115 78     ASSESSMENT:  50-year-old male seen for A. fib and apical hypertrophic cardiomyopathy.  Overall his cardiac issues seem stable.  He has not had any symptomatic A. fib since cardioversion in November.  He appears euvolemic on exam.  There would be no obvious cardiac reason for his dyspnea.  Echo will be done in 3 months, and could be done sooner if his dyspnea worsens.    Device to be checked next week, suspect he will have some short runs of nonsustained VT.    RECOMMENDATIONS:  1.  Apical hypertrophic cardiomyopathy  -Continue current medications  -Echocardiogram in 3 months    2. Paroxysmal A. Fib  -Continue metoprolol and Eliquis    3.  ICD  -Device check next week    Follow-up in 3 months with echo, BMP, NT proBNP.    Brice Harrison MD  Cardiology - [facility]  Pager:  [phone]  Text Page  March 23, 2021

## 2021-03-23 ENCOUNTER — OFFICE VISIT (OUTPATIENT)
Dept: CARDIOLOGY | Facility: CLINIC | Age: 51
End: 2021-03-23
Payer: COMMERCIAL

## 2021-03-23 VITALS
HEART RATE: 65 BPM | DIASTOLIC BLOOD PRESSURE: 72 MMHG | SYSTOLIC BLOOD PRESSURE: 126 MMHG | BODY MASS INDEX: 35.57 KG/M2 | WEIGHT: 255 LBS

## 2021-03-23 DIAGNOSIS — I42.2 APICAL VARIANT HYPERTROPHIC CARDIOMYOPATHY (H): Primary | ICD-10-CM

## 2021-03-23 PROCEDURE — 99214 OFFICE O/P EST MOD 30 MIN: CPT | Performed by: INTERNAL MEDICINE

## 2021-03-23 NOTE — LETTER
Date:May 13, 2021      Patient was self referred, no letter generated. Do not send.        [facility] Health Information

## 2021-03-23 NOTE — LETTER
3/23/2021    [facility]  [address] [place] [address]    RE: Matthew Still       Dear Colleague,    I had the pleasure of seeing Matthew Still in the [facility].    CARDIOLOGY VISIT    REASON FOR VISIT: afib, HCM    SUBJECTIVE:  50-year-old male seen for atrial fibrillation and apical hypertrophic cardiomyopathy.        He reports a history of paroxysmal A. fib dating back about 20 years.  He has had several cardioversions in the past.       In November 2017 he presented with atrial fibrillation and chest pain with troponin 0.13.  Angiogram showed normal left main, mild disease of LAD, 60% D1 (FFR 0.88), normal circumflex, dominant RCA with minimal disease.   Echo showed EF 65%, moderate mostly concentric LVH with slight prominence of the septum.         Cardiac MRI December 2017 showed EF 70%, mild to moderate mid ventricular hypertrophy and severe apical hypertrophy consistent with apical hypertrophic cardiomyopathy, normal RV, diffuse patchy mid wall enhancement of the anterior and apical segments, total scar burden 11%, trivial MR.             February 2018 he underwent atrial fibrillation ablation including wide circumferential pulmonary vein isolation and SVC isolation.      October 2018 he had an episode of syncope.  He then underwent single-chamber ICD implantation (Biotronik Intica 7 VR-T ProMRI).      Echo December 2019 showed EF 60%, moderate concentric LVH with obliteration of left apical cavity due to apical hypertrophy, normal RV, no valve disease.      Nuclear stress January 2020 showed moderate anterior ischemia.  Coronary angiogram showed moderate first diagonal lesion with normal FFR, otherwise no obstructive coronary disease.      Carotid ultrasound February 2020 showed less than 50% stenosis bilaterally.      ÁNGELA November 2020 showed EF 70%, apical hypertrophy, 1-2+ TR.     He was seen by EP December 2020.  They  recommend continuing metoprolol at least 3 months and preferably Eliquis long-term.  He underwent successful cardioversion.     Device interrogation December 2020 showed 0% pacing, one episode nonsustained VT, no atrial arrhythmias.     He has had some exertional dyspnea, but no chest pain.  He continues to have some intermittent palpitations lasting a few seconds, but nothing sustained.  He does not think he has gone back into A. fib.  Resting heart rate tends to be in the 60s with blood pressure between 120 and 130.    MEDICATIONS:  Current Outpatient Medications   Medication     amLODIPine (NORVASC) 5 MG tablet     apixaban ANTICOAGULANT (ELIQUIS) 5 MG tablet     Ascorbic Acid (VITAMIN C PO)     aspirin (ASA) 81 MG tablet     atorvastatin (LIPITOR) 80 MG tablet     furosemide (LASIX) 20 MG tablet     metoprolol succinate ER (TOPROL-XL) 100 MG 24 hr tablet     No current facility-administered medications for this visit.        ALLERGIES:  No Known Allergies    REVIEW OF SYSTEMS:  Constitutional:  No weight loss, fever, chills, weakness or fatigue.  HEENT:  Eyes:  No visual loss, blurred vision, double vision or yellow sclerae. No hearing loss, sneezing, congestion, runny nose or sore throat.  Skin:  No rash or itching.  Cardiovascular: per HPI  Respiratory: per HPI  GI:  No anorexia, nausea, vomiting or diarrhea. No abdominal pain or blood.  :  No dysurea, hematuria  Neurologic:  No headache, dizziness, syncope, paralysis, ataxia, numbness or tingling in the extremities. No change in bowel or bladder control.  Musculoskeletal:  No muscle, back pain, joint pain or stiffness.  Hematologic:  No anemia, bleeding or bruising.  Lymphatics:  No enlarged nodes. No history of splenectomy.  Psychiatric:  No history of depression or anxiety.  Endocrine:  No reports of sweating, cold or heat intolerance. No polyuria or polydipsia.  Allergies:  No history of asthma, hives, eczema or rhinitis.    PHYSICAL EXAM:  /72    Pulse 65   Wt 115.7 kg (255 lb)   BMI 35.57 kg/m      Constitutional: awake, alert, no distress  Eyes: PERRL, sclera nonicteric  ENT: trachea midline  Respiratory: Lungs clear  Cardiovascular: Regular rate and rhythm, no murmurs  GI: nondistended, nontender, bowel sounds present  Lymph/Hematologic: no lymphadenopathy  Skin: dry, no rash  Musculoskeletal: good muscle tone, strength 5/5 in upper and lower extremities  Neurologic: no focal deficits  Neuropsychiatric: appropriate affact    DATA:  Lab:   Recent Labs   Lab Test 02/06/20  0845 02/21/19  0924   CHOL 151 141   HDL 33* 36*   LDL 95 89   TRIG 115 78     ASSESSMENT:  50-year-old male seen for A. fib and apical hypertrophic cardiomyopathy.  Overall his cardiac issues seem stable.  He has not had any symptomatic A. fib since cardioversion in November.  He appears euvolemic on exam.  There would be no obvious cardiac reason for his dyspnea.  Echo will be done in 3 months, and could be done sooner if his dyspnea worsens.    Device to be checked next week, suspect he will have some short runs of nonsustained VT.    RECOMMENDATIONS:  1.  Apical hypertrophic cardiomyopathy  -Continue current medications  -Echocardiogram in 3 months    2. Paroxysmal A. Fib  -Continue metoprolol and Eliquis    3.  ICD  -Device check next week    Follow-up in 3 months with echo, BMP, NT proBNP.    Brice Harrison MD  Cardiology - [facility]  Pager:  [phone]  Text Page  March 23, 2021          Thank you for allowing me to participate in the care of your patient.      Sincerely,     Brice Harrison MD     [facility]  cc:   No referring provider defined for this encounter.

## 2021-03-31 ENCOUNTER — ANCILLARY PROCEDURE (OUTPATIENT)
Dept: CARDIOLOGY | Facility: CLINIC | Age: 51
End: 2021-03-31
Attending: INTERNAL MEDICINE
Payer: COMMERCIAL

## 2021-03-31 DIAGNOSIS — I42.9 CARDIOMYOPATHY (H): ICD-10-CM

## 2021-03-31 DIAGNOSIS — Z95.810 ICD (IMPLANTABLE CARDIOVERTER-DEFIBRILLATOR) IN PLACE: ICD-10-CM

## 2021-03-31 PROCEDURE — 93296 REM INTERROG EVL PM/IDS: CPT | Performed by: INTERNAL MEDICINE

## 2021-03-31 PROCEDURE — 93295 DEV INTERROG REMOTE 1/2/MLT: CPT | Performed by: INTERNAL MEDICINE

## 2021-03-31 NOTE — PROGRESS NOTES
Biotronik Intica (S) ICD Remote Device Check    : 0 %    Mode: VVI 40    Presenting Rhythm: AS/ VS Regular in the 50's     Heart Rate: Stable with good variability    Sensing: WNL    Pacing Threshold: Unable to obtain    Impedance: WNL    Battery Status: OK, TONYA (3.11V)    Atrial Arrhythmia: 0    Ventricular Arrhythmia: 1 episode logged on 1/19/2021, EGM suggestive of NSVT.  Lasting 12 beats with V rates 180-190.   ATP: 0    Shocks: 0    Care Plan:  Will have remote check on 6/30/2021. Due to follow up with Dr Harrison 6/2021.       I have reviewed and interpreted the device interrogation, settings, programming and nurse's summary. The device is functioning within normal device parameters. I agree with the current findings, assessment and plan.

## 2021-04-05 DIAGNOSIS — R06.09 DOE (DYSPNEA ON EXERTION): ICD-10-CM

## 2021-04-05 DIAGNOSIS — E78.5 HYPERLIPIDEMIA LDL GOAL <70: ICD-10-CM

## 2021-04-05 RX ORDER — FUROSEMIDE 20 MG
20 TABLET ORAL DAILY
Qty: 90 TABLET | Refills: 3 | Status: SHIPPED | OUTPATIENT
Start: 2021-04-05 | End: 2022-03-28

## 2021-04-05 RX ORDER — ATORVASTATIN CALCIUM 80 MG/1
80 TABLET, FILM COATED ORAL DAILY
Qty: 90 TABLET | Refills: 3 | Status: SHIPPED | OUTPATIENT
Start: 2021-04-05 | End: 2022-03-28

## 2021-06-12 NOTE — OR NURSING
Dr. Kitty mendoza.  Pt has small amount of blood on Band-Aid covering R groin site, no firmness at site or bruising, pt has minimal pain with palpation.  Verbal order to hold penelope pressure for a couple more minutes.  Pressure is applied to R groin and held for 5 min.   no abrasion/no back pain/no bruising/no fever/no numbness/no stiffness/no tingling/no weakness

## 2021-06-30 ENCOUNTER — ANCILLARY PROCEDURE (OUTPATIENT)
Dept: CARDIOLOGY | Facility: CLINIC | Age: 51
End: 2021-06-30
Attending: INTERNAL MEDICINE
Payer: COMMERCIAL

## 2021-06-30 DIAGNOSIS — I42.9 CARDIOMYOPATHY (H): ICD-10-CM

## 2021-06-30 DIAGNOSIS — Z95.810 ICD (IMPLANTABLE CARDIOVERTER-DEFIBRILLATOR) IN PLACE: ICD-10-CM

## 2021-06-30 PROCEDURE — 93295 DEV INTERROG REMOTE 1/2/MLT: CPT | Performed by: INTERNAL MEDICINE

## 2021-06-30 PROCEDURE — 93296 REM INTERROG EVL PM/IDS: CPT | Performed by: INTERNAL MEDICINE

## 2021-07-09 LAB
MDC_IDC_LEAD_IMPLANT_DT: NORMAL
MDC_IDC_LEAD_LOCATION: NORMAL
MDC_IDC_LEAD_LOCATION_DETAIL_1: NORMAL
MDC_IDC_LEAD_MFG: NORMAL
MDC_IDC_LEAD_MODEL: NORMAL
MDC_IDC_LEAD_SERIAL: NORMAL
MDC_IDC_MSMT_BATTERY_REMAINING_PERCENTAGE: 100 %
MDC_IDC_MSMT_BATTERY_RRT_TRIGGER: NORMAL
MDC_IDC_MSMT_BATTERY_STATUS: NORMAL
MDC_IDC_MSMT_BATTERY_VOLTAGE: 3.11 V
MDC_IDC_MSMT_CAP_CHARGE_DTM: NORMAL
MDC_IDC_MSMT_CAP_CHARGE_ENERGY: 40 J
MDC_IDC_MSMT_CAP_CHARGE_TIME: 9.4 S
MDC_IDC_MSMT_CAP_CHARGE_TYPE: NORMAL
MDC_IDC_MSMT_LEADCHNL_RA_LEAD_CHANNEL_STATUS: NORMAL
MDC_IDC_MSMT_LEADCHNL_RV_IMPEDANCE_VALUE: 494 OHM
MDC_IDC_MSMT_LEADCHNL_RV_LEAD_CHANNEL_STATUS: NORMAL
MDC_IDC_PG_IMPLANT_DTM: NORMAL
MDC_IDC_PG_MFG: NORMAL
MDC_IDC_PG_MODEL: NORMAL
MDC_IDC_PG_SERIAL: NORMAL
MDC_IDC_PG_TYPE: NORMAL
MDC_IDC_SESS_CLINIC_NAME: NORMAL
MDC_IDC_SESS_DTM: NORMAL
MDC_IDC_SESS_REPROGRAMMED: NO
MDC_IDC_SESS_TYPE: NORMAL
MDC_IDC_SET_BRADY_LOWRATE: 40 {BEATS}/MIN
MDC_IDC_SET_BRADY_MODE: NORMAL
MDC_IDC_SET_LEADCHNL_RA_SENSING_POLARITY: NORMAL
MDC_IDC_SET_LEADCHNL_RA_SENSING_SENSITIVITY: 0.4 MV
MDC_IDC_SET_LEADCHNL_RV_PACING_AMPLITUDE: 1.8 V
MDC_IDC_SET_LEADCHNL_RV_PACING_POLARITY: NORMAL
MDC_IDC_SET_LEADCHNL_RV_PACING_PULSEWIDTH: 0.4 MS
MDC_IDC_SET_LEADCHNL_RV_SENSING_ADAPTATION_MODE: NORMAL
MDC_IDC_SET_LEADCHNL_RV_SENSING_POLARITY: NORMAL
MDC_IDC_SET_LEADCHNL_RV_SENSING_SENSITIVITY: 0.8 MV
MDC_IDC_SET_ZONE_DETECTION_BEATS_DENOMINATOR: 40 {BEATS}
MDC_IDC_SET_ZONE_DETECTION_BEATS_NUMERATOR: 30 {BEATS}
MDC_IDC_SET_ZONE_DETECTION_INTERVAL: 250 MS
MDC_IDC_SET_ZONE_DETECTION_INTERVAL: 300 MS
MDC_IDC_SET_ZONE_TYPE: NORMAL
MDC_IDC_SET_ZONE_TYPE: NORMAL
MDC_IDC_STAT_AT_BURDEN_PERCENT: 0 %
MDC_IDC_STAT_AT_DTM_END: NORMAL
MDC_IDC_STAT_AT_DTM_START: NORMAL
MDC_IDC_STAT_BRADY_AS_VP_PERCENT: 0 %
MDC_IDC_STAT_BRADY_AS_VS_PERCENT: 100 %
MDC_IDC_STAT_BRADY_DTM_END: NORMAL
MDC_IDC_STAT_BRADY_DTM_START: NORMAL
MDC_IDC_STAT_BRADY_RV_PERCENT_PACED: 0 %
MDC_IDC_STAT_CRT_DTM_END: NORMAL
MDC_IDC_STAT_CRT_DTM_START: NORMAL
MDC_IDC_STAT_EPISODE_TOTAL_COUNT: 0
MDC_IDC_STAT_EPISODE_TOTAL_COUNT: 26
MDC_IDC_STAT_EPISODE_TOTAL_COUNT_DTM_END: NORMAL
MDC_IDC_STAT_EPISODE_TOTAL_COUNT_DTM_START: NORMAL
MDC_IDC_STAT_EPISODE_TYPE: NORMAL
MDC_IDC_STAT_TACHYTHERAPY_ATP_DELIVERED_TOTAL: 0
MDC_IDC_STAT_TACHYTHERAPY_SHOCKS_ABORTED_TOTAL: 0
MDC_IDC_STAT_TACHYTHERAPY_SHOCKS_DELIVERED_TOTAL: 0
MDC_IDC_STAT_TACHYTHERAPY_TOTAL_DTM_END: NORMAL
MDC_IDC_STAT_TACHYTHERAPY_TOTAL_DTM_START: NORMAL

## 2021-08-20 ENCOUNTER — OFFICE VISIT (OUTPATIENT)
Dept: URGENT CARE | Facility: URGENT CARE | Age: 51
End: 2021-08-20
Payer: COMMERCIAL

## 2021-08-20 VITALS
HEART RATE: 64 BPM | BODY MASS INDEX: 35.73 KG/M2 | OXYGEN SATURATION: 99 % | WEIGHT: 256.2 LBS | DIASTOLIC BLOOD PRESSURE: 79 MMHG | TEMPERATURE: 97 F | SYSTOLIC BLOOD PRESSURE: 130 MMHG

## 2021-08-20 DIAGNOSIS — L24.89 IRRITANT CONTACT DERMATITIS DUE TO OTHER AGENTS: Primary | ICD-10-CM

## 2021-08-20 PROCEDURE — 99214 OFFICE O/P EST MOD 30 MIN: CPT | Performed by: PHYSICIAN ASSISTANT

## 2021-08-20 RX ORDER — TRIAMCINOLONE ACETONIDE 1 MG/G
CREAM TOPICAL
Qty: 45 G | Refills: 1 | Status: SHIPPED | OUTPATIENT
Start: 2021-08-20 | End: 2021-10-13

## 2021-08-20 RX ORDER — PREDNISONE 20 MG/1
40 TABLET ORAL DAILY
Qty: 10 TABLET | Refills: 0 | Status: SHIPPED | OUTPATIENT
Start: 2021-08-20 | End: 2021-08-25

## 2021-08-20 ASSESSMENT — PAIN SCALES - GENERAL: PAINLEVEL: NO PAIN (0)

## 2021-08-20 NOTE — PROGRESS NOTES
"    Assessment & Plan     Irritant contact dermatitis due to other agents  Will treat with predniSONE (DELTASONE) 20 MG tablet; Take 2 tablets (40 mg) by mouth daily for 5 days and  triamcinolone (KENALOG) 0.1 % external cream; Apply sparingly to affected area three times daily for 7-14 days. Avoid scratching. Watch for signs of infection. Return to clinic if symptoms worsen or do not improve; otherwise follow up as needed                 BMI:   Estimated body mass index is 35.73 kg/m  as calculated from the following:    Height as of 11/2/20: 1.803 m (5' 11\").    Weight as of this encounter: 116.2 kg (256 lb 3.2 oz).           Return in about 1 week (around 8/27/2021), or if symptoms worsen or fail to improve.    Karen Montes PA-C  [facility]    Subjective   Chief Complaint   Patient presents with     Derm Problem     Woke up Sunday morning with rash on his chest, neck, arms. Has been taking Benadryl at home is helping a bit but patient states he has been short of breathe at times.       HPI     Derm problem   Onset of symptoms was 5 day(s) ago.  Course of illness is same.    Severity moderate  Current and Associated symptoms: rash on neck and arms   Treatment measures tried include benadryl.  Predisposing factors include has latex allergy and was grinding a tire and hand.              Review of Systems   Constitutional, HEENT, cardiovascular, pulmonary, gi and gu systems are negative, except as otherwise noted.      Objective    /79 (BP Location: Left arm, Patient Position: Sitting, Cuff Size: Adult Small)   Pulse 64   Temp 97  F (36.1  C) (Tympanic)   Wt 116.2 kg (256 lb 3.2 oz)   SpO2 99%   BMI 35.73 kg/m    Body mass index is 35.73 kg/m .  Physical Exam  Constitutional:       General: He is not in acute distress.     Appearance: He is well-developed.   HENT:      Head: Normocephalic and atraumatic.      Right Ear: Tympanic membrane and ear canal normal.      Left " Ear: Tympanic membrane and ear canal normal.   Eyes:      Conjunctiva/sclera: Conjunctivae normal.   Cardiovascular:      Rate and Rhythm: Normal rate and regular rhythm.   Pulmonary:      Effort: Pulmonary effort is normal.      Breath sounds: Normal breath sounds.   Skin:     General: Skin is warm and dry.      Comments: Dermatitis type rash around neck and in creases of arms.    Psychiatric:         Behavior: Behavior normal.

## 2021-09-29 ENCOUNTER — ANCILLARY PROCEDURE (OUTPATIENT)
Dept: CARDIOLOGY | Facility: CLINIC | Age: 51
End: 2021-09-29
Attending: INTERNAL MEDICINE
Payer: COMMERCIAL

## 2021-09-29 DIAGNOSIS — I42.9 CARDIOMYOPATHY (H): ICD-10-CM

## 2021-09-29 DIAGNOSIS — Z95.810 ICD (IMPLANTABLE CARDIOVERTER-DEFIBRILLATOR) IN PLACE: ICD-10-CM

## 2021-09-29 PROCEDURE — 93295 DEV INTERROG REMOTE 1/2/MLT: CPT | Performed by: INTERNAL MEDICINE

## 2021-09-29 PROCEDURE — 93296 REM INTERROG EVL PM/IDS: CPT | Performed by: INTERNAL MEDICINE

## 2021-10-04 LAB
MDC_IDC_LEAD_IMPLANT_DT: NORMAL
MDC_IDC_LEAD_LOCATION: NORMAL
MDC_IDC_LEAD_LOCATION_DETAIL_1: NORMAL
MDC_IDC_LEAD_MFG: NORMAL
MDC_IDC_LEAD_MODEL: NORMAL
MDC_IDC_LEAD_SERIAL: NORMAL
MDC_IDC_MSMT_BATTERY_REMAINING_PERCENTAGE: 89 %
MDC_IDC_MSMT_BATTERY_RRT_TRIGGER: NORMAL
MDC_IDC_MSMT_BATTERY_STATUS: NORMAL
MDC_IDC_MSMT_BATTERY_VOLTAGE: 3.11 V
MDC_IDC_MSMT_CAP_CHARGE_DTM: NORMAL
MDC_IDC_MSMT_CAP_CHARGE_ENERGY: 40 J
MDC_IDC_MSMT_CAP_CHARGE_TIME: 9.4 S
MDC_IDC_MSMT_CAP_CHARGE_TYPE: NORMAL
MDC_IDC_MSMT_LEADCHNL_RA_LEAD_CHANNEL_STATUS: NORMAL
MDC_IDC_MSMT_LEADCHNL_RV_IMPEDANCE_VALUE: 493 OHM
MDC_IDC_MSMT_LEADCHNL_RV_LEAD_CHANNEL_STATUS: NORMAL
MDC_IDC_PG_IMPLANT_DTM: NORMAL
MDC_IDC_PG_MFG: NORMAL
MDC_IDC_PG_MODEL: NORMAL
MDC_IDC_PG_SERIAL: NORMAL
MDC_IDC_PG_TYPE: NORMAL
MDC_IDC_SESS_CLINIC_NAME: NORMAL
MDC_IDC_SESS_DTM: NORMAL
MDC_IDC_SESS_REPROGRAMMED: NO
MDC_IDC_SESS_TYPE: NORMAL
MDC_IDC_SET_BRADY_LOWRATE: 40 {BEATS}/MIN
MDC_IDC_SET_BRADY_MODE: NORMAL
MDC_IDC_SET_LEADCHNL_RA_SENSING_POLARITY: NORMAL
MDC_IDC_SET_LEADCHNL_RA_SENSING_SENSITIVITY: 0.4 MV
MDC_IDC_SET_LEADCHNL_RV_PACING_AMPLITUDE: 1.8 V
MDC_IDC_SET_LEADCHNL_RV_PACING_POLARITY: NORMAL
MDC_IDC_SET_LEADCHNL_RV_PACING_PULSEWIDTH: 0.4 MS
MDC_IDC_SET_LEADCHNL_RV_SENSING_ADAPTATION_MODE: NORMAL
MDC_IDC_SET_LEADCHNL_RV_SENSING_POLARITY: NORMAL
MDC_IDC_SET_LEADCHNL_RV_SENSING_SENSITIVITY: 0.8 MV
MDC_IDC_SET_ZONE_DETECTION_BEATS_DENOMINATOR: 40 {BEATS}
MDC_IDC_SET_ZONE_DETECTION_BEATS_NUMERATOR: 30 {BEATS}
MDC_IDC_SET_ZONE_DETECTION_INTERVAL: 250 MS
MDC_IDC_SET_ZONE_DETECTION_INTERVAL: 300 MS
MDC_IDC_SET_ZONE_TYPE: NORMAL
MDC_IDC_SET_ZONE_TYPE: NORMAL
MDC_IDC_STAT_AT_BURDEN_PERCENT: 0 %
MDC_IDC_STAT_AT_DTM_END: NORMAL
MDC_IDC_STAT_AT_DTM_START: NORMAL
MDC_IDC_STAT_BRADY_AS_VP_PERCENT: 0 %
MDC_IDC_STAT_BRADY_AS_VS_PERCENT: 100 %
MDC_IDC_STAT_BRADY_DTM_END: NORMAL
MDC_IDC_STAT_BRADY_DTM_START: NORMAL
MDC_IDC_STAT_BRADY_RV_PERCENT_PACED: 0 %
MDC_IDC_STAT_CRT_DTM_END: NORMAL
MDC_IDC_STAT_CRT_DTM_START: NORMAL
MDC_IDC_STAT_EPISODE_TOTAL_COUNT: 0
MDC_IDC_STAT_EPISODE_TOTAL_COUNT: 26
MDC_IDC_STAT_EPISODE_TOTAL_COUNT_DTM_END: NORMAL
MDC_IDC_STAT_EPISODE_TOTAL_COUNT_DTM_START: NORMAL
MDC_IDC_STAT_EPISODE_TYPE: NORMAL
MDC_IDC_STAT_TACHYTHERAPY_ATP_DELIVERED_TOTAL: 0
MDC_IDC_STAT_TACHYTHERAPY_SHOCKS_ABORTED_TOTAL: 0
MDC_IDC_STAT_TACHYTHERAPY_SHOCKS_DELIVERED_TOTAL: 0
MDC_IDC_STAT_TACHYTHERAPY_TOTAL_DTM_END: NORMAL
MDC_IDC_STAT_TACHYTHERAPY_TOTAL_DTM_START: NORMAL

## 2021-10-07 ENCOUNTER — HOSPITAL ENCOUNTER (OUTPATIENT)
Dept: CARDIOLOGY | Facility: CLINIC | Age: 51
Discharge: HOME | End: 2021-10-07
Attending: INTERNAL MEDICINE | Admitting: INTERNAL MEDICINE
Payer: COMMERCIAL

## 2021-10-07 DIAGNOSIS — I42.2 APICAL VARIANT HYPERTROPHIC CARDIOMYOPATHY (H): ICD-10-CM

## 2021-10-07 LAB — LVEF ECHO: NORMAL

## 2021-10-07 PROCEDURE — 999N000208 ECHOCARDIOGRAM COMPLETE

## 2021-10-07 PROCEDURE — 255N000002 HC RX 255 OP 636: Performed by: INTERNAL MEDICINE

## 2021-10-07 PROCEDURE — 93306 TTE W/DOPPLER COMPLETE: CPT | Mod: 26 | Performed by: INTERNAL MEDICINE

## 2021-10-07 RX ADMIN — HUMAN ALBUMIN MICROSPHERES AND PERFLUTREN 2 ML: 10; .22 INJECTION, SOLUTION INTRAVENOUS at 13:08

## 2021-10-08 NOTE — RESULT ENCOUNTER NOTE
EF >70%; no significant intracavitary gradient; pericardial fat pad with probably small pericardial effusion; no change from previous per reader. Follow up with Magalis Martin NP on 10/13/21.

## 2021-10-13 ENCOUNTER — OFFICE VISIT (OUTPATIENT)
Dept: CARDIOLOGY | Facility: CLINIC | Age: 51
End: 2021-10-13
Attending: INTERNAL MEDICINE
Payer: COMMERCIAL

## 2021-10-13 ENCOUNTER — LAB (OUTPATIENT)
Dept: LAB | Facility: CLINIC | Age: 51
End: 2021-10-13
Payer: COMMERCIAL

## 2021-10-13 VITALS
BODY MASS INDEX: 35.84 KG/M2 | OXYGEN SATURATION: 96 % | HEART RATE: 59 BPM | SYSTOLIC BLOOD PRESSURE: 129 MMHG | DIASTOLIC BLOOD PRESSURE: 84 MMHG | WEIGHT: 257 LBS

## 2021-10-13 DIAGNOSIS — I42.2 APICAL VARIANT HYPERTROPHIC CARDIOMYOPATHY (H): ICD-10-CM

## 2021-10-13 DIAGNOSIS — I48.0 PAROXYSMAL ATRIAL FIBRILLATION (H): ICD-10-CM

## 2021-10-13 DIAGNOSIS — I10 BENIGN ESSENTIAL HYPERTENSION: ICD-10-CM

## 2021-10-13 DIAGNOSIS — Z95.810 ICD (IMPLANTABLE CARDIOVERTER-DEFIBRILLATOR) IN PLACE: ICD-10-CM

## 2021-10-13 DIAGNOSIS — I47.29 NSVT (NONSUSTAINED VENTRICULAR TACHYCARDIA) (H): ICD-10-CM

## 2021-10-13 DIAGNOSIS — I25.10 CORONARY ARTERY DISEASE INVOLVING NATIVE CORONARY ARTERY OF NATIVE HEART WITHOUT ANGINA PECTORIS: Primary | ICD-10-CM

## 2021-10-13 DIAGNOSIS — E78.5 HYPERLIPIDEMIA LDL GOAL <70: ICD-10-CM

## 2021-10-13 LAB
ALT SERPL W P-5'-P-CCNC: 29 U/L (ref 0–70)
ANION GAP SERPL CALCULATED.3IONS-SCNC: 5 MMOL/L (ref 3–14)
BUN SERPL-MCNC: 16 MG/DL (ref 7–30)
CALCIUM SERPL-MCNC: 9.1 MG/DL (ref 8.5–10.1)
CHLORIDE BLD-SCNC: 110 MMOL/L (ref 94–109)
CHOLEST SERPL-MCNC: 142 MG/DL
CO2 SERPL-SCNC: 26 MMOL/L (ref 20–32)
CREAT SERPL-MCNC: 1.05 MG/DL (ref 0.66–1.25)
GFR SERPL CREATININE-BSD FRML MDRD: 81 ML/MIN/1.73M2
GLUCOSE BLD-MCNC: 151 MG/DL (ref 70–99)
HDLC SERPL-MCNC: 39 MG/DL
LDLC SERPL CALC-MCNC: 69 MG/DL
NONHDLC SERPL-MCNC: 103 MG/DL
NT-PROBNP SERPL-MCNC: 1357 PG/ML (ref 0–125)
POTASSIUM BLD-SCNC: 4.3 MMOL/L (ref 3.4–5.3)
SODIUM SERPL-SCNC: 141 MMOL/L (ref 133–144)
TRIGL SERPL-MCNC: 170 MG/DL

## 2021-10-13 PROCEDURE — 36415 COLL VENOUS BLD VENIPUNCTURE: CPT | Performed by: INTERNAL MEDICINE

## 2021-10-13 PROCEDURE — 99215 OFFICE O/P EST HI 40 MIN: CPT | Performed by: NURSE PRACTITIONER

## 2021-10-13 PROCEDURE — 80048 BASIC METABOLIC PNL TOTAL CA: CPT | Performed by: INTERNAL MEDICINE

## 2021-10-13 PROCEDURE — 83880 ASSAY OF NATRIURETIC PEPTIDE: CPT | Performed by: INTERNAL MEDICINE

## 2021-10-13 PROCEDURE — 80061 LIPID PANEL: CPT

## 2021-10-13 PROCEDURE — 84460 ALANINE AMINO (ALT) (SGPT): CPT

## 2021-10-13 RX ORDER — ISOSORBIDE MONONITRATE 30 MG/1
30 TABLET, EXTENDED RELEASE ORAL DAILY
Qty: 30 TABLET | Refills: 11 | Status: SHIPPED | OUTPATIENT
Start: 2021-10-13 | End: 2022-04-11

## 2021-10-13 NOTE — RESULT ENCOUNTER NOTE
These were added on after is OV. Can you notify him of results. Continue to work on mediterranean diet for TG and HDL. No changes to meds.

## 2021-10-13 NOTE — PROGRESS NOTES
Cardiology Clinic Progress Note  Matthew Still MRN# [id number]   YOB: 1970 Age: 51 year old      Primary Cardiologist:   Dr. Harrison           History of Presenting Illness:      Matthew Still is a pleasant 51 year old patient with a past cardiac history significant for:  1. paroxysmal atrial fibrillation, since his 30s.  He has undergone several cardioversions for this  2. apical hypertrophic cardiomyopathy s/p ICD  3. hypertension  4. hyperlipidemia  5. CAD    In November 2017, he was seen for atrial fibrillation and chest pain.  Coronary angiogram showed moderate nonobstructive disease with 60% stenosis in the D1 with negative FFR and otherwise mild disease.  Echocardiogram showed moderate concentric LVH with slight prominence of the septum but no outflow obstruction, normal EF, no significant valvular disease.  Cardiac MRI December 2017 was consistent with apical hypertrophic cardiomyopathy.  ÁNGELA December 2017 showed thrombus in the left atrial appendage.  He underwent atrial fibrillation ablation in February 2018.  Follow-up ZIO Patch showed no further atrial fibrillation but patient had an 8 beat run of VT which he was symptomatic with.  Later in October 2018 he had a syncopal episode and subsequently underwent single-chamber ICD.  Given that he had no further atrial fibrillation, his Eliquis was discontinued.  Echocardiogram from December 2019 was stable with normal EF, apical hypertrophic cardiomyopathy and no significant valvular disease.  His amlodipine was previously decreased due to lower extremity edema.  His antihypertensives have been uptitrated.  He declined sleep medicine consultation.  In January 2020 he had complaints of dyspnea on exertion and was found to have abnormal stress test. Coronary angiogram 2/6/2020 showing no culprit lesion and no indication for revascularization, he has moderate narrowing in the D1/ramus with normal FFR, no change since 2017. Carotid ultrasound  February 2020 with less than 50% stenosis bilaterally. In October 2020 he was noted to have a 10-second episode of ventricular tachycardia at night and had will get up with a squeezing sensation in his chest that resolved after approximately 10 seconds.    In October 2020 he was noted to be back in atrial fibrillation on device check and anticoagulation was restarted.  He underwent ÁNGELA cardioversion November 2020.    He was seen by EP in December 2020 who recommended continuing metoprolol for at least 3 months and continuing Eliquis long-term.    Patient was last seen by Dr. Harrison in March 2021.  He noted some exertional dyspnea and intermittent palpitations lasting a few seconds.  He did not feel he had gone back into atrial fibrillation.    Pt presents today for 7-month follow-up. BMP today showing normal renal function and electrolytes. BNP today is relatively stable at 1300. Echocardiogram 10/7/2021 showing apical hypertrophic cardiomyopathy with EF greater than 70%, no significant intracavitary gradient, RV normal size and function, mild TR, pericardial fat pad with probable small pericardial effusion, and compared to 2020 there was no significant change. Device check 9/29/2021 showing 0% ventricular paced, 2 episodes of NSVT lasting 9-12 beats, no ATP or shock.  Results reviewed today.    Since he was last seen, he has been experiencing palpitations multiple times a week.  He feels this as a heart racing and is unsure if this is his atrial fibrillation or his VT.  He is agreeable to Zio patch for further evaluation.  He also notes that his dyspnea on exertion has worsened compared to last spring.  He now needs to stop and rest when walking across the shop at work.  He also finds that he has to sit and catch his breath after getting up to go to the bathroom.  He is agreeable to repeating stress testing to see if any of the ischemic areas have worsened and is agreeable to starting Imdur.  If no significant  worsening on stress testing may consider follow-up with PCP for PFTs.  Blood pressure today is controlled.  Weight today in the clinic has been stable over the last 7 months.  We will add his annual lipids onto his lab work from today as he states that he was fasting.  Overall, the patient is getting frustrated that he keeps having symptoms and has to keep repeating testing.  If he has returned to atrial fibrillation, I will have him follow back up with EP for long-term A. fib management.  We spent a significant portion of our visit today reviewing testing and explaining diagnoses and management.  Patient reports no chest pain, PND, orthopnea, syncope, edema.      Current Cardiac Medications   Amlodipine 5 mg daily  Eliquis 5 mg twice daily  Aspirin 81 mg daily- Pt not taking   Lasix 20 mg daily  Metoprolol  mg daily  Atorvastatin 80 mg daily                   Assessment and Plan:       Plan  Patient Instructions   Medication Changes:  START imdur 30 mg daily in the AM for angina     Recommendations:  1. Check blood pressure at least 1 hour after medications. Call the clinic if your blood pressure is consistently greater than 130/80.     Follow-up:  Call Friday to schedule Stress test and zio patch - we will call with results   See Dr. Harrison  for cardiology follow up at [place]: April 2022. Call in Dec. to schedule.     Cardiology Scheduling~[phone]  Cardiology Clinic RN~[phone] (Yary Tello, ANN)          1. CAD    Angio 2017 with 60% stenosis in the D1 with negative FFR with otherwise mild disease    Angio 2020 no changes     ABREU     Continue statin, beta-blocker, CCB, no ASA on eliquis       2. Apical hypertrophic cardiomyopathy    Diagnosed 2017    Had syncope with sustained VT and underwent ICD    Continue beta-blocker, lasix       3. Paroxysmal atrial fibrillation    Present since age 28    H/o multiple cardioversions    S/p A. fib ablation 2018    Recurrent atrial fibrillation s/p  cardioversion 11/2020    Elevated [id number] score    Continue metoprolol for rate control and Eliquis for anticoagulation      4. NSVT    Prior episode of syncope and underwent ICD placement 2018    Short episodes of NSVT correlating with palpitations, on device checks but no further sustained and has not needed therapies with ICD    Continue metoprolol    Follow with device clinic      5. Hyperlipidemia    Last LDL 95 2/2020    Continue atorvastatin 80 mg daily    Consider starting zetia       6. HTN    controlled      Continue amlodipine, metoprolol    Breast tenderness with spironolactone           Thank you for allowing me to participate in this delightful patient's care.      This note was completed in part using Dragon voice recognition software. Although reviewed after completion, some word and grammatical errors may occur.    Magalis Anderson, APRN CNP, APRN, CNP           Data:   All laboratory data reviewed      Total time spent today was 56 mins, reviewing labs, testing, notes, documenting notes, and seeing patient.       Constitutional:  cooperative, alert and oriented, well developed, well nourished, in no acute distress  overweight    Skin:  warm and dry to the touch         Head:  normocephalic       Eyes:  pupils equal and round       ENT:  no pallor or cyanosis       Neck:  no stiffness       Respiratory:  clear to auscultation; normal symmetry        Cardiac: regular rate and rhythm; normal S1 and S2                 pulses full and equal     GI:  abdomen soft, nondistended     Extremities and Muscular Skeletal:  no edema        Neurological:  affect appropriate; no gross motor deficits       Psych:  Alert and Oriented x 3 , appropriate affact

## 2021-10-13 NOTE — LETTER
10/13/2021    [facility]  [address] [address]    RE: Matthew Still       Dear Colleague,    I had the pleasure of seeing Matthew Still in the [facility].      Cardiology Clinic Progress Note  Matthew Still MRN# [id number]   YOB: 1970 Age: 51 year old      Primary Cardiologist:   Dr. Harrison           History of Presenting Illness:      Matthew Still is a pleasant 51 year old patient with a past cardiac history significant for:  1. paroxysmal atrial fibrillation, since his 30s.  He has undergone several cardioversions for this  2. apical hypertrophic cardiomyopathy s/p ICD  3. hypertension  4. hyperlipidemia  5. CAD    In November 2017, he was seen for atrial fibrillation and chest pain.  Coronary angiogram showed moderate nonobstructive disease with 60% stenosis in the D1 with negative FFR and otherwise mild disease.  Echocardiogram showed moderate concentric LVH with slight prominence of the septum but no outflow obstruction, normal EF, no significant valvular disease.  Cardiac MRI December 2017 was consistent with apical hypertrophic cardiomyopathy.  ÁNGELA December 2017 showed thrombus in the left atrial appendage.  He underwent atrial fibrillation ablation in February 2018.  Follow-up ZIO Patch showed no further atrial fibrillation but patient had an 8 beat run of VT which he was symptomatic with.  Later in October 2018 he had a syncopal episode and subsequently underwent single-chamber ICD.  Given that he had no further atrial fibrillation, his Eliquis was discontinued.  Echocardiogram from December 2019 was stable with normal EF, apical hypertrophic cardiomyopathy and no significant valvular disease.  His amlodipine was previously decreased due to lower extremity edema.  His antihypertensives have been uptitrated.  He declined sleep medicine consultation.  In January 2020 he had complaints of dyspnea on  exertion and was found to have abnormal stress test. Coronary angiogram 2/6/2020 showing no culprit lesion and no indication for revascularization, he has moderate narrowing in the D1/ramus with normal FFR, no change since 2017. Carotid ultrasound February 2020 with less than 50% stenosis bilaterally. In October 2020 he was noted to have a 10-second episode of ventricular tachycardia at night and had will get up with a squeezing sensation in his chest that resolved after approximately 10 seconds.    In October 2020 he was noted to be back in atrial fibrillation on device check and anticoagulation was restarted.  He underwent ÁNGELA cardioversion November 2020.    He was seen by EP in December 2020 who recommended continuing metoprolol for at least 3 months and continuing Eliquis long-term.    Patient was last seen by Dr. Harrison in March 2021.  He noted some exertional dyspnea and intermittent palpitations lasting a few seconds.  He did not feel he had gone back into atrial fibrillation.    Pt presents today for 7-month follow-up. BMP today showing normal renal function and electrolytes. BNP today is relatively stable at 1300. Echocardiogram 10/7/2021 showing apical hypertrophic cardiomyopathy with EF greater than 70%, no significant intracavitary gradient, RV normal size and function, mild TR, pericardial fat pad with probable small pericardial effusion, and compared to 2020 there was no significant change. Device check 9/29/2021 showing 0% ventricular paced, 2 episodes of NSVT lasting 9-12 beats, no ATP or shock.  Results reviewed today.    Since he was last seen, he has been experiencing palpitations multiple times a week.  He feels this as a heart racing and is unsure if this is his atrial fibrillation or his VT.  He is agreeable to Zio patch for further evaluation.  He also notes that his dyspnea on exertion has worsened compared to last spring.  He now needs to stop and rest when walking across the shop at work.   He also finds that he has to sit and catch his breath after getting up to go to the bathroom.  He is agreeable to repeating stress testing to see if any of the ischemic areas have worsened and is agreeable to starting Imdur.  If no significant worsening on stress testing may consider follow-up with PCP for PFTs.  Blood pressure today is controlled.  Weight today in the clinic has been stable over the last 7 months.  We will add his annual lipids onto his lab work from today as he states that he was fasting.  Overall, the patient is getting frustrated that he keeps having symptoms and has to keep repeating testing.  If he has returned to atrial fibrillation, I will have him follow back up with EP for long-term A. fib management.  We spent a significant portion of our visit today reviewing testing and explaining diagnoses and management.  Patient reports no chest pain, PND, orthopnea, syncope, edema.      Current Cardiac Medications   Amlodipine 5 mg daily  Eliquis 5 mg twice daily  Aspirin 81 mg daily- Pt not taking   Lasix 20 mg daily  Metoprolol  mg daily  Atorvastatin 80 mg daily                   Assessment and Plan:       Plan  Patient Instructions   Medication Changes:  START imdur 30 mg daily in the AM for angina     Recommendations:  1. Check blood pressure at least 1 hour after medications. Call the clinic if your blood pressure is consistently greater than 130/80.     Follow-up:  Call Friday to schedule Stress test and zio patch - we will call with results   See Dr. Harrison  for cardiology follow up at [place]: April 2022. Call in Dec. to schedule.     Cardiology Scheduling~[phone]  Cardiology Clinic RN~[phone] (Yary Tello RN)          1. CAD    Angio 2017 with 60% stenosis in the D1 with negative FFR with otherwise mild disease    Angio 2020 no changes     ABREU     Continue statin, beta-blocker, CCB, no ASA on eliquis       2. Apical hypertrophic cardiomyopathy    Diagnosed  2017    Had syncope with sustained VT and underwent ICD    Continue beta-blocker, lasix       3. Paroxysmal atrial fibrillation    Present since age 28    H/o multiple cardioversions    S/p A. fib ablation 2018    Recurrent atrial fibrillation s/p cardioversion 11/2020    Elevated [id number] score    Continue metoprolol for rate control and Eliquis for anticoagulation      4. NSVT    Prior episode of syncope and underwent ICD placement 2018    Short episodes of NSVT correlating with palpitations, on device checks but no further sustained and has not needed therapies with ICD    Continue metoprolol    Follow with device clinic      5. Hyperlipidemia    Last LDL 95 2/2020    Continue atorvastatin 80 mg daily    Consider starting zetia       6. HTN    controlled      Continue amlodipine, metoprolol    Breast tenderness with spironolactone           Thank you for allowing me to participate in this delightful patient's care.      This note was completed in part using Dragon voice recognition software. Although reviewed after completion, some word and grammatical errors may occur.    Magalis Anderson, APRN CNP, APRN, CNP           Data:   All laboratory data reviewed      Total time spent today was 56 mins, reviewing labs, testing, notes, documenting notes, and seeing patient.       Constitutional:  cooperative, alert and oriented, well developed, well nourished, in no acute distress  overweight    Skin:  warm and dry to the touch         Head:  normocephalic       Eyes:  pupils equal and round       ENT:  no pallor or cyanosis       Neck:  no stiffness       Respiratory:  clear to auscultation; normal symmetry        Cardiac: regular rate and rhythm; normal S1 and S2                 pulses full and equal     GI:  abdomen soft, nondistended     Extremities and Muscular Skeletal:  no edema        Neurological:  affect appropriate; no gross motor deficits       Psych:  Alert and Oriented x 3 , appropriate  affact                Thank you for allowing me to participate in the care of your patient.      Sincerely,     HORTENSIA Rdz [facility]  cc:   Brice Harrison MD  [facility]  [address]  [place] [address]         138

## 2021-10-13 NOTE — PATIENT INSTRUCTIONS
Medication Changes:  START imdur 30 mg daily in the AM     Recommendations:  1. Check blood pressure at least 1 hour after medications. Call the clinic if your blood pressure is consistently greater than 130/80.     Follow-up:  Call Friday to schedule Stress test and zio patch   See Dr. Harrison  for cardiology follow up at [facility]: April 2022. Call in Dec. to schedule.     Cardiology Scheduling~[phone]  Cardiology Clinic RN~[phone] (Yary Tello RN)

## 2021-10-14 ENCOUNTER — TELEPHONE (OUTPATIENT)
Dept: CARDIOLOGY | Facility: CLINIC | Age: 51
End: 2021-10-14

## 2021-10-14 NOTE — TELEPHONE ENCOUNTER
Called Pt per NP left message-Magalis Martin, Sandra De Jesus CNP, RN  These were added on after is OV. Can you notify him of results. Continue to work on mediterranean diet for TG and HDL. No changes to meds    Component      Latest Ref Rng & Units 10/13/2021   Cholesterol      <200 mg/dL 142   Triglycerides      <150 mg/dL 170 (H)   HDL Cholesterol      >=40 mg/dL 39 (L)   LDL Cholesterol Calculated      <=100 mg/dL 69   Non HDL Cholesterol      <130 mg/dL 103   ALT 29. Left phone number for questions. Informed Pt of this information. MARCI Adhikari RN

## 2021-11-01 ENCOUNTER — HOSPITAL ENCOUNTER (OUTPATIENT)
Dept: CARDIOLOGY | Facility: CLINIC | Age: 51
End: 2021-11-01
Attending: NURSE PRACTITIONER
Payer: COMMERCIAL

## 2021-11-01 ENCOUNTER — HOSPITAL ENCOUNTER (OUTPATIENT)
Dept: NUCLEAR MEDICINE | Facility: CLINIC | Age: 51
Setting detail: NUCLEAR MEDICINE
End: 2021-11-01
Attending: NURSE PRACTITIONER
Payer: COMMERCIAL

## 2021-11-01 VITALS — HEIGHT: 71 IN | WEIGHT: 257 LBS | BODY MASS INDEX: 35.98 KG/M2

## 2021-11-01 DIAGNOSIS — I25.10 CORONARY ARTERY DISEASE INVOLVING NATIVE CORONARY ARTERY OF NATIVE HEART WITHOUT ANGINA PECTORIS: ICD-10-CM

## 2021-11-01 DIAGNOSIS — I47.29 NSVT (NONSUSTAINED VENTRICULAR TACHYCARDIA) (H): ICD-10-CM

## 2021-11-01 DIAGNOSIS — I48.0 PAROXYSMAL ATRIAL FIBRILLATION (H): ICD-10-CM

## 2021-11-01 LAB
CV STRESS MAX HR HE: 88
RATE PRESSURE PRODUCT: NORMAL
STRESS ECHO BASELINE DIASTOLIC HE: 64
STRESS ECHO BASELINE HR: 65 BPM
STRESS ECHO BASELINE SYSTOLIC BP: 142
STRESS ECHO CALCULATED PERCENT HR: 53 %
STRESS ECHO LAST STRESS DIASTOLIC BP: 80
STRESS ECHO LAST STRESS SYSTOLIC BP: 160
STRESS ECHO TARGET HR: 167

## 2021-11-01 PROCEDURE — 78452 HT MUSCLE IMAGE SPECT MULT: CPT | Mod: 26 | Performed by: INTERNAL MEDICINE

## 2021-11-01 PROCEDURE — A9502 TC99M TETROFOSMIN: HCPCS | Performed by: NURSE PRACTITIONER

## 2021-11-01 PROCEDURE — 78452 HT MUSCLE IMAGE SPECT MULT: CPT

## 2021-11-01 PROCEDURE — 93017 CV STRESS TEST TRACING ONLY: CPT

## 2021-11-01 PROCEDURE — 93244 EXT ECG>48HR<7D REV&INTERPJ: CPT | Performed by: INTERNAL MEDICINE

## 2021-11-01 PROCEDURE — 250N000011 HC RX IP 250 OP 636: Performed by: NURSE PRACTITIONER

## 2021-11-01 PROCEDURE — 93016 CV STRESS TEST SUPVJ ONLY: CPT | Performed by: INTERNAL MEDICINE

## 2021-11-01 PROCEDURE — 343N000001 HC RX 343: Performed by: NURSE PRACTITIONER

## 2021-11-01 PROCEDURE — 93242 EXT ECG>48HR<7D RECORDING: CPT

## 2021-11-01 PROCEDURE — 93018 CV STRESS TEST I&R ONLY: CPT | Performed by: INTERNAL MEDICINE

## 2021-11-01 RX ORDER — REGADENOSON 0.08 MG/ML
0.4 INJECTION, SOLUTION INTRAVENOUS ONCE
Status: COMPLETED | OUTPATIENT
Start: 2021-11-01 | End: 2021-11-01

## 2021-11-01 RX ADMIN — TETROFOSMIN 11.4 MCI.: 1.38 INJECTION, POWDER, LYOPHILIZED, FOR SOLUTION INTRAVENOUS at 09:10

## 2021-11-01 RX ADMIN — TETROFOSMIN 36.4 MCI.: 1.38 INJECTION, POWDER, LYOPHILIZED, FOR SOLUTION INTRAVENOUS at 10:30

## 2021-11-01 RX ADMIN — REGADENOSON 0.4 MG: 0.08 INJECTION, SOLUTION INTRAVENOUS at 10:28

## 2021-11-01 ASSESSMENT — MIFFLIN-ST. JEOR: SCORE: 2032.87

## 2021-11-03 NOTE — RESULT ENCOUNTER NOTE
Done for increased ABREU and palpitations. H/o CAD with angio 2020 no intervention. D1 60% 2017 negative FFR. I started a trial of imdur. Do you agree with Micha that the study is similar to prior? Or would you recommend redoing angio?

## 2021-11-04 ENCOUNTER — TELEPHONE (OUTPATIENT)
Dept: CARDIOLOGY | Facility: CLINIC | Age: 51
End: 2021-11-04

## 2021-11-04 NOTE — TELEPHONE ENCOUNTER
----- Message from HORTENSIA Amaya CNP sent at 11/4/2021  2:55 PM CDT -----  Eula,   Do you feel comfortable relaying this info to the Pt and seeing if his symptoms of ABREU are any better on imdur? If not, I will ask my [place] RN to call him. Pt's zio patch is still in process. We can notify him with those results later.     ADDENDUM  Patient called with results of NM stress test.  Patient reports he stopped using Imdur after one week due to migraines while taking medication.  Patient continues to have ABREU.  Provider to advise further.   Eula Desai RN on 11/4/2021 at 3:21 PM    ADDENDUM:  1. Instead, he can try increasing amlodipine to 10 mg daily.   2. Call if blood pressure is less than 90 on top or less than 100 with lightheadedness.  Call if significant edema.   3. Have him follow-up with me in 1 month to reassess ABREU and review zio patch.    HORTENSIA Rdz CNP 11/04/21  3:53 PM    ADDENDUM:  Patient is not agreeable to increasing amlodipine, states this was tried and he became lightheaded.  Appointment made for one month f/u to reassess ABREU and review Zio patch.  Eula Desai RN on 11/5/2021 at 9:26 AM

## 2021-11-26 ENCOUNTER — APPOINTMENT (OUTPATIENT)
Dept: CT IMAGING | Facility: CLINIC | Age: 51
End: 2021-11-26
Attending: EMERGENCY MEDICINE
Payer: COMMERCIAL

## 2021-11-26 ENCOUNTER — HOSPITAL ENCOUNTER (EMERGENCY)
Facility: CLINIC | Age: 51
Discharge: HOME | End: 2021-11-27
Attending: EMERGENCY MEDICINE | Admitting: EMERGENCY MEDICINE
Payer: COMMERCIAL

## 2021-11-26 ENCOUNTER — ANCILLARY PROCEDURE (OUTPATIENT)
Dept: GENERAL RADIOLOGY | Facility: CLINIC | Age: 51
End: 2021-11-26
Attending: FAMILY MEDICINE
Payer: COMMERCIAL

## 2021-11-26 ENCOUNTER — OFFICE VISIT (OUTPATIENT)
Dept: URGENT CARE | Facility: URGENT CARE | Age: 51
End: 2021-11-26
Payer: COMMERCIAL

## 2021-11-26 VITALS
HEART RATE: 78 BPM | DIASTOLIC BLOOD PRESSURE: 70 MMHG | RESPIRATION RATE: 18 BRPM | BODY MASS INDEX: 34.87 KG/M2 | TEMPERATURE: 98.8 F | OXYGEN SATURATION: 97 % | SYSTOLIC BLOOD PRESSURE: 142 MMHG | WEIGHT: 250 LBS

## 2021-11-26 DIAGNOSIS — U07.1 INFECTION DUE TO 2019 NOVEL CORONAVIRUS: ICD-10-CM

## 2021-11-26 DIAGNOSIS — R50.9 FEVER, UNSPECIFIED FEVER CAUSE: Primary | ICD-10-CM

## 2021-11-26 DIAGNOSIS — Z20.822 SUSPECTED COVID-19 VIRUS INFECTION: ICD-10-CM

## 2021-11-26 DIAGNOSIS — R05.9 COUGH: ICD-10-CM

## 2021-11-26 DIAGNOSIS — R50.9 FEVER, UNSPECIFIED FEVER CAUSE: ICD-10-CM

## 2021-11-26 LAB
ANION GAP SERPL CALCULATED.3IONS-SCNC: 6 MMOL/L (ref 3–14)
BUN SERPL-MCNC: 14 MG/DL (ref 7–30)
CALCIUM SERPL-MCNC: 8.9 MG/DL (ref 8.5–10.1)
CHLORIDE BLD-SCNC: 107 MMOL/L (ref 94–109)
CO2 SERPL-SCNC: 27 MMOL/L (ref 20–32)
CREAT SERPL-MCNC: 0.93 MG/DL (ref 0.66–1.25)
ERYTHROCYTE [DISTWIDTH] IN BLOOD BY AUTOMATED COUNT: 12.1 % (ref 10–15)
FLUAV RNA SPEC QL NAA+PROBE: NEGATIVE
FLUBV RNA RESP QL NAA+PROBE: NEGATIVE
GFR SERPL CREATININE-BSD FRML MDRD: >90 ML/MIN/1.73M2
GLUCOSE BLD-MCNC: 104 MG/DL (ref 70–99)
HCT VFR BLD AUTO: 46 % (ref 40–53)
HGB BLD-MCNC: 15.1 G/DL (ref 13.3–17.7)
HOLD SPECIMEN: NORMAL
HOLD SPECIMEN: NORMAL
MCH RBC QN AUTO: 30.3 PG (ref 26.5–33)
MCHC RBC AUTO-ENTMCNC: 32.8 G/DL (ref 31.5–36.5)
MCV RBC AUTO: 92 FL (ref 78–100)
PLATELET # BLD AUTO: 121 10E3/UL (ref 150–450)
POTASSIUM BLD-SCNC: 3.7 MMOL/L (ref 3.4–5.3)
RBC # BLD AUTO: 4.98 10E6/UL (ref 4.4–5.9)
SARS-COV-2 RNA RESP QL NAA+PROBE: POSITIVE
SODIUM SERPL-SCNC: 140 MMOL/L (ref 133–144)
WBC # BLD AUTO: 6 10E3/UL (ref 4–11)

## 2021-11-26 PROCEDURE — 85027 COMPLETE CBC AUTOMATED: CPT | Performed by: EMERGENCY MEDICINE

## 2021-11-26 PROCEDURE — 80048 BASIC METABOLIC PNL TOTAL CA: CPT | Performed by: EMERGENCY MEDICINE

## 2021-11-26 PROCEDURE — 250N000009 HC RX 250: Performed by: EMERGENCY MEDICINE

## 2021-11-26 PROCEDURE — 99215 OFFICE O/P EST HI 40 MIN: CPT | Performed by: FAMILY MEDICINE

## 2021-11-26 PROCEDURE — 36415 COLL VENOUS BLD VENIPUNCTURE: CPT | Performed by: EMERGENCY MEDICINE

## 2021-11-26 PROCEDURE — 71275 CT ANGIOGRAPHY CHEST: CPT

## 2021-11-26 PROCEDURE — C9803 HOPD COVID-19 SPEC COLLECT: HCPCS | Performed by: EMERGENCY MEDICINE

## 2021-11-26 PROCEDURE — 250N000011 HC RX IP 250 OP 636: Performed by: EMERGENCY MEDICINE

## 2021-11-26 PROCEDURE — 94640 AIRWAY INHALATION TREATMENT: CPT | Performed by: EMERGENCY MEDICINE

## 2021-11-26 PROCEDURE — 71046 X-RAY EXAM CHEST 2 VIEWS: CPT | Performed by: RADIOLOGY

## 2021-11-26 PROCEDURE — 99284 EMERGENCY DEPT VISIT MOD MDM: CPT | Performed by: EMERGENCY MEDICINE

## 2021-11-26 PROCEDURE — 99285 EMERGENCY DEPT VISIT HI MDM: CPT | Mod: 25 | Performed by: EMERGENCY MEDICINE

## 2021-11-26 PROCEDURE — 87636 SARSCOV2 & INF A&B AMP PRB: CPT | Performed by: EMERGENCY MEDICINE

## 2021-11-26 RX ORDER — IOPAMIDOL 755 MG/ML
92 INJECTION, SOLUTION INTRAVASCULAR ONCE
Status: COMPLETED | OUTPATIENT
Start: 2021-11-26 | End: 2021-11-26

## 2021-11-26 RX ADMIN — IPRATROPIUM BROMIDE AND ALBUTEROL 1 PUFF: 20; 100 SPRAY, METERED RESPIRATORY (INHALATION) at 21:54

## 2021-11-26 RX ADMIN — IOPAMIDOL 92 ML: 755 INJECTION, SOLUTION INTRAVENOUS at 22:55

## 2021-11-26 RX ADMIN — SODIUM CHLORIDE 100 ML: 9 INJECTION, SOLUTION INTRAVENOUS at 22:55

## 2021-11-26 ASSESSMENT — ENCOUNTER SYMPTOMS
CONFUSION: 0
GASTROINTESTINAL NEGATIVE: 1
BRUISES/BLEEDS EASILY: 1
NAUSEA: 0
DIARRHEA: 0
HEMATOLOGIC/LYMPHATIC NEGATIVE: 1
WEAKNESS: 0
PSYCHIATRIC NEGATIVE: 1
CARDIOVASCULAR NEGATIVE: 1
CONSTITUTIONAL NEGATIVE: 1
COUGH: 1
MUSCULOSKELETAL NEGATIVE: 1
NEUROLOGICAL NEGATIVE: 1
COUGH: 1
SHORTNESS OF BREATH: 1
BACK PAIN: 0
SHORTNESS OF BREATH: 1
FEVER: 1
SINUS PAIN: 1
VOMITING: 0

## 2021-11-26 ASSESSMENT — MIFFLIN-ST. JEOR: SCORE: 2010.19

## 2021-11-26 NOTE — PROGRESS NOTES
SUBJECTIVE:   Matthew Still is a 51 year old male presenting with a chief complaint of   Chief Complaint   Patient presents with     Cough     Ongoing for 3 weeks, fever of 100.0 today, mucas has blood in it. shortness of breath- oxygen sats at home 92-93%. concern for pnumonia.       He is an established patient of [facility].    URI Adult    Onset of symptoms was 3 week(s) ago.  Course of illness is worsening.    Severity moderate  Current and Associated symptoms: fever, chills, cough - non-productive and shortness of breath  Treatment measures tried include Tylenol/Ibuprofen.  Predisposing factors include None.      Patient is a 51 yr old male here for cough and shortness of breath started about three weeks ago, patient says he has had some fevers. Cough is productive of greenish phlegm. Patient has a history of atrial fibrillation and CAD and he has a defibrillator . He is also on chronic anticoagulation.       Review of Systems   Constitutional: Negative.    HENT: Positive for congestion and sinus pain.    Respiratory: Positive for cough and shortness of breath.    Cardiovascular: Negative.    Gastrointestinal: Negative.    Genitourinary: Negative.    Musculoskeletal: Negative.    Skin: Negative.    Neurological: Negative.    Hematological: Negative.    Psychiatric/Behavioral: Negative.        Past Medical History:   Diagnosis Date     Atrial fibrillation (H)      Coronary artery disease      Heart disease      Hyperlipidemia      Hypertension      Family History   Problem Relation Age of Onset     Coronary Artery Disease Mother         a fib, ,MI     Heart Failure Mother         CHF     Other Cancer Father      Diabetes Father      Coronary Artery Disease Paternal Grandmother      Coronary Artery Disease Paternal Grandfather      Current Outpatient Medications   Medication Sig Dispense Refill     amLODIPine (NORVASC) 5 MG tablet Take 1 tablet (5 mg) by mouth daily 90 tablet 3     apixaban ANTICOAGULANT  (ELIQUIS) 5 MG tablet Take 1 tablet (5 mg) by mouth 2 times daily 180 tablet 3     Ascorbic Acid (VITAMIN C PO) Take by mouth daily Takes most days       atorvastatin (LIPITOR) 80 MG tablet Take 1 tablet (80 mg) by mouth daily 90 tablet 3     furosemide (LASIX) 20 MG tablet Take 1 tablet (20 mg) by mouth daily 90 tablet 3     metoprolol succinate ER (TOPROL-XL) 100 MG 24 hr tablet Take 1.5 tablets (150 mg) by mouth daily 135 tablet 3     aspirin (ASA) 81 MG tablet Take 1 tablet (81 mg) by mouth daily (Patient not taking: Reported on 8/20/2021)       isosorbide mononitrate (IMDUR) 30 MG 24 hr tablet Take 1 tablet (30 mg) by mouth daily 30 tablet 11     UNABLE TO FIND daily MEDICATION NAME: Super Beets       Social History     Tobacco Use     Smoking status: Never Smoker     Smokeless tobacco: Former User   Substance Use Topics     Alcohol use: Yes     Comment: 1 drink per month       OBJECTIVE  BP (!) 142/70 (BP Location: Right arm, Patient Position: Sitting, Cuff Size: Adult Large)   Pulse 78   Temp 98.8  F (37.1  C) (Tympanic)   Resp 18   Wt 113.4 kg (250 lb)   SpO2 97%   BMI 34.87 kg/m      Physical Exam  Constitutional:       Appearance: Normal appearance.   HENT:      Head: Normocephalic and atraumatic.   Cardiovascular:      Rate and Rhythm: Normal rate and regular rhythm.   Pulmonary:      Effort: Pulmonary effort is normal.      Breath sounds: Rhonchi present.   Musculoskeletal:         General: Normal range of motion.   Skin:     General: Skin is warm and dry.   Neurological:      Mental Status: He is alert and oriented to person, place, and time.   Psychiatric:         Mood and Affect: Mood normal.         Behavior: Behavior normal.         Thought Content: Thought content normal.         Labs:  No results found for this or any previous visit (from the past 24 hour(s)).    X-Ray was done  X-ray findings   Symmetric lung inflation. There is a airspace opacity with indistinct borders in the left mid  chest along the lateral pleura. Questioned indistinct opacity opacities are also present in the infrahilar right lung which are insufficiently attenuating to   obscure the lung vessels. Findings could represent early or mild pneumonia and correlation with COVID 19 PET CT are suggested.     Left subclavian approach defibrillator terminates in the right ventricle. There cardiac silhouette is enlarged but unchanged. The vascular pedicle width is normal and mediastinal borders are well-defined.     No pleural fluid or pneumothorax  ASSESSMENT:      ICD-10-CM    1. Fever, unspecified fever cause  R50.9 XR Chest 2 Views     Symptomatic COVID-19 Virus (Coronavirus) by PCR   2. Cough  R05.9 XR Chest 2 Views     Symptomatic COVID-19 Virus (Coronavirus) by PCR    Discussed the findings of x-rays with the patient -it was suggested that the x-ray be followed up with CT scan as there is suspicious of COVID 19 . Because of the limitations in UC I suggested to the patient to proceed to the ER.    Medical Decision Making:    Differential Diagnosis:  URI Adult/Peds:  Pneumonia    Serious Comorbid Conditions:  Adult:  Anticoagulation    PLAN:    URI Adult:  Proceed to the ER for further evaluation.    Followup:    If not improving or if condition worsens, follow up with your Primary Care Provider    There are no Patient Instructions on file for this visit.

## 2021-11-26 NOTE — LETTER
November 27, 2021      To Whom It May Concern:      Matthew HA Cheko was seen in our Emergency Department today, 11/27/21.     He tested positive for COVID-19.     He needs to quarantine for the next 10 days.       Sincerely,        Harjinder Sanchez MD        
150

## 2021-11-27 VITALS
OXYGEN SATURATION: 96 % | HEIGHT: 71 IN | TEMPERATURE: 99 F | RESPIRATION RATE: 16 BRPM | HEART RATE: 76 BPM | SYSTOLIC BLOOD PRESSURE: 170 MMHG | BODY MASS INDEX: 35.28 KG/M2 | WEIGHT: 252 LBS | DIASTOLIC BLOOD PRESSURE: 84 MMHG

## 2021-11-27 NOTE — DISCHARGE INSTRUCTIONS
You have Covid.  Hopefully will recover in the near future.  I did sign your for monoclonal antibody therapy.  Hopefully this will happen in the next few days please monitor your home oxygen levels at home.  If they fall below 90, please come back immediately.    Please use the inhaler as needed and also drink plenty of fluids.    You do not have a blood clot in your lungs.    I hope you feel better soon.    When you feel better, please get a vaccine.    You do need another CT in 3 months to make sure your lung findings have resolved.     This is what your CT scan showed:    1.  Scattered foci of groundglass opacity and developing consolidation with mild adenopathy consistent with COVID pneumonia. Follow-up CT recommended in 3 months to ensure resolution and exclude other underlying pathology.  2.  Evidence for left ventricular hypertrophy. Small pericardial effusion.  3.  No pulmonary embolus, aortic aneurysm or dissection.  4.  Cholelithiasis with trace adjacent inflammatory change.

## 2021-11-27 NOTE — ED PROVIDER NOTES
"  History     Chief Complaint   Patient presents with     Cough     HPI  Matthew Still is a 51 year old male who presents with cough and shortness of breath.  The patient has had this for about 3 weeks.  He says intermittently when he coughs there is some sputum production.  There is sometimes flecks of blood in it.  He is not vaccinated gets Covid.  He went to the [place] urgent care today and was told to come here.  He does take Eliquis because he has a defibrillator.  It has not fired recently.  He says there is \"something where my heart can stop.\"     The radiology read of the x-ray that was done in urgent care recommended a CT of his chest and was read as possible COVID.     He said he had a fever today of 100.0.    No chest pain or LOC. No weakness. No back pain. No nausea or vomiting, no diarrhea. No sore throat.      Allergies:  No Known Allergies    Problem List:    Patient Active Problem List    Diagnosis Date Noted     Pre-syncope 02/13/2020     Priority: Medium     Status post coronary angiogram 02/06/2020     Priority: Medium     Hyperlipidemia LDL goal <70 01/29/2020     Priority: Medium     ABREU (dyspnea on exertion) 01/29/2020     Priority: Medium     Added automatically from request for surgery [id number]       Benign essential hypertension 01/09/2020     Priority: Medium     Coronary artery disease of native artery of native heart with stable angina pectoris (H) 01/09/2020     Priority: Medium     NSVT (nonsustained ventricular tachycardia) (H) 01/09/2020     Priority: Medium     Apical variant hypertrophic cardiomyopathy (H) 01/09/2020     Priority: Medium     Atrial fibrillation (H) 11/14/2017     Priority: Medium        Past Medical History:    Past Medical History:   Diagnosis Date     Atrial fibrillation (H)      Coronary artery disease      Heart disease      Hyperlipidemia      Hypertension        Past Surgical History:    Past Surgical History:   Procedure Laterality Date     ANESTHESIA " "CARDIOVERSION N/A 11/2/2020    Procedure: CARDIOVERSION (FOLLOWING ÁNGELA AT 0930);  Surgeon: GENERIC ANESTHESIA PROVIDER;  Location:  OR     CV CORONARY ANGIOGRAM N/A 2/6/2020    Procedure: Coronary Angiogram;  Surgeon: Daniel Miranda MD;  Location:  HEART CARDIAC CATH LAB     CV FRACTIONAL FLOW RATIO WIRE N/A 2/6/2020    Procedure: Fractional Flow Morgan;  Surgeon: Daniel Miranda MD;  Location:  HEART CARDIAC CATH LAB       Family History:    Family History   Problem Relation Age of Onset     Coronary Artery Disease Mother         a fib, ,MI     Heart Failure Mother         CHF     Other Cancer Father      Diabetes Father      Coronary Artery Disease Paternal Grandmother      Coronary Artery Disease Paternal Grandfather        Social History:  Marital Status:   [2]  Social History     Tobacco Use     Smoking status: Never Smoker     Smokeless tobacco: Former User   Substance Use Topics     Alcohol use: Yes     Comment: 1 drink per month     Drug use: No        Medications:    amLODIPine (NORVASC) 5 MG tablet  apixaban ANTICOAGULANT (ELIQUIS) 5 MG tablet  Ascorbic Acid (VITAMIN C PO)  aspirin (ASA) 81 MG tablet  atorvastatin (LIPITOR) 80 MG tablet  furosemide (LASIX) 20 MG tablet  isosorbide mononitrate (IMDUR) 30 MG 24 hr tablet  metoprolol succinate ER (TOPROL-XL) 100 MG 24 hr tablet  UNABLE TO FIND          Review of Systems   Constitutional: Positive for fever.   Respiratory: Positive for cough and shortness of breath.    Cardiovascular: Negative for chest pain.   Gastrointestinal: Negative for diarrhea, nausea and vomiting.   Musculoskeletal: Negative for back pain.   Neurological: Negative for syncope and weakness.   Hematological: Bruises/bleeds easily.   Psychiatric/Behavioral: Negative for confusion.   All other systems reviewed and are negative.      Physical Exam   BP: (!) 170/84  Pulse: 76  Temp: 99  F (37.2  C)  Resp: 16  Height: 180.3 cm (5' 11\")  Weight: 114.3 kg (252 " lb)  SpO2: 96 %      Physical Exam  Vitals reviewed.   Constitutional:       General: He is not in acute distress.     Appearance: He is not ill-appearing.   HENT:      Head: Normocephalic and atraumatic.      Right Ear: External ear normal.      Left Ear: External ear normal.      Nose: No congestion.      Mouth/Throat:      Mouth: Mucous membranes are moist.   Eyes:      Extraocular Movements: Extraocular movements intact.   Neck:      Comments: No jvd  Cardiovascular:      Rate and Rhythm: Normal rate and regular rhythm.      Pulses: Normal pulses.      Heart sounds: No murmur heard.      Pulmonary:      Effort: No respiratory distress.      Comments: Faint rale LLL  Abdominal:      Palpations: Abdomen is soft.   Musculoskeletal:         General: No swelling.      Cervical back: No rigidity or tenderness.   Skin:     Capillary Refill: Capillary refill takes less than 2 seconds.      Coloration: Skin is not jaundiced.   Neurological:      General: No focal deficit present.      Mental Status: He is alert and oriented to person, place, and time.   Psychiatric:      Comments: Very nice         ED Course     ED Course as of 11/29/21 0107   Fri Nov 26, 2021   2237 +covid   2338 CT shows:    IMPRESSION:  1.  Scattered foci of groundglass opacity and developing consolidation with mild adenopathy consistent with COVID pneumonia. Follow-up CT recommended in 3 months to ensure resolution and exclude other underlying pathology.  2.  Evidence for left ventricular hypertrophy. Small pericardial effusion.  3.  No pulmonary embolus, aortic aneurysm or dissection.  4.  Cholelithiasis with trace adjacent inflammatory change.   Sat Nov 27, 2021   0027 No clot on CT. Patient feeling better   0028 Gave inhaler. Signed him up for Get Well Loop and monoclonal antibodies. Has O2 at home that family member uses. Will use PRN. Stable for discharge. Encouraged him to get a vaccine.      Procedures                No results found for this  or any previous visit (from the past 24 hour(s)).    Medications   iopamidol (ISOVUE-370) solution 92 mL (92 mLs Intravenous Given 11/26/21 2255)   sodium chloride 0.9 % bag 500mL for CT scan flush use (100 mLs As instructed Given 11/26/21 2255)       Assessments & Plan (with Medical Decision Making)     I'm going to get basic labs and get a CT of this patient's chest.  I will look for PE.  The radiology read recommended getting this for get a D-dimer and just order the scan.  Also give him some fluids slowly.  I will give him an inhaler for his cough.  I will reassess him after his Covid results.    The ER is extremely overcrowded and overwhelmed due to an ongoing pandemic. Every effort was made to provide optimum care to this patient despite the extreme strain on resources and staff.     I have reviewed the nursing notes.    I have reviewed the findings, diagnosis, plan and need for follow up with the patient.    This note was in part created using dictation software in an effort to speed and improve patient care; any typos should be read with the frequent shortcomings of this technology in mind.        Discharge Medication List as of 11/27/2021 12:36 AM          Final diagnoses:   Infection due to 2019 novel coronavirus   Suspected COVID-19 virus infection       11/26/2021   [facility] EMERGENCY DEPT     Harjinder Sanchez MD  11/29/21 0107

## 2021-12-17 ENCOUNTER — HOSPITAL ENCOUNTER (OUTPATIENT)
Dept: CARDIOLOGY | Facility: CLINIC | Age: 51
Discharge: HOME | End: 2021-12-17
Attending: NURSE PRACTITIONER | Admitting: NURSE PRACTITIONER
Payer: COMMERCIAL

## 2021-12-17 ENCOUNTER — OFFICE VISIT (OUTPATIENT)
Dept: CARDIOLOGY | Facility: CLINIC | Age: 51
End: 2021-12-17
Payer: COMMERCIAL

## 2021-12-17 VITALS
DIASTOLIC BLOOD PRESSURE: 82 MMHG | BODY MASS INDEX: 34.31 KG/M2 | WEIGHT: 246 LBS | SYSTOLIC BLOOD PRESSURE: 128 MMHG | HEART RATE: 68 BPM

## 2021-12-17 DIAGNOSIS — R06.09 DOE (DYSPNEA ON EXERTION): Primary | ICD-10-CM

## 2021-12-17 DIAGNOSIS — I31.39 PERICARDIAL EFFUSION: ICD-10-CM

## 2021-12-17 DIAGNOSIS — I42.2 APICAL VARIANT HYPERTROPHIC CARDIOMYOPATHY (H): ICD-10-CM

## 2021-12-17 DIAGNOSIS — R55 PRE-SYNCOPE: ICD-10-CM

## 2021-12-17 DIAGNOSIS — E78.5 HYPERLIPIDEMIA LDL GOAL <70: ICD-10-CM

## 2021-12-17 DIAGNOSIS — I25.10 CORONARY ARTERY DISEASE INVOLVING NATIVE CORONARY ARTERY OF NATIVE HEART WITHOUT ANGINA PECTORIS: ICD-10-CM

## 2021-12-17 DIAGNOSIS — I48.0 PAROXYSMAL ATRIAL FIBRILLATION (H): ICD-10-CM

## 2021-12-17 DIAGNOSIS — I10 BENIGN ESSENTIAL HYPERTENSION: ICD-10-CM

## 2021-12-17 DIAGNOSIS — Z95.810 ICD (IMPLANTABLE CARDIOVERTER-DEFIBRILLATOR) IN PLACE: ICD-10-CM

## 2021-12-17 DIAGNOSIS — I47.29 NSVT (NONSUSTAINED VENTRICULAR TACHYCARDIA) (H): ICD-10-CM

## 2021-12-17 LAB — LVEF ECHO: NORMAL

## 2021-12-17 PROCEDURE — 93308 TTE F-UP OR LMTD: CPT | Mod: 26 | Performed by: INTERNAL MEDICINE

## 2021-12-17 PROCEDURE — 93308 TTE F-UP OR LMTD: CPT

## 2021-12-17 PROCEDURE — 93325 DOPPLER ECHO COLOR FLOW MAPG: CPT | Mod: 26 | Performed by: INTERNAL MEDICINE

## 2021-12-17 PROCEDURE — 99215 OFFICE O/P EST HI 40 MIN: CPT | Mod: 25 | Performed by: NURSE PRACTITIONER

## 2021-12-17 PROCEDURE — 93321 DOPPLER ECHO F-UP/LMTD STD: CPT | Mod: 26 | Performed by: INTERNAL MEDICINE

## 2021-12-17 NOTE — LETTER
12/17/2021    [facility]  [address] [address]    RE: Matthew Still       Dear Colleague,     I had the pleasure of seeing Matthew Still in the [facility].    Cardiology Clinic Progress Note  Matthew Still MRN# [id number]   YOB: 1970 Age: 51 year old      Primary Cardiologist:   Dr. Harrison           History of Presenting Illness:      Matthew Still is a pleasant 51 year old patient with a past cardiac history significant for:  1. paroxysmal atrial fibrillation, since his 30s.  He has undergone several cardioversions and PVI 2018  2. apical hypertrophic cardiomyopathy s/p ICD  3. hypertension  4. hyperlipidemia  5. CAD  Past medical history significant for COVID pneumonia 11/2021.     In November 2017, he was seen for atrial fibrillation and chest pain.  Coronary angiogram showed moderate nonobstructive disease with 60% stenosis in the D1 with negative FFR and otherwise mild disease.  Echocardiogram showed moderate concentric LVH with slight prominence of the septum but no outflow obstruction, normal EF, no significant valvular disease.  Cardiac MRI December 2017 was consistent with apical hypertrophic cardiomyopathy.  ÁNGELA December 2017 showed thrombus in the left atrial appendage.  He underwent atrial fibrillation ablation in February 2018.  Follow-up ZIO Patch showed no further atrial fibrillation but patient had an 8 beat run of VT which he was symptomatic with.  Later in October 2018 he had a syncopal episode and subsequently underwent single-chamber ICD.  Given that he had no further atrial fibrillation, his Eliquis was discontinued.  Echocardiogram from December 2019 was stable with normal EF, apical hypertrophic cardiomyopathy and no significant valvular disease.  His amlodipine was previously decreased due to lower extremity edema.  His antihypertensives have been uptitrated.  He declined sleep medicine consultation.  In January 2020 he  had complaints of dyspnea on exertion and was found to have abnormal stress test. Coronary angiogram 2/6/2020 showing no culprit lesion and no indication for revascularization, he has moderate narrowing in the D1/ramus with normal FFR, no change since 2017. Carotid ultrasound February 2020 with less than 50% stenosis bilaterally. In October 2020 he was noted to have a 10-second episode of ventricular tachycardia at night and had will get up with a squeezing sensation in his chest that resolved after approximately 10 seconds.    In October 2020 he was noted to be back in atrial fibrillation on device check and anticoagulation was restarted.  He underwent ÁNGELA cardioversion November 2020.    He was seen by EP in December 2020 who recommended continuing metoprolol for at least 3 months and continuing Eliquis long-term.    Patient was last seen by Dr. Harrison in March 2021.  He noted some exertional dyspnea and intermittent palpitations lasting a few seconds.  He did not feel he had gone back into atrial fibrillation.    Patient was seen by me in October 2021.  BNP was stable at 1300. Echocardiogram 10/7/2021 showing apical hypertrophic cardiomyopathy with EF greater than 70%, no significant intracavitary gradient, RV normal size and function, mild TR, pericardial fat pad with probable small pericardial effusion, and compared to 2020 there was no significant change. Device check 9/29/2021 showing 0% ventricular paced, 2 episodes of NSVT lasting 9-12 beats, no ATP or shock.  He was having palpitations multiple times a week felt his heart racing.  Dyspnea on exertion had worsened over the prior 6 months and need to stop and rest when walking across the shop at work.  Stress test and Zio patch were recommended.  He was started on Imdur.    He called noting significant headache and nausea with Imdur and discontinued the medication.  He was not interested in increasing amlodipine as he had previous lightheadedness with  that.    He was seen in the ED on 11/26/2021 for Covid pneumonia.  Chest CT showed small pericardial effusion.    Pt presents today for 2 month follow-up.  7 day Zio patch 11/1/2021 showing sinus rhythm with average heart rate 71 bpm, 3 runs of NSVT longest lasting 11 beats, 26 runs of SVT longest lasting 17 beats, symptoms correlated with ectopy and SVT. Lexiscan nuclear stress test 11/1/2021 was abnormal consistent with hypertrophic cardiomyopathy, compared to prior testing from 2020 there were no significant changes per reader.  Dr. Harrison reviewed with the test and noted that in the absence of chest pain would probably not recommend angiogram at this time.  If symptoms worsen angiogram could be considered.  Lipid profile 10/13/2021 showing total cholesterol 142 HDL 39 LDL 69 triglycerides 170 ALT WNL.  Results reviewed today.    He continues to feel palpitations but declines increasing beta-blocker at this time.  He said overall, the palpitations are tolerable.  However, he went on to discuss an episode he had last night where he felt his heart beating more forcefully and had presyncope but did not lose consciousness.  Limited echocardiogram today showing trace to small pericardial effusion which has not worsened.  Device check today showing he did have a 21 beat run of VT up to 160 to 170 bpm at 10:45 PM last night without any ATP or shock.  Per device nurse the patient has had the same types of VT episodes in the past without any symptoms.  When discussing with the patient he cannot recall exactly what time his presyncope was but thinks it could be around 10:45.  Blood pressure after that episode was 140 systolic.  Could possibly be related to SVT and we discussed Valsalva maneuver today.  Results reviewed today.     He continues with dyspnea on exertion but is also now recovering from Covid pneumonia.  He denies any chest pain.  His weight is down 11 pounds in the last 2 months.  I recommended he wait for a  few months to see if his shortness of breath is improving and if not then follow back up with PCP and they can decide if PFTs are recommended.  Patient reports no chest pain, PND, orthopnea, syncope, edema.      Current Cardiac Medications   Amlodipine 5 mg daily  Eliquis 5 mg twice daily  Aspirin 81 mg daily- Pt not taking   Lasix 20 mg daily  Metoprolol  mg daily  Atorvastatin 80 mg daily                   Assessment and Plan:       Plan    Patient Instructions   Medication Changes:  1. None     Recommendations:  1. Check blood pressure at least 1 hour after medications. Call the clinic if your blood pressure is consistently greater than 130/80.      Follow-up:  1. Follow-up with primary care if shortness of breath with exertion does not improve. May need lung function test  2. See Dr. Harrison for cardiology follow up at [facility]: March or April 2022     Cardiology Scheduling~[phone]  Cardiology Clinic RN~[phone] (Yary Tello, RN and Eula REDMOND RN)          1. CAD    Angio 2017 with 60% stenosis in the D1 with negative FFR with otherwise mild disease    Angio 2020 no changes     lexiscan 11/2021 with HCM changes no likely ischemia     ABREU  With likely negative ischemia on stress test     Continue statin, beta-blocker, CCB, no ASA on eliquis     HA and nausea with imdur 30      2. Apical hypertrophic cardiomyopathy    Diagnosed 2017    Had syncope with sustained VT and underwent ICD    Continue beta-blocker, lasix       3. Paroxysmal atrial fibrillation    Present since age 28    H/o multiple cardioversions    S/p A. fib ablation 2018    Recurrent atrial fibrillation s/p cardioversion 11/2020    No afib zio 11/2021    Elevated [id number] score    Continue metoprolol for rate control and Eliquis for anticoagulation      4. NSVT    Prior episode of syncope and underwent ICD placement 2018    Short episodes of NSVT correlating with palpitations, on device checks but no further sustained and has  "not needed therapies with ICD    Continue metoprolol    Follow with device clinic      5. Hyperlipidemia    Last LDL 69 10/2021    Continue atorvastatin 80 mg daily    Consider starting zetia       6. HTN    controlled     Continue amlodipine, metoprolol    Breast tenderness with spironolactone      7.  Presyncope    Patient experienced significant lightheadedness with \"tunnel vision\" but did not lose consciousness yesterday evening while sitting and watching TV    Device check showed episode of 21 beat VT up to 170 bpm at 10:45 PM which correlates a similar time of his presyncope, no ATP or shock. However, patient has had similar VT episodes in the past that were asymptomatic    Patient declines increasing beta-blocker and will monitor symptoms    Not hypotensive when checked blood pressure after episode    History of SVT-discussed Valsalva maneuver    Trace to small pericardial effusion which has been stable    Likely negative stress test 10/2021 unchanged from previous         Thank you for allowing me to participate in this delightful patient's care.      This note was completed in part using Dragon voice recognition software. Although reviewed after completion, some word and grammatical errors may occur.    Magalis Anderson, APRN CNP, APRN, CNP           Data:   All laboratory data reviewed      Total time spent today was 59 mins, reviewing labs, testing, notes, documenting notes, and seeing patient.       Constitutional:  cooperative, alert and oriented, well developed, well nourished, in no acute distress  overweight    Skin:  warm and dry to the touch         Head:  normocephalic       Eyes:  pupils equal and round       ENT:  no pallor or cyanosis       Neck:  no stiffness       Respiratory:  clear to auscultation; normal symmetry        Cardiac: regular rate and rhythm; normal S1 and S2                 pulses full and equal     GI:  abdomen soft, nondistended     Extremities and Muscular " Skeletal:  no edema        Neurological:  affect appropriate; no gross motor deficits       Psych:  Alert and Oriented x 3 , appropriate affact      [facility]ORTENS[facility]

## 2021-12-17 NOTE — PROGRESS NOTES
Cardiology Clinic Progress Note  Matthew Still MRN# [id number]   YOB: 1970 Age: 51 year old      Primary Cardiologist:   Dr. Harrison           History of Presenting Illness:      Matthew Still is a pleasant 51 year old patient with a past cardiac history significant for:  1. paroxysmal atrial fibrillation, since his 30s.  He has undergone several cardioversions and PVI 2018  2. apical hypertrophic cardiomyopathy s/p ICD  3. hypertension  4. hyperlipidemia  5. CAD  Past medical history significant for COVID pneumonia 11/2021.     In November 2017, he was seen for atrial fibrillation and chest pain.  Coronary angiogram showed moderate nonobstructive disease with 60% stenosis in the D1 with negative FFR and otherwise mild disease.  Echocardiogram showed moderate concentric LVH with slight prominence of the septum but no outflow obstruction, normal EF, no significant valvular disease.  Cardiac MRI December 2017 was consistent with apical hypertrophic cardiomyopathy.  ÁNGELA December 2017 showed thrombus in the left atrial appendage.  He underwent atrial fibrillation ablation in February 2018.  Follow-up ZIO Patch showed no further atrial fibrillation but patient had an 8 beat run of VT which he was symptomatic with.  Later in October 2018 he had a syncopal episode and subsequently underwent single-chamber ICD.  Given that he had no further atrial fibrillation, his Eliquis was discontinued.  Echocardiogram from December 2019 was stable with normal EF, apical hypertrophic cardiomyopathy and no significant valvular disease.  His amlodipine was previously decreased due to lower extremity edema.  His antihypertensives have been uptitrated.  He declined sleep medicine consultation.  In January 2020 he had complaints of dyspnea on exertion and was found to have abnormal stress test. Coronary angiogram 2/6/2020 showing no culprit lesion and no indication for revascularization, he has moderate narrowing in the  D1/ramus with normal FFR, no change since 2017. Carotid ultrasound February 2020 with less than 50% stenosis bilaterally. In October 2020 he was noted to have a 10-second episode of ventricular tachycardia at night and had will get up with a squeezing sensation in his chest that resolved after approximately 10 seconds.    In October 2020 he was noted to be back in atrial fibrillation on device check and anticoagulation was restarted.  He underwent ÁNGELA cardioversion November 2020.    He was seen by EP in December 2020 who recommended continuing metoprolol for at least 3 months and continuing Eliquis long-term.    Patient was last seen by Dr. Harrison in March 2021.  He noted some exertional dyspnea and intermittent palpitations lasting a few seconds.  He did not feel he had gone back into atrial fibrillation.    Patient was seen by me in October 2021.  BNP was stable at 1300. Echocardiogram 10/7/2021 showing apical hypertrophic cardiomyopathy with EF greater than 70%, no significant intracavitary gradient, RV normal size and function, mild TR, pericardial fat pad with probable small pericardial effusion, and compared to 2020 there was no significant change. Device check 9/29/2021 showing 0% ventricular paced, 2 episodes of NSVT lasting 9-12 beats, no ATP or shock.  He was having palpitations multiple times a week felt his heart racing.  Dyspnea on exertion had worsened over the prior 6 months and need to stop and rest when walking across the shop at work.  Stress test and Zio patch were recommended.  He was started on Imdur.    He called noting significant headache and nausea with Imdur and discontinued the medication.  He was not interested in increasing amlodipine as he had previous lightheadedness with that.    He was seen in the ED on 11/26/2021 for Covid pneumonia.  Chest CT showed small pericardial effusion.    Pt presents today for 2 month follow-up.  7 day Zio patch 11/1/2021 showing sinus rhythm with average  heart rate 71 bpm, 3 runs of NSVT longest lasting 11 beats, 26 runs of SVT longest lasting 17 beats, symptoms correlated with ectopy and SVT. Lexiscan nuclear stress test 11/1/2021 was abnormal consistent with hypertrophic cardiomyopathy, compared to prior testing from 2020 there were no significant changes per reader.  Dr. Harrison reviewed with the test and noted that in the absence of chest pain would probably not recommend angiogram at this time.  If symptoms worsen angiogram could be considered.  Lipid profile 10/13/2021 showing total cholesterol 142 HDL 39 LDL 69 triglycerides 170 ALT WNL.  Results reviewed today.    He continues to feel palpitations but declines increasing beta-blocker at this time.  He said overall, the palpitations are tolerable.  However, he went on to discuss an episode he had last night where he felt his heart beating more forcefully and had presyncope but did not lose consciousness.  Limited echocardiogram today showing trace to small pericardial effusion which has not worsened.  Device check today showing he did have a 21 beat run of VT up to 160 to 170 bpm at 10:45 PM last night without any ATP or shock.  Per device nurse the patient has had the same types of VT episodes in the past without any symptoms.  When discussing with the patient he cannot recall exactly what time his presyncope was but thinks it could be around 10:45.  Blood pressure after that episode was 140 systolic.  Could possibly be related to SVT and we discussed Valsalva maneuver today.  Results reviewed today.     He continues with dyspnea on exertion but is also now recovering from Covid pneumonia.  He denies any chest pain.  His weight is down 11 pounds in the last 2 months.  I recommended he wait for a few months to see if his shortness of breath is improving and if not then follow back up with PCP and they can decide if PFTs are recommended.  Patient reports no chest pain, PND, orthopnea, syncope,  edema.      Current Cardiac Medications   Amlodipine 5 mg daily  Eliquis 5 mg twice daily  Aspirin 81 mg daily- Pt not taking   Lasix 20 mg daily  Metoprolol  mg daily  Atorvastatin 80 mg daily                   Assessment and Plan:       Plan    Patient Instructions   Medication Changes:  1. None     Recommendations:  1. Check blood pressure at least 1 hour after medications. Call the clinic if your blood pressure is consistently greater than 130/80.      Follow-up:  1. Follow-up with primary care if shortness of breath with exertion does not improve. May need lung function test  2. See Dr. Harrison for cardiology follow up at [facility]: March or April 2022     Cardiology Scheduling~[phone]  Cardiology Clinic RN~[phone] (Yary Tello, RN and Eula REDMOND RN)          1. CAD    Angio 2017 with 60% stenosis in the D1 with negative FFR with otherwise mild disease    Angio 2020 no changes     lexiscan 11/2021 with HCM changes no likely ischemia     ABREU  With likely negative ischemia on stress test     Continue statin, beta-blocker, CCB, no ASA on eliquis     HA and nausea with imdur 30      2. Apical hypertrophic cardiomyopathy    Diagnosed 2017    Had syncope with sustained VT and underwent ICD    Continue beta-blocker, lasix       3. Paroxysmal atrial fibrillation    Present since age 28    H/o multiple cardioversions    S/p A. fib ablation 2018    Recurrent atrial fibrillation s/p cardioversion 11/2020    No afib zio 11/2021    Elevated [id number] score    Continue metoprolol for rate control and Eliquis for anticoagulation      4. NSVT    Prior episode of syncope and underwent ICD placement 2018    Short episodes of NSVT correlating with palpitations, on device checks but no further sustained and has not needed therapies with ICD    Continue metoprolol    Follow with device clinic      5. Hyperlipidemia    Last LDL 69 10/2021    Continue atorvastatin 80 mg daily    Consider starting zetia       6.  "HTN    controlled     Continue amlodipine, metoprolol    Breast tenderness with spironolactone      7.  Presyncope    Patient experienced significant lightheadedness with \"tunnel vision\" but did not lose consciousness yesterday evening while sitting and watching TV    Device check showed episode of 21 beat VT up to 170 bpm at 10:45 PM which correlates a similar time of his presyncope, no ATP or shock. However, patient has had similar VT episodes in the past that were asymptomatic    Patient declines increasing beta-blocker and will monitor symptoms    Not hypotensive when checked blood pressure after episode    History of SVT-discussed Valsalva maneuver    Trace to small pericardial effusion which has been stable    Likely negative stress test 10/2021 unchanged from previous         Thank you for allowing me to participate in this delightful patient's care.      This note was completed in part using Dragon voice recognition software. Although reviewed after completion, some word and grammatical errors may occur.    Magalis Anderson, APRN CNP, APRN, CNP           Data:   All laboratory data reviewed      Total time spent today was 59 mins, reviewing labs, testing, notes, documenting notes, and seeing patient.       Constitutional:  cooperative, alert and oriented, well developed, well nourished, in no acute distress  overweight    Skin:  warm and dry to the touch         Head:  normocephalic       Eyes:  pupils equal and round       ENT:  no pallor or cyanosis       Neck:  no stiffness       Respiratory:  clear to auscultation; normal symmetry        Cardiac: regular rate and rhythm; normal S1 and S2                 pulses full and equal     GI:  abdomen soft, nondistended     Extremities and Muscular Skeletal:  no edema        Neurological:  affect appropriate; no gross motor deficits       Psych:  Alert and Oriented x 3 , appropriate affact            "

## 2021-12-17 NOTE — PATIENT INSTRUCTIONS
Medication Changes:  1. None     Recommendations:  1. Check blood pressure at least 1 hour after medications. Call the clinic if your blood pressure is consistently greater than 130/80.      Follow-up:  1. Follow-up with primary care if shortness of breath with exertion does not improve. May need lung function test  2. See Dr. Harrison for cardiology follow up at [facility]: March or April 2022     Cardiology Scheduling~[phone]  Cardiology Clinic RN~[phone] (Yary Tello RN and Eula REDMOND RN)

## 2022-01-05 ENCOUNTER — ANCILLARY PROCEDURE (OUTPATIENT)
Dept: CARDIOLOGY | Facility: CLINIC | Age: 52
End: 2022-01-05
Attending: INTERNAL MEDICINE
Payer: COMMERCIAL

## 2022-01-05 DIAGNOSIS — I42.9 CARDIOMYOPATHY (H): ICD-10-CM

## 2022-01-05 DIAGNOSIS — Z95.810 ICD (IMPLANTABLE CARDIOVERTER-DEFIBRILLATOR) IN PLACE: ICD-10-CM

## 2022-01-05 PROCEDURE — 93296 REM INTERROG EVL PM/IDS: CPT | Performed by: INTERNAL MEDICINE

## 2022-01-05 PROCEDURE — 93295 DEV INTERROG REMOTE 1/2/MLT: CPT | Performed by: INTERNAL MEDICINE

## 2022-01-10 LAB
MDC_IDC_LEAD_IMPLANT_DT: NORMAL
MDC_IDC_LEAD_LOCATION: NORMAL
MDC_IDC_LEAD_LOCATION_DETAIL_1: NORMAL
MDC_IDC_LEAD_MFG: NORMAL
MDC_IDC_LEAD_MODEL: NORMAL
MDC_IDC_LEAD_SERIAL: NORMAL
MDC_IDC_MSMT_BATTERY_REMAINING_PERCENTAGE: 86 %
MDC_IDC_MSMT_BATTERY_RRT_TRIGGER: NORMAL
MDC_IDC_MSMT_BATTERY_STATUS: NORMAL
MDC_IDC_MSMT_BATTERY_VOLTAGE: 3.12 V
MDC_IDC_MSMT_CAP_CHARGE_DTM: NORMAL
MDC_IDC_MSMT_CAP_CHARGE_ENERGY: 40 J
MDC_IDC_MSMT_CAP_CHARGE_TIME: 9.4 S
MDC_IDC_MSMT_CAP_CHARGE_TYPE: NORMAL
MDC_IDC_MSMT_LEADCHNL_RA_LEAD_CHANNEL_STATUS: NORMAL
MDC_IDC_MSMT_LEADCHNL_RV_IMPEDANCE_VALUE: 491 OHM
MDC_IDC_MSMT_LEADCHNL_RV_LEAD_CHANNEL_STATUS: NORMAL
MDC_IDC_PG_IMPLANT_DTM: NORMAL
MDC_IDC_PG_MFG: NORMAL
MDC_IDC_PG_MODEL: NORMAL
MDC_IDC_PG_SERIAL: NORMAL
MDC_IDC_PG_TYPE: NORMAL
MDC_IDC_SESS_CLINIC_NAME: NORMAL
MDC_IDC_SESS_DTM: NORMAL
MDC_IDC_SESS_REPROGRAMMED: NO
MDC_IDC_SESS_TYPE: NORMAL
MDC_IDC_SET_BRADY_LOWRATE: 40 {BEATS}/MIN
MDC_IDC_SET_BRADY_MODE: NORMAL
MDC_IDC_SET_LEADCHNL_RA_SENSING_POLARITY: NORMAL
MDC_IDC_SET_LEADCHNL_RA_SENSING_SENSITIVITY: 0.4 MV
MDC_IDC_SET_LEADCHNL_RV_PACING_AMPLITUDE: 1.8 V
MDC_IDC_SET_LEADCHNL_RV_PACING_POLARITY: NORMAL
MDC_IDC_SET_LEADCHNL_RV_PACING_PULSEWIDTH: 0.4 MS
MDC_IDC_SET_LEADCHNL_RV_SENSING_ADAPTATION_MODE: NORMAL
MDC_IDC_SET_LEADCHNL_RV_SENSING_POLARITY: NORMAL
MDC_IDC_SET_LEADCHNL_RV_SENSING_SENSITIVITY: 0.8 MV
MDC_IDC_SET_ZONE_DETECTION_BEATS_DENOMINATOR: 40 {BEATS}
MDC_IDC_SET_ZONE_DETECTION_BEATS_NUMERATOR: 30 {BEATS}
MDC_IDC_SET_ZONE_DETECTION_INTERVAL: 250 MS
MDC_IDC_SET_ZONE_DETECTION_INTERVAL: 300 MS
MDC_IDC_SET_ZONE_TYPE: NORMAL
MDC_IDC_SET_ZONE_TYPE: NORMAL
MDC_IDC_STAT_AT_BURDEN_PERCENT: 0 %
MDC_IDC_STAT_AT_DTM_END: NORMAL
MDC_IDC_STAT_AT_DTM_START: NORMAL
MDC_IDC_STAT_BRADY_AS_VP_PERCENT: 0 %
MDC_IDC_STAT_BRADY_AS_VS_PERCENT: 100 %
MDC_IDC_STAT_BRADY_DTM_END: NORMAL
MDC_IDC_STAT_BRADY_DTM_START: NORMAL
MDC_IDC_STAT_BRADY_RV_PERCENT_PACED: 0 %
MDC_IDC_STAT_CRT_DTM_END: NORMAL
MDC_IDC_STAT_CRT_DTM_START: NORMAL
MDC_IDC_STAT_EPISODE_TOTAL_COUNT: 0
MDC_IDC_STAT_EPISODE_TOTAL_COUNT: 26
MDC_IDC_STAT_EPISODE_TOTAL_COUNT_DTM_END: NORMAL
MDC_IDC_STAT_EPISODE_TOTAL_COUNT_DTM_START: NORMAL
MDC_IDC_STAT_EPISODE_TYPE: NORMAL
MDC_IDC_STAT_TACHYTHERAPY_ATP_DELIVERED_TOTAL: 0
MDC_IDC_STAT_TACHYTHERAPY_SHOCKS_ABORTED_TOTAL: 0
MDC_IDC_STAT_TACHYTHERAPY_SHOCKS_DELIVERED_TOTAL: 0
MDC_IDC_STAT_TACHYTHERAPY_TOTAL_DTM_END: NORMAL
MDC_IDC_STAT_TACHYTHERAPY_TOTAL_DTM_START: NORMAL

## 2022-03-28 ENCOUNTER — TELEPHONE (OUTPATIENT)
Dept: CARDIOLOGY | Facility: CLINIC | Age: 52
End: 2022-03-28
Payer: COMMERCIAL

## 2022-03-28 DIAGNOSIS — I10 BENIGN ESSENTIAL HYPERTENSION: ICD-10-CM

## 2022-03-28 DIAGNOSIS — I48.0 PAROXYSMAL ATRIAL FIBRILLATION (H): ICD-10-CM

## 2022-03-28 DIAGNOSIS — I47.29 NSVT (NONSUSTAINED VENTRICULAR TACHYCARDIA) (H): ICD-10-CM

## 2022-03-28 DIAGNOSIS — E78.5 HYPERLIPIDEMIA LDL GOAL <70: ICD-10-CM

## 2022-03-28 DIAGNOSIS — R06.09 DOE (DYSPNEA ON EXERTION): ICD-10-CM

## 2022-03-28 RX ORDER — AMLODIPINE BESYLATE 5 MG/1
5 TABLET ORAL DAILY
Qty: 90 TABLET | Refills: 3 | Status: SHIPPED | OUTPATIENT
Start: 2022-03-28 | End: 2023-02-22

## 2022-03-28 RX ORDER — FUROSEMIDE 20 MG
20 TABLET ORAL DAILY
Qty: 90 TABLET | Refills: 3 | Status: SHIPPED | OUTPATIENT
Start: 2022-03-28 | End: 2023-02-22

## 2022-03-28 RX ORDER — ATORVASTATIN CALCIUM 80 MG/1
80 TABLET, FILM COATED ORAL DAILY
Qty: 90 TABLET | Refills: 3 | Status: SHIPPED | OUTPATIENT
Start: 2022-03-28 | End: 2023-02-22

## 2022-03-28 RX ORDER — METOPROLOL SUCCINATE 100 MG/1
150 TABLET, EXTENDED RELEASE ORAL DAILY
Qty: 135 TABLET | Refills: 3 | Status: SHIPPED | OUTPATIENT
Start: 2022-03-28 | End: 2023-02-22

## 2022-03-28 NOTE — TELEPHONE ENCOUNTER
Prior Authorization Retail Medication Request    Medication/Dose: Eliquis 5mg tab  ICD code (if different than what is on RX):    Previously Tried and Failed:    Rationale:      Insurance Name:  [employer]  Insurance ID:  [id number]      Pharmacy Information (if different than what is on RX)  Name:    Phone:      Thank You!  Liana Truong  Certified Pharmacy Technician  [facility]  P: [phone] F:[phone]

## 2022-03-28 NOTE — TELEPHONE ENCOUNTER
LOV 12/17/21-YANETH Haddad f/u 4/11/22-Dr. Harrison    ADDENDUM: [facility] Cardiology Refill Guideline reviewed.  Medication meets criteria for refill. Refills sent. Yary Tello RN Cardiology March 28, 2022, 8:05 AM

## 2022-03-28 NOTE — TELEPHONE ENCOUNTER
Good morning,  Patient insurance will not pay for Eliquis. His insurance will pay for Xarelto or Coumadin. Could you please send a new order if it is ok to change.    Thank you,  Yu Turpin - Pharmacy Technician  [facility]  [phone]

## 2022-03-30 NOTE — TELEPHONE ENCOUNTER
Ok to switch eliquis to xarelto 20 mg daily at supper. Take last eliquis in the AM and then start xarelto that same evening.    HORTENSIA Rdz CNP

## 2022-03-31 NOTE — TELEPHONE ENCOUNTER
ADDENDUM: See 3/30/22 and 3/31/22 encounters re: switching to Xarelto. Yary Tello RN Cardiology April 1, 2022, 8:19 AM      PRIOR AUTHORIZATION DENIED    Medication: apixaban ANTICOAGULANT (ELIQUIS) 5 MG tablet--DENIED    Denial Date: 3/31/2022    Denial Rational: Patient needs to try and fail Xarelto    Appeal Information:

## 2022-03-31 NOTE — TELEPHONE ENCOUNTER
PA Initiation    Medication: apixaban ANTICOAGULANT (ELIQUIS) 5 MG tablet   Insurance Company: [employer] - Phone [phone] Fax [phone]  Pharmacy Filling the Rx: [facility] [place], [place] - [facility]  Filling Pharmacy Phone: [phone]  Filling Pharmacy Fax: [phone]  Start Date: 3/30/2022

## 2022-03-31 NOTE — TELEPHONE ENCOUNTER
New prescription sent to pharmacy.  Notes to pharmacy to discuss stopping of Eliquis with patient.    Eula Desai RN on 3/31/2022 at 10:54 AM

## 2022-03-31 NOTE — TELEPHONE ENCOUNTER
Remote alert received for NSVT episode. Episode lated 11 beats with rates in the 180s.    
Name band;

## 2022-04-08 NOTE — PROGRESS NOTES
CARDIOLOGY VISIT    REASON FOR VISIT: A. fib, apical hypertrophic cardiomyopathy, ICD    SUBJECTIVE:  51-year-old male seen for atrial fibrillation and apical hypertrophic cardiomyopathy.         He reports a history of paroxysmal A. fib dating back about 20 years.  He has had several cardioversions in the past.        November 2017 he presented with atrial fibrillation and chest pain with troponin 0.13.  Angiogram showed normal left main, mild disease of LAD, 60% D1 (FFR 0.88), normal circumflex, dominant RCA with minimal disease.   Echo showed EF 65%, moderate mostly concentric LVH with slight prominence of the septum.          Cardiac MRI December 2017 showed EF 70%, mild to moderate mid ventricular hypertrophy and severe apical hypertrophy consistent with apical hypertrophic cardiomyopathy, normal RV, diffuse patchy mid wall enhancement of the anterior and apical segments, total scar burden 11%, trivial MR.              February 2018 he underwent atrial fibrillation ablation including wide circumferential pulmonary vein isolation and SVC isolation.       October 2018 he had an episode of syncope.  He then underwent single-chamber ICD implantation (Biotronik Intica 7 VR-T ProMRI).       Echo December 2019 showed EF 60%, moderate concentric LVH with obliteration of left apical cavity due to apical hypertrophy, normal RV, no valve disease.       Nuclear stress January 2020 showed moderate anterior ischemia.  Coronary angiogram showed moderate first diagonal lesion with normal FFR, otherwise no obstructive coronary disease.       December 2020 he underwent successful cardioversion.      Nuclear stress November 2021 showed moderate ischemia of the mid to distal anterior wall, apex, mid to distal inferior wall, however defect may be secondary to hypertrophic cardiomyopathy.  Study appears unchanged from January 2020.     Zio monitor November 2021 showed sinus rhythm, 3 VT runs up to 11 beats, 26 SVT runs up to 17  beats, symptoms generally correlated with ectopy.    Echo December 2021 showed EF 60%, no outflow obstruction, no valve disease.    Device interrogation January 2022 showed 0% ventricular pacing, no arrhythmias, battery 86%.    He has felt about the same in the past few months.  He has some exertional dyspnea and a little orthostatic lightheadedness, but no syncope.  He continues to have some intermittent palpitations lasting a few seconds.  Blood pressure runs 135/80 at home.  He has some mild edema at the end of the day at work when he is on his feet a lot.    MEDICATIONS:  Current Outpatient Medications   Medication     amLODIPine (NORVASC) 5 MG tablet     apixaban ANTICOAGULANT (ELIQUIS) 5 MG tablet     Ascorbic Acid (VITAMIN C PO)     aspirin (ASA) 81 MG tablet     atorvastatin (LIPITOR) 80 MG tablet     furosemide (LASIX) 20 MG tablet     isosorbide mononitrate (IMDUR) 30 MG 24 hr tablet     metoprolol succinate ER (TOPROL-XL) 100 MG 24 hr tablet     rivaroxaban ANTICOAGULANT (XARELTO) 20 MG TABS tablet     UNABLE TO FIND     No current facility-administered medications for this visit.       ALLERGIES:  No Known Allergies    REVIEW OF SYSTEMS:  Constitutional:  No weight loss, fever, chills  HEENT:  Eyes:  No visual loss, blurred vision, double vision or yellow sclerae. No hearing loss, sneezing, congestion, runny nose or sore throat.  Skin:  No rash or itching.  Cardiovascular: per HPI  Respiratory: per HPI  GI:  No anorexia, nausea, vomiting or diarrhea. No abdominal pain or blood.  :  No dysurea, hematuria  Neurologic:  No headache, paralysis, ataxia, numbness or tingling in the extremities. No change in bowel or bladder control.  Musculoskeletal:  No muscle pain  Hematologic:  No bleeding or bruising.  Lymphatics:  No enlarged nodes. No history of splenectomy.  Endocrine:  No reports of sweating, cold or heat intolerance. No polyuria or polydipsia.  Allergies:  No history of asthma, hives, eczema or  rhinitis.    PHYSICAL EXAM:  /82 (BP Location: Right arm, Patient Position: Chair, Cuff Size: Adult Large)   Pulse 60   Temp 97.4  F (36.3  C) (Tympanic)   Wt 117 kg (258 lb)   SpO2 97%   BMI 35.98 kg/m      Constitutional: awake, alert, no distress  Eyes: PERRL, sclera nonicteric  ENT: trachea midline  Respiratory: Lungs clear  Cardiovascular: Regular rate and rhythm, no murmurs  GI: nondistended, nontender, bowel sounds present  Lymph/Hematologic: no lymphadenopathy  Skin: dry, no rash  Musculoskeletal: good muscle tone, strength 5/5 in upper and lower extremities  Neurologic: no focal deficits  Neuropsychiatric: appropriate affact    DATA:  Lab:   Recent Labs   Lab Test 10/13/21  1225 02/06/20  0845   CHOL 142 151   HDL 39* 33*   LDL 69 95   TRIG 170* 115     ASSESSMENT:  51-year-old male seen for hypertrophic cardiomyopathy and A. fib.  His overall cardiac issues are stable.  He continues to have some dyspnea which is likely multifactorial.  There is no convincing ischemia on stress test in November.  If he develops any anginal type symptoms, we talked about a possible angiogram.  Otherwise he appears euvolemic.  Blood pressure is acceptable.  He has some short runs of nonsustained VT, this probably would explain his palpitations.    RECOMMENDATIONS:  1.  Apical hypertrophic cardiomyopathy with ICD in place  -Continue routine device checks  -Echo in about 1 year    2.  A. fib, history PVI  -Continue Xarelto (he was on Eliquis, but insurance is having him switch to Xarelto)    Follow-up in 6 months.    Brice Harrison MD  Cardiology - [facility]  Pager:  [phone]  Text Page  April 11, 2022

## 2022-04-11 ENCOUNTER — OFFICE VISIT (OUTPATIENT)
Dept: CARDIOLOGY | Facility: CLINIC | Age: 52
End: 2022-04-11
Attending: NURSE PRACTITIONER
Payer: COMMERCIAL

## 2022-04-11 VITALS
DIASTOLIC BLOOD PRESSURE: 82 MMHG | HEART RATE: 60 BPM | SYSTOLIC BLOOD PRESSURE: 136 MMHG | TEMPERATURE: 97.4 F | WEIGHT: 258 LBS | BODY MASS INDEX: 35.98 KG/M2 | OXYGEN SATURATION: 97 %

## 2022-04-11 DIAGNOSIS — Z95.810 ICD (IMPLANTABLE CARDIOVERTER-DEFIBRILLATOR) IN PLACE: ICD-10-CM

## 2022-04-11 DIAGNOSIS — I48.0 PAROXYSMAL ATRIAL FIBRILLATION (H): ICD-10-CM

## 2022-04-11 DIAGNOSIS — I25.10 CORONARY ARTERY DISEASE INVOLVING NATIVE CORONARY ARTERY OF NATIVE HEART WITHOUT ANGINA PECTORIS: ICD-10-CM

## 2022-04-11 DIAGNOSIS — I47.29 NSVT (NONSUSTAINED VENTRICULAR TACHYCARDIA) (H): ICD-10-CM

## 2022-04-11 DIAGNOSIS — I10 BENIGN ESSENTIAL HYPERTENSION: ICD-10-CM

## 2022-04-11 DIAGNOSIS — I42.2 APICAL VARIANT HYPERTROPHIC CARDIOMYOPATHY (H): ICD-10-CM

## 2022-04-11 PROCEDURE — 99214 OFFICE O/P EST MOD 30 MIN: CPT | Performed by: INTERNAL MEDICINE

## 2022-04-11 NOTE — LETTER
4/11/2022    [facility]  [address] [facility] [address] [facility] [address] [address]    RE: Matthew Still       Dear Colleague,     I had the pleasure of seeing Matthew Still in the [facility].  CARDIOLOGY VISIT    REASON FOR VISIT: A. fib, apical hypertrophic cardiomyopathy, ICD    SUBJECTIVE:  51-year-old male seen for atrial fibrillation and apical hypertrophic cardiomyopathy.         He reports a history of paroxysmal A. fib dating back about 20 years.  He has had several cardioversions in the past.        November 2017 he presented with atrial fibrillation and chest pain with troponin 0.13.  Angiogram showed normal left main, mild disease of LAD, 60% D1 (FFR 0.88), normal circumflex, dominant RCA with minimal disease.   Echo showed EF 65%, moderate mostly concentric LVH with slight prominence of the septum.          Cardiac MRI December 2017 showed EF 70%, mild to moderate mid ventricular hypertrophy and severe apical hypertrophy consistent with apical hypertrophic cardiomyopathy, normal RV, diffuse patchy mid wall enhancement of the anterior and apical segments, total scar burden 11%, trivial MR.              February 2018 he underwent atrial fibrillation ablation including wide circumferential pulmonary vein isolation and SVC isolation.       October 2018 he had an episode of syncope.  He then underwent single-chamber ICD implantation (Biotronik Intica 7 VR-T ProMRI).       Echo December 2019 showed EF 60%, moderate concentric LVH with obliteration of left apical cavity due to apical hypertrophy, normal RV, no valve disease.       Nuclear stress January 2020 showed moderate anterior ischemia.  Coronary angiogram showed moderate first diagonal lesion with normal FFR, otherwise no obstructive coronary disease.       December 2020 he underwent successful cardioversion.      Nuclear stress November 2021 showed moderate ischemia of the mid to distal anterior wall, apex, mid to distal inferior wall,  however defect may be secondary to hypertrophic cardiomyopathy.  Study appears unchanged from January 2020.     Zio monitor November 2021 showed sinus rhythm, 3 VT runs up to 11 beats, 26 SVT runs up to 17 beats, symptoms generally correlated with ectopy.    Echo December 2021 showed EF 60%, no outflow obstruction, no valve disease.    Device interrogation January 2022 showed 0% ventricular pacing, no arrhythmias, battery 86%.    He has felt about the same in the past few months.  He has some exertional dyspnea and a little orthostatic lightheadedness, but no syncope.  He continues to have some intermittent palpitations lasting a few seconds.  Blood pressure runs 135/80 at home.  He has some mild edema at the end of the day at work when he is on his feet a lot.    MEDICATIONS:  Current Outpatient Medications   Medication     amLODIPine (NORVASC) 5 MG tablet     apixaban ANTICOAGULANT (ELIQUIS) 5 MG tablet     Ascorbic Acid (VITAMIN C PO)     aspirin (ASA) 81 MG tablet     atorvastatin (LIPITOR) 80 MG tablet     furosemide (LASIX) 20 MG tablet     isosorbide mononitrate (IMDUR) 30 MG 24 hr tablet     metoprolol succinate ER (TOPROL-XL) 100 MG 24 hr tablet     rivaroxaban ANTICOAGULANT (XARELTO) 20 MG TABS tablet     UNABLE TO FIND     No current facility-administered medications for this visit.       ALLERGIES:  No Known Allergies    REVIEW OF SYSTEMS:  Constitutional:  No weight loss, fever, chills  HEENT:  Eyes:  No visual loss, blurred vision, double vision or yellow sclerae. No hearing loss, sneezing, congestion, runny nose or sore throat.  Skin:  No rash or itching.  Cardiovascular: per HPI  Respiratory: per HPI  GI:  No anorexia, nausea, vomiting or diarrhea. No abdominal pain or blood.  :  No dysurea, hematuria  Neurologic:  No headache, paralysis, ataxia, numbness or tingling in the extremities. No change in bowel or bladder control.  Musculoskeletal:  No muscle pain  Hematologic:  No bleeding or  bruising.  Lymphatics:  No enlarged nodes. No history of splenectomy.  Endocrine:  No reports of sweating, cold or heat intolerance. No polyuria or polydipsia.  Allergies:  No history of asthma, hives, eczema or rhinitis.    PHYSICAL EXAM:  /82 (BP Location: Right arm, Patient Position: Chair, Cuff Size: Adult Large)   Pulse 60   Temp 97.4  F (36.3  C) (Tympanic)   Wt 117 kg (258 lb)   SpO2 97%   BMI 35.98 kg/m      Constitutional: awake, alert, no distress  Eyes: PERRL, sclera nonicteric  ENT: trachea midline  Respiratory: Lungs clear  Cardiovascular: Regular rate and rhythm, no murmurs  GI: nondistended, nontender, bowel sounds present  Lymph/Hematologic: no lymphadenopathy  Skin: dry, no rash  Musculoskeletal: good muscle tone, strength 5/5 in upper and lower extremities  Neurologic: no focal deficits  Neuropsychiatric: appropriate affact    DATA:  Lab:   Recent Labs   Lab Test 10/13/21  1225 02/06/20  0845   CHOL 142 151   HDL 39* 33*   LDL 69 95   TRIG 170* 115     ASSESSMENT:  51-year-old male seen for hypertrophic cardiomyopathy and A. fib.  His overall cardiac issues are stable.  He continues to have some dyspnea which is likely multifactorial.  There is no convincing ischemia on stress test in November.  If he develops any anginal type symptoms, we talked about a possible angiogram.  Otherwise he appears euvolemic.  Blood pressure is acceptable.  He has some short runs of nonsustained VT, this probably would explain his palpitations.    RECOMMENDATIONS:  1.  Apical hypertrophic cardiomyopathy with ICD in place  -Continue routine device checks  -Echo in about 1 year    2.  A. fib, history PVI  -Continue Xarelto (he was on Eliquis, but insurance is having him switch to Xarelto)    Follow-up in 6 months.    Brice Harrison MD  Cardiology - [facility]  Pager:  [phone]  Text Page  April 11, 2022    Thank you for allowing me to participate in the care of your patient.      Sincerely,      Brice Harrison MD     [facility]  cc:   Magalis Martin, APRN CNP  [address]  [place] [id number]

## 2022-06-28 ENCOUNTER — TELEPHONE (OUTPATIENT)
Dept: CARDIOLOGY | Facility: CLINIC | Age: 52
End: 2022-06-28

## 2022-06-28 DIAGNOSIS — I48.0 PAROXYSMAL ATRIAL FIBRILLATION (H): Primary | ICD-10-CM

## 2022-06-28 NOTE — TELEPHONE ENCOUNTER
Pt has switched insurance companies and would like to switch back to Eliquis.  Please advise.    Carlos Sumner, Pharm D  [facility]  [phone]       ADDENDUM:   Please send new script for eliquis 5 mg BID. Not sure if he is taking xarelto. If so, would take evening dose of xarelto and then start Eliquis the following evening.   HORTENSIA Rdz CNP    ADDENDUM: Left detailed message about how to switch from Xarelto to Eliquis. Script sent to pharmacy. Med list updated. Asked pt to call back to let me know he got the message and has no questions. Yary Tello RN Cardiology June 30, 2022, 8:37 AM

## 2022-06-29 ENCOUNTER — TELEPHONE (OUTPATIENT)
Dept: CARDIOLOGY | Facility: CLINIC | Age: 52
End: 2022-06-29

## 2022-06-30 NOTE — TELEPHONE ENCOUNTER
"Message from Cardiology Nurse Voice Mail: [phone]  Date: 6/30/2022  Time of Call: 9:02am  Regarding:  \"Calling back to let you know, sounds like a plan about the Eliquis\"    Please call:  [phone] if further questions    Tamara Fritz MA Cardiology   6/30/2022 9:47 AM      "

## 2022-07-06 ENCOUNTER — ANCILLARY PROCEDURE (OUTPATIENT)
Dept: CARDIOLOGY | Facility: CLINIC | Age: 52
End: 2022-07-06
Attending: INTERNAL MEDICINE
Payer: COMMERCIAL

## 2022-07-06 DIAGNOSIS — I48.0 PAROXYSMAL ATRIAL FIBRILLATION (H): ICD-10-CM

## 2022-07-06 DIAGNOSIS — Z95.810 ICD (IMPLANTABLE CARDIOVERTER-DEFIBRILLATOR) IN PLACE: ICD-10-CM

## 2022-07-06 DIAGNOSIS — I48.0 PAROXYSMAL ATRIAL FIBRILLATION (H): Primary | ICD-10-CM

## 2022-07-06 PROCEDURE — 93296 REM INTERROG EVL PM/IDS: CPT | Performed by: INTERNAL MEDICINE

## 2022-07-06 PROCEDURE — 93295 DEV INTERROG REMOTE 1/2/MLT: CPT | Performed by: INTERNAL MEDICINE

## 2022-07-17 LAB
MDC_IDC_LEAD_IMPLANT_DT: NORMAL
MDC_IDC_LEAD_LOCATION: NORMAL
MDC_IDC_LEAD_LOCATION_DETAIL_1: NORMAL
MDC_IDC_LEAD_MFG: NORMAL
MDC_IDC_LEAD_MODEL: NORMAL
MDC_IDC_LEAD_SERIAL: NORMAL
MDC_IDC_MSMT_BATTERY_REMAINING_PERCENTAGE: 81 %
MDC_IDC_MSMT_BATTERY_RRT_TRIGGER: NORMAL
MDC_IDC_MSMT_BATTERY_STATUS: NORMAL
MDC_IDC_MSMT_BATTERY_VOLTAGE: 3.12 V
MDC_IDC_MSMT_CAP_CHARGE_DTM: NORMAL
MDC_IDC_MSMT_CAP_CHARGE_ENERGY: 40 J
MDC_IDC_MSMT_CAP_CHARGE_TIME: 9.4 S
MDC_IDC_MSMT_CAP_CHARGE_TYPE: NORMAL
MDC_IDC_MSMT_LEADCHNL_RA_LEAD_CHANNEL_STATUS: NORMAL
MDC_IDC_MSMT_LEADCHNL_RV_IMPEDANCE_VALUE: 489 OHM
MDC_IDC_MSMT_LEADCHNL_RV_LEAD_CHANNEL_STATUS: NORMAL
MDC_IDC_PG_IMPLANT_DTM: NORMAL
MDC_IDC_PG_MFG: NORMAL
MDC_IDC_PG_MODEL: NORMAL
MDC_IDC_PG_SERIAL: NORMAL
MDC_IDC_PG_TYPE: NORMAL
MDC_IDC_SESS_CLINIC_NAME: NORMAL
MDC_IDC_SESS_DTM: NORMAL
MDC_IDC_SESS_REPROGRAMMED: NO
MDC_IDC_SESS_TYPE: NORMAL
MDC_IDC_SET_BRADY_LOWRATE: 40 {BEATS}/MIN
MDC_IDC_SET_BRADY_MODE: NORMAL
MDC_IDC_SET_LEADCHNL_RA_SENSING_POLARITY: NORMAL
MDC_IDC_SET_LEADCHNL_RA_SENSING_SENSITIVITY: 0.4 MV
MDC_IDC_SET_LEADCHNL_RV_PACING_AMPLITUDE: 1.8 V
MDC_IDC_SET_LEADCHNL_RV_PACING_POLARITY: NORMAL
MDC_IDC_SET_LEADCHNL_RV_PACING_PULSEWIDTH: 0.4 MS
MDC_IDC_SET_LEADCHNL_RV_SENSING_ADAPTATION_MODE: NORMAL
MDC_IDC_SET_LEADCHNL_RV_SENSING_POLARITY: NORMAL
MDC_IDC_SET_LEADCHNL_RV_SENSING_SENSITIVITY: 0.8 MV
MDC_IDC_SET_ZONE_DETECTION_BEATS_DENOMINATOR: 40 {BEATS}
MDC_IDC_SET_ZONE_DETECTION_BEATS_NUMERATOR: 30 {BEATS}
MDC_IDC_SET_ZONE_DETECTION_INTERVAL: 250 MS
MDC_IDC_SET_ZONE_DETECTION_INTERVAL: 300 MS
MDC_IDC_SET_ZONE_TYPE: NORMAL
MDC_IDC_SET_ZONE_TYPE: NORMAL
MDC_IDC_STAT_AT_BURDEN_PERCENT: 0 %
MDC_IDC_STAT_AT_DTM_END: NORMAL
MDC_IDC_STAT_AT_DTM_START: NORMAL
MDC_IDC_STAT_BRADY_AS_VP_PERCENT: 0 %
MDC_IDC_STAT_BRADY_AS_VS_PERCENT: 100 %
MDC_IDC_STAT_BRADY_DTM_END: NORMAL
MDC_IDC_STAT_BRADY_DTM_START: NORMAL
MDC_IDC_STAT_BRADY_RV_PERCENT_PACED: 0 %
MDC_IDC_STAT_CRT_DTM_END: NORMAL
MDC_IDC_STAT_CRT_DTM_START: NORMAL
MDC_IDC_STAT_EPISODE_TOTAL_COUNT: 0
MDC_IDC_STAT_EPISODE_TOTAL_COUNT: 26
MDC_IDC_STAT_EPISODE_TOTAL_COUNT_DTM_END: NORMAL
MDC_IDC_STAT_EPISODE_TOTAL_COUNT_DTM_START: NORMAL
MDC_IDC_STAT_EPISODE_TYPE: NORMAL
MDC_IDC_STAT_TACHYTHERAPY_ATP_DELIVERED_TOTAL: 0
MDC_IDC_STAT_TACHYTHERAPY_SHOCKS_ABORTED_TOTAL: 0
MDC_IDC_STAT_TACHYTHERAPY_SHOCKS_DELIVERED_TOTAL: 0
MDC_IDC_STAT_TACHYTHERAPY_TOTAL_DTM_END: NORMAL
MDC_IDC_STAT_TACHYTHERAPY_TOTAL_DTM_START: NORMAL

## 2022-08-08 ENCOUNTER — OFFICE VISIT (OUTPATIENT)
Dept: URGENT CARE | Facility: URGENT CARE | Age: 52
End: 2022-08-08
Payer: COMMERCIAL

## 2022-08-08 VITALS
OXYGEN SATURATION: 98 % | TEMPERATURE: 98.4 F | HEART RATE: 71 BPM | BODY MASS INDEX: 34.59 KG/M2 | WEIGHT: 248 LBS | SYSTOLIC BLOOD PRESSURE: 160 MMHG | DIASTOLIC BLOOD PRESSURE: 94 MMHG

## 2022-08-08 DIAGNOSIS — I10 BENIGN ESSENTIAL HYPERTENSION: ICD-10-CM

## 2022-08-08 DIAGNOSIS — K04.7 TOOTH INFECTION: Primary | ICD-10-CM

## 2022-08-08 PROCEDURE — 99214 OFFICE O/P EST MOD 30 MIN: CPT | Performed by: PHYSICIAN ASSISTANT

## 2022-08-08 RX ORDER — HYDROCODONE BITARTRATE AND ACETAMINOPHEN 5; 325 MG/1; MG/1
1 TABLET ORAL EVERY 6 HOURS PRN
Qty: 10 TABLET | Refills: 0 | Status: SHIPPED | OUTPATIENT
Start: 2022-08-08 | End: 2022-08-11

## 2022-08-08 NOTE — PROGRESS NOTES
"  Assessment & Plan     Tooth infection    - amoxicillin-clavulanate (AUGMENTIN) 875-125 MG tablet; Take 1 tablet by mouth 2 times daily for 10 days  - HYDROcodone-acetaminophen (NORCO) 5-325 MG tablet; Take 1 tablet by mouth every 6 hours as needed for severe pain    Benign essential hypertension  Monitor blood pressure at home and if average pressure is greater than or equal to 140/90 follow up with primary care provider                 BMI:   Estimated body mass index is 34.59 kg/m  as calculated from the following:    Height as of 11/26/21: 1.803 m (5' 11\").    Weight as of this encounter: 112.5 kg (248 lb).           No follow-ups on file.    Karen Montes PA-C  [facility]            Subjective   Chief Complaint   Patient presents with     Dental Pain     4 Teeth on top of mouth and thinks one is abscess, on the upper right he has a lot of pain and is swollen.          HPI     Dental problem    Onset of symptoms was several days.  Course of illness is worsening.    Severity moderate  Current and Associated symptoms: infected teeth, top right  Treatment measures tried include None tried.  Predisposing factors include None.              Review of Systems   Constitutional, HEENT, cardiovascular, pulmonary, gi and gu systems are negative, except as otherwise noted.      Objective    BP (!) 160/94   Pulse 71   Temp 98.4  F (36.9  C) (Tympanic)   Wt 112.5 kg (248 lb)   SpO2 98%   BMI 34.59 kg/m    Body mass index is 34.59 kg/m .  Physical Exam  Constitutional:       General: He is not in acute distress.  HENT:      Head:      Comments: Numerous cracked off upper teeth on both sides. There is tenderness and redness on right upper gums, no drainage or discharge.   Neurological:      Mental Status: He is alert.                            .  ..  "

## 2022-08-29 ENCOUNTER — TELEPHONE (OUTPATIENT)
Dept: CARDIOLOGY | Facility: CLINIC | Age: 52
End: 2022-08-29

## 2022-08-29 NOTE — TELEPHONE ENCOUNTER
Called pt and scheduled him for a clinic visit with Dr. Harrison this Thursday 9/1/22 at 1:00pm.    Kristin Gauthier RN

## 2022-08-29 NOTE — TELEPHONE ENCOUNTER
Remote transmission read.  Pt currently in Afib with controlled V-rates ranging anywhere from 70-80bpm.  Pt on Eliquis.  It looks like afib started 8/26/22 at 2200 and has been ongoing.     I will route this to his clinic team in [place].

## 2022-08-29 NOTE — TELEPHONE ENCOUNTER
Routing call to device nurses.     Pt calling in with c/o his heart racing 20 min. after eating at about 115 bpm, /99. Pt has hx of P Afib .    Would you be able to do a remote device check and send us the read to make sure there is nothing concerning happening ?    Kristin Gauthier, RN

## 2022-08-29 NOTE — TELEPHONE ENCOUNTER
"Routing call to Dr. Harrison.     Dr. Harrison last saw pt 4/11/22. Due to f/u in Oct. 2022    Pt calling in with c/o his heart racing 20 min. after eating at about 115 bpm, /99. Pt has hx of P Afib and ICD.    Reached out to device clinic to see if they could do a remote check    See device nurse interrogation below , they also state-  \"Pt is in Afib with controlled rates, has been in it for a few days.  Maybe that's why he feels bad. \"    Any further recommendations at this time?    Kristin Gauthier, RN    "

## 2022-08-29 NOTE — TELEPHONE ENCOUNTER
Yary, this patient is back in A. fib based on pacemaker check.  Apparently he has symptoms.  I can see him this Thursday during my vascular time if he is able to come in.    Leonid

## 2022-09-01 ENCOUNTER — HOSPITAL ENCOUNTER (OUTPATIENT)
Dept: CARDIOLOGY | Facility: CLINIC | Age: 52
Discharge: HOME | End: 2022-09-01
Attending: INTERNAL MEDICINE | Admitting: INTERNAL MEDICINE
Payer: COMMERCIAL

## 2022-09-01 ENCOUNTER — OFFICE VISIT (OUTPATIENT)
Dept: CARDIOLOGY | Facility: CLINIC | Age: 52
End: 2022-09-01
Attending: INTERNAL MEDICINE

## 2022-09-01 ENCOUNTER — PRE VISIT (OUTPATIENT)
Dept: CARDIOLOGY | Facility: CLINIC | Age: 52
End: 2022-09-01

## 2022-09-01 VITALS
SYSTOLIC BLOOD PRESSURE: 127 MMHG | DIASTOLIC BLOOD PRESSURE: 74 MMHG | TEMPERATURE: 97.5 F | OXYGEN SATURATION: 98 % | BODY MASS INDEX: 34.84 KG/M2 | WEIGHT: 249.8 LBS | HEART RATE: 82 BPM

## 2022-09-01 DIAGNOSIS — I48.0 PAROXYSMAL ATRIAL FIBRILLATION (H): Primary | ICD-10-CM

## 2022-09-01 DIAGNOSIS — I48.0 PAROXYSMAL ATRIAL FIBRILLATION (H): ICD-10-CM

## 2022-09-01 DIAGNOSIS — I42.2 APICAL VARIANT HYPERTROPHIC CARDIOMYOPATHY (H): ICD-10-CM

## 2022-09-01 PROCEDURE — 99214 OFFICE O/P EST MOD 30 MIN: CPT | Performed by: INTERNAL MEDICINE

## 2022-09-01 PROCEDURE — 93010 ELECTROCARDIOGRAM REPORT: CPT | Performed by: INTERNAL MEDICINE

## 2022-09-01 PROCEDURE — 93005 ELECTROCARDIOGRAM TRACING: CPT

## 2022-09-01 NOTE — LETTER
[facility] [place]  [address] [address]  [phone]    September 1, 2022    RE:  Matthew Still                                                                                                                                                       [address] [facility] [address]    To whom it may concern:    Matthew Still is under my professional care for a heart condition.  He had a clinic appointment on September 1st and will require an procedure soon and will need to be off work for the day.  Otherwise his is OK to resume work without restrictions.    Sincerely,          Brice Harrison MD  Cardiology - [facility]  Pager: [phone]  Text Page  September 1, 2022

## 2022-09-01 NOTE — LETTER
9/1/2022    [facility]  [address] [address]    RE: Matthew Still       Dear Colleague,     I had the pleasure of seeing Matthew Still in the [facility].  CARDIOLOGY VISIT    REASON FOR VISIT: A. fib, apical hypertrophic cardiomyopathy    SUBJECTIVE:  51-year-old male seen for atrial fibrillation and apical hypertrophic cardiomyopathy.         He reports a history of paroxysmal A. fib dating back about 20 years.  He has had several cardioversions in the past.          Cardiac MRI December 2017 showed EF 70%, mild to moderate mid ventricular hypertrophy and severe apical hypertrophy consistent with apical hypertrophic cardiomyopathy, normal RV, diffuse patchy mid wall enhancement of the anterior and apical segments, total scar burden 11%, trivial MR.              February 2018 he underwent atrial fibrillation ablation including wide circumferential pulmonary vein isolation and SVC isolation.       October 2018 he had an episode of syncope.  He then underwent single-chamber ICD implantation (Biotronik Intica 7 VR-T ProMRI).       Coronary angiogram 2020 showed moderate first diagonal lesion with normal FFR, otherwise no obstructive coronary disease.       December 2020 he underwent successful cardioversion.      Nuclear stress November 2021 showed moderate ischemia of the mid to distal anterior wall, apex, mid to distal inferior wall, however defect may be secondary to hypertrophic cardiomyopathy.  Study appears unchanged from January 2020.      Zio monitor November 2021 showed sinus rhythm, 3 VT runs up to 11 beats, 26 SVT runs up to 17 beats, symptoms generally correlated with ectopy.     Echo December 2021 showed EF 60%, no outflow obstruction, no valve disease.    Device interrogation July 2022 showed 0% V pacing, 2 episodes of VT up to 14 beats, no A. Fib.    Overall he has been stable this summer.  He has some mild fatigue and generalized dyspnea which has  been quite stable.  Several days ago he went into [facility] with palpitations.  This worsens after he eats and blood pressure will be high.    MEDICATIONS:  Current Outpatient Medications   Medication     amLODIPine (NORVASC) 5 MG tablet     apixaban ANTICOAGULANT (ELIQUIS ANTICOAGULANT) 5 MG tablet     Ascorbic Acid (VITAMIN C PO)     aspirin (ASA) 81 MG tablet     atorvastatin (LIPITOR) 80 MG tablet     furosemide (LASIX) 20 MG tablet     metoprolol succinate ER (TOPROL-XL) 100 MG 24 hr tablet     UNABLE TO FIND     No current facility-administered medications for this visit.       ALLERGIES:  No Known Allergies    REVIEW OF SYSTEMS:  Constitutional:  No weight loss, fever, chills  HEENT:  Eyes:  No visual loss, blurred vision, double vision or yellow sclerae. No hearing loss, sneezing, congestion, runny nose or sore throat.  Skin:  No rash or itching.  Cardiovascular: per HPI  Respiratory: per HPI  GI:  No anorexia, nausea, vomiting or diarrhea. No abdominal pain or blood.  :  No dysurea, hematuria  Neurologic:  No headache, paralysis, ataxia, numbness or tingling in the extremities. No change in bowel or bladder control.  Musculoskeletal:  No muscle pain  Hematologic:  No bleeding or bruising.  Lymphatics:  No enlarged nodes. No history of splenectomy.  Endocrine:  No reports of sweating, cold or heat intolerance. No polyuria or polydipsia.  Allergies:  No history of asthma, hives, eczema or rhinitis.    PHYSICAL EXAM:  /74   Pulse 82   Temp 97.5  F (36.4  C) (Tympanic)   Wt 113.3 kg (249 lb 12.8 oz)   SpO2 98%   BMI 34.84 kg/m      Constitutional: awake, alert, no distress  Eyes: PERRL, sclera nonicteric  ENT: trachea midline  Respiratory: Lungs clear  Cardiovascular: Regular rate, totally irregular rhythm  GI: nondistended, nontender, bowel sounds present  Lymph/Hematologic: no lymphadenopathy  Skin: dry, no rash  Musculoskeletal: good muscle tone, strength 5/5 in upper and lower  extremities  Neurologic: no focal deficits  Neuropsychiatric: appropriate affact    DATA:  Lab:   Recent Labs   Lab Test 10/13/21  1225 02/06/20  0845   CHOL 142 151   HDL 39* 33*   LDL 69 95   TRIG 170* 115     EKG: September 1, 2022: A. fib, rate 81, ST changes consistent with hypertrophic cardiomyopathy    ASSESSMENT:  52-year-old male with paroxysmal A. fib and apical hypertrophic cardiomyopathy seen for follow-up.  Based on pacemaker he went into A. fib on August 26.  He remains in A. fib with controlled rate, but is symptomatic.  He has not had any documented A. fib since December 2020.  A cardioversion was recommended.  He has not missed any Eliquis doses in the past 3 to 4 weeks.    Would not recommend antiarrhythmic at this time.  If he starts having more A. fib, EP could weigh in on medical therapy.    RECOMMENDATIONS:  1.  Paroxysmal A. Fib  -Schedule cardioversion  -Continue Eliquis and metoprolol    2.  Apical hypertrophic cardiomyopathy  -Continue with annual echo and ZIO monitors    3.  ICD  -Continue routine device checks    Follow-up should be scheduled for the end of the year.    Brice Harrison MD  Cardiology - [facility]  Pager:  [phone]  Text Page  September 1, 2022      Thank you for allowing me to participate in the care of your patient.      Sincerely,     Brice Harrison MD     [facility]  cc:   Brice Harrison MD  [facility]  [address] [place] [address]

## 2022-09-01 NOTE — H&P (VIEW-ONLY)
CARDIOLOGY VISIT    REASON FOR VISIT: A. fib, apical hypertrophic cardiomyopathy    SUBJECTIVE:  51-year-old male seen for atrial fibrillation and apical hypertrophic cardiomyopathy.         He reports a history of paroxysmal A. fib dating back about 20 years.  He has had several cardioversions in the past.          Cardiac MRI December 2017 showed EF 70%, mild to moderate mid ventricular hypertrophy and severe apical hypertrophy consistent with apical hypertrophic cardiomyopathy, normal RV, diffuse patchy mid wall enhancement of the anterior and apical segments, total scar burden 11%, trivial MR.              February 2018 he underwent atrial fibrillation ablation including wide circumferential pulmonary vein isolation and SVC isolation.       October 2018 he had an episode of syncope.  He then underwent single-chamber ICD implantation (Biotronik Intica 7 VR-T ProMRI).       Coronary angiogram 2020 showed moderate first diagonal lesion with normal FFR, otherwise no obstructive coronary disease.       December 2020 he underwent successful cardioversion.      Nuclear stress November 2021 showed moderate ischemia of the mid to distal anterior wall, apex, mid to distal inferior wall, however defect may be secondary to hypertrophic cardiomyopathy.  Study appears unchanged from January 2020.      Zio monitor November 2021 showed sinus rhythm, 3 VT runs up to 11 beats, 26 SVT runs up to 17 beats, symptoms generally correlated with ectopy.     Echo December 2021 showed EF 60%, no outflow obstruction, no valve disease.    Device interrogation July 2022 showed 0% V pacing, 2 episodes of VT up to 14 beats, no A. Fib.    Overall he has been stable this summer.  He has some mild fatigue and generalized dyspnea which has been quite stable.  Several days ago he went into A. fib with palpitations.  This worsens after he eats and blood pressure will be high.    MEDICATIONS:  Current Outpatient Medications   Medication      amLODIPine (NORVASC) 5 MG tablet     apixaban ANTICOAGULANT (ELIQUIS ANTICOAGULANT) 5 MG tablet     Ascorbic Acid (VITAMIN C PO)     aspirin (ASA) 81 MG tablet     atorvastatin (LIPITOR) 80 MG tablet     furosemide (LASIX) 20 MG tablet     metoprolol succinate ER (TOPROL-XL) 100 MG 24 hr tablet     UNABLE TO FIND     No current facility-administered medications for this visit.       ALLERGIES:  No Known Allergies    REVIEW OF SYSTEMS:  Constitutional:  No weight loss, fever, chills  HEENT:  Eyes:  No visual loss, blurred vision, double vision or yellow sclerae. No hearing loss, sneezing, congestion, runny nose or sore throat.  Skin:  No rash or itching.  Cardiovascular: per HPI  Respiratory: per HPI  GI:  No anorexia, nausea, vomiting or diarrhea. No abdominal pain or blood.  :  No dysurea, hematuria  Neurologic:  No headache, paralysis, ataxia, numbness or tingling in the extremities. No change in bowel or bladder control.  Musculoskeletal:  No muscle pain  Hematologic:  No bleeding or bruising.  Lymphatics:  No enlarged nodes. No history of splenectomy.  Endocrine:  No reports of sweating, cold or heat intolerance. No polyuria or polydipsia.  Allergies:  No history of asthma, hives, eczema or rhinitis.    PHYSICAL EXAM:  /74   Pulse 82   Temp 97.5  F (36.4  C) (Tympanic)   Wt 113.3 kg (249 lb 12.8 oz)   SpO2 98%   BMI 34.84 kg/m      Constitutional: awake, alert, no distress  Eyes: PERRL, sclera nonicteric  ENT: trachea midline  Respiratory: Lungs clear  Cardiovascular: Regular rate, totally irregular rhythm  GI: nondistended, nontender, bowel sounds present  Lymph/Hematologic: no lymphadenopathy  Skin: dry, no rash  Musculoskeletal: good muscle tone, strength 5/5 in upper and lower extremities  Neurologic: no focal deficits  Neuropsychiatric: appropriate affact    DATA:  Lab:   Recent Labs   Lab Test 10/13/21  1225 02/06/20  0845   CHOL 142 151   HDL 39* 33*   LDL 69 95   TRIG 170* 115     EKG:  September 1, 2022: A. fib, rate 81, ST changes consistent with hypertrophic cardiomyopathy    ASSESSMENT:  52-year-old male with paroxysmal A. fib and apical hypertrophic cardiomyopathy seen for follow-up.  Based on pacemaker he went into A. fib on August 26.  He remains in A. fib with controlled rate, but is symptomatic.  He has not had any documented A. fib since December 2020.  A cardioversion was recommended.  He has not missed any Eliquis doses in the past 3 to 4 weeks.    Would not recommend antiarrhythmic at this time.  If he starts having more A. fib, EP could weigh in on medical therapy.    RECOMMENDATIONS:  1.  Paroxysmal A. Fib  -Schedule cardioversion  -Continue Eliquis and metoprolol    2.  Apical hypertrophic cardiomyopathy  -Continue with annual echo and ZIO monitors    3.  ICD  -Continue routine device checks    Follow-up should be scheduled for the end of the year.    Brice Harrison MD  Cardiology - [facility]  Pager:  [phone]  Text Page  September 1, 2022

## 2022-09-01 NOTE — PROGRESS NOTES
CARDIOLOGY VISIT    REASON FOR VISIT: A. fib, apical hypertrophic cardiomyopathy    SUBJECTIVE:  51-year-old male seen for atrial fibrillation and apical hypertrophic cardiomyopathy.         He reports a history of paroxysmal A. fib dating back about 20 years.  He has had several cardioversions in the past.          Cardiac MRI December 2017 showed EF 70%, mild to moderate mid ventricular hypertrophy and severe apical hypertrophy consistent with apical hypertrophic cardiomyopathy, normal RV, diffuse patchy mid wall enhancement of the anterior and apical segments, total scar burden 11%, trivial MR.              February 2018 he underwent atrial fibrillation ablation including wide circumferential pulmonary vein isolation and SVC isolation.       October 2018 he had an episode of syncope.  He then underwent single-chamber ICD implantation (Biotronik Intica 7 VR-T ProMRI).       Coronary angiogram 2020 showed moderate first diagonal lesion with normal FFR, otherwise no obstructive coronary disease.       December 2020 he underwent successful cardioversion.      Nuclear stress November 2021 showed moderate ischemia of the mid to distal anterior wall, apex, mid to distal inferior wall, however defect may be secondary to hypertrophic cardiomyopathy.  Study appears unchanged from January 2020.      Zio monitor November 2021 showed sinus rhythm, 3 VT runs up to 11 beats, 26 SVT runs up to 17 beats, symptoms generally correlated with ectopy.     Echo December 2021 showed EF 60%, no outflow obstruction, no valve disease.    Device interrogation July 2022 showed 0% V pacing, 2 episodes of VT up to 14 beats, no A. Fib.    Overall he has been stable this summer.  He has some mild fatigue and generalized dyspnea which has been quite stable.  Several days ago he went into A. fib with palpitations.  This worsens after he eats and blood pressure will be high.    MEDICATIONS:  Current Outpatient Medications   Medication      amLODIPine (NORVASC) 5 MG tablet     apixaban ANTICOAGULANT (ELIQUIS ANTICOAGULANT) 5 MG tablet     Ascorbic Acid (VITAMIN C PO)     aspirin (ASA) 81 MG tablet     atorvastatin (LIPITOR) 80 MG tablet     furosemide (LASIX) 20 MG tablet     metoprolol succinate ER (TOPROL-XL) 100 MG 24 hr tablet     UNABLE TO FIND     No current facility-administered medications for this visit.       ALLERGIES:  No Known Allergies    REVIEW OF SYSTEMS:  Constitutional:  No weight loss, fever, chills  HEENT:  Eyes:  No visual loss, blurred vision, double vision or yellow sclerae. No hearing loss, sneezing, congestion, runny nose or sore throat.  Skin:  No rash or itching.  Cardiovascular: per HPI  Respiratory: per HPI  GI:  No anorexia, nausea, vomiting or diarrhea. No abdominal pain or blood.  :  No dysurea, hematuria  Neurologic:  No headache, paralysis, ataxia, numbness or tingling in the extremities. No change in bowel or bladder control.  Musculoskeletal:  No muscle pain  Hematologic:  No bleeding or bruising.  Lymphatics:  No enlarged nodes. No history of splenectomy.  Endocrine:  No reports of sweating, cold or heat intolerance. No polyuria or polydipsia.  Allergies:  No history of asthma, hives, eczema or rhinitis.    PHYSICAL EXAM:  /74   Pulse 82   Temp 97.5  F (36.4  C) (Tympanic)   Wt 113.3 kg (249 lb 12.8 oz)   SpO2 98%   BMI 34.84 kg/m      Constitutional: awake, alert, no distress  Eyes: PERRL, sclera nonicteric  ENT: trachea midline  Respiratory: Lungs clear  Cardiovascular: Regular rate, totally irregular rhythm  GI: nondistended, nontender, bowel sounds present  Lymph/Hematologic: no lymphadenopathy  Skin: dry, no rash  Musculoskeletal: good muscle tone, strength 5/5 in upper and lower extremities  Neurologic: no focal deficits  Neuropsychiatric: appropriate affact    DATA:  Lab:   Recent Labs   Lab Test 10/13/21  1225 02/06/20  0845   CHOL 142 151   HDL 39* 33*   LDL 69 95   TRIG 170* 115     EKG:  September 1, 2022: A. fib, rate 81, ST changes consistent with hypertrophic cardiomyopathy    ASSESSMENT:  52-year-old male with paroxysmal A. fib and apical hypertrophic cardiomyopathy seen for follow-up.  Based on pacemaker he went into A. fib on August 26.  He remains in A. fib with controlled rate, but is symptomatic.  He has not had any documented A. fib since December 2020.  A cardioversion was recommended.  He has not missed any Eliquis doses in the past 3 to 4 weeks.    Would not recommend antiarrhythmic at this time.  If he starts having more A. fib, EP could weigh in on medical therapy.    RECOMMENDATIONS:  1.  Paroxysmal A. Fib  -Schedule cardioversion  -Continue Eliquis and metoprolol    2.  Apical hypertrophic cardiomyopathy  -Continue with annual echo and ZIO monitors    3.  ICD  -Continue routine device checks    Follow-up should be scheduled for the end of the year.    Brice Harrison MD  Cardiology - [facility]  Pager:  [phone]  Text Page  September 1, 2022

## 2022-09-20 ENCOUNTER — HOSPITAL ENCOUNTER (OUTPATIENT)
Facility: CLINIC | Age: 52
Discharge: HOME | End: 2022-09-20
Admitting: INTERNAL MEDICINE
Payer: COMMERCIAL

## 2022-09-20 ENCOUNTER — HOSPITAL ENCOUNTER (OUTPATIENT)
Dept: MEDSURG UNIT | Facility: CLINIC | Age: 52
Discharge: HOME | End: 2022-09-20
Attending: INTERNAL MEDICINE
Payer: COMMERCIAL

## 2022-09-20 ENCOUNTER — ANESTHESIA EVENT (OUTPATIENT)
Dept: SURGERY | Facility: CLINIC | Age: 52
End: 2022-09-20
Payer: COMMERCIAL

## 2022-09-20 ENCOUNTER — ANESTHESIA (OUTPATIENT)
Dept: SURGERY | Facility: CLINIC | Age: 52
End: 2022-09-20
Payer: COMMERCIAL

## 2022-09-20 VITALS
DIASTOLIC BLOOD PRESSURE: 90 MMHG | WEIGHT: 249 LBS | HEIGHT: 71 IN | OXYGEN SATURATION: 99 % | SYSTOLIC BLOOD PRESSURE: 147 MMHG | HEART RATE: 56 BPM | RESPIRATION RATE: 25 BRPM | BODY MASS INDEX: 34.86 KG/M2 | TEMPERATURE: 97.9 F

## 2022-09-20 DIAGNOSIS — I48.0 PAROXYSMAL ATRIAL FIBRILLATION (H): ICD-10-CM

## 2022-09-20 LAB
MAGNESIUM SERPL-MCNC: 2.1 MG/DL (ref 1.6–2.3)
POTASSIUM BLD-SCNC: 4.3 MMOL/L (ref 3.4–5.3)

## 2022-09-20 PROCEDURE — 36415 COLL VENOUS BLD VENIPUNCTURE: CPT | Performed by: INTERNAL MEDICINE

## 2022-09-20 PROCEDURE — 36591 DRAW BLOOD OFF VENOUS DEVICE: CPT

## 2022-09-20 PROCEDURE — 92960 CARDIOVERSION ELECTRIC EXT: CPT

## 2022-09-20 PROCEDURE — 92960 CARDIOVERSION ELECTRIC EXT: CPT | Performed by: INTERNAL MEDICINE

## 2022-09-20 PROCEDURE — 93010 ELECTROCARDIOGRAM REPORT: CPT | Mod: 76 | Performed by: INTERNAL MEDICINE

## 2022-09-20 PROCEDURE — 83735 ASSAY OF MAGNESIUM: CPT | Performed by: INTERNAL MEDICINE

## 2022-09-20 PROCEDURE — 84132 ASSAY OF SERUM POTASSIUM: CPT | Performed by: INTERNAL MEDICINE

## 2022-09-20 PROCEDURE — 93005 ELECTROCARDIOGRAM TRACING: CPT

## 2022-09-20 PROCEDURE — 258N000003 HC RX IP 258 OP 636: Performed by: INTERNAL MEDICINE

## 2022-09-20 PROCEDURE — 999N000054 HC STATISTIC EKG NON-CHARGEABLE

## 2022-09-20 PROCEDURE — 999N000184 HC STATISTIC TELEMETRY

## 2022-09-20 PROCEDURE — 250N000011 HC RX IP 250 OP 636: Performed by: ANESTHESIOLOGY

## 2022-09-20 PROCEDURE — 370N000017 HC ANESTHESIA TECHNICAL FEE, PER MIN

## 2022-09-20 PROCEDURE — 999N000010 HC STATISTIC ANES STAT CODE-CRNA PER MINUTE

## 2022-09-20 RX ORDER — NALOXONE HYDROCHLORIDE 0.4 MG/ML
0.4 INJECTION, SOLUTION INTRAMUSCULAR; INTRAVENOUS; SUBCUTANEOUS
Status: DISCONTINUED | OUTPATIENT
Start: 2022-09-20 | End: 2022-09-20 | Stop reason: HOSPADM

## 2022-09-20 RX ORDER — FLUMAZENIL 0.1 MG/ML
0.2 INJECTION, SOLUTION INTRAVENOUS
Status: DISCONTINUED | OUTPATIENT
Start: 2022-09-20 | End: 2022-09-20 | Stop reason: HOSPADM

## 2022-09-20 RX ORDER — SODIUM CHLORIDE 9 MG/ML
INJECTION, SOLUTION INTRAVENOUS CONTINUOUS
Status: DISCONTINUED | OUTPATIENT
Start: 2022-09-20 | End: 2022-09-20 | Stop reason: HOSPADM

## 2022-09-20 RX ORDER — PROPOFOL 10 MG/ML
INJECTION, EMULSION INTRAVENOUS PRN
Status: DISCONTINUED | OUTPATIENT
Start: 2022-09-20 | End: 2022-09-20

## 2022-09-20 RX ORDER — POTASSIUM CHLORIDE 1500 MG/1
40 TABLET, EXTENDED RELEASE ORAL
Status: DISCONTINUED | OUTPATIENT
Start: 2022-09-20 | End: 2022-09-20 | Stop reason: HOSPADM

## 2022-09-20 RX ORDER — ATROPINE SULFATE 0.1 MG/ML
.5-1 INJECTION INTRAVENOUS
Status: DISCONTINUED | OUTPATIENT
Start: 2022-09-20 | End: 2022-09-20 | Stop reason: HOSPADM

## 2022-09-20 RX ORDER — POTASSIUM CHLORIDE 1500 MG/1
20 TABLET, EXTENDED RELEASE ORAL
Status: DISCONTINUED | OUTPATIENT
Start: 2022-09-20 | End: 2022-09-20 | Stop reason: HOSPADM

## 2022-09-20 RX ORDER — NALOXONE HYDROCHLORIDE 0.4 MG/ML
0.2 INJECTION, SOLUTION INTRAMUSCULAR; INTRAVENOUS; SUBCUTANEOUS
Status: DISCONTINUED | OUTPATIENT
Start: 2022-09-20 | End: 2022-09-20 | Stop reason: HOSPADM

## 2022-09-20 RX ORDER — MAGNESIUM SULFATE HEPTAHYDRATE 40 MG/ML
2 INJECTION, SOLUTION INTRAVENOUS
Status: DISCONTINUED | OUTPATIENT
Start: 2022-09-20 | End: 2022-09-20 | Stop reason: HOSPADM

## 2022-09-20 RX ADMIN — SODIUM CHLORIDE: 9 INJECTION, SOLUTION INTRAVENOUS at 07:33

## 2022-09-20 RX ADMIN — PROPOFOL 120 MG: 10 INJECTION, EMULSION INTRAVENOUS at 08:47

## 2022-09-20 ASSESSMENT — ACTIVITIES OF DAILY LIVING (ADL)
ADLS_ACUITY_SCORE: 35
ADLS_ACUITY_SCORE: 35

## 2022-09-20 ASSESSMENT — ENCOUNTER SYMPTOMS: DYSRHYTHMIAS: 1

## 2022-09-20 NOTE — DISCHARGE INSTRUCTIONS
Cardioversion Discharge Instructions    After you go home:       For 24 hours - due to the sedation you received:    Have an adult stay with you for 24 hours.   Relax and take it easy.  Do NOT make any important or legal decisions.  Do NOT drive or operate machines at home or at work.  Do NOT drink alcohol.    Diet:    Start with clear liquids and progress to your normal diet as you feel able.    Medicines:    Take your medications, including blood thinners, unless your provider tells you not to.  If you have stopped any medications, check with your provider about when to restart them.    Follow Up Appointments:    Follow up with your cardiologist at [facility] of patient preference as instructed.  Follow up with your primary care provider as needed.    Post cardioversion:    The skin on your chest or back may feel tender for 48 hours.  If your skin is tender, you may:    Use a cold pack on the site. Never use ice directly on your skin. Use the cold pack for 20 minutes. Remove it for at least 30 minutes before re-using.  Apply 1% hydrocortisone cream to the skin (sold at drug stores)  Take Advil (Ibuprofen) or Tylenol (Acetaminophen) per your provider's recommendations.      Call your provider if you have:    Weakness, dizziness, lightheadedness, or fainting.  Shortness of breath.  Irregular heartbeat, feelings of your heart fluttering or beating fast, hard or palpitations.   More than minor skin discomfort or redness where the cardioversion pads were placed.  Questions or concerns.      Call 911 if you have:    Pain in your chest, arm, shoulder, neck, or upper back.  You have problems speaking or seeing.  Weakness in your arm or leg.  You are unable to move your arm or leg.  You have uncontrolled bleeding.         [facility] [place]:    [phone] [facility] (7 days a week)

## 2022-09-20 NOTE — PROCEDURES
[facility]    Procedure: Cardioversion    Date/Time: 9/20/2022 1:21 PM  Performed by: Fracisco Dutton MD  Authorized by: Brice Harrison MD       UNIVERSAL PROTOCOL   Site Marked: Yes  Prior Images Obtained and Reviewed:  Yes  Required items: Required blood products, implants, devices and special equipment available    Patient identity confirmed:  Verbally with patient  Patient was reevaluated immediately before administering moderate or deep sedation or anesthesia  Confirmation Checklist:  Patient's identity using two indicators  Time out: Immediately prior to the procedure a time out was called    Universal Protocol: the Joint Commission Universal Protocol was followed    Preparation: Patient was prepped and draped in usual sterile fashion       ANESTHESIA  Anesthesia was administered and monitored by anesthesiology.  See anesthesia documentation for details.    SEDATION  Patient Sedated: Yes    Vital signs: Vital signs monitored during sedation      PROCEDURE DETAILS  Pre-procedure rhythm: atrial fibrillation  Electrodes: pads  Electrodes placed: anterior-posterior  Number of attempts: 1    Details of Attempts:  Pt converted to the sinus rhythm with 200J x1   Post-procedure rhythm: normal sinus rhythm  Complications: no complications      PROCEDURE    Patient Tolerance:  Patient tolerated the procedure well with no immediate complications

## 2022-09-20 NOTE — PROGRESS NOTES
Care Suites Discharge Nursing Note    Patient Information  Name: Matthew Still  Age: 52 year old    Discharge Education:  Discharge instructions reviewed: Yes  Additional education/resources provided: NA  Patient/patient representative verbalizes understanding: Yes  Patient discharging on new medications: No  Medication education completed: N/A    Discharge Plans:   Discharge location: home  Discharge ride contacted: Yes  Approximate discharge time: 1000    Discharge Criteria:  Discharge criteria met and vital signs stable: Yes    Patient Belongs:  Patient belongings returned to patient: Yes    Lucia Chen RN

## 2022-09-20 NOTE — ANESTHESIA PREPROCEDURE EVALUATION
Anesthesia Pre-Procedure Evaluation    Patient: Matthew Still   MRN: [id number] : 1970        Procedure : Procedure(s):  CARDIOVERSION          Past Medical History:   Diagnosis Date     Atrial fibrillation (H)      Coronary artery disease      Heart disease      Hyperlipidemia      Hypertension       Past Surgical History:   Procedure Laterality Date     ANESTHESIA CARDIOVERSION N/A 2020    Procedure: CARDIOVERSION (FOLLOWING ÁNGELA AT 0930);  Surgeon: GENERIC ANESTHESIA PROVIDER;  Location:  OR     CV CORONARY ANGIOGRAM N/A 2020    Procedure: Coronary Angiogram;  Surgeon: Daniel Miranda MD;  Location:  HEART CARDIAC CATH LAB     CV FRACTIONAL FLOW RATIO WIRE N/A 2020    Procedure: Fractional Flow [place];  Surgeon: Daniel Miranda MD;  Location:  HEART CARDIAC CATH LAB      No Known Allergies   Social History     Tobacco Use     Smoking status: Never Smoker     Smokeless tobacco: Former User   Substance Use Topics     Alcohol use: Yes     Comment: 1 drink per month      Wt Readings from Last 1 Encounters:   22 113.3 kg (249 lb 12.8 oz)        Anesthesia Evaluation            ROS/MED HX  ENT/Pulmonary:       Neurologic:       Cardiovascular: Comment: Apical variant hypertrophic cardiomyopathy     (+) Dyslipidemia hypertension-----dysrhythmias, a-fib,     METS/Exercise Tolerance:     Hematologic:       Musculoskeletal:       GI/Hepatic:       Renal/Genitourinary:       Endo:     (+) Obesity,     Psychiatric/Substance Use:       Infectious Disease:       Malignancy:       Other:            Physical Exam    Airway        Mallampati: II   TM distance: > 3 FB   Neck ROM: full   Mouth opening: > 3 cm    Respiratory Devices and Support         Dental       (+) missing      Cardiovascular   cardiovascular exam normal       Rhythm and rate: irregular and normal     Pulmonary   pulmonary exam normal                OUTSIDE LABS:  CBC:   Lab Results   Component Value Date    WBC 6.0  11/26/2021    WBC 8.5 02/06/2020    HGB 15.1 11/26/2021    HGB 14.8 02/06/2020    HCT 46.0 11/26/2021    HCT 43.5 02/06/2020     (L) 11/26/2021     02/06/2020     BMP:   Lab Results   Component Value Date     11/26/2021     10/13/2021    POTASSIUM 3.7 11/26/2021    POTASSIUM 4.3 10/13/2021    CHLORIDE 107 11/26/2021    CHLORIDE 110 (H) 10/13/2021    CO2 27 11/26/2021    CO2 26 10/13/2021    BUN 14 11/26/2021    BUN 16 10/13/2021    CR 0.93 11/26/2021    CR 1.05 10/13/2021     (H) 11/26/2021     (H) 10/13/2021     COAGS:   Lab Results   Component Value Date    PTT 27 02/06/2020    INR 1.15 (H) 11/02/2020     POC:   Lab Results   Component Value Date     (H) 02/05/2018     HEPATIC:   Lab Results   Component Value Date    ALBUMIN 3.8 11/14/2017    PROTTOTAL 7.4 11/14/2017    ALT 29 10/13/2021    AST 18 11/14/2017    ALKPHOS 67 11/14/2017    BILITOTAL 0.2 11/14/2017     OTHER:   Lab Results   Component Value Date    A1C 5.3 11/15/2017    BARRON 8.9 11/26/2021    MAG 2.2 11/02/2020       Anesthesia Plan    ASA Status:  3   NPO Status:  NPO Appropriate    Anesthesia Type: General.              Consents    Anesthesia Plan(s) and associated risks, benefits, and realistic alternatives discussed. Questions answered and patient/representative(s) expressed understanding.    - Discussed:     - Discussed with:  Patient         Postoperative Care            Comments:                BESSIE MARTÍNEZ MD

## 2022-09-20 NOTE — PROGRESS NOTES
Care Suites Admission Nursing Note    Patient Information  Name: Matthew Still  Age: 52 year old  Reason for admission: [facility]  Care Suites arrival time: 0630    Visitor Information  Name: Tyree  Informed of visitor restrictions: Yes  1 visitor allowed per patient   Visitor must screen negative for COVID symptoms   Visitor must wear a mask  Waiting rooms closed to visitors    Patient Admission/Assessment   Pre-procedure assessment complete: Yes  If abnormal assessment/labs, provider notified: N/A  NPO: Yes  Medications held per instructions/orders: Yes  Consent: deferred  If applicable, pregnancy test status: deferred  Patient oriented to room: Yes  Education/questions answered: Yes  Plan/other: Labs WNL    Discharge Planning  Discharge name/phone number: Tyree  Overnight post sedation caregiver: Wife  Discharge location: home    Lucia Chen RN

## 2022-09-20 NOTE — ANESTHESIA POSTPROCEDURE EVALUATION
Patient: Matthew Still    Procedure: Procedure(s):  CARDIOVERSION       Anesthesia Type:  General    Note:  Disposition: Outpatient   Postop Pain Control: Uneventful            Sign Out: Well controlled pain   PONV: No   Neuro/Psych: Uneventful            Sign Out: Acceptable/Baseline neuro status   Airway/Respiratory: Uneventful            Sign Out: Acceptable/Baseline resp. status   CV/Hemodynamics: Uneventful            Sign Out: Acceptable CV status; No obvious hypovolemia; No obvious fluid overload   Other NRE: NONE   DID A NON-ROUTINE EVENT OCCUR? No           Last vitals:  Vitals:    09/20/22 0920 09/20/22 0930 09/20/22 0940   BP: 136/87 133/86    Pulse: 54 55 55   Resp: 15 19 17   Temp:      SpO2: 98% 98% 98%       Electronically Signed By: BESSIE MARTÍNEZ MD  September 20, 2022  9:42 AM

## 2022-09-20 NOTE — ANESTHESIA CARE TRANSFER NOTE
Patient: Matthew Still    Procedure: Procedure(s):  CARDIOVERSION       Diagnosis: A-fib (H) [I48.91]  Diagnosis Additional Information: No value filed.    Anesthesia Type:   General     Note:    Oropharynx: oropharynx clear of all foreign objects and spontaneously breathing  Level of Consciousness: awake  Oxygen Supplementation: face mask  Level of Supplemental Oxygen (L/min / FiO2): 4  Independent Airway: airway patency satisfactory and stable  Dentition: dentition unchanged  Vital Signs Stable: post-procedure vital signs reviewed and stable  Report to RN Given: handoff report given  Patient transferred to: PACU (remains in care suites)    Handoff Report: Identifed the Patient, Identified the Reponsible Provider, Reviewed the pertinent medical history, Discussed the surgical course, Reviewed Intra-OP anesthesia mangement and issues during anesthesia, Set expectations for post-procedure period and Allowed opportunity for questions and acknowledgement of understanding      Vitals:  Vitals Value Taken Time   /82 09/20/22 0854   Temp     Pulse 56 09/20/22 0901   Resp 6 09/20/22 0901   SpO2 98 % 09/20/22 0901   Vitals shown include unvalidated device data.    Electronically Signed By: HORTENSIA Jordan CRNA  September 20, 2022  9:02 AM

## 2022-09-25 LAB
ATRIAL RATE - MUSE: 45 BPM
ATRIAL RATE - MUSE: 56 BPM
DIASTOLIC BLOOD PRESSURE - MUSE: NORMAL MMHG
DIASTOLIC BLOOD PRESSURE - MUSE: NORMAL MMHG
INTERPRETATION ECG - MUSE: NORMAL
INTERPRETATION ECG - MUSE: NORMAL
P AXIS - MUSE: 14 DEGREES
P AXIS - MUSE: NORMAL DEGREES
PR INTERVAL - MUSE: 138 MS
PR INTERVAL - MUSE: NORMAL MS
QRS DURATION - MUSE: 104 MS
QRS DURATION - MUSE: 106 MS
QT - MUSE: 466 MS
QT - MUSE: 496 MS
QTC - MUSE: 457 MS
QTC - MUSE: 478 MS
R AXIS - MUSE: 15 DEGREES
R AXIS - MUSE: 41 DEGREES
SYSTOLIC BLOOD PRESSURE - MUSE: NORMAL MMHG
SYSTOLIC BLOOD PRESSURE - MUSE: NORMAL MMHG
T AXIS - MUSE: 170 DEGREES
T AXIS - MUSE: 194 DEGREES
VENTRICULAR RATE- MUSE: 56 BPM
VENTRICULAR RATE- MUSE: 58 BPM

## 2022-10-10 ENCOUNTER — ANCILLARY PROCEDURE (OUTPATIENT)
Dept: CARDIOLOGY | Facility: CLINIC | Age: 52
End: 2022-10-10
Attending: INTERNAL MEDICINE
Payer: COMMERCIAL

## 2022-10-10 DIAGNOSIS — I48.0 PAROXYSMAL ATRIAL FIBRILLATION (H): ICD-10-CM

## 2022-10-10 DIAGNOSIS — Z95.810 ICD (IMPLANTABLE CARDIOVERTER-DEFIBRILLATOR) IN PLACE: ICD-10-CM

## 2022-10-10 PROCEDURE — 93296 REM INTERROG EVL PM/IDS: CPT | Performed by: INTERNAL MEDICINE

## 2022-10-10 PROCEDURE — 93295 DEV INTERROG REMOTE 1/2/MLT: CPT | Performed by: INTERNAL MEDICINE

## 2022-10-13 DIAGNOSIS — I48.0 PAROXYSMAL ATRIAL FIBRILLATION (H): Primary | ICD-10-CM

## 2022-10-13 DIAGNOSIS — Z95.810 ICD (IMPLANTABLE CARDIOVERTER-DEFIBRILLATOR) IN PLACE: Primary | ICD-10-CM

## 2022-10-13 DIAGNOSIS — I42.9 CARDIOMYOPATHY (H): ICD-10-CM

## 2022-10-14 LAB
MDC_IDC_LEAD_IMPLANT_DT: NORMAL
MDC_IDC_LEAD_LOCATION: NORMAL
MDC_IDC_LEAD_LOCATION_DETAIL_1: NORMAL
MDC_IDC_LEAD_MFG: NORMAL
MDC_IDC_LEAD_MODEL: NORMAL
MDC_IDC_LEAD_SERIAL: NORMAL
MDC_IDC_MSMT_BATTERY_REMAINING_PERCENTAGE: 77 %
MDC_IDC_MSMT_BATTERY_RRT_TRIGGER: NORMAL
MDC_IDC_MSMT_BATTERY_STATUS: NORMAL
MDC_IDC_MSMT_BATTERY_VOLTAGE: 3.11 V
MDC_IDC_MSMT_CAP_CHARGE_DTM: NORMAL
MDC_IDC_MSMT_CAP_CHARGE_ENERGY: 40 J
MDC_IDC_MSMT_CAP_CHARGE_TIME: 9.5 S
MDC_IDC_MSMT_CAP_CHARGE_TYPE: NORMAL
MDC_IDC_MSMT_LEADCHNL_RA_LEAD_CHANNEL_STATUS: NORMAL
MDC_IDC_MSMT_LEADCHNL_RV_IMPEDANCE_VALUE: 491 OHM
MDC_IDC_MSMT_LEADCHNL_RV_LEAD_CHANNEL_STATUS: NORMAL
MDC_IDC_PG_IMPLANT_DTM: NORMAL
MDC_IDC_PG_MFG: NORMAL
MDC_IDC_PG_MODEL: NORMAL
MDC_IDC_PG_SERIAL: NORMAL
MDC_IDC_PG_TYPE: NORMAL
MDC_IDC_SESS_CLINIC_NAME: NORMAL
MDC_IDC_SESS_DTM: NORMAL
MDC_IDC_SESS_REPROGRAMMED: NO
MDC_IDC_SESS_TYPE: NORMAL
MDC_IDC_SET_BRADY_LOWRATE: 40 {BEATS}/MIN
MDC_IDC_SET_BRADY_MODE: NORMAL
MDC_IDC_SET_LEADCHNL_RA_SENSING_POLARITY: NORMAL
MDC_IDC_SET_LEADCHNL_RA_SENSING_SENSITIVITY: 0.4 MV
MDC_IDC_SET_LEADCHNL_RV_PACING_AMPLITUDE: 1.8 V
MDC_IDC_SET_LEADCHNL_RV_PACING_POLARITY: NORMAL
MDC_IDC_SET_LEADCHNL_RV_PACING_PULSEWIDTH: 0.4 MS
MDC_IDC_SET_LEADCHNL_RV_SENSING_ADAPTATION_MODE: NORMAL
MDC_IDC_SET_LEADCHNL_RV_SENSING_POLARITY: NORMAL
MDC_IDC_SET_LEADCHNL_RV_SENSING_SENSITIVITY: 0.8 MV
MDC_IDC_SET_ZONE_DETECTION_BEATS_DENOMINATOR: 40 {BEATS}
MDC_IDC_SET_ZONE_DETECTION_BEATS_NUMERATOR: 30 {BEATS}
MDC_IDC_SET_ZONE_DETECTION_INTERVAL: 250 MS
MDC_IDC_SET_ZONE_DETECTION_INTERVAL: 300 MS
MDC_IDC_SET_ZONE_TYPE: NORMAL
MDC_IDC_SET_ZONE_TYPE: NORMAL
MDC_IDC_STAT_AT_BURDEN_PERCENT: 0 %
MDC_IDC_STAT_AT_DTM_END: NORMAL
MDC_IDC_STAT_AT_DTM_START: NORMAL
MDC_IDC_STAT_BRADY_AS_VP_PERCENT: 0 %
MDC_IDC_STAT_BRADY_AS_VS_PERCENT: 100 %
MDC_IDC_STAT_BRADY_DTM_END: NORMAL
MDC_IDC_STAT_BRADY_DTM_START: NORMAL
MDC_IDC_STAT_BRADY_RV_PERCENT_PACED: 0 %
MDC_IDC_STAT_CRT_DTM_END: NORMAL
MDC_IDC_STAT_CRT_DTM_START: NORMAL
MDC_IDC_STAT_EPISODE_TOTAL_COUNT: 0
MDC_IDC_STAT_EPISODE_TOTAL_COUNT: 71
MDC_IDC_STAT_EPISODE_TOTAL_COUNT_DTM_END: NORMAL
MDC_IDC_STAT_EPISODE_TOTAL_COUNT_DTM_START: NORMAL
MDC_IDC_STAT_EPISODE_TYPE: NORMAL
MDC_IDC_STAT_TACHYTHERAPY_ATP_DELIVERED_TOTAL: 0
MDC_IDC_STAT_TACHYTHERAPY_SHOCKS_ABORTED_TOTAL: 0
MDC_IDC_STAT_TACHYTHERAPY_SHOCKS_DELIVERED_TOTAL: 0
MDC_IDC_STAT_TACHYTHERAPY_TOTAL_DTM_END: NORMAL
MDC_IDC_STAT_TACHYTHERAPY_TOTAL_DTM_START: NORMAL

## 2023-01-10 ENCOUNTER — HOSPITAL ENCOUNTER (OUTPATIENT)
Dept: CARDIOLOGY | Facility: CLINIC | Age: 53
Discharge: HOME | End: 2023-01-10
Attending: INTERNAL MEDICINE | Admitting: INTERNAL MEDICINE
Payer: COMMERCIAL

## 2023-01-10 DIAGNOSIS — Z95.810 ICD (IMPLANTABLE CARDIOVERTER-DEFIBRILLATOR) IN PLACE: ICD-10-CM

## 2023-01-10 DIAGNOSIS — I42.9 CARDIOMYOPATHY (H): ICD-10-CM

## 2023-01-10 LAB
MDC_IDC_LEAD_IMPLANT_DT: NORMAL
MDC_IDC_LEAD_LOCATION: NORMAL
MDC_IDC_LEAD_LOCATION_DETAIL_1: NORMAL
MDC_IDC_LEAD_MFG: NORMAL
MDC_IDC_LEAD_MODEL: NORMAL
MDC_IDC_LEAD_SERIAL: NORMAL
MDC_IDC_MSMT_BATTERY_STATUS: NORMAL
MDC_IDC_MSMT_BATTERY_VOLTAGE: 3.11 V
MDC_IDC_MSMT_CAP_CHARGE_ENERGY: 40 J
MDC_IDC_MSMT_CAP_CHARGE_TIME: 10 S
MDC_IDC_MSMT_CAP_CHARGE_TYPE: NORMAL
MDC_IDC_MSMT_LEADCHNL_RA_SENSING_INTR_AMPL: 2.2 MV
MDC_IDC_MSMT_LEADCHNL_RV_IMPEDANCE_VALUE: 509 OHM
MDC_IDC_MSMT_LEADCHNL_RV_PACING_THRESHOLD_AMPLITUDE: 0.6 V
MDC_IDC_MSMT_LEADCHNL_RV_PACING_THRESHOLD_PULSEWIDTH: 0.4 MS
MDC_IDC_MSMT_LEADCHNL_RV_SENSING_INTR_AMPL: 24.2 MV
MDC_IDC_PG_IMPLANT_DTM: NORMAL
MDC_IDC_PG_MFG: NORMAL
MDC_IDC_PG_MODEL: NORMAL
MDC_IDC_PG_SERIAL: NORMAL
MDC_IDC_PG_TYPE: NORMAL
MDC_IDC_SESS_CLINIC_NAME: NORMAL
MDC_IDC_SESS_DTM: NORMAL
MDC_IDC_SESS_REPROGRAMMED: NORMAL
MDC_IDC_SESS_TYPE: NORMAL
MDC_IDC_SET_BRADY_AT_MODE_SWITCH_MODE: NORMAL
MDC_IDC_SET_BRADY_HYSTRATE: 40 {BEATS}/MIN
MDC_IDC_SET_BRADY_LOWRATE: 40 {BEATS}/MIN
MDC_IDC_SET_BRADY_MODE: NORMAL
MDC_IDC_SET_BRADY_NIGHT_RATE: 40 {BEATS}/MIN
MDC_IDC_SET_CRT_PACED_CHAMBERS: NORMAL
MDC_IDC_SET_LEADCHNL_LV_PACING_CATHODE_ELECTRODE_1: NORMAL
MDC_IDC_SET_LEADCHNL_LV_PACING_CATHODE_LOCATION_1: NORMAL
MDC_IDC_SET_LEADCHNL_LV_PACING_POLARITY: NORMAL
MDC_IDC_SET_LEADCHNL_LV_SENSING_CATHODE_ELECTRODE_1: NORMAL
MDC_IDC_SET_LEADCHNL_LV_SENSING_CATHODE_LOCATION_1: NORMAL
MDC_IDC_SET_LEADCHNL_LV_SENSING_POLARITY: NORMAL
MDC_IDC_SET_LEADCHNL_RA_PACING_CATHODE_ELECTRODE_1: NORMAL
MDC_IDC_SET_LEADCHNL_RA_PACING_CATHODE_LOCATION_1: NORMAL
MDC_IDC_SET_LEADCHNL_RA_PACING_POLARITY: NORMAL
MDC_IDC_SET_LEADCHNL_RA_SENSING_POLARITY: NORMAL
MDC_IDC_SET_LEADCHNL_RA_SENSING_SENSITIVITY: 0.4 MV
MDC_IDC_SET_LEADCHNL_RV_PACING_AMPLITUDE: 2 V
MDC_IDC_SET_LEADCHNL_RV_PACING_POLARITY: NORMAL
MDC_IDC_SET_LEADCHNL_RV_PACING_PULSEWIDTH: 0.4 MS
MDC_IDC_SET_LEADCHNL_RV_SENSING_POLARITY: NORMAL
MDC_IDC_SET_LEADCHNL_RV_SENSING_SENSITIVITY: 0.8 MV
MDC_IDC_SET_ZONE_DETECTION_INTERVAL: 250 MS
MDC_IDC_SET_ZONE_DETECTION_INTERVAL: 300 MS
MDC_IDC_SET_ZONE_DETECTION_INTERVAL: 350 MS
MDC_IDC_SET_ZONE_TYPE: NORMAL
MDC_IDC_STAT_AT_MODE_SW_COUNT: 0
MDC_IDC_STAT_BRADY_RV_PERCENT_PACED: 0 %
MDC_IDC_STAT_EPISODE_RECENT_COUNT: 0
MDC_IDC_STAT_EPISODE_TYPE: NORMAL
MDC_IDC_STAT_TACHYTHERAPY_SHOCKS_ABORTED_TOTAL: 0
MDC_IDC_STAT_TACHYTHERAPY_SHOCKS_DELIVERED_RECENT: 0
MDC_IDC_STAT_TACHYTHERAPY_SHOCKS_DELIVERED_TOTAL: 0

## 2023-01-10 PROCEDURE — 93282 PRGRMG EVAL IMPLANTABLE DFB: CPT

## 2023-01-10 PROCEDURE — 93282 PRGRMG EVAL IMPLANTABLE DFB: CPT | Mod: 26 | Performed by: INTERNAL MEDICINE

## 2023-02-21 NOTE — PROGRESS NOTES
Matthew is a 52 year old who is being evaluated via a billable video visit.      How would you like to obtain your AVS? Mail a copy  If the video visit is dropped, the invitation should be resent by: Text to cell phone: [phone]  Will anyone else be joining your video visit? No        Video-Visit Details    Type of service:  Video Visit     Originating Location (pt. Location):  Home    Distant Location (provider location):  Home  Platform used for Video Visit: Karrie    Additional provider notes:  51-year-old male seen for atrial fibrillation and apical hypertrophic cardiomyopathy.         He reports a history of paroxysmal A. fib dating back about 20 years.  He has had several cardioversions in the past.          Cardiac MRI December 2017 showed EF 70%, mild to moderate mid ventricular hypertrophy and severe apical hypertrophy consistent with apical hypertrophic cardiomyopathy, normal RV, diffuse patchy mid wall enhancement of the anterior and apical segments, total scar burden 11%, trivial MR.              February 2018 he underwent atrial fibrillation ablation including wide circumferential pulmonary vein isolation and SVC isolation.       October 2018 he had an episode of syncope.  He then underwent single-chamber ICD implantation (Biotronik Intica 7 VR-T ProMRI).       Coronary angiogram 2020 showed moderate first diagonal lesion with normal FFR, otherwise no obstructive coronary disease.       December 2020 he underwent successful cardioversion.      Nuclear stress November 2021 showed moderate ischemia of the mid to distal anterior wall, apex, mid to distal inferior wall, however defect may be secondary to hypertrophic cardiomyopathy.  Study appears unchanged from January 2020.      Zio monitor November 2021 showed sinus rhythm, 3 VT runs up to 11 beats, 26 SVT runs up to 17 beats, symptoms generally correlated with ectopy.     Echo December 2021 showed EF 60%, no outflow obstruction, no valve  disease.    September 2022 he underwent successful cardioversion.    Device check January 2023 showed 0% pacing, few short episodes of PAT up to 615 beats, no A-fib.    He has been feeling the same recently.  He has done some light activity this winter including a little shoveling and walking, but nothing more strenuous.  He continues to have some intermittent lightheadedness with occasional short episodes of palpitations.  He denies any syncope or chest pain.    Exam:  General Appearance: no distress, normal body habitus, upright.  ENT/Mouth: membranes moist, no nasal discharge or bleeding gums. Normal head shape, no evidence of injury or laceration.  EYES: no scleral icterus, normal conjunctivae  Neck: no evidence of thyromegaly. Supple  Chest/Lungs: No audible wheezing equal chest wall expansion. Non labored breathing. No cough.  Cardiovascular: No evidence of elevated jugular venous pressure.   Abdomen: No observed jaundice.  Extremities: no cyanosis or clubbing noted.  Skin: no xanthelasma, normal skin collar. No evidence of facial lacerations.  Neurologic: Normal arm motion bilateral, no tremors. No evidence of focal defect.  Psychiatric: alert and oriented x3, calm    The rest of a comprehensive physical examination is deferred due to [facility] (public health emergency) video visit restrictions.    Data:    Recent Labs   Lab Test 10/13/21  1225 02/06/20  0845   CHOL 142 151   HDL 39* 33*   LDL 69 95   TRIG 170* 115       Agree with ROS as above.    Assessment:  52-year-old male seen for apical hypertrophic cardiomyopathy and paroxysmal A-fib.  He is doing well.  There is no recurrent A-fib on his device checks since his cardioversion last September.  He probably has some occasional ectopy that he is feeling.  Otherwise blood pressure seems stable.  Medications will be kept the same.    Recommendations:  1.  Apical hypertrophic cardiomyopathy  -Continue current medications  -Echo on follow-up    2.  Paroxysmal  atrial fibrillation  -Continue metoprolol and Eliquis    3.  ICD  -Continue device checks    Follow-up in 6 months with QUAN after echo.      Video-Visit Details    Type of service:  Video Visit    Video Start Time: 1:35 PM  Video End Time (time video stopped): 1:44 PM    A total of 25 minutes was spent with clinic visit, including chart review and coordinating care, >50% of time was spent talking with patient.    Brice Harrison MD  Cardiology - [facility]  Pager: [phone]  Text Page  February 21, 2023

## 2023-02-22 ENCOUNTER — VIRTUAL VISIT (OUTPATIENT)
Dept: CARDIOLOGY | Facility: CLINIC | Age: 53
End: 2023-02-22
Attending: INTERNAL MEDICINE
Payer: COMMERCIAL

## 2023-02-22 DIAGNOSIS — I42.2 APICAL VARIANT HYPERTROPHIC CARDIOMYOPATHY (H): Primary | ICD-10-CM

## 2023-02-22 DIAGNOSIS — E78.5 HYPERLIPIDEMIA LDL GOAL <70: ICD-10-CM

## 2023-02-22 DIAGNOSIS — I47.29 NSVT (NONSUSTAINED VENTRICULAR TACHYCARDIA) (H): ICD-10-CM

## 2023-02-22 DIAGNOSIS — I48.0 PAROXYSMAL ATRIAL FIBRILLATION (H): ICD-10-CM

## 2023-02-22 DIAGNOSIS — R06.09 DOE (DYSPNEA ON EXERTION): ICD-10-CM

## 2023-02-22 DIAGNOSIS — I10 BENIGN ESSENTIAL HYPERTENSION: ICD-10-CM

## 2023-02-22 PROCEDURE — 99214 OFFICE O/P EST MOD 30 MIN: CPT | Mod: 95 | Performed by: INTERNAL MEDICINE

## 2023-02-22 RX ORDER — FUROSEMIDE 20 MG
20 TABLET ORAL DAILY
Qty: 90 TABLET | Refills: 3 | Status: SHIPPED | OUTPATIENT
Start: 2023-02-22 | End: 2023-09-27 | Stop reason: SINTOL

## 2023-02-22 RX ORDER — METOPROLOL SUCCINATE 100 MG/1
150 TABLET, EXTENDED RELEASE ORAL DAILY
Qty: 135 TABLET | Refills: 3 | Status: SHIPPED | OUTPATIENT
Start: 2023-02-22 | End: 2023-09-27

## 2023-02-22 RX ORDER — ATORVASTATIN CALCIUM 80 MG/1
80 TABLET, FILM COATED ORAL DAILY
Qty: 90 TABLET | Refills: 3 | Status: SHIPPED | OUTPATIENT
Start: 2023-02-22 | End: 2024-03-24

## 2023-02-22 RX ORDER — AMLODIPINE BESYLATE 5 MG/1
5 TABLET ORAL DAILY
Qty: 90 TABLET | Refills: 3 | Status: SHIPPED | OUTPATIENT
Start: 2023-02-22 | End: 2024-03-24

## 2023-02-22 NOTE — LETTER
2/22/2023    [facility]  [address] [address]    RE: Matthew Still       Dear Colleague,     I had the pleasure of seeing Matthew Still in the [facility].  Matthew is a 52 year old who is being evaluated via a billable video visit.      How would you like to obtain your AVS? Mail a copy  If the video visit is dropped, the invitation should be resent by: Text to cell phone: [phone]  Will anyone else be joining your video visit? No        Video-Visit Details    Type of service:  Video Visit     Originating Location (pt. Location):  Home    Distant Location (provider location):  Home  Platform used for Video Visit: [employer]    Additional provider notes:  51-year-old male seen for atrial fibrillation and apical hypertrophic cardiomyopathy.         He reports a history of paroxysmal A. fib dating back about 20 years.  He has had several cardioversions in the past.          Cardiac MRI December 2017 showed EF 70%, mild to moderate mid ventricular hypertrophy and severe apical hypertrophy consistent with apical hypertrophic cardiomyopathy, normal RV, diffuse patchy mid wall enhancement of the anterior and apical segments, total scar burden 11%, trivial MR.              February 2018 he underwent atrial fibrillation ablation including wide circumferential pulmonary vein isolation and SVC isolation.       October 2018 he had an episode of syncope.  He then underwent single-chamber ICD implantation (Biotronik Intica 7 VR-T ProMRI).       Coronary angiogram 2020 showed moderate first diagonal lesion with normal FFR, otherwise no obstructive coronary disease.       December 2020 he underwent successful cardioversion.      Nuclear stress November 2021 showed moderate ischemia of the mid to distal anterior wall, apex, mid to distal inferior wall, however defect may be secondary to hypertrophic cardiomyopathy.  Study appears unchanged from January 2020.      Zio monitor  November 2021 showed sinus rhythm, 3 VT runs up to 11 beats, 26 SVT runs up to 17 beats, symptoms generally correlated with ectopy.     Echo December 2021 showed EF 60%, no outflow obstruction, no valve disease.    September 2022 he underwent successful cardioversion.    Device check January 2023 showed 0% pacing, few short episodes of PAT up to 615 beats, no A-fib.    He has been feeling the same recently.  He has done some light activity this winter including a little shoveling and walking, but nothing more strenuous.  He continues to have some intermittent lightheadedness with occasional short episodes of palpitations.  He denies any syncope or chest pain.    Exam:  General Appearance: no distress, normal body habitus, upright.  ENT/Mouth: membranes moist, no nasal discharge or bleeding gums. Normal head shape, no evidence of injury or laceration.  EYES: no scleral icterus, normal conjunctivae  Neck: no evidence of thyromegaly. Supple  Chest/Lungs: No audible wheezing equal chest wall expansion. Non labored breathing. No cough.  Cardiovascular: No evidence of elevated jugular venous pressure.   Abdomen: No observed jaundice.  Extremities: no cyanosis or clubbing noted.  Skin: no xanthelasma, normal skin collar. No evidence of facial lacerations.  Neurologic: Normal arm motion bilateral, no tremors. No evidence of focal defect.  Psychiatric: alert and oriented x3, calm    The rest of a comprehensive physical examination is deferred due to [facility] (public health emergency) video visit restrictions.    Data:    Recent Labs   Lab Test 10/13/21  1225 02/06/20  0845   CHOL 142 151   HDL 39* 33*   LDL 69 95   TRIG 170* 115       Agree with ROS as above.    Assessment:  52-year-old male seen for apical hypertrophic cardiomyopathy and paroxysmal A-fib.  He is doing well.  There is no recurrent A-fib on his device checks since his cardioversion last September.  He probably has some occasional ectopy that he is feeling.   Otherwise blood pressure seems stable.  Medications will be kept the same.    Recommendations:  1.  Apical hypertrophic cardiomyopathy  -Continue current medications  -Echo on follow-up    2.  Paroxysmal atrial fibrillation  -Continue metoprolol and Eliquis    3.  ICD  -Continue device checks    Follow-up in 6 months with QUAN after echo.      Video-Visit Details    Type of service:  Video Visit    Video Start Time: 1:35 PM  Video End Time (time video stopped): 1:44 PM    A total of 25 minutes was spent with clinic visit, including chart review and coordinating care, >50% of time was spent talking with patient.    Brice Harrison MD  Cardiology - [facility]  Pager: [phone]  Text Page  February 21, 2023           Thank you for allowing me to participate in the care of your patient.      Sincerely,     Brice Harrison MD     [facility]  cc:   Brice Harrison MD  [facility]  [address]  [place] [address]

## 2023-02-22 NOTE — PATIENT INSTRUCTIONS
It was a pleasure speaking with you during our recent video visit.  It sounds like everything with your heart is stable.  Please keep your medications the same.  We would like to see you back in about 6 months time with another echocardiogram.  Our schedulers will contact you this summer to arrange the follow-up appointments.

## 2023-04-13 ENCOUNTER — ANCILLARY PROCEDURE (OUTPATIENT)
Dept: CARDIOLOGY | Facility: CLINIC | Age: 53
End: 2023-04-13
Attending: INTERNAL MEDICINE
Payer: COMMERCIAL

## 2023-04-13 DIAGNOSIS — Z95.810 ICD (IMPLANTABLE CARDIOVERTER-DEFIBRILLATOR) IN PLACE: ICD-10-CM

## 2023-04-13 DIAGNOSIS — I48.0 PAROXYSMAL ATRIAL FIBRILLATION (H): ICD-10-CM

## 2023-04-13 PROCEDURE — 93296 REM INTERROG EVL PM/IDS: CPT | Performed by: INTERNAL MEDICINE

## 2023-04-13 PROCEDURE — 93295 DEV INTERROG REMOTE 1/2/MLT: CPT | Performed by: INTERNAL MEDICINE

## 2023-04-17 LAB
MDC_IDC_LEAD_IMPLANT_DT: NORMAL
MDC_IDC_LEAD_LOCATION: NORMAL
MDC_IDC_LEAD_LOCATION_DETAIL_1: NORMAL
MDC_IDC_LEAD_MFG: NORMAL
MDC_IDC_LEAD_MODEL: NORMAL
MDC_IDC_LEAD_SERIAL: NORMAL
MDC_IDC_MSMT_BATTERY_REMAINING_PERCENTAGE: 72 %
MDC_IDC_MSMT_BATTERY_RRT_TRIGGER: NORMAL
MDC_IDC_MSMT_BATTERY_STATUS: NORMAL
MDC_IDC_MSMT_BATTERY_VOLTAGE: 3.11 V
MDC_IDC_MSMT_CAP_CHARGE_DTM: NORMAL
MDC_IDC_MSMT_CAP_CHARGE_ENERGY: 40 J
MDC_IDC_MSMT_CAP_CHARGE_TIME: 9.6 S
MDC_IDC_MSMT_CAP_CHARGE_TYPE: NORMAL
MDC_IDC_MSMT_LEADCHNL_RA_LEAD_CHANNEL_STATUS: NORMAL
MDC_IDC_MSMT_LEADCHNL_RV_IMPEDANCE_VALUE: 489 OHM
MDC_IDC_MSMT_LEADCHNL_RV_LEAD_CHANNEL_STATUS: NORMAL
MDC_IDC_PG_IMPLANT_DTM: NORMAL
MDC_IDC_PG_MFG: NORMAL
MDC_IDC_PG_MODEL: NORMAL
MDC_IDC_PG_SERIAL: NORMAL
MDC_IDC_PG_TYPE: NORMAL
MDC_IDC_SESS_CLINIC_NAME: NORMAL
MDC_IDC_SESS_DTM: NORMAL
MDC_IDC_SESS_REPROGRAMMED: NO
MDC_IDC_SESS_TYPE: NORMAL
MDC_IDC_SET_BRADY_LOWRATE: 40 {BEATS}/MIN
MDC_IDC_SET_BRADY_MODE: NORMAL
MDC_IDC_SET_LEADCHNL_RA_SENSING_POLARITY: NORMAL
MDC_IDC_SET_LEADCHNL_RA_SENSING_SENSITIVITY: 0.4 MV
MDC_IDC_SET_LEADCHNL_RV_PACING_AMPLITUDE: 2 V
MDC_IDC_SET_LEADCHNL_RV_PACING_POLARITY: NORMAL
MDC_IDC_SET_LEADCHNL_RV_PACING_PULSEWIDTH: 0.4 MS
MDC_IDC_SET_LEADCHNL_RV_SENSING_ADAPTATION_MODE: NORMAL
MDC_IDC_SET_LEADCHNL_RV_SENSING_POLARITY: NORMAL
MDC_IDC_SET_LEADCHNL_RV_SENSING_SENSITIVITY: 0.8 MV
MDC_IDC_SET_ZONE_DETECTION_BEATS_DENOMINATOR: 40 {BEATS}
MDC_IDC_SET_ZONE_DETECTION_BEATS_NUMERATOR: 30 {BEATS}
MDC_IDC_SET_ZONE_DETECTION_INTERVAL: 250 MS
MDC_IDC_SET_ZONE_DETECTION_INTERVAL: 300 MS
MDC_IDC_SET_ZONE_TYPE: NORMAL
MDC_IDC_SET_ZONE_TYPE: NORMAL
MDC_IDC_STAT_AT_BURDEN_PERCENT: 0 %
MDC_IDC_STAT_AT_DTM_END: NORMAL
MDC_IDC_STAT_AT_DTM_START: NORMAL
MDC_IDC_STAT_BRADY_AS_VP_PERCENT: 0 %
MDC_IDC_STAT_BRADY_AS_VS_PERCENT: 100 %
MDC_IDC_STAT_BRADY_DTM_END: NORMAL
MDC_IDC_STAT_BRADY_DTM_START: NORMAL
MDC_IDC_STAT_BRADY_RV_PERCENT_PACED: 0 %
MDC_IDC_STAT_CRT_DTM_END: NORMAL
MDC_IDC_STAT_CRT_DTM_START: NORMAL
MDC_IDC_STAT_EPISODE_TOTAL_COUNT: 0
MDC_IDC_STAT_EPISODE_TOTAL_COUNT: 71
MDC_IDC_STAT_EPISODE_TOTAL_COUNT_DTM_END: NORMAL
MDC_IDC_STAT_EPISODE_TOTAL_COUNT_DTM_START: NORMAL
MDC_IDC_STAT_EPISODE_TYPE: NORMAL
MDC_IDC_STAT_TACHYTHERAPY_ATP_DELIVERED_TOTAL: 0
MDC_IDC_STAT_TACHYTHERAPY_SHOCKS_ABORTED_TOTAL: 0
MDC_IDC_STAT_TACHYTHERAPY_SHOCKS_DELIVERED_TOTAL: 0
MDC_IDC_STAT_TACHYTHERAPY_TOTAL_DTM_END: NORMAL
MDC_IDC_STAT_TACHYTHERAPY_TOTAL_DTM_START: NORMAL

## 2023-04-27 ENCOUNTER — TELEPHONE (OUTPATIENT)
Dept: CARDIOLOGY | Facility: CLINIC | Age: 53
End: 2023-04-27
Payer: COMMERCIAL

## 2023-04-27 DIAGNOSIS — Z95.810 ICD (IMPLANTABLE CARDIOVERTER-DEFIBRILLATOR) IN PLACE: Primary | ICD-10-CM

## 2023-04-27 NOTE — TELEPHONE ENCOUNTER
----- Message from Piedad Bowser RN sent at 4/27/2023  4:26 PM CDT -----  Regarding: ICD device check  Patient tripped and fell today and hit his ICD. He is hoping someone could do a device check on his ICD to make sure nothing was dislodged. Would someone be able to help him with that please?    Thanks!    Piedad Bowser RN

## 2023-04-27 NOTE — TELEPHONE ENCOUNTER
Called pt back to discuss what happened.  Stated that he was at work and hit his ICD and it moved and turned maybe a 1/2 inch.      Denies it turning all the way around.  Stated that his chest and arm is pretty sore (but I stated this is most likely because he hit it).     Told him with his ICD there is no way to send a manual transmission, but his ICD updates every night and if something was wrong it would alert us once it communicates with his remote.       Plan is that we will call him if we get any alerts and he will call us if things get worse with him.      Pt agreed with this plan.

## 2023-04-27 NOTE — CONFIDENTIAL NOTE
"Routing to Dr. Harrison,    Patient tripped at work and hit his ICD. He says that he does not see any bruise or cut but it does hurt a 7/10. He feels like it \"sits flatter\" on his chest than previously. Concerns of being dislodged although he did not feel any sort of description other than pain.  BP and HR stable. No other symptoms noted.    Last seen on a virtual visit 2/22/23    52-year-old male seen for apical hypertrophic cardiomyopathy and paroxysmal A-fib.  He is doing well.  There is no recurrent A-fib on his device checks since his cardioversion last September.  He probably has some occasional ectopy that he is feeling.  Otherwise blood pressure seems stable.  Medications will be kept the same.    Recommendations:  1.  Apical hypertrophic cardiomyopathy  -Continue current medications  -Echo on follow-up     2.  Paroxysmal atrial fibrillation  -Continue metoprolol and Eliquis     3.  ICD  -Continue device checks    Follow-up in 6 months with QUAN after echo.    Parameters given to when he should seek out emergency services for the night. Message sent to device nurses to see if they could complete a device check to make sure it is not dislodged, which is his greatest concern at the time.     Any other tests, scans or recommendations?    Piedad Bowser RN  "

## 2023-04-28 ENCOUNTER — HOSPITAL ENCOUNTER (OUTPATIENT)
Dept: GENERAL RADIOLOGY | Facility: CLINIC | Age: 53
Discharge: HOME | End: 2023-04-28
Attending: INTERNAL MEDICINE | Admitting: INTERNAL MEDICINE
Payer: COMMERCIAL

## 2023-04-28 DIAGNOSIS — Z95.810 ICD (IMPLANTABLE CARDIOVERTER-DEFIBRILLATOR) IN PLACE: ICD-10-CM

## 2023-04-28 PROCEDURE — 71046 X-RAY EXAM CHEST 2 VIEWS: CPT

## 2023-04-28 NOTE — RESULT ENCOUNTER NOTE
"Per Dr Harrison: \"Chest x-ray on patient looks good.  I compared it to his chest x-ray from November 2021, the defibrillator has not moved, the wire looks good. Leonid\". Pt notified of above. Yary Tello RN Cardiology April 28, 2023, 4:42 PM      "

## 2023-04-28 NOTE — TELEPHONE ENCOUNTER
The device nurses can do a device check. I would like him to have a chest x-ray (PA and lateral), this will check the position of the device.      Leonid

## 2023-04-28 NOTE — CONFIDENTIAL NOTE
Patient was called to go over need for chest xray. Scheduling messaged to complete this. Device RN contacted patient last night. See telephone encounter for device RN communication. Piedad Bowser RN

## 2023-07-10 ENCOUNTER — TELEPHONE (OUTPATIENT)
Dept: CARDIOLOGY | Facility: CLINIC | Age: 53
End: 2023-07-10
Payer: COMMERCIAL

## 2023-07-10 DIAGNOSIS — Z95.810 ICD (IMPLANTABLE CARDIOVERTER-DEFIBRILLATOR) IN PLACE: Primary | ICD-10-CM

## 2023-07-10 DIAGNOSIS — I42.9 CARDIOMYOPATHY (H): ICD-10-CM

## 2023-07-10 NOTE — TELEPHONE ENCOUNTER
I agree with lowering the VT monitor parameters.  Could do Zio monitor if he is still having symptoms and nothing shows up on pacemaker check.    Leonid

## 2023-07-10 NOTE — TELEPHONE ENCOUNTER
"Spoke with patient. He has had episodes in the past where he has felt fuzzy and like he might pass out but has never completely lost consciousness like this. Since last week he has felt like there is \"a bubble\" in his heart that flutters off and on for a few seconds at a time. He takes his BP typically once every day and his SBP typically runs in the 130-140s, it was atypical for him to have this low BP.    Last transmission from his ICD was received on 7/10/23 and showed no episodes no logged since 2/3/23. Patient was surprised to hear that he wasn't in AF and that no episodes were logged when he passed out on 7/8.    Tachy settings as below:    Possible that patient is having PVCs/NSVT that are slower than device's lowest VT zone (170bpm). Spoke with Babs Marie who thinks it is a good idea to lower VT monitor zone to 150bpm to monitor for any slow VT. Will route to Dr. Harrison so he is aware.    Call out to patient to get him scheduled for a courtesy check at [facility] to lower VT monitor zone.    *Confirmed with Michael from [employer] that patient's RV lead has sensing capabilities in the atrium that is extremely sensitive. Meaning, if patient were to have gone into AF we would have been alerted. Also confirmed that no episodes will be logged for (NS)VT lower than 170bpm. He agrees that lowering monitor zone from 170 to 150 or 140bpm would be helpful to assess for possible slow VT.    BORA RN  "

## 2023-07-10 NOTE — TELEPHONE ENCOUNTER
Pts wife calling in for pt. Pt currently at work.     Wife states pt feels like he is back in a fib. Feeling irregular heart beats but also states he had a syncopal episode Saturday 7/8/23 night sometime between 8-10 pm. Pt was sitting in a chair and got up to throw some garbage away in the kitchen. On the way to the kitchen he blacked out for a minute and woke up on the floor. No injury's, did not hit is head. Pt had no aura prior that he is aware of or any symptoms. Wife took his BP while on floor with reading 75/45. Pt sat on floor for a couple of minutes and then felt fine. No further symptoms or concerns since. BP back to normal.   Pt wondering if his ICD could be checked to see if he is back in A. fib and if his heart had anything to do with his syncopal episode last Saturday.     Routing call to device nurses to interigate his device. Please call wife (Mery [phone]) back as  is at work until 3:30 pm with any further questions.     Kristin Gauthier, RN

## 2023-07-12 ENCOUNTER — ANCILLARY PROCEDURE (OUTPATIENT)
Dept: CARDIOLOGY | Facility: CLINIC | Age: 53
End: 2023-07-12
Attending: INTERNAL MEDICINE
Payer: COMMERCIAL

## 2023-07-12 DIAGNOSIS — I42.9 CARDIOMYOPATHY (H): ICD-10-CM

## 2023-07-12 DIAGNOSIS — Z95.810 ICD (IMPLANTABLE CARDIOVERTER-DEFIBRILLATOR) IN PLACE: ICD-10-CM

## 2023-07-12 LAB
MDC_IDC_LEAD_IMPLANT_DT: NORMAL
MDC_IDC_LEAD_LOCATION: NORMAL
MDC_IDC_LEAD_LOCATION_DETAIL_1: NORMAL
MDC_IDC_LEAD_MFG: NORMAL
MDC_IDC_LEAD_MODEL: NORMAL
MDC_IDC_LEAD_SERIAL: NORMAL
MDC_IDC_MSMT_BATTERY_STATUS: NORMAL
MDC_IDC_MSMT_BATTERY_VOLTAGE: 3.11 V
MDC_IDC_MSMT_CAP_CHARGE_ENERGY: 40 J
MDC_IDC_MSMT_CAP_CHARGE_TIME: 10 S
MDC_IDC_MSMT_CAP_CHARGE_TYPE: NORMAL
MDC_IDC_MSMT_LEADCHNL_RA_SENSING_INTR_AMPL: 3.3 MV
MDC_IDC_MSMT_LEADCHNL_RV_IMPEDANCE_VALUE: 509 OHM
MDC_IDC_MSMT_LEADCHNL_RV_PACING_THRESHOLD_AMPLITUDE: 0.5 V
MDC_IDC_MSMT_LEADCHNL_RV_PACING_THRESHOLD_PULSEWIDTH: 0.4 MS
MDC_IDC_MSMT_LEADCHNL_RV_SENSING_INTR_AMPL: 24.2 MV
MDC_IDC_PG_IMPLANT_DTM: NORMAL
MDC_IDC_PG_MFG: NORMAL
MDC_IDC_PG_MODEL: NORMAL
MDC_IDC_PG_SERIAL: NORMAL
MDC_IDC_PG_TYPE: NORMAL
MDC_IDC_SESS_CLINIC_NAME: NORMAL
MDC_IDC_SESS_DTM: NORMAL
MDC_IDC_SESS_REPROGRAMMED: NORMAL
MDC_IDC_SESS_TYPE: NORMAL
MDC_IDC_SET_BRADY_AT_MODE_SWITCH_MODE: NORMAL
MDC_IDC_SET_BRADY_HYSTRATE: 40 {BEATS}/MIN
MDC_IDC_SET_BRADY_LOWRATE: 40 {BEATS}/MIN
MDC_IDC_SET_BRADY_MODE: NORMAL
MDC_IDC_SET_BRADY_NIGHT_RATE: 40 {BEATS}/MIN
MDC_IDC_SET_CRT_PACED_CHAMBERS: NORMAL
MDC_IDC_SET_LEADCHNL_LV_PACING_CATHODE_ELECTRODE_1: NORMAL
MDC_IDC_SET_LEADCHNL_LV_PACING_CATHODE_LOCATION_1: NORMAL
MDC_IDC_SET_LEADCHNL_LV_PACING_POLARITY: NORMAL
MDC_IDC_SET_LEADCHNL_LV_SENSING_CATHODE_ELECTRODE_1: NORMAL
MDC_IDC_SET_LEADCHNL_LV_SENSING_CATHODE_LOCATION_1: NORMAL
MDC_IDC_SET_LEADCHNL_LV_SENSING_POLARITY: NORMAL
MDC_IDC_SET_LEADCHNL_RA_PACING_CATHODE_ELECTRODE_1: NORMAL
MDC_IDC_SET_LEADCHNL_RA_PACING_CATHODE_LOCATION_1: NORMAL
MDC_IDC_SET_LEADCHNL_RA_PACING_POLARITY: NORMAL
MDC_IDC_SET_LEADCHNL_RA_SENSING_POLARITY: NORMAL
MDC_IDC_SET_LEADCHNL_RA_SENSING_SENSITIVITY: 0.4 MV
MDC_IDC_SET_LEADCHNL_RV_PACING_AMPLITUDE: 2 V
MDC_IDC_SET_LEADCHNL_RV_PACING_POLARITY: NORMAL
MDC_IDC_SET_LEADCHNL_RV_PACING_PULSEWIDTH: 0.4 MS
MDC_IDC_SET_LEADCHNL_RV_SENSING_POLARITY: NORMAL
MDC_IDC_SET_LEADCHNL_RV_SENSING_SENSITIVITY: 0.8 MV
MDC_IDC_SET_ZONE_DETECTION_INTERVAL: 250 MS
MDC_IDC_SET_ZONE_DETECTION_INTERVAL: 300 MS
MDC_IDC_SET_ZONE_DETECTION_INTERVAL: 400 MS
MDC_IDC_SET_ZONE_TYPE: NORMAL
MDC_IDC_STAT_AT_MODE_SW_COUNT: 0
MDC_IDC_STAT_BRADY_RV_PERCENT_PACED: 0 %
MDC_IDC_STAT_EPISODE_RECENT_COUNT: 0
MDC_IDC_STAT_EPISODE_TYPE: NORMAL
MDC_IDC_STAT_TACHYTHERAPY_SHOCKS_ABORTED_TOTAL: 0
MDC_IDC_STAT_TACHYTHERAPY_SHOCKS_DELIVERED_RECENT: 0
MDC_IDC_STAT_TACHYTHERAPY_SHOCKS_DELIVERED_TOTAL: 0

## 2023-09-27 ENCOUNTER — OFFICE VISIT (OUTPATIENT)
Dept: CARDIOLOGY | Facility: CLINIC | Age: 53
End: 2023-09-27
Attending: INTERNAL MEDICINE
Payer: COMMERCIAL

## 2023-09-27 ENCOUNTER — HOSPITAL ENCOUNTER (OUTPATIENT)
Dept: CARDIOLOGY | Facility: CLINIC | Age: 53
Discharge: HOME | End: 2023-09-27
Attending: INTERNAL MEDICINE | Admitting: INTERNAL MEDICINE
Payer: COMMERCIAL

## 2023-09-27 VITALS
WEIGHT: 248.4 LBS | HEART RATE: 70 BPM | SYSTOLIC BLOOD PRESSURE: 131 MMHG | BODY MASS INDEX: 34.77 KG/M2 | HEIGHT: 71 IN | OXYGEN SATURATION: 97 % | DIASTOLIC BLOOD PRESSURE: 76 MMHG | RESPIRATION RATE: 18 BRPM

## 2023-09-27 DIAGNOSIS — E78.5 HYPERLIPIDEMIA LDL GOAL <70: ICD-10-CM

## 2023-09-27 DIAGNOSIS — I47.29 NSVT (NONSUSTAINED VENTRICULAR TACHYCARDIA) (H): ICD-10-CM

## 2023-09-27 DIAGNOSIS — I10 BENIGN ESSENTIAL HYPERTENSION: ICD-10-CM

## 2023-09-27 DIAGNOSIS — I42.2 APICAL VARIANT HYPERTROPHIC CARDIOMYOPATHY (H): ICD-10-CM

## 2023-09-27 DIAGNOSIS — I48.0 PAROXYSMAL ATRIAL FIBRILLATION (H): ICD-10-CM

## 2023-09-27 DIAGNOSIS — I25.10 CORONARY ARTERY DISEASE INVOLVING NATIVE CORONARY ARTERY OF NATIVE HEART WITHOUT ANGINA PECTORIS: Primary | ICD-10-CM

## 2023-09-27 LAB — LVEF ECHO: NORMAL

## 2023-09-27 PROCEDURE — 93306 TTE W/DOPPLER COMPLETE: CPT | Mod: 26 | Performed by: INTERNAL MEDICINE

## 2023-09-27 PROCEDURE — 99215 OFFICE O/P EST HI 40 MIN: CPT | Performed by: NURSE PRACTITIONER

## 2023-09-27 PROCEDURE — 255N000002 HC RX 255 OP 636: Performed by: INTERNAL MEDICINE

## 2023-09-27 RX ORDER — METOPROLOL SUCCINATE 200 MG/1
200 TABLET, EXTENDED RELEASE ORAL DAILY
Qty: 90 TABLET | Refills: 3 | Status: SHIPPED | OUTPATIENT
Start: 2023-09-27 | End: 2024-08-07

## 2023-09-27 RX ADMIN — HUMAN ALBUMIN MICROSPHERES AND PERFLUTREN 2 ML: 10; .22 INJECTION, SOLUTION INTRAVENOUS at 11:15

## 2023-09-27 NOTE — LETTER
9/27/2023    [facility]  [address] [address]    RE: Matthew HA Cheko       Dear Colleague,     I had the pleasure of seeing Matthew Still in the [facility].  Cardiology Clinic Progress Note  Matthew Still MRN# [id number]   YOB: 1970 Age: 53 year old      Primary Cardiologist:   Dr. Harrison      Patient presents today for 6-month follow-up.          History of Presenting Illness:      Matthew Still is a pleasant 53 year old patient with a past cardiac history significant for   CAD  Angio 2017 with 60% stenosis in the D1 with negative FFR with otherwise mild disease  Angio 2020 no changes   lexiscan 11/2021 with HCM changes not likely ischemia   Paroxysmal atrial fibrillation  since his 30s.    H/o multiple cardioversions, 2020, 2022  S/p A. fib ablation 2018  Recurrent atrial fibrillation s/p cardioversion 11/2020  No afib zio 11/2021  Rhythm control and anticoagulation  Apical hypertrophic cardiomyopathy   Diagnosed on MRI 2017-EF 70%, diffuse patchy mid wall enhancement of the anterior and apical segments with 11% scar burden  Had syncope with sustained VT and underwent ICD 2018   Echo 2021 EF 60%, no outflow obstruction  Echo 2023 EF >70% mid LVOT obstruction new   Hypertension  Breast tenderness with spironolactone  Hyperlipidemia  Past medical history significant for COVID pneumonia 11/2021.         Patient was seen by Dr. Harrison in February 2023 for routine follow-up.  He was able to do light activity he was shoveling and walking.  He reported intermittent lightheadedness and short episodes of palpitations but no syncope.  He had no further A-fib on device check after his cardioversion.    Patient called in July 2023 thinking he was back in atrial fibrillation.  He had a syncopal episode the weekend prior.  After getting up from a chair he walked to the kitchen and awoke on the floor.  He had no prior symptoms.  Upon regaining  consciousness BP was 75/45.  Device check showed no atrial fibrillation or VT.  Device clinic recommended changing his in VT heart rate sounds to detect slow VT if this was occurring.  Dr. Harrison noted that if he remains symptomatic could consider Zio patch to correlate symptoms.    Most recent lipid profile, BMP, ALT, device check reviewed today.  Echocardiogram today showing EF greater than 70%, apical hypertrophy with dynamic mid LVOT obstruction peak gradient 42 mmHg with Valsalva, which is new.  Results reviewed today.    Since July, he has not had any further ashtyn syncope.  He continues with episodes of presyncope/lightheadedness 1-2 times per week.  These start with palpitations which are prolonged and are associated with chest tightness.  Blood pressure has been controlled.  He denies any anginal symptoms.  Weight has been stable and denies any significant heart failure symptoms.  He is agreeable to increasing metoprolol.  He was previously on Lasix for lower extremity edema and will instead use compression stockings, leg elevation, and ambulation to help with this.  He has device check coming up in a couple weeks and we can see if his episodes are correlating with VT. Patient reports no shortness of breath, PND, orthopnea, syncope, edema, heart racing.                         Assessment and Plan:       Plan  Patient Instructions   Medication Changes:  STOP lasix d/t mid LV obstruction  INCREASE metoprolol XL to 200 mg daily     Recommendations:  Check blood pressure at least 1 hour after medications. Call the clinic if your blood pressure is consistently greater than 130/80.   Call if blood pressure is less than 90 on top or less than 100 with lightheadedness.     Check daily weights and call the clinic if your weight has increased more than 2 lbs in one day or 5 lbs in one week; if you feel more short of breath or have worsening swelling in your legs or abdomen.  2000 mg sodium diet   4-8 8 ounce  "glasses of fluids per day, not more than 2000 mL (2 L) per day.      Follow-up:  Cardiology follow up at [facility]: Dr. Harrison in 3 months  Annual fasting lab within 2 months (lipid/ALT, BMP)  Device clinic as scheduled      Cardiology Scheduling~[phone]  Cardiology Clinic RN~[phone] (Yary RN, Kristin RN, Mary RN)               Coronary artery disease involving native coronary artery of native heart without angina pectoris  Paroxysmal atrial fibrillation (H)  Apical variant hypertrophic cardiomyopathy (H)  Benign essential hypertension  Hyperlipidemia LDL goal <70  NSVT (nonsustained ventricular tachycardia) (H)        CAD  No angina  Continue GDMT  HA and nausea with imdur 30        Apical hypertrophic cardiomyopathy  No symptoms of heart failure  Continue beta-blocker        Paroxysmal atrial fibrillation  H/o Symptomatic  with episodes  Elevated [id number]-GJSf score  Continue metoprolol for rate control and Eliquis for anticoagulation        NSVT  Prior episode of syncope and underwent ICD placement 2018  Short episodes of NSVT correlating with palpitations, on device checks but no further sustained and has not needed therapies with ICD  Continue metoprolol  Follow with device clinic  Consider switching amlodipine to diltiazem, if needed        Hyperlipidemia  Last LDL 69 10/2021  Continue statin  Reassess lipids  Consider starting zetia, if needed        HTN  controlled   Continue current medications        Presyncope  significant lightheadedness with \"tunnel vision\" but does not lose consciousness preceded by Palpitations  Beta-blocker increased  History of SVT  Valsalva maneuver       Respiratory:  clear to auscultation; normal symmetry        Cardiac: regular rate and rhythm     GI:  nondistended     Extremities and Muscular Skeletal:   no edema            Thank you for allowing me to participate in this delightful patient's care.      This note was completed in part using Dragon voice " recognition software. Although reviewed after completion, some word and grammatical errors may occur.    HORTENSIA Rdz CNP      Total time spent today was 56 mins, reviewing labs, testing, notes, documenting notes, and seeing patient.      Thank you for allowing me to participate in the care of your patient.      Sincerely,     HORTENSIA Rdz CNP     [facility]  cc: Brice Harrison MD

## 2023-09-27 NOTE — PATIENT INSTRUCTIONS
Medication Changes:  STOP lasix    INCREASE metoprolol XL to 200 mg daily     Recommendations:  Check blood pressure at least 1 hour after medications. Call the clinic if your blood pressure is consistently greater than 130/80.   Call if blood pressure is less than 90 on top or less than 100 with lightheadedness.     Check daily weights and call the clinic if your weight has increased more than 2 lbs in one day or 5 lbs in one week; if you feel more short of breath or have worsening swelling in your legs or abdomen.  2000 mg sodium diet   4-8 8 ounce glasses of fluids per day, not more than 2000 mL (2 L) per day.      Follow-up:  Cardiology follow up at [facility]: Dr. Harrison in 3 months  Annual fasting lab within 2 months (lipid/ALT, BMP)      Cardiology Scheduling~[phone]  Cardiology Clinic RN~[phone] (Yary RN, Kristin RN, Mary RN)

## 2023-09-27 NOTE — RESULT ENCOUNTER NOTE
EF >70%; apical hypertrophy present; dynamic mid-ventricular LV obstruction noted with peak gradient of 42 mm with valsalva--gradient is new compared to prior studies. Apical HCM has been described on prior studies. Pt seeing Magalis Martin NP this afternoon. Will route to her for her information

## 2023-09-27 NOTE — PROGRESS NOTES
Cardiology Clinic Progress Note  Matthew Still MRN# [id number]   YOB: 1970 Age: 53 year old      Primary Cardiologist:   Dr. Harrison      Patient presents today for 6-month follow-up.          History of Presenting Illness:      Matthew Still is a pleasant 53 year old patient with a past cardiac history significant for   CAD  Angio 2017 with 60% stenosis in the D1 with negative FFR with otherwise mild disease  Angio 2020 no changes   lexiscan 11/2021 with HCM changes not likely ischemia   Paroxysmal atrial fibrillation  since his 30s.    H/o multiple cardioversions, 2020, 2022  S/p A. fib ablation 2018  Recurrent atrial fibrillation s/p cardioversion 11/2020  No afib zio 11/2021  Rhythm control and anticoagulation  Apical hypertrophic cardiomyopathy   Diagnosed on MRI 2017-EF 70%, diffuse patchy mid wall enhancement of the anterior and apical segments with 11% scar burden  Had syncope with sustained VT and underwent ICD 2018   Echo 2021 EF 60%, no outflow obstruction  Echo 2023 EF >70% mid LVOT obstruction new   Hypertension  Breast tenderness with spironolactone  Hyperlipidemia  Past medical history significant for COVID pneumonia 11/2021.         Patient was seen by Dr. Harrison in February 2023 for routine follow-up.  He was able to do light activity he was shoveling and walking.  He reported intermittent lightheadedness and short episodes of palpitations but no syncope.  He had no further A-fib on device check after his cardioversion.    Patient called in July 2023 thinking he was back in atrial fibrillation.  He had a syncopal episode the weekend prior.  After getting up from a chair he walked to the kitchen and awoke on the floor.  He had no prior symptoms.  Upon regaining consciousness BP was 75/45.  Device check showed no atrial fibrillation or VT.  Device clinic recommended changing his in VT heart rate sounds to detect slow VT if this was occurring.  Dr. Harrison noted that if he remains  symptomatic could consider Zio patch to correlate symptoms.    Most recent lipid profile, BMP, ALT, device check reviewed today.  Echocardiogram today showing EF greater than 70%, apical hypertrophy with dynamic mid LVOT obstruction peak gradient 42 mmHg with Valsalva, which is new.  Results reviewed today.    Since July, he has not had any further ashtyn syncope.  He continues with episodes of presyncope/lightheadedness 1-2 times per week.  These start with palpitations which are prolonged and are associated with chest tightness.  Blood pressure has been controlled.  He denies any anginal symptoms.  Weight has been stable and denies any significant heart failure symptoms.  He is agreeable to increasing metoprolol.  He was previously on Lasix for lower extremity edema and will instead use compression stockings, leg elevation, and ambulation to help with this.  He has device check coming up in a couple weeks and we can see if his episodes are correlating with VT. Patient reports no shortness of breath, PND, orthopnea, syncope, edema, heart racing.                         Assessment and Plan:       Plan  Patient Instructions   Medication Changes:  STOP lasix d/t mid LV obstruction  INCREASE metoprolol XL to 200 mg daily     Recommendations:  Check blood pressure at least 1 hour after medications. Call the clinic if your blood pressure is consistently greater than 130/80.   Call if blood pressure is less than 90 on top or less than 100 with lightheadedness.     Check daily weights and call the clinic if your weight has increased more than 2 lbs in one day or 5 lbs in one week; if you feel more short of breath or have worsening swelling in your legs or abdomen.  2000 mg sodium diet   4-8 8 ounce glasses of fluids per day, not more than 2000 mL (2 L) per day.      Follow-up:  Cardiology follow up at [facility]: Dr. Harrison in 3 months  Annual fasting lab within 2 months (lipid/ALT, BMP)  Device clinic as  "scheduled      Cardiology Scheduling~[phone]  Cardiology Clinic RN~[phone] (Yary RN, Kristin RN, Mary RN)               Coronary artery disease involving native coronary artery of native heart without angina pectoris  Paroxysmal atrial fibrillation (H)  Apical variant hypertrophic cardiomyopathy (H)  Benign essential hypertension  Hyperlipidemia LDL goal <70  NSVT (nonsustained ventricular tachycardia) (H)        CAD  No angina  Continue GDMT  HA and nausea with imdur 30        Apical hypertrophic cardiomyopathy  No symptoms of heart failure  Continue beta-blocker        Paroxysmal atrial fibrillation  H/o Symptomatic  with episodes  Elevated [id number] score  Continue metoprolol for rate control and Eliquis for anticoagulation        NSVT  Prior episode of syncope and underwent ICD placement 2018  Short episodes of NSVT correlating with palpitations, on device checks but no further sustained and has not needed therapies with ICD  Continue metoprolol  Follow with device clinic  Consider switching amlodipine to diltiazem, if needed        Hyperlipidemia  Last LDL 69 10/2021  Continue statin  Reassess lipids  Consider starting zetia, if needed        HTN  controlled   Continue current medications        Presyncope  significant lightheadedness with \"tunnel vision\" but does not lose consciousness preceded by Palpitations  Beta-blocker increased  History of SVT  Valsalva maneuver       Respiratory:  clear to auscultation; normal symmetry        Cardiac: regular rate and rhythm     GI:  nondistended     Extremities and Muscular Skeletal:   no edema            Thank you for allowing me to participate in this delightful patient's care.      This note was completed in part using Dragon voice recognition software. Although reviewed after completion, some word and grammatical errors may occur.    HORTENSIA Rdz CNP      Total time spent today was 56 mins, reviewing labs, testing, notes, " documenting notes, and seeing patient.

## 2023-10-06 ENCOUNTER — LAB (OUTPATIENT)
Dept: LAB | Facility: CLINIC | Age: 53
End: 2023-10-06
Payer: COMMERCIAL

## 2023-10-06 DIAGNOSIS — I25.10 CORONARY ARTERY DISEASE INVOLVING NATIVE CORONARY ARTERY OF NATIVE HEART WITHOUT ANGINA PECTORIS: Primary | ICD-10-CM

## 2023-10-06 DIAGNOSIS — E78.5 HYPERLIPIDEMIA LDL GOAL <70: ICD-10-CM

## 2023-10-06 DIAGNOSIS — I25.10 CORONARY ARTERY DISEASE INVOLVING NATIVE CORONARY ARTERY OF NATIVE HEART WITHOUT ANGINA PECTORIS: ICD-10-CM

## 2023-10-06 LAB
ALT SERPL W P-5'-P-CCNC: 19 U/L (ref 0–70)
ANION GAP SERPL CALCULATED.3IONS-SCNC: 9 MMOL/L (ref 7–15)
BUN SERPL-MCNC: 10.1 MG/DL (ref 6–20)
CALCIUM SERPL-MCNC: 9.6 MG/DL (ref 8.6–10)
CHLORIDE SERPL-SCNC: 108 MMOL/L (ref 98–107)
CHOLEST SERPL-MCNC: 132 MG/DL
CREAT SERPL-MCNC: 1.07 MG/DL (ref 0.67–1.17)
DEPRECATED HCO3 PLAS-SCNC: 24 MMOL/L (ref 22–29)
EGFRCR SERPLBLD CKD-EPI 2021: 82 ML/MIN/1.73M2
GLUCOSE SERPL-MCNC: 119 MG/DL (ref 70–99)
HDLC SERPL-MCNC: 35 MG/DL
LDLC SERPL CALC-MCNC: 81 MG/DL
NONHDLC SERPL-MCNC: 97 MG/DL
POTASSIUM SERPL-SCNC: 4.6 MMOL/L (ref 3.4–5.3)
SODIUM SERPL-SCNC: 141 MMOL/L (ref 135–145)
TRIGL SERPL-MCNC: 79 MG/DL

## 2023-10-06 PROCEDURE — 80048 BASIC METABOLIC PNL TOTAL CA: CPT | Performed by: NURSE PRACTITIONER

## 2023-10-06 PROCEDURE — 84460 ALANINE AMINO (ALT) (SGPT): CPT | Performed by: NURSE PRACTITIONER

## 2023-10-06 PROCEDURE — 80061 LIPID PANEL: CPT | Performed by: NURSE PRACTITIONER

## 2023-10-06 PROCEDURE — 36415 COLL VENOUS BLD VENIPUNCTURE: CPT | Performed by: NURSE PRACTITIONER

## 2023-10-06 RX ORDER — EZETIMIBE 10 MG/1
10 TABLET ORAL DAILY
Qty: 90 TABLET | Refills: 3 | Status: SHIPPED | OUTPATIENT
Start: 2023-10-06 | End: 2024-08-07

## 2023-10-06 NOTE — RESULT ENCOUNTER NOTE
Pt called back. He is willing to start Zetia. Script sent to pharmacy. Lab orders placed. Lab appt scheduled for 12/11/23.

## 2023-10-06 NOTE — RESULT ENCOUNTER NOTE
Lipids worsened and no longer at goal; electrolytes, ALT and kidney function WNL. Will discuss with Magalis Martin NP for recommendations.

## 2023-10-16 ENCOUNTER — ANCILLARY PROCEDURE (OUTPATIENT)
Dept: CARDIOLOGY | Facility: CLINIC | Age: 53
End: 2023-10-16
Attending: INTERNAL MEDICINE
Payer: COMMERCIAL

## 2023-10-16 DIAGNOSIS — Z95.810 ICD (IMPLANTABLE CARDIOVERTER-DEFIBRILLATOR) IN PLACE: ICD-10-CM

## 2023-10-16 DIAGNOSIS — I42.9 CARDIOMYOPATHY (H): ICD-10-CM

## 2023-10-16 PROCEDURE — 93296 REM INTERROG EVL PM/IDS: CPT | Performed by: INTERNAL MEDICINE

## 2023-10-16 PROCEDURE — 93295 DEV INTERROG REMOTE 1/2/MLT: CPT | Performed by: INTERNAL MEDICINE

## 2023-10-17 LAB
MDC_IDC_LEAD_CONNECTION_STATUS: NORMAL
MDC_IDC_LEAD_IMPLANT_DT: NORMAL
MDC_IDC_LEAD_LOCATION: NORMAL
MDC_IDC_LEAD_LOCATION_DETAIL_1: NORMAL
MDC_IDC_LEAD_MFG: NORMAL
MDC_IDC_LEAD_MODEL: NORMAL
MDC_IDC_LEAD_SERIAL: NORMAL
MDC_IDC_MSMT_BATTERY_REMAINING_PERCENTAGE: 67 %
MDC_IDC_MSMT_BATTERY_RRT_TRIGGER: NORMAL
MDC_IDC_MSMT_BATTERY_STATUS: NORMAL
MDC_IDC_MSMT_BATTERY_VOLTAGE: 3.11 V
MDC_IDC_MSMT_CAP_CHARGE_DTM: NORMAL
MDC_IDC_MSMT_CAP_CHARGE_ENERGY: 40 J
MDC_IDC_MSMT_CAP_CHARGE_TIME: 9.8 S
MDC_IDC_MSMT_CAP_CHARGE_TYPE: NORMAL
MDC_IDC_MSMT_LEADCHNL_RA_LEAD_CHANNEL_STATUS: NORMAL
MDC_IDC_MSMT_LEADCHNL_RV_IMPEDANCE_VALUE: 495 OHM
MDC_IDC_MSMT_LEADCHNL_RV_LEAD_CHANNEL_STATUS: NORMAL
MDC_IDC_PG_IMPLANT_DTM: NORMAL
MDC_IDC_PG_MFG: NORMAL
MDC_IDC_PG_MODEL: NORMAL
MDC_IDC_PG_SERIAL: NORMAL
MDC_IDC_PG_TYPE: NORMAL
MDC_IDC_SESS_CLINIC_NAME: NORMAL
MDC_IDC_SESS_DTM: NORMAL
MDC_IDC_SESS_REPROGRAMMED: NO
MDC_IDC_SESS_TYPE: NORMAL
MDC_IDC_SET_BRADY_LOWRATE: 40 {BEATS}/MIN
MDC_IDC_SET_BRADY_MODE: NORMAL
MDC_IDC_SET_LEADCHNL_RA_SENSING_POLARITY: NORMAL
MDC_IDC_SET_LEADCHNL_RA_SENSING_SENSITIVITY: 0.4 MV
MDC_IDC_SET_LEADCHNL_RV_PACING_AMPLITUDE: 2 V
MDC_IDC_SET_LEADCHNL_RV_PACING_POLARITY: NORMAL
MDC_IDC_SET_LEADCHNL_RV_PACING_PULSEWIDTH: 0.4 MS
MDC_IDC_SET_LEADCHNL_RV_SENSING_ADAPTATION_MODE: NORMAL
MDC_IDC_SET_LEADCHNL_RV_SENSING_POLARITY: NORMAL
MDC_IDC_SET_LEADCHNL_RV_SENSING_SENSITIVITY: 0.8 MV
MDC_IDC_SET_ZONE_DETECTION_BEATS_DENOMINATOR: 40 {BEATS}
MDC_IDC_SET_ZONE_DETECTION_BEATS_NUMERATOR: 30 {BEATS}
MDC_IDC_SET_ZONE_DETECTION_INTERVAL: 250 MS
MDC_IDC_SET_ZONE_DETECTION_INTERVAL: 300 MS
MDC_IDC_SET_ZONE_STATUS: NORMAL
MDC_IDC_SET_ZONE_STATUS: NORMAL
MDC_IDC_SET_ZONE_TYPE: NORMAL
MDC_IDC_SET_ZONE_TYPE: NORMAL
MDC_IDC_SET_ZONE_VENDOR_TYPE: NORMAL
MDC_IDC_STAT_AT_BURDEN_PERCENT: 0 %
MDC_IDC_STAT_AT_DTM_END: NORMAL
MDC_IDC_STAT_AT_DTM_START: NORMAL
MDC_IDC_STAT_BRADY_AS_VP_PERCENT: 0 %
MDC_IDC_STAT_BRADY_AS_VS_PERCENT: 100 %
MDC_IDC_STAT_BRADY_DTM_END: NORMAL
MDC_IDC_STAT_BRADY_DTM_START: NORMAL
MDC_IDC_STAT_BRADY_RV_PERCENT_PACED: 0 %
MDC_IDC_STAT_CRT_DTM_END: NORMAL
MDC_IDC_STAT_CRT_DTM_START: NORMAL
MDC_IDC_STAT_EPISODE_TOTAL_COUNT: 0
MDC_IDC_STAT_EPISODE_TOTAL_COUNT: 71
MDC_IDC_STAT_EPISODE_TOTAL_COUNT_DTM_END: NORMAL
MDC_IDC_STAT_EPISODE_TOTAL_COUNT_DTM_START: NORMAL
MDC_IDC_STAT_EPISODE_TYPE: NORMAL
MDC_IDC_STAT_EPISODE_VENDOR_TYPE: NORMAL
MDC_IDC_STAT_EPISODE_VENDOR_TYPE: NORMAL
MDC_IDC_STAT_TACHYTHERAPY_ATP_DELIVERED_TOTAL: 0
MDC_IDC_STAT_TACHYTHERAPY_SHOCKS_ABORTED_TOTAL: 0
MDC_IDC_STAT_TACHYTHERAPY_SHOCKS_DELIVERED_TOTAL: 0
MDC_IDC_STAT_TACHYTHERAPY_TOTAL_DTM_END: NORMAL
MDC_IDC_STAT_TACHYTHERAPY_TOTAL_DTM_START: NORMAL

## 2023-12-11 ENCOUNTER — LAB (OUTPATIENT)
Dept: LAB | Facility: CLINIC | Age: 53
End: 2023-12-11
Payer: COMMERCIAL

## 2023-12-11 DIAGNOSIS — E78.5 HYPERLIPIDEMIA LDL GOAL <70: ICD-10-CM

## 2023-12-11 DIAGNOSIS — I25.10 CORONARY ARTERY DISEASE INVOLVING NATIVE CORONARY ARTERY OF NATIVE HEART WITHOUT ANGINA PECTORIS: ICD-10-CM

## 2023-12-11 LAB
ALT SERPL W P-5'-P-CCNC: 19 U/L (ref 0–70)
CHOLEST SERPL-MCNC: 114 MG/DL
FASTING STATUS PATIENT QL REPORTED: YES
HDLC SERPL-MCNC: 49 MG/DL
LDLC SERPL CALC-MCNC: 54 MG/DL
NONHDLC SERPL-MCNC: 65 MG/DL
TRIGL SERPL-MCNC: 54 MG/DL

## 2023-12-11 PROCEDURE — 84460 ALANINE AMINO (ALT) (SGPT): CPT | Performed by: NURSE PRACTITIONER

## 2023-12-11 PROCEDURE — 36415 COLL VENOUS BLD VENIPUNCTURE: CPT | Performed by: NURSE PRACTITIONER

## 2023-12-11 PROCEDURE — 80061 LIPID PANEL: CPT | Performed by: NURSE PRACTITIONER

## 2023-12-11 NOTE — RESULT ENCOUNTER NOTE
Lipids improved and now at goal; ALT WNL. Done after staring Zetia 10 mg daily on 10/6/23. Results letter sent.

## 2024-02-05 ENCOUNTER — ANCILLARY PROCEDURE (OUTPATIENT)
Dept: CARDIOLOGY | Facility: CLINIC | Age: 54
End: 2024-02-05
Attending: INTERNAL MEDICINE
Payer: COMMERCIAL

## 2024-02-05 DIAGNOSIS — Z95.810 ICD (IMPLANTABLE CARDIOVERTER-DEFIBRILLATOR) IN PLACE: ICD-10-CM

## 2024-02-05 DIAGNOSIS — I42.9 CARDIOMYOPATHY (H): ICD-10-CM

## 2024-02-05 PROCEDURE — 93296 REM INTERROG EVL PM/IDS: CPT | Performed by: INTERNAL MEDICINE

## 2024-02-05 PROCEDURE — 93295 DEV INTERROG REMOTE 1/2/MLT: CPT | Performed by: INTERNAL MEDICINE

## 2024-02-06 LAB
MDC_IDC_EPISODE_DTM: NORMAL
MDC_IDC_EPISODE_ID: 172
MDC_IDC_EPISODE_ID: 173
MDC_IDC_EPISODE_ID: 174
MDC_IDC_EPISODE_ID: 175
MDC_IDC_EPISODE_ID: 176
MDC_IDC_EPISODE_ID: 177
MDC_IDC_EPISODE_ID: 178
MDC_IDC_EPISODE_ID: 179
MDC_IDC_EPISODE_ID: 180
MDC_IDC_EPISODE_THERAPY_RESULT: NORMAL
MDC_IDC_EPISODE_TYPE: NORMAL
MDC_IDC_EPISODE_TYPE_INDUCED: NO
MDC_IDC_EPISODE_VENDOR_TYPE: NORMAL
MDC_IDC_LEAD_CONNECTION_STATUS: NORMAL
MDC_IDC_LEAD_IMPLANT_DT: NORMAL
MDC_IDC_LEAD_LOCATION: NORMAL
MDC_IDC_LEAD_LOCATION_DETAIL_1: NORMAL
MDC_IDC_LEAD_MFG: NORMAL
MDC_IDC_LEAD_MODEL: NORMAL
MDC_IDC_LEAD_SERIAL: NORMAL
MDC_IDC_MSMT_BATTERY_DTM: NORMAL
MDC_IDC_MSMT_BATTERY_REMAINING_PERCENTAGE: 63 %
MDC_IDC_MSMT_BATTERY_RRT_TRIGGER: NORMAL
MDC_IDC_MSMT_BATTERY_STATUS: NORMAL
MDC_IDC_MSMT_BATTERY_VOLTAGE: 3.11 V
MDC_IDC_MSMT_CAP_CHARGE_DTM: NORMAL
MDC_IDC_MSMT_CAP_CHARGE_ENERGY: 40 J
MDC_IDC_MSMT_CAP_CHARGE_TIME: 9.8 S
MDC_IDC_MSMT_CAP_CHARGE_TYPE: NORMAL
MDC_IDC_MSMT_LEADCHNL_RA_LEAD_CHANNEL_STATUS: NORMAL
MDC_IDC_MSMT_LEADCHNL_RV_IMPEDANCE_VALUE: 480 OHM
MDC_IDC_MSMT_LEADCHNL_RV_LEAD_CHANNEL_STATUS: NORMAL
MDC_IDC_PG_IMPLANT_DTM: NORMAL
MDC_IDC_PG_MFG: NORMAL
MDC_IDC_PG_MODEL: NORMAL
MDC_IDC_PG_SERIAL: NORMAL
MDC_IDC_PG_TYPE: NORMAL
MDC_IDC_SESS_CLINIC_NAME: NORMAL
MDC_IDC_SESS_DTM: NORMAL
MDC_IDC_SESS_REPROGRAMMED: NO
MDC_IDC_SESS_TYPE: NORMAL
MDC_IDC_SET_BRADY_LOWRATE: 40 {BEATS}/MIN
MDC_IDC_SET_BRADY_MODE: NORMAL
MDC_IDC_SET_LEADCHNL_RA_SENSING_ANODE_ELECTRODE_1: NORMAL
MDC_IDC_SET_LEADCHNL_RA_SENSING_ANODE_LOCATION_1: NORMAL
MDC_IDC_SET_LEADCHNL_RA_SENSING_CATHODE_ELECTRODE_1: NORMAL
MDC_IDC_SET_LEADCHNL_RA_SENSING_CATHODE_LOCATION_1: NORMAL
MDC_IDC_SET_LEADCHNL_RA_SENSING_POLARITY: NORMAL
MDC_IDC_SET_LEADCHNL_RA_SENSING_SENSITIVITY: 0.4 MV
MDC_IDC_SET_LEADCHNL_RV_PACING_AMPLITUDE: 2 V
MDC_IDC_SET_LEADCHNL_RV_PACING_ANODE_ELECTRODE_1: NORMAL
MDC_IDC_SET_LEADCHNL_RV_PACING_ANODE_LOCATION_1: NORMAL
MDC_IDC_SET_LEADCHNL_RV_PACING_CAPTURE_MODE: NORMAL
MDC_IDC_SET_LEADCHNL_RV_PACING_CATHODE_ELECTRODE_1: NORMAL
MDC_IDC_SET_LEADCHNL_RV_PACING_CATHODE_LOCATION_1: NORMAL
MDC_IDC_SET_LEADCHNL_RV_PACING_POLARITY: NORMAL
MDC_IDC_SET_LEADCHNL_RV_PACING_PULSEWIDTH: 0.4 MS
MDC_IDC_SET_LEADCHNL_RV_SENSING_ADAPTATION_MODE: NORMAL
MDC_IDC_SET_LEADCHNL_RV_SENSING_ANODE_ELECTRODE_1: NORMAL
MDC_IDC_SET_LEADCHNL_RV_SENSING_ANODE_LOCATION_1: NORMAL
MDC_IDC_SET_LEADCHNL_RV_SENSING_CATHODE_ELECTRODE_1: NORMAL
MDC_IDC_SET_LEADCHNL_RV_SENSING_CATHODE_LOCATION_1: NORMAL
MDC_IDC_SET_LEADCHNL_RV_SENSING_POLARITY: NORMAL
MDC_IDC_SET_LEADCHNL_RV_SENSING_SENSITIVITY: 0.8 MV
MDC_IDC_SET_ZONE_DETECTION_BEATS_DENOMINATOR: 40 {BEATS}
MDC_IDC_SET_ZONE_DETECTION_BEATS_NUMERATOR: 30 {BEATS}
MDC_IDC_SET_ZONE_DETECTION_INTERVAL: 250 MS
MDC_IDC_SET_ZONE_DETECTION_INTERVAL: 300 MS
MDC_IDC_SET_ZONE_STATUS: NORMAL
MDC_IDC_SET_ZONE_STATUS: NORMAL
MDC_IDC_SET_ZONE_TYPE: NORMAL
MDC_IDC_SET_ZONE_TYPE: NORMAL
MDC_IDC_SET_ZONE_VENDOR_TYPE: NORMAL
MDC_IDC_STAT_AT_BURDEN_PERCENT: 0 %
MDC_IDC_STAT_AT_DTM_END: NORMAL
MDC_IDC_STAT_AT_DTM_START: NORMAL
MDC_IDC_STAT_BRADY_AS_VP_PERCENT: 0 %
MDC_IDC_STAT_BRADY_AS_VS_PERCENT: 100 %
MDC_IDC_STAT_BRADY_DTM_END: NORMAL
MDC_IDC_STAT_BRADY_DTM_START: NORMAL
MDC_IDC_STAT_BRADY_RV_PERCENT_PACED: 0 %
MDC_IDC_STAT_EPISODE_RECENT_COUNT: 0
MDC_IDC_STAT_EPISODE_RECENT_COUNT_DTM_END: NORMAL
MDC_IDC_STAT_EPISODE_RECENT_COUNT_DTM_START: NORMAL
MDC_IDC_STAT_EPISODE_TOTAL_COUNT: 0
MDC_IDC_STAT_EPISODE_TOTAL_COUNT: 71
MDC_IDC_STAT_EPISODE_TOTAL_COUNT_DTM_END: NORMAL
MDC_IDC_STAT_EPISODE_TOTAL_COUNT_DTM_START: NORMAL
MDC_IDC_STAT_EPISODE_TYPE: NORMAL
MDC_IDC_STAT_EPISODE_VENDOR_TYPE: NORMAL
MDC_IDC_STAT_HEART_RATE_ATRIAL_MEAN: 62 {BEATS}/MIN
MDC_IDC_STAT_HEART_RATE_DTM_END: NORMAL
MDC_IDC_STAT_HEART_RATE_DTM_START: NORMAL
MDC_IDC_STAT_HEART_RATE_VENTRICULAR_MEAN: 62 {BEATS}/MIN
MDC_IDC_STAT_TACHYTHERAPY_ATP_DELIVERED_RECENT: 0
MDC_IDC_STAT_TACHYTHERAPY_ATP_DELIVERED_TOTAL: 0
MDC_IDC_STAT_TACHYTHERAPY_RECENT_DTM_END: NORMAL
MDC_IDC_STAT_TACHYTHERAPY_RECENT_DTM_START: NORMAL
MDC_IDC_STAT_TACHYTHERAPY_SHOCKS_ABORTED_RECENT: 0
MDC_IDC_STAT_TACHYTHERAPY_SHOCKS_ABORTED_TOTAL: 0
MDC_IDC_STAT_TACHYTHERAPY_SHOCKS_DELIVERED_RECENT: 0
MDC_IDC_STAT_TACHYTHERAPY_SHOCKS_DELIVERED_TOTAL: 0
MDC_IDC_STAT_TACHYTHERAPY_TOTAL_DTM_END: NORMAL
MDC_IDC_STAT_TACHYTHERAPY_TOTAL_DTM_START: NORMAL

## 2024-03-03 NOTE — RESULT ENCOUNTER NOTE
Done to assess palpitations and ABREU. H/o afib and VT. 7-day Zio patch 11/1/2021 showing sinus rhythm with average heart rate 71 bpm, 3 runs of nonsustained VT longest lasting 11 beats in 145 bpm, 26 runs of SVT longest lasting 17 beats, and symptoms correlated with ectopy, SVT, and NSVT.     See TE regarding stress test results. Pt stopped imdur and continues with ABREU.     I would like to see if he is agreeable to increasing metoprolol xl to 200 mg daily for his SVT and VT which is causing symptoms. 
MED/SURG

## 2024-03-04 NOTE — PROGRESS NOTES
CARDIOLOGY VISIT    REASON FOR VISIT: A-fib, hypertrophic cardiomyopathy    SUBJECTIVE:  51-year-old male seen for atrial fibrillation and apical hypertrophic cardiomyopathy.         He reports a history of paroxysmal A. fib dating back about 20 years.  He has had several cardioversions in the past.          Cardiac MRI December 2017 showed EF 70%, mild to moderate mid ventricular hypertrophy and severe apical hypertrophy consistent with apical hypertrophic cardiomyopathy, normal RV, diffuse patchy mid wall enhancement of the anterior and apical segments, total scar burden 11%, trivial MR.              February 2018 he underwent atrial fibrillation ablation including wide circumferential pulmonary vein isolation and SVC isolation.       October 2018 he had an episode of syncope.  He then underwent single-chamber ICD implantation (Biotronik Intica 7 VR-T ProMRI).       Coronary angiogram 2020 showed moderate first diagonal lesion with normal FFR, otherwise no obstructive coronary disease.       Nuclear stress November 2021 showed moderate ischemia of the mid to distal anterior wall, apex, mid to distal inferior wall, however defect may be secondary to hypertrophic cardiomyopathy.  Study appears unchanged from January 2020.      September 2022 he underwent successful cardioversion.    Echo September 2023 showed EF 70%, apical hypertrophy, mid LV cavity gradient of 42 mmHg.    Device check February 2024 showed 0% pacing, few brief episodes of atrial tach, 15 runs of NSVT up to 23 beats.    He has complaints of some fatigue and intermittent racing heart.  He also has some lightheadedness, especially when he bends over or stands up.  Blood pressure tends to run 130-140 at home.  He denies any syncope or A-fib symptoms.    MEDICATIONS:  Current Outpatient Medications   Medication    amLODIPine (NORVASC) 5 MG tablet    apixaban ANTICOAGULANT (ELIQUIS ANTICOAGULANT) 5 MG tablet    Ascorbic Acid (VITAMIN C PO)     "atorvastatin (LIPITOR) 80 MG tablet    ezetimibe (ZETIA) 10 MG tablet    metoprolol succinate ER (TOPROL XL) 200 MG 24 hr tablet     No current facility-administered medications for this visit.       ALLERGIES:  No Known Allergies    REVIEW OF SYSTEMS:  Constitutional:  No weight loss, fever, chills  HEENT:  Eyes:  No visual loss, blurred vision, double vision or yellow sclerae. No hearing loss, sneezing, congestion, runny nose or sore throat.  Skin:  No rash or itching.  Cardiovascular: per HPI  Respiratory: per HPI  GI:  No anorexia, nausea, vomiting or diarrhea. No abdominal pain or blood.  :  No dysurea, hematuria  Neurologic:  No headache, paralysis, ataxia, numbness or tingling in the extremities. No change in bowel or bladder control.  Musculoskeletal:  No muscle pain  Hematologic:  No bleeding or bruising.  Lymphatics:  No enlarged nodes. No history of splenectomy.  Endocrine:  No reports of sweating, cold or heat intolerance. No polyuria or polydipsia.  Allergies:  No history of asthma, hives, eczema or rhinitis.    PHYSICAL EXAM:  BP (!) 144/81 (BP Location: Left arm, Patient Position: Sitting, Cuff Size: Adult Large)   Pulse 53   Resp 22   Ht 1.803 m (5' 11\")   Wt 112.9 kg (249 lb)   SpO2 98%   BMI 34.73 kg/m        Constitutional: awake, alert, no distress  Eyes: PERRL, sclera nonicteric  ENT: trachea midline  Respiratory: Lungs clear  Cardiovascular: Regular rate and rhythm, no murmurs  GI: nondistended, nontender, bowel sounds present  Lymph/Hematologic: no lymphadenopathy  Skin: dry, no rash  Musculoskeletal: good muscle tone, strength 5/5 in upper and lower extremities  Neurologic: no focal deficits  Neuropsychiatric: appropriate affact    DATA:  Lab: October 2023: Potassium 4.6, creatinine 1.1  Recent Labs   Lab Test 12/11/23  1006 10/06/23  0959   CHOL 114 132   HDL 49 35*   LDL 54 81   TRIG 54 79     ASSESSMENT:  53-year-old male seen for paroxysmal A-fib and apical hypertrophic " cardiomyopathy.  His main symptoms are fatigue, lightheadedness, and palpitations.  He has short runs of NSVT and atrial tach on device checks, but these likely would not be causing any of his more chronic symptoms.  Last echo showed hyperdynamic EF with some midcavity gradient, but no outflow obstruction.  He is on high-dose metoprolol.  We talked about trying verapamil, this may or may not help.  It was explained that with hypertrophic cardiomyopathy ejection fraction tends to be on the higher side and he can develop symptoms more easily with exertion and be more sensitive to dehydration.  He would prefer to hold off on verapamil currently.    He also complains of some trouble sleeping on his back.  There is some suspicion for sleep apnea, but he prefers to avoid sleep referral at this time.    RECOMMENDATIONS:  1.  Apical hypertrophic cardiomyopathy  -Continue metoprolol  -Consider trial of verapamil in the future which may or may not help    2.  ICD  -Continue routine device checks    3.  Paroxysmal A-fib  -Continue Eliquis    Follow-up in 6 months with repeat echo.    Brice Harrison MD  Cardiology - [facility]  Pager:  [phone]  Text Page  March 5, 2024

## 2024-03-05 ENCOUNTER — OFFICE VISIT (OUTPATIENT)
Dept: CARDIOLOGY | Facility: CLINIC | Age: 54
End: 2024-03-05
Attending: NURSE PRACTITIONER
Payer: COMMERCIAL

## 2024-03-05 VITALS
SYSTOLIC BLOOD PRESSURE: 144 MMHG | DIASTOLIC BLOOD PRESSURE: 81 MMHG | BODY MASS INDEX: 34.86 KG/M2 | HEIGHT: 71 IN | HEART RATE: 53 BPM | WEIGHT: 249 LBS | RESPIRATION RATE: 22 BRPM | OXYGEN SATURATION: 98 %

## 2024-03-05 DIAGNOSIS — I47.29 NSVT (NONSUSTAINED VENTRICULAR TACHYCARDIA) (H): ICD-10-CM

## 2024-03-05 DIAGNOSIS — I25.10 CORONARY ARTERY DISEASE INVOLVING NATIVE CORONARY ARTERY OF NATIVE HEART WITHOUT ANGINA PECTORIS: ICD-10-CM

## 2024-03-05 DIAGNOSIS — E78.5 HYPERLIPIDEMIA LDL GOAL <70: ICD-10-CM

## 2024-03-05 DIAGNOSIS — I48.0 PAROXYSMAL ATRIAL FIBRILLATION (H): ICD-10-CM

## 2024-03-05 DIAGNOSIS — I42.2 APICAL VARIANT HYPERTROPHIC CARDIOMYOPATHY (H): ICD-10-CM

## 2024-03-05 DIAGNOSIS — I10 BENIGN ESSENTIAL HYPERTENSION: ICD-10-CM

## 2024-03-05 PROCEDURE — 99214 OFFICE O/P EST MOD 30 MIN: CPT | Performed by: INTERNAL MEDICINE

## 2024-03-05 NOTE — LETTER
3/5/2024    [facility]  [address] [id number]    RE: Matthew Still       Dear Colleague,     I had the pleasure of seeing Matthew Still in the [facility].  CARDIOLOGY VISIT    REASON FOR VISIT: A-fib, hypertrophic cardiomyopathy    SUBJECTIVE:  51-year-old male seen for atrial fibrillation and apical hypertrophic cardiomyopathy.         He reports a history of paroxysmal A. fib dating back about 20 years.  He has had several cardioversions in the past.          Cardiac MRI December 2017 showed EF 70%, mild to moderate mid ventricular hypertrophy and severe apical hypertrophy consistent with apical hypertrophic cardiomyopathy, normal RV, diffuse patchy mid wall enhancement of the anterior and apical segments, total scar burden 11%, trivial MR.              February 2018 he underwent atrial fibrillation ablation including wide circumferential pulmonary vein isolation and SVC isolation.       October 2018 he had an episode of syncope.  He then underwent single-chamber ICD implantation (Biotronik Intica 7 VR-T ProMRI).       Coronary angiogram 2020 showed moderate first diagonal lesion with normal FFR, otherwise no obstructive coronary disease.       Nuclear stress November 2021 showed moderate ischemia of the mid to distal anterior wall, apex, mid to distal inferior wall, however defect may be secondary to hypertrophic cardiomyopathy.  Study appears unchanged from January 2020.      September 2022 he underwent successful cardioversion.    Echo September 2023 showed EF 70%, apical hypertrophy, mid LV cavity gradient of 42 mmHg.    Device check February 2024 showed 0% pacing, few brief episodes of atrial tach, 15 runs of NSVT up to 23 beats.    He has complaints of some fatigue and intermittent racing heart.  He also has some lightheadedness, especially when he bends over or stands up.  Blood pressure tends to run 130-140 at home.  He denies any syncope or A-fib  "symptoms.    MEDICATIONS:  Current Outpatient Medications   Medication    amLODIPine (NORVASC) 5 MG tablet    apixaban ANTICOAGULANT (ELIQUIS ANTICOAGULANT) 5 MG tablet    Ascorbic Acid (VITAMIN C PO)    atorvastatin (LIPITOR) 80 MG tablet    ezetimibe (ZETIA) 10 MG tablet    metoprolol succinate ER (TOPROL XL) 200 MG 24 hr tablet     No current facility-administered medications for this visit.       ALLERGIES:  No Known Allergies    REVIEW OF SYSTEMS:  Constitutional:  No weight loss, fever, chills  HEENT:  Eyes:  No visual loss, blurred vision, double vision or yellow sclerae. No hearing loss, sneezing, congestion, runny nose or sore throat.  Skin:  No rash or itching.  Cardiovascular: per HPI  Respiratory: per HPI  GI:  No anorexia, nausea, vomiting or diarrhea. No abdominal pain or blood.  :  No dysurea, hematuria  Neurologic:  No headache, paralysis, ataxia, numbness or tingling in the extremities. No change in bowel or bladder control.  Musculoskeletal:  No muscle pain  Hematologic:  No bleeding or bruising.  Lymphatics:  No enlarged nodes. No history of splenectomy.  Endocrine:  No reports of sweating, cold or heat intolerance. No polyuria or polydipsia.  Allergies:  No history of asthma, hives, eczema or rhinitis.    PHYSICAL EXAM:  BP (!) 144/81 (BP Location: Left arm, Patient Position: Sitting, Cuff Size: Adult Large)   Pulse 53   Resp 22   Ht 1.803 m (5' 11\")   Wt 112.9 kg (249 lb)   SpO2 98%   BMI 34.73 kg/m        Constitutional: awake, alert, no distress  Eyes: PERRL, sclera nonicteric  ENT: trachea midline  Respiratory: Lungs clear  Cardiovascular: Regular rate and rhythm, no murmurs  GI: nondistended, nontender, bowel sounds present  Lymph/Hematologic: no lymphadenopathy  Skin: dry, no rash  Musculoskeletal: good muscle tone, strength 5/5 in upper and lower extremities  Neurologic: no focal deficits  Neuropsychiatric: appropriate affact    DATA:  Lab: October 2023: Potassium 4.6, creatinine " 1.1  Recent Labs   Lab Test 12/11/23  1006 10/06/23  0959   CHOL 114 132   HDL 49 35*   LDL 54 81   TRIG 54 79     ASSESSMENT:  53-year-old male seen for paroxysmal A-fib and apical hypertrophic cardiomyopathy.  His main symptoms are fatigue, lightheadedness, and palpitations.  He has short runs of NSVT and atrial tach on device checks, but these likely would not be causing any of his more chronic symptoms.  Last echo showed hyperdynamic EF with some midcavity gradient, but no outflow obstruction.  He is on high-dose metoprolol.  We talked about trying verapamil, this may or may not help.  It was explained that with hypertrophic cardiomyopathy ejection fraction tends to be on the higher side and he can develop symptoms more easily with exertion and be more sensitive to dehydration.  He would prefer to hold off on verapamil currently.    He also complains of some trouble sleeping on his back.  There is some suspicion for sleep apnea, but he prefers to avoid sleep referral at this time.    RECOMMENDATIONS:  1.  Apical hypertrophic cardiomyopathy  -Continue metoprolol  -Consider trial of verapamil in the future which may or may not help    2.  ICD  -Continue routine device checks    3.  Paroxysmal A-fib  -Continue Eliquis    Follow-up in 6 months with repeat echo.    Brice Harrison MD  Cardiology - [facility]  Pager:  [phone]  Text Page  March 5, 2024        Thank you for allowing me to participate in the care of your patient.      Sincerely,     Brice Harrison MD     [facility]  cc:   Magalis Martin, HORTENSIA CNP  [address] [place] [address]

## 2024-03-24 DIAGNOSIS — I48.0 PAROXYSMAL ATRIAL FIBRILLATION (H): ICD-10-CM

## 2024-03-24 DIAGNOSIS — E78.5 HYPERLIPIDEMIA LDL GOAL <70: ICD-10-CM

## 2024-03-24 DIAGNOSIS — I10 BENIGN ESSENTIAL HYPERTENSION: ICD-10-CM

## 2024-03-25 RX ORDER — ATORVASTATIN CALCIUM 80 MG/1
80 TABLET, FILM COATED ORAL DAILY
Qty: 90 TABLET | Refills: 3 | Status: SHIPPED | OUTPATIENT
Start: 2024-03-25

## 2024-03-25 RX ORDER — AMLODIPINE BESYLATE 5 MG/1
5 TABLET ORAL DAILY
Qty: 90 TABLET | Refills: 3 | Status: SHIPPED | OUTPATIENT
Start: 2024-03-25

## 2024-03-31 ENCOUNTER — HEALTH MAINTENANCE LETTER (OUTPATIENT)
Age: 54
End: 2024-03-31

## 2024-04-29 NOTE — PROGRESS NOTES
Pt scheduled for ICD implant next week on Wednesday.     Called patient with pre-procedure instructions for device implant, no answer, left brief message to call Device Clinic for instructions. When pt calls back will discuss the following:    Anticoagulation: Eliquis, hold for 3 days   Oral diabetes meds: NA  Insulin: NA  Diuretic: NA  Contrast allergy: NA  Pt informed to be NPO at midnight    Will discuss post-procedure transportation and 24 hours monitoring, no driving for 24 hours post procedure due to sedation, and of arrival time and location.    ADDENDUM 11/12/2018, 3:40pm. Attempted to call pt again, no answer, left another voicemail to call Device Clinic for instructions for Wednesday. 1700: spoke with pt; he understands all of the instructions as above. OV scheduled for [place] on 11-20 @ 4067. [facility]   24

## 2024-05-07 ENCOUNTER — ANCILLARY PROCEDURE (OUTPATIENT)
Dept: CARDIOLOGY | Facility: CLINIC | Age: 54
End: 2024-05-07
Attending: INTERNAL MEDICINE
Payer: COMMERCIAL

## 2024-05-07 DIAGNOSIS — Z95.810 ICD (IMPLANTABLE CARDIOVERTER-DEFIBRILLATOR) IN PLACE: ICD-10-CM

## 2024-05-07 DIAGNOSIS — I42.9 CARDIOMYOPATHY (H): ICD-10-CM

## 2024-05-07 PROCEDURE — 93295 DEV INTERROG REMOTE 1/2/MLT: CPT | Performed by: INTERNAL MEDICINE

## 2024-05-07 PROCEDURE — 93296 REM INTERROG EVL PM/IDS: CPT | Performed by: INTERNAL MEDICINE

## 2024-05-08 LAB
MDC_IDC_EPISODE_DTM: NORMAL
MDC_IDC_EPISODE_ID: 191
MDC_IDC_EPISODE_ID: 192
MDC_IDC_EPISODE_ID: 193
MDC_IDC_EPISODE_ID: 194
MDC_IDC_EPISODE_THERAPY_RESULT: NORMAL
MDC_IDC_EPISODE_TYPE: NORMAL
MDC_IDC_EPISODE_TYPE_INDUCED: NO
MDC_IDC_EPISODE_VENDOR_TYPE: NORMAL
MDC_IDC_LEAD_CONNECTION_STATUS: NORMAL
MDC_IDC_LEAD_IMPLANT_DT: NORMAL
MDC_IDC_LEAD_LOCATION: NORMAL
MDC_IDC_LEAD_LOCATION_DETAIL_1: NORMAL
MDC_IDC_LEAD_MFG: NORMAL
MDC_IDC_LEAD_MODEL: NORMAL
MDC_IDC_LEAD_SERIAL: NORMAL
MDC_IDC_MSMT_BATTERY_DTM: NORMAL
MDC_IDC_MSMT_BATTERY_REMAINING_PERCENTAGE: 59 %
MDC_IDC_MSMT_BATTERY_RRT_TRIGGER: NORMAL
MDC_IDC_MSMT_BATTERY_STATUS: NORMAL
MDC_IDC_MSMT_BATTERY_VOLTAGE: 3.09 V
MDC_IDC_MSMT_CAP_CHARGE_DTM: NORMAL
MDC_IDC_MSMT_CAP_CHARGE_ENERGY: 40 J
MDC_IDC_MSMT_CAP_CHARGE_TIME: 9.9 S
MDC_IDC_MSMT_CAP_CHARGE_TYPE: NORMAL
MDC_IDC_MSMT_LEADCHNL_RA_LEAD_CHANNEL_STATUS: NORMAL
MDC_IDC_MSMT_LEADCHNL_RV_IMPEDANCE_VALUE: 480 OHM
MDC_IDC_MSMT_LEADCHNL_RV_LEAD_CHANNEL_STATUS: NORMAL
MDC_IDC_PG_IMPLANT_DTM: NORMAL
MDC_IDC_PG_MFG: NORMAL
MDC_IDC_PG_MODEL: NORMAL
MDC_IDC_PG_SERIAL: NORMAL
MDC_IDC_PG_TYPE: NORMAL
MDC_IDC_SESS_CLINIC_NAME: NORMAL
MDC_IDC_SESS_DTM: NORMAL
MDC_IDC_SESS_REPROGRAMMED: NO
MDC_IDC_SESS_TYPE: NORMAL
MDC_IDC_SET_BRADY_LOWRATE: 40 {BEATS}/MIN
MDC_IDC_SET_BRADY_MODE: NORMAL
MDC_IDC_SET_LEADCHNL_RA_SENSING_ANODE_ELECTRODE_1: NORMAL
MDC_IDC_SET_LEADCHNL_RA_SENSING_ANODE_LOCATION_1: NORMAL
MDC_IDC_SET_LEADCHNL_RA_SENSING_CATHODE_ELECTRODE_1: NORMAL
MDC_IDC_SET_LEADCHNL_RA_SENSING_CATHODE_LOCATION_1: NORMAL
MDC_IDC_SET_LEADCHNL_RA_SENSING_POLARITY: NORMAL
MDC_IDC_SET_LEADCHNL_RA_SENSING_SENSITIVITY: 0.4 MV
MDC_IDC_SET_LEADCHNL_RV_PACING_AMPLITUDE: 2 V
MDC_IDC_SET_LEADCHNL_RV_PACING_ANODE_ELECTRODE_1: NORMAL
MDC_IDC_SET_LEADCHNL_RV_PACING_ANODE_LOCATION_1: NORMAL
MDC_IDC_SET_LEADCHNL_RV_PACING_CAPTURE_MODE: NORMAL
MDC_IDC_SET_LEADCHNL_RV_PACING_CATHODE_ELECTRODE_1: NORMAL
MDC_IDC_SET_LEADCHNL_RV_PACING_CATHODE_LOCATION_1: NORMAL
MDC_IDC_SET_LEADCHNL_RV_PACING_POLARITY: NORMAL
MDC_IDC_SET_LEADCHNL_RV_PACING_PULSEWIDTH: 0.4 MS
MDC_IDC_SET_LEADCHNL_RV_SENSING_ADAPTATION_MODE: NORMAL
MDC_IDC_SET_LEADCHNL_RV_SENSING_ANODE_ELECTRODE_1: NORMAL
MDC_IDC_SET_LEADCHNL_RV_SENSING_ANODE_LOCATION_1: NORMAL
MDC_IDC_SET_LEADCHNL_RV_SENSING_CATHODE_ELECTRODE_1: NORMAL
MDC_IDC_SET_LEADCHNL_RV_SENSING_CATHODE_LOCATION_1: NORMAL
MDC_IDC_SET_LEADCHNL_RV_SENSING_POLARITY: NORMAL
MDC_IDC_SET_LEADCHNL_RV_SENSING_SENSITIVITY: 0.8 MV
MDC_IDC_SET_ZONE_DETECTION_BEATS_DENOMINATOR: 40 {BEATS}
MDC_IDC_SET_ZONE_DETECTION_BEATS_NUMERATOR: 30 {BEATS}
MDC_IDC_SET_ZONE_DETECTION_INTERVAL: 250 MS
MDC_IDC_SET_ZONE_DETECTION_INTERVAL: 300 MS
MDC_IDC_SET_ZONE_STATUS: NORMAL
MDC_IDC_SET_ZONE_STATUS: NORMAL
MDC_IDC_SET_ZONE_TYPE: NORMAL
MDC_IDC_SET_ZONE_TYPE: NORMAL
MDC_IDC_SET_ZONE_VENDOR_TYPE: NORMAL
MDC_IDC_STAT_AT_BURDEN_PERCENT: 0 %
MDC_IDC_STAT_AT_DTM_END: NORMAL
MDC_IDC_STAT_AT_DTM_START: NORMAL
MDC_IDC_STAT_BRADY_AS_VP_PERCENT: 0 %
MDC_IDC_STAT_BRADY_AS_VS_PERCENT: 100 %
MDC_IDC_STAT_BRADY_DTM_END: NORMAL
MDC_IDC_STAT_BRADY_DTM_START: NORMAL
MDC_IDC_STAT_BRADY_RV_PERCENT_PACED: 0 %
MDC_IDC_STAT_EPISODE_RECENT_COUNT: 0
MDC_IDC_STAT_EPISODE_RECENT_COUNT_DTM_END: NORMAL
MDC_IDC_STAT_EPISODE_RECENT_COUNT_DTM_START: NORMAL
MDC_IDC_STAT_EPISODE_TOTAL_COUNT: 0
MDC_IDC_STAT_EPISODE_TOTAL_COUNT: 71
MDC_IDC_STAT_EPISODE_TOTAL_COUNT_DTM_END: NORMAL
MDC_IDC_STAT_EPISODE_TOTAL_COUNT_DTM_START: NORMAL
MDC_IDC_STAT_EPISODE_TYPE: NORMAL
MDC_IDC_STAT_EPISODE_VENDOR_TYPE: NORMAL
MDC_IDC_STAT_HEART_RATE_ATRIAL_MEAN: 61 {BEATS}/MIN
MDC_IDC_STAT_HEART_RATE_DTM_END: NORMAL
MDC_IDC_STAT_HEART_RATE_DTM_START: NORMAL
MDC_IDC_STAT_HEART_RATE_VENTRICULAR_MEAN: 61 {BEATS}/MIN
MDC_IDC_STAT_TACHYTHERAPY_ATP_DELIVERED_RECENT: 0
MDC_IDC_STAT_TACHYTHERAPY_ATP_DELIVERED_TOTAL: 0
MDC_IDC_STAT_TACHYTHERAPY_RECENT_DTM_END: NORMAL
MDC_IDC_STAT_TACHYTHERAPY_RECENT_DTM_START: NORMAL
MDC_IDC_STAT_TACHYTHERAPY_SHOCKS_ABORTED_RECENT: 0
MDC_IDC_STAT_TACHYTHERAPY_SHOCKS_ABORTED_TOTAL: 0
MDC_IDC_STAT_TACHYTHERAPY_SHOCKS_DELIVERED_RECENT: 0
MDC_IDC_STAT_TACHYTHERAPY_SHOCKS_DELIVERED_TOTAL: 0
MDC_IDC_STAT_TACHYTHERAPY_TOTAL_DTM_END: NORMAL
MDC_IDC_STAT_TACHYTHERAPY_TOTAL_DTM_START: NORMAL

## 2024-05-23 ENCOUNTER — TELEPHONE (OUTPATIENT)
Dept: CARDIOLOGY | Facility: CLINIC | Age: 54
End: 2024-05-23
Payer: COMMERCIAL

## 2024-05-23 NOTE — TELEPHONE ENCOUNTER
Pt calling in asking if he can get a letter for work stating that due to his heart condition (A fib, hypertrophic cardiomyopathy, ICD) he will be unable to travel for work. Pt states concerned on what he would do if something would happen to him when he was out of town.      Okay to write letter for no out of town travel for work due to his heart condition?    Kristin Gauthier, RN

## 2024-05-23 NOTE — TELEPHONE ENCOUNTER
"Pt called back and gave additional details.     Pt may be asked to travel for work 4-6 times per year.   It may be driving or flying.   Next trip will be to [place] on 6/3/2024 and he will be driving.     Pt reports that he usually stays in the [place] but could be asked to go internationally as other groups from his company have.     \"It would just be easier if I didn't have to travel. There was also talk at one point of them taking my drivers license away because I passed out before\". Pt reports that happened 1 time after his cardiac device was implanted.    Explained to patient that his condition is not life threatening and between his medications and his cardiac device, the \"life threatening\" part has essentially been managed.     Would this mean that the patient could not/should not travel for leisure also?    Please advise.     Senait Milligan RN    "

## 2024-05-23 NOTE — TELEPHONE ENCOUNTER
Can you get the details about how frequently he would travel, would this be flying or driving, and where would he be?  His condition does not necessarily prohibit him from traveling.  There would need to know details before weighing in on and a travel restriction.    Leonid

## 2024-05-23 NOTE — TELEPHONE ENCOUNTER
Attempt to call pt to get more info. No answer, left message to call back.     Kristin Gauthier RN

## 2024-05-24 NOTE — TELEPHONE ENCOUNTER
Patient has atrial fibrillation and apical hypertrophic cardiomyopathy.  These conditions really do not qualify for avoiding traveling.  If he travels for work, then in theory he should not travel for leisure or vacation also.  So long as he is in [place] and in medium or larger cities, he should have access to medical care if something came up.  It may be reasonable to avoid international travel with this condition.    Leonid

## 2024-05-24 NOTE — TELEPHONE ENCOUNTER
Called pt, no answer, left message with details regarding travel for pt.   Stating that if pt is not traveling for work due to his cardiac condition, then he should, in theory, not travel for vacation or leisure either.     If pt is traveling within the [place], he should be fine if medical care is needed, but it would be reasonable to avoid international travel due to his cardiac condition. This would apply to work and leisure/vacation.     Instructed pt to call the nurse line with any further questions or concerns.     Senait Milligan RN

## 2024-08-02 ENCOUNTER — PATIENT OUTREACH (OUTPATIENT)
Dept: CARE COORDINATION | Facility: CLINIC | Age: 54
End: 2024-08-02
Payer: COMMERCIAL

## 2024-08-07 ENCOUNTER — TELEPHONE (OUTPATIENT)
Dept: CARDIOLOGY | Facility: CLINIC | Age: 54
End: 2024-08-07
Payer: COMMERCIAL

## 2024-08-07 DIAGNOSIS — I25.10 CORONARY ARTERY DISEASE INVOLVING NATIVE CORONARY ARTERY OF NATIVE HEART WITHOUT ANGINA PECTORIS: ICD-10-CM

## 2024-08-07 DIAGNOSIS — E78.5 HYPERLIPIDEMIA LDL GOAL <70: ICD-10-CM

## 2024-08-07 DIAGNOSIS — I47.29 NSVT (NONSUSTAINED VENTRICULAR TACHYCARDIA) (H): ICD-10-CM

## 2024-08-07 RX ORDER — EZETIMIBE 10 MG/1
10 TABLET ORAL DAILY
Qty: 90 TABLET | Refills: 1 | Status: SHIPPED | OUTPATIENT
Start: 2024-08-07 | End: 2024-10-02

## 2024-08-07 RX ORDER — METOPROLOL SUCCINATE 200 MG/1
200 TABLET, EXTENDED RELEASE ORAL DAILY
Qty: 90 TABLET | Refills: 1 | Status: SHIPPED | OUTPATIENT
Start: 2024-08-07

## 2024-08-07 NOTE — TELEPHONE ENCOUNTER
[facility] Measures Blood Pressure guideline reviewed.  Patients recent blood pressure is outside of guideline parameters.  Called pt to review, no answer.  Left voicemail message asking patient to check their blood pressure using a home blood pressure cuff or by going to a [facility].  Patient instructed to then call [phone] ([place]) and leave a message with their name, date of birth, and blood pressure reading that was completed within the last 24 hours and where it was completed.  Will await call back for further review.

## 2024-08-09 NOTE — TELEPHONE ENCOUNTER
Patient returned call and left voicemail message with update blood pressure reading.      Last Blood Pressure: N/A  Last Heart Rate: N/A  Date: N/A    Today's Blood Pressure: 118/77  Today's Heart Rate: 88  Location: Home BP    Patient reported blood pressure updated in Epic. Blood pressure falls within [place] Community Measures guidelines.  Patient will follow up as previously advised.

## 2024-08-14 ENCOUNTER — TELEPHONE (OUTPATIENT)
Dept: CARDIOLOGY | Facility: CLINIC | Age: 54
End: 2024-08-14
Payer: COMMERCIAL

## 2024-08-14 DIAGNOSIS — I48.0 PAROXYSMAL ATRIAL FIBRILLATION (H): Primary | ICD-10-CM

## 2024-08-14 RX ORDER — DILTIAZEM HYDROCHLORIDE 120 MG/1
120 CAPSULE, EXTENDED RELEASE ORAL DAILY
Qty: 30 CAPSULE | Refills: 11 | Status: SHIPPED | OUTPATIENT
Start: 2024-08-14 | End: 2024-10-02

## 2024-08-14 NOTE — TELEPHONE ENCOUNTER
Brice Harrison MD  You2 hours ago (2:13 PM)       I would recommend starting diltiazem 120 mg long-acting once daily.  I am not sure if this is generic or not, but this would likely slow his heart rate and probably lower blood pressure I know he is on amlodipine, but in this case I think it would be reasonable to continue both calcium channel blockers since they have slightly different mechanisms.    He probably needs a cardioversion to get out of A-fib, if he currently does not have insurance this would be quite extensive.  He should try to work on getting some coverage and then let us know.    Leonid     Brice Orozco MD; [facility] Care Team Pool4 hours ago (12:54 PM)     TK  Hi Dr. Harrison    Pt has been in persistent afib since 8/8/24 and is symptomatic with this.  Has been compliant with medications. Not sure if you would recommend anything.  Thanks.       Called and provided pt with recommendations. Sent diltiazem script to pharmacy.  Pt stated that he did end up getting insurance today.  Will route to Dr. Harrison to let him know.

## 2024-08-14 NOTE — TELEPHONE ENCOUNTER
Pt's wife called back and stated that Pt has been compliant with all of his medications.     She did state that his BPs have been running on the high side 180-160's/'s (with HRs 's)     She also stated that Pt has been feeling light headed, tired and he can definitely feel his heart racing and knows that he is in afib.       Unfortunately Pt does not have insurance currently, but wife did say that hopefully the Pt will have some insurance by the middle of next month.        Will forward this to Dr. Harrison for further recommendations.

## 2024-08-14 NOTE — TELEPHONE ENCOUNTER
"Alert received from patient's ICD for, \"Long atrial episode detected - 1 episode(s) detected between Aug 13, 2024, 1:06:02 AM and Aug 14, 2024, 1:06:02 AM - The programmed episode duration limit was 12h 0min\"    Upon review, patient has been in mostly persistent AF since 8/8/24. EGMs for review confirm AF w/ controlled/slow V rates. Patient has a longstanding history of pAF s/p many cardioversions (most recent 9/2022) and AF ablation (including wide circumferential pulmonary vein isolation and SVC isolation) in 2/2018. Patient remains on eliquis and 200mg toprol XL daily.    Called patient to assess for symptoms and med compliance. No answer. LVM requesting he call back to the Device RN line.     Will route an update to Dr. Harrison for review and recommendation.    BORA RN        "

## 2024-08-15 NOTE — TELEPHONE ENCOUNTER
Dr. Harrison had a cancellation tomorrow 8/16/24 at 10:30am. Spot on hold. Attempted to call pt no answer, left message to call back to see if this date and time works for him.     Kristin Gauthier RN

## 2024-08-15 NOTE — TELEPHONE ENCOUNTER
I would like him to come in for a visit next week sometime, we will likely then schedule a cardioversion.    Leonid

## 2024-08-16 ENCOUNTER — CARE COORDINATION (OUTPATIENT)
Dept: CARDIOLOGY | Facility: CLINIC | Age: 54
End: 2024-08-16

## 2024-08-16 ENCOUNTER — TELEPHONE (OUTPATIENT)
Dept: CARDIOLOGY | Facility: CLINIC | Age: 54
End: 2024-08-16

## 2024-08-16 ENCOUNTER — OFFICE VISIT (OUTPATIENT)
Dept: CARDIOLOGY | Facility: CLINIC | Age: 54
End: 2024-08-16
Payer: COMMERCIAL

## 2024-08-16 VITALS
RESPIRATION RATE: 16 BRPM | HEART RATE: 70 BPM | OXYGEN SATURATION: 99 % | BODY MASS INDEX: 35.01 KG/M2 | WEIGHT: 251 LBS | SYSTOLIC BLOOD PRESSURE: 130 MMHG | DIASTOLIC BLOOD PRESSURE: 91 MMHG

## 2024-08-16 DIAGNOSIS — I48.0 PAROXYSMAL ATRIAL FIBRILLATION (H): Primary | ICD-10-CM

## 2024-08-16 LAB
ANION GAP SERPL CALCULATED.3IONS-SCNC: 8 MMOL/L (ref 7–15)
BUN SERPL-MCNC: 15.5 MG/DL (ref 6–20)
CALCIUM SERPL-MCNC: 9.1 MG/DL (ref 8.8–10.4)
CHLORIDE SERPL-SCNC: 111 MMOL/L (ref 98–107)
CREAT SERPL-MCNC: 1.07 MG/DL (ref 0.67–1.17)
EGFRCR SERPLBLD CKD-EPI 2021: 81 ML/MIN/1.73M2
GLUCOSE SERPL-MCNC: 104 MG/DL (ref 70–99)
HCO3 SERPL-SCNC: 23 MMOL/L (ref 22–29)
POTASSIUM SERPL-SCNC: 5 MMOL/L (ref 3.4–5.3)
SODIUM SERPL-SCNC: 142 MMOL/L (ref 135–145)

## 2024-08-16 PROCEDURE — 99214 OFFICE O/P EST MOD 30 MIN: CPT | Performed by: INTERNAL MEDICINE

## 2024-08-16 PROCEDURE — 93000 ELECTROCARDIOGRAM COMPLETE: CPT | Performed by: INTERNAL MEDICINE

## 2024-08-16 PROCEDURE — 80048 BASIC METABOLIC PNL TOTAL CA: CPT | Performed by: INTERNAL MEDICINE

## 2024-08-16 PROCEDURE — 36415 COLL VENOUS BLD VENIPUNCTURE: CPT | Performed by: INTERNAL MEDICINE

## 2024-08-16 NOTE — PATIENT INSTRUCTIONS
"1.    Please call clinic with any new COVID like symptoms prior to procedure.    2.    Take your temperature the morning of the procedure. If it is >100 call the Care Suites at [phone]    3.    Cardioversion to be done at [facility] on Thursday 8/22/2024. Please arrive at 11:30am. If you need to contact [facility] for any reason, please call [phone] option #2.    4.    Follow up with __________ at [facility] on ________ at ________    Please call the cardiology nurse line at [phone] for any questions or concerns  Yary RN; Kristin RN; Senait JUAREZ      ELECTIVE CARDIOVERSION  [facility]  Your procedure will be done at [facility] located at [address] [address]. Please park in the\"Skyway Ramp\", walk skyway over to hospital, go down one floor and register at the \"[employer] Welcome Desk\"   Or \" Parking\" is located by [employer] Welcome Desk entrance.    Please follow the instructions below:  1. In preparation for your cardioversion you will need to be anticoagulated.    If you are taking a \"Novel Anticoagulant\", please continue to take this medication daily as directed. You will continue to take this medication after your procedure.    2. The morning of your cardioversion we require that you do the following:  NOTHING to eat or drink after midnight the night before your procedure.  Take your medications immediately upon arising with a small sip of water.    3. You will be unable to drive or make important legal decisions for 24 hours after your procedure. You will need a  home and a responsible adult to stay with you for 24 hours post procedure. Your appointment may be cancelled if you do not have a  or responsible adult to stay with you.  4. If you have any questions please contact your cardiology RN at [phone], [facility] clinic or [facility] at [phone], Option " #2.

## 2024-08-16 NOTE — PROGRESS NOTES
CARDIOLOGY VISIT    REASON FOR VISIT: A-fib, apical hypertrophic cardiomyopathy    SUBJECTIVE:  54-year-old male seen for atrial fibrillation and apical hypertrophic cardiomyopathy.         He reports a history of paroxysmal A. fib dating back about 20 years.  He has had several cardioversions in the past.          Cardiac MRI December 2017 showed EF 70%, mild to moderate mid ventricular hypertrophy and severe apical hypertrophy consistent with apical hypertrophic cardiomyopathy, normal RV, diffuse patchy mid wall enhancement of the anterior and apical segments, total scar burden 11%, trivial MR.              2018 he underwent atrial fibrillation ablation including wide circumferential pulmonary vein isolation and SVC isolation.       2018 he had an episode of syncope.  He then underwent single-chamber ICD implantation (Biotronik Intica 7 VR-T ProMRI).       Coronary angiogram 2020 showed moderate first diagonal lesion with normal FFR, otherwise no obstructive coronary disease.       Nuclear stress November 2021 showed moderate ischemia of the mid to distal anterior wall, apex, mid to distal inferior wall, however defect may be secondary to hypertrophic cardiomyopathy.  Study appears unchanged from January 2020.      2022 he underwent successful cardioversion.     Echo September 2023 showed EF 70%, apical hypertrophy, mid LV cavity gradient of 42 mmHg.     Device check February 2024 showed 0% pacing, few brief episodes of atrial tach, 15 runs of NSVT up to 23 beats.    Generally he has been feeling about the same.  1 week ago he noticed palpitations and increased fatigue. Device nurses received notification that he was back in a rate controlled A-fib.  Heart rate has been up and down, typically between 60 and 100.  He denies chest pain or lower extremity edema.  He has been compliant with his medications, specifically he denies missing any Eliquis doses.    MEDICATIONS:  Current Outpatient Medications   Medication  Sig Dispense Refill    amLODIPine (NORVASC) 5 MG tablet Take 1 tablet (5 mg) by mouth daily 90 tablet 3    apixaban ANTICOAGULANT (ELIQUIS ANTICOAGULANT) 5 MG tablet Take 1 tablet (5 mg) by mouth 2 times daily 180 tablet 3    Ascorbic Acid (VITAMIN C PO) Take by mouth daily Takes most days      atorvastatin (LIPITOR) 80 MG tablet Take 1 tablet (80 mg) by mouth daily 90 tablet 3    diltiazem ER (DILT-XR) 120 MG 24 hr capsule Take 1 capsule (120 mg) by mouth daily 30 capsule 11    ezetimibe (ZETIA) 10 MG tablet Take 1 tablet (10 mg) by mouth daily 90 tablet 1    metoprolol succinate ER (TOPROL XL) 200 MG 24 hr tablet Take 1 tablet (200 mg) by mouth daily 90 tablet 1     No current facility-administered medications for this visit.       ALLERGIES:  No Known Allergies    REVIEW OF SYSTEMS:  Constitutional:  No weight loss, fever, chills  HEENT:  Eyes:  No visual loss, blurred vision, double vision or yellow sclerae. No hearing loss, sneezing, congestion, runny nose or sore throat.  Skin:  No rash or itching.  Cardiovascular: per HPI  Respiratory: per HPI  GI:  No anorexia, nausea, vomiting or diarrhea. No abdominal pain or blood.  :  No dysurea, hematuria  Neurologic:  No headache, paralysis, ataxia, numbness or tingling in the extremities. No change in bowel or bladder control.  Musculoskeletal:  No muscle pain  Hematologic:  No bleeding or bruising.  Lymphatics:  No enlarged nodes. No history of splenectomy.  Endocrine:  No reports of sweating, cold or heat intolerance. No polyuria or polydipsia.  Allergies:  No history of asthma, hives, eczema or rhinitis.    PHYSICAL EXAM:  BP (!) 130/91 (BP Location: Left arm, Patient Position: Sitting)   Pulse 70   Resp 16   Wt 113.9 kg (251 lb)   SpO2 99%   BMI 35.01 kg/m        Constitutional: awake, alert, no distress  Eyes: PERRL, sclera nonicteric  ENT: trachea midline  Respiratory: Lungs clear  Cardiovascular: Regular rate, irregular rhythm  GI: nondistended,  nontender, bowel sounds present  Lymph/Hematologic: no lymphadenopathy  Skin: dry, no rash  Musculoskeletal: good muscle tone, strength 5/5 in upper and lower extremities  Neurologic: no focal deficits  Neuropsychiatric: appropriate affact    DATA:  Lab:   Recent Labs   Lab Test 12/11/23  1006 10/06/23  0959   CHOL 114 132   HDL 49 35*   LDL 54 81   TRIG 54 79     EKG: August 16: Atrial fibrillation, rate 50s    ASSESSMENT:  54-year-old male seen for paroxysmal A-fib and apical hypertrophic cardiomyopathy.  He is back in A-fib in the past 1 week.  Suspect he is in continuous A-fib.  Heart rates are up and down a little, but overall controlled.  He is symptomatic with some generalized fatigue and shortness of breath.  Cardioversion will be arranged.  He has been compliant with his Eliquis over the past 3 weeks.    He will be set up to see EP again for a longer term strategy if possible such as antiarrhythmic, but options would be limited with his hypertrophic cardiomyopathy.    RECOMMENDATIONS:  1.  Paroxysmal A-fib  -Cardioversion next week  -Remote device check the day before cardioversion to make sure he is still in A-fib before driving to [employer]  -Continue metoprolol and Eliquis  -Hold off on starting diltiazem, but he will pick it up and have it on hand if he goes back into faster A-fib  -Arrange follow-up with EP    2.  Apical hypertrophic cardiomyopathy with ICD  -Continue current medications.    Follow-up with QUAN in about 1 month.    Brice Harrison MD  Cardiology - [facility]  Pager:  [phone]  Text Page  August 16, 2024

## 2024-08-16 NOTE — PROGRESS NOTES
DCCV/ÁNGELA prep instructions    Patient is scheduled for a Cardioversion at [facility] - [address], [place] [address] - Main Entrance of the Hospital, on Thursday 8/22/2024.  Check in time is at 11:30am and procedure to follow.    Patient instructed to remain NPO for solid foods 8 hours prior to arrival and may have clear liquids up to 2 hours prior to arrival for their DCCV.    Patient is taking Pradaxa/Xarelto/Eliquis and has been taking daily uninterrupted for at least 21days.     Patient is not diabetic.     Patient is not taking Digoxin.    Patient is taking not on diuretics. and has been advised to hold this the morning of the procedure.    Pt is not on a SGLT2 inhibitor.    Pt is not on a GLP-1 Agonist    Patient advised to take their other daily medications the morning of the procedure with small sips of water.     Verified patient has someone available to drive them home from the hospital and can stay with them for 24 hours after the procedure.     Patient advised to notify care team with any new COVID like symptoms prior to procedure.    Patient advised to notify care team with any new COVID like symptoms prior to procedure. [facility] [phone]    Patient is aware of visitor policy.    Patient expresses understanding of above instructions and denies further questions at this time.      Senait Milligan RN  [facility]

## 2024-08-16 NOTE — TELEPHONE ENCOUNTER
----- Message from Yandy ZHAO sent at 8/16/2024 12:54 PM CDT -----  Regarding: FW: Requested Remote Device Check 8/21    ----- Message -----  From: Iqra Tello RN  Sent: 8/16/2024  11:11 AM CDT  To: [facility] Scheduling  Subject: FW: Requested Remote Device Check 8/21             ----- Message -----  From: Claudia Bocanegra  Sent: 8/16/2024  11:09 AM CDT  To: Claudia Bocanegra; [facility] Care Team Pool; #  Subject: Requested Remote Device Check 8/21               Good morning,   Dr. Harrison is requesting if you can set up a remote check for this pt on 8/21, the pt has a scheduled DCCV on 8/22.    Thank you!    Claudia

## 2024-08-16 NOTE — LETTER
8/16/2024    [facility]  [address] [address]    RE: Matthew Still       Dear Colleague,     I had the pleasure of seeing Matthew Still in the [facility].  CARDIOLOGY VISIT    REASON FOR VISIT: A-fib, apical hypertrophic cardiomyopathy    SUBJECTIVE:  54-year-old male seen for atrial fibrillation and apical hypertrophic cardiomyopathy.         He reports a history of paroxysmal A. fib dating back about 20 years.  He has had several cardioversions in the past.          Cardiac MRI December 2017 showed EF 70%, mild to moderate mid ventricular hypertrophy and severe apical hypertrophy consistent with apical hypertrophic cardiomyopathy, normal RV, diffuse patchy mid wall enhancement of the anterior and apical segments, total scar burden 11%, trivial MR.              2018 he underwent atrial fibrillation ablation including wide circumferential pulmonary vein isolation and SVC isolation.       2018 he had an episode of syncope.  He then underwent single-chamber ICD implantation (Biotronik Intica 7 VR-T ProMRI).       Coronary angiogram 2020 showed moderate first diagonal lesion with normal FFR, otherwise no obstructive coronary disease.       Nuclear stress November 2021 showed moderate ischemia of the mid to distal anterior wall, apex, mid to distal inferior wall, however defect may be secondary to hypertrophic cardiomyopathy.  Study appears unchanged from January 2020.      2022 he underwent successful cardioversion.     Echo September 2023 showed EF 70%, apical hypertrophy, mid LV cavity gradient of 42 mmHg.     Device check February 2024 showed 0% pacing, few brief episodes of atrial tach, 15 runs of NSVT up to 23 beats.    Generally he has been feeling about the same.  1 week ago he noticed palpitations and increased fatigue. Device nurses received notification that he was back in a rate controlled A-fib.  Heart rate has been up and down, typically between 60 and  100.  He denies chest pain or lower extremity edema.  He has been compliant with his medications, specifically he denies missing any Eliquis doses.    MEDICATIONS:  Current Outpatient Medications   Medication Sig Dispense Refill     amLODIPine (NORVASC) 5 MG tablet Take 1 tablet (5 mg) by mouth daily 90 tablet 3     apixaban ANTICOAGULANT (ELIQUIS ANTICOAGULANT) 5 MG tablet Take 1 tablet (5 mg) by mouth 2 times daily 180 tablet 3     Ascorbic Acid (VITAMIN C PO) Take by mouth daily Takes most days       atorvastatin (LIPITOR) 80 MG tablet Take 1 tablet (80 mg) by mouth daily 90 tablet 3     diltiazem ER (DILT-XR) 120 MG 24 hr capsule Take 1 capsule (120 mg) by mouth daily 30 capsule 11     ezetimibe (ZETIA) 10 MG tablet Take 1 tablet (10 mg) by mouth daily 90 tablet 1     metoprolol succinate ER (TOPROL XL) 200 MG 24 hr tablet Take 1 tablet (200 mg) by mouth daily 90 tablet 1     No current facility-administered medications for this visit.       ALLERGIES:  No Known Allergies    REVIEW OF SYSTEMS:  Constitutional:  No weight loss, fever, chills  HEENT:  Eyes:  No visual loss, blurred vision, double vision or yellow sclerae. No hearing loss, sneezing, congestion, runny nose or sore throat.  Skin:  No rash or itching.  Cardiovascular: per HPI  Respiratory: per HPI  GI:  No anorexia, nausea, vomiting or diarrhea. No abdominal pain or blood.  :  No dysurea, hematuria  Neurologic:  No headache, paralysis, ataxia, numbness or tingling in the extremities. No change in bowel or bladder control.  Musculoskeletal:  No muscle pain  Hematologic:  No bleeding or bruising.  Lymphatics:  No enlarged nodes. No history of splenectomy.  Endocrine:  No reports of sweating, cold or heat intolerance. No polyuria or polydipsia.  Allergies:  No history of asthma, hives, eczema or rhinitis.    PHYSICAL EXAM:  BP (!) 130/91 (BP Location: Left arm, Patient Position: Sitting)   Pulse 70   Resp 16   Wt 113.9 kg (251 lb)   SpO2 99%   BMI  35.01 kg/m        Constitutional: awake, alert, no distress  Eyes: PERRL, sclera nonicteric  ENT: trachea midline  Respiratory: Lungs clear  Cardiovascular: Regular rate, irregular rhythm  GI: nondistended, nontender, bowel sounds present  Lymph/Hematologic: no lymphadenopathy  Skin: dry, no rash  Musculoskeletal: good muscle tone, strength 5/5 in upper and lower extremities  Neurologic: no focal deficits  Neuropsychiatric: appropriate affact    DATA:  Lab:   Recent Labs   Lab Test 12/11/23  1006 10/06/23  0959   CHOL 114 132   HDL 49 35*   LDL 54 81   TRIG 54 79     EKG: August 16: Atrial fibrillation, rate 50s    ASSESSMENT:  54-year-old male seen for paroxysmal A-fib and apical hypertrophic cardiomyopathy.  He is back in A-fib in the past 1 week.  Suspect he is in continuous A-fib.  Heart rates are up and down a little, but overall controlled.  He is symptomatic with some generalized fatigue and shortness of breath.  Cardioversion will be arranged.  He has been compliant with his Eliquis over the past 3 weeks.    He will be set up to see EP again for a longer term strategy if possible such as antiarrhythmic, but options would be limited with his hypertrophic cardiomyopathy.    RECOMMENDATIONS:  1.  Paroxysmal A-fib  -Cardioversion next week  -Remote device check the day before cardioversion to make sure he is still in A-fib before driving to [employer]  -Continue metoprolol and Eliquis  -Hold off on starting diltiazem, but he will pick it up and have it on hand if he goes back into faster A-fib  -Arrange follow-up with EP    2.  Apical hypertrophic cardiomyopathy with ICD  -Continue current medications.    Follow-up with QUAN in about 1 month.    Brice Harrison MD  Cardiology - [facility]  Pager:  [phone]  Text Page  August 16, 2024        Thank you for allowing me to participate in the care of your patient.      Sincerely,     Brice Harrison MD     [facility]  [facility]  cc:   Brice Harrison MD  [facility]  [address] [place] [address]

## 2024-08-20 ENCOUNTER — TELEPHONE (OUTPATIENT)
Dept: CARDIOLOGY | Facility: CLINIC | Age: 54
End: 2024-08-20
Payer: COMMERCIAL

## 2024-08-20 NOTE — TELEPHONE ENCOUNTER
----- Message from Eliza CHÁVEZ sent at 8/20/2024  7:56 AM CDT -----  Regarding: FW: Requested Remote Device Check 8/21  ----- Message -----  From: Iqra Tello, RN  Sent: 8/16/2024  11:11 AM CDT  To: [facility] Scheduling  Subject: FW: Requested Remote Device Check 8/21         ----- Message -----  From: Claudia Bocanegra  Sent: 8/16/2024  11:09 AM CDT  To: Claudia Bocanegra; [facility] Care Team Pool; #  Subject: Requested Remote Device Check 8/21               Good morning,   Dr. Harrison is requesting if you can set up a remote check for this pt on 8/21, the pt has a scheduled DCCV on 8/22.    Thank you!  Claudia          Received above message about scheduling a remote check for 8/21/2024, 1 day before DCCV. Pt has a Biotronik Intica ICD, implanted in 2018. Remote check for 8/21 was already scheduled on 8/16. Will close this encounter.

## 2024-08-21 ENCOUNTER — ANCILLARY PROCEDURE (OUTPATIENT)
Dept: CARDIOLOGY | Facility: CLINIC | Age: 54
End: 2024-08-21
Attending: INTERNAL MEDICINE
Payer: COMMERCIAL

## 2024-08-21 DIAGNOSIS — Z95.810 ICD (IMPLANTABLE CARDIOVERTER-DEFIBRILLATOR) IN PLACE: ICD-10-CM

## 2024-08-21 DIAGNOSIS — I42.9 CARDIOMYOPATHY (H): ICD-10-CM

## 2024-08-21 NOTE — TELEPHONE ENCOUNTER
Courtesy remote check scheduled for today because pt has DCCV tomorrow. Presenting rhythm is AF. Will route update to Dr. Harrison.     (Remote check documentation will be completed at a later time)

## 2024-08-22 ENCOUNTER — ANESTHESIA (OUTPATIENT)
Dept: SURGERY | Facility: CLINIC | Age: 54
End: 2024-08-22
Payer: COMMERCIAL

## 2024-08-22 ENCOUNTER — HOSPITAL ENCOUNTER (OUTPATIENT)
Facility: CLINIC | Age: 54
Discharge: HOME | End: 2024-08-22
Admitting: INTERNAL MEDICINE
Payer: COMMERCIAL

## 2024-08-22 ENCOUNTER — HOSPITAL ENCOUNTER (OUTPATIENT)
Dept: MEDSURG UNIT | Facility: CLINIC | Age: 54
Discharge: HOME | End: 2024-08-22
Attending: INTERNAL MEDICINE
Payer: COMMERCIAL

## 2024-08-22 ENCOUNTER — ANESTHESIA EVENT (OUTPATIENT)
Dept: SURGERY | Facility: CLINIC | Age: 54
End: 2024-08-22
Payer: COMMERCIAL

## 2024-08-22 VITALS
OXYGEN SATURATION: 97 % | DIASTOLIC BLOOD PRESSURE: 78 MMHG | TEMPERATURE: 98.1 F | SYSTOLIC BLOOD PRESSURE: 130 MMHG | BODY MASS INDEX: 34.49 KG/M2 | HEART RATE: 51 BPM | WEIGHT: 247.3 LBS | RESPIRATION RATE: 12 BRPM

## 2024-08-22 DIAGNOSIS — I48.0 PAROXYSMAL ATRIAL FIBRILLATION (H): ICD-10-CM

## 2024-08-22 LAB
ATRIAL RATE - MUSE: 52 BPM
DIASTOLIC BLOOD PRESSURE - MUSE: NORMAL MMHG
INR PPP: 1.4 (ref 0.85–1.15)
INTERPRETATION ECG - MUSE: NORMAL
MAGNESIUM SERPL-MCNC: 2 MG/DL (ref 1.7–2.3)
MDC_IDC_EPISODE_DTM: NORMAL
MDC_IDC_EPISODE_DURATION: 8581 S
MDC_IDC_EPISODE_ID: 236
MDC_IDC_EPISODE_ID: 239
MDC_IDC_EPISODE_ID: 240
MDC_IDC_EPISODE_THERAPY_RESULT: NORMAL
MDC_IDC_EPISODE_TYPE: NORMAL
MDC_IDC_EPISODE_TYPE_INDUCED: NO
MDC_IDC_EPISODE_VENDOR_TYPE: NORMAL
MDC_IDC_LEAD_CONNECTION_STATUS: NORMAL
MDC_IDC_LEAD_IMPLANT_DT: NORMAL
MDC_IDC_LEAD_LOCATION: NORMAL
MDC_IDC_LEAD_LOCATION_DETAIL_1: NORMAL
MDC_IDC_LEAD_MFG: NORMAL
MDC_IDC_LEAD_MODEL: NORMAL
MDC_IDC_LEAD_SERIAL: NORMAL
MDC_IDC_MSMT_BATTERY_DTM: NORMAL
MDC_IDC_MSMT_BATTERY_REMAINING_PERCENTAGE: 57 %
MDC_IDC_MSMT_BATTERY_RRT_TRIGGER: NORMAL
MDC_IDC_MSMT_BATTERY_STATUS: NORMAL
MDC_IDC_MSMT_BATTERY_VOLTAGE: 3.11 V
MDC_IDC_MSMT_CAP_CHARGE_DTM: NORMAL
MDC_IDC_MSMT_CAP_CHARGE_ENERGY: 40 J
MDC_IDC_MSMT_CAP_CHARGE_TIME: 9.9 S
MDC_IDC_MSMT_CAP_CHARGE_TYPE: NORMAL
MDC_IDC_MSMT_LEADCHNL_RA_LEAD_CHANNEL_STATUS: NORMAL
MDC_IDC_MSMT_LEADCHNL_RV_IMPEDANCE_VALUE: 495 OHM
MDC_IDC_MSMT_LEADCHNL_RV_LEAD_CHANNEL_STATUS: NORMAL
MDC_IDC_PG_IMPLANT_DTM: NORMAL
MDC_IDC_PG_MFG: NORMAL
MDC_IDC_PG_MODEL: NORMAL
MDC_IDC_PG_SERIAL: NORMAL
MDC_IDC_PG_TYPE: NORMAL
MDC_IDC_SESS_CLINIC_NAME: NORMAL
MDC_IDC_SESS_DTM: NORMAL
MDC_IDC_SESS_REPROGRAMMED: NO
MDC_IDC_SESS_TYPE: NORMAL
MDC_IDC_SET_BRADY_LOWRATE: 40 {BEATS}/MIN
MDC_IDC_SET_BRADY_MODE: NORMAL
MDC_IDC_SET_LEADCHNL_RA_SENSING_ANODE_ELECTRODE_1: NORMAL
MDC_IDC_SET_LEADCHNL_RA_SENSING_ANODE_LOCATION_1: NORMAL
MDC_IDC_SET_LEADCHNL_RA_SENSING_CATHODE_ELECTRODE_1: NORMAL
MDC_IDC_SET_LEADCHNL_RA_SENSING_CATHODE_LOCATION_1: NORMAL
MDC_IDC_SET_LEADCHNL_RA_SENSING_POLARITY: NORMAL
MDC_IDC_SET_LEADCHNL_RA_SENSING_SENSITIVITY: 0.4 MV
MDC_IDC_SET_LEADCHNL_RV_PACING_AMPLITUDE: 2 V
MDC_IDC_SET_LEADCHNL_RV_PACING_ANODE_ELECTRODE_1: NORMAL
MDC_IDC_SET_LEADCHNL_RV_PACING_ANODE_LOCATION_1: NORMAL
MDC_IDC_SET_LEADCHNL_RV_PACING_CAPTURE_MODE: NORMAL
MDC_IDC_SET_LEADCHNL_RV_PACING_CATHODE_ELECTRODE_1: NORMAL
MDC_IDC_SET_LEADCHNL_RV_PACING_CATHODE_LOCATION_1: NORMAL
MDC_IDC_SET_LEADCHNL_RV_PACING_POLARITY: NORMAL
MDC_IDC_SET_LEADCHNL_RV_PACING_PULSEWIDTH: 0.4 MS
MDC_IDC_SET_LEADCHNL_RV_SENSING_ADAPTATION_MODE: NORMAL
MDC_IDC_SET_LEADCHNL_RV_SENSING_ANODE_ELECTRODE_1: NORMAL
MDC_IDC_SET_LEADCHNL_RV_SENSING_ANODE_LOCATION_1: NORMAL
MDC_IDC_SET_LEADCHNL_RV_SENSING_CATHODE_ELECTRODE_1: NORMAL
MDC_IDC_SET_LEADCHNL_RV_SENSING_CATHODE_LOCATION_1: NORMAL
MDC_IDC_SET_LEADCHNL_RV_SENSING_POLARITY: NORMAL
MDC_IDC_SET_LEADCHNL_RV_SENSING_SENSITIVITY: 0.8 MV
MDC_IDC_SET_ZONE_DETECTION_BEATS_DENOMINATOR: 40 {BEATS}
MDC_IDC_SET_ZONE_DETECTION_BEATS_NUMERATOR: 30 {BEATS}
MDC_IDC_SET_ZONE_DETECTION_INTERVAL: 250 MS
MDC_IDC_SET_ZONE_DETECTION_INTERVAL: 300 MS
MDC_IDC_SET_ZONE_STATUS: NORMAL
MDC_IDC_SET_ZONE_STATUS: NORMAL
MDC_IDC_SET_ZONE_TYPE: NORMAL
MDC_IDC_SET_ZONE_TYPE: NORMAL
MDC_IDC_SET_ZONE_VENDOR_TYPE: NORMAL
MDC_IDC_STAT_AT_BURDEN_PERCENT: 2 %
MDC_IDC_STAT_AT_DTM_END: NORMAL
MDC_IDC_STAT_AT_DTM_START: NORMAL
MDC_IDC_STAT_BRADY_AS_VP_PERCENT: 0 %
MDC_IDC_STAT_BRADY_AS_VS_PERCENT: 99 %
MDC_IDC_STAT_BRADY_DTM_END: NORMAL
MDC_IDC_STAT_BRADY_DTM_START: NORMAL
MDC_IDC_STAT_BRADY_RV_PERCENT_PACED: 0 %
MDC_IDC_STAT_EPISODE_RECENT_COUNT: 26
MDC_IDC_STAT_EPISODE_RECENT_COUNT_DTM_END: NORMAL
MDC_IDC_STAT_EPISODE_RECENT_COUNT_DTM_START: NORMAL
MDC_IDC_STAT_EPISODE_TOTAL_COUNT: 0
MDC_IDC_STAT_EPISODE_TOTAL_COUNT: 97
MDC_IDC_STAT_EPISODE_TOTAL_COUNT_DTM_END: NORMAL
MDC_IDC_STAT_EPISODE_TOTAL_COUNT_DTM_START: NORMAL
MDC_IDC_STAT_EPISODE_TYPE: NORMAL
MDC_IDC_STAT_EPISODE_VENDOR_TYPE: NORMAL
MDC_IDC_STAT_HEART_RATE_ATRIAL_MEAN: 64 {BEATS}/MIN
MDC_IDC_STAT_HEART_RATE_DTM_END: NORMAL
MDC_IDC_STAT_HEART_RATE_DTM_START: NORMAL
MDC_IDC_STAT_HEART_RATE_VENTRICULAR_MEAN: 60 {BEATS}/MIN
MDC_IDC_STAT_TACHYTHERAPY_ATP_DELIVERED_RECENT: 0
MDC_IDC_STAT_TACHYTHERAPY_ATP_DELIVERED_TOTAL: 0
MDC_IDC_STAT_TACHYTHERAPY_RECENT_DTM_END: NORMAL
MDC_IDC_STAT_TACHYTHERAPY_RECENT_DTM_START: NORMAL
MDC_IDC_STAT_TACHYTHERAPY_SHOCKS_ABORTED_RECENT: 0
MDC_IDC_STAT_TACHYTHERAPY_SHOCKS_ABORTED_TOTAL: 0
MDC_IDC_STAT_TACHYTHERAPY_SHOCKS_DELIVERED_RECENT: 0
MDC_IDC_STAT_TACHYTHERAPY_SHOCKS_DELIVERED_TOTAL: 0
MDC_IDC_STAT_TACHYTHERAPY_TOTAL_DTM_END: NORMAL
MDC_IDC_STAT_TACHYTHERAPY_TOTAL_DTM_START: NORMAL
P AXIS - MUSE: -8 DEGREES
POTASSIUM SERPL-SCNC: 4.4 MMOL/L (ref 3.4–5.3)
PR INTERVAL - MUSE: 138 MS
QRS DURATION - MUSE: 118 MS
QT - MUSE: 530 MS
QTC - MUSE: 492 MS
R AXIS - MUSE: 48 DEGREES
SYSTOLIC BLOOD PRESSURE - MUSE: NORMAL MMHG
T AXIS - MUSE: 210 DEGREES
VENTRICULAR RATE- MUSE: 52 BPM

## 2024-08-22 PROCEDURE — 92960 CARDIOVERSION ELECTRIC EXT: CPT | Performed by: INTERNAL MEDICINE

## 2024-08-22 PROCEDURE — 92960 CARDIOVERSION ELECTRIC EXT: CPT | Performed by: ANESTHESIOLOGY

## 2024-08-22 PROCEDURE — 92960 CARDIOVERSION ELECTRIC EXT: CPT | Performed by: NURSE ANESTHETIST, CERTIFIED REGISTERED

## 2024-08-22 PROCEDURE — 85610 PROTHROMBIN TIME: CPT | Performed by: INTERNAL MEDICINE

## 2024-08-22 PROCEDURE — 999N000054 HC STATISTIC EKG NON-CHARGEABLE

## 2024-08-22 PROCEDURE — 92960 CARDIOVERSION ELECTRIC EXT: CPT

## 2024-08-22 PROCEDURE — 36415 COLL VENOUS BLD VENIPUNCTURE: CPT | Performed by: INTERNAL MEDICINE

## 2024-08-22 PROCEDURE — 83735 ASSAY OF MAGNESIUM: CPT | Performed by: INTERNAL MEDICINE

## 2024-08-22 PROCEDURE — 84132 ASSAY OF SERUM POTASSIUM: CPT | Performed by: INTERNAL MEDICINE

## 2024-08-22 PROCEDURE — 370N000017 HC ANESTHESIA TECHNICAL FEE, PER MIN

## 2024-08-22 PROCEDURE — 93005 ELECTROCARDIOGRAM TRACING: CPT

## 2024-08-22 PROCEDURE — 93010 ELECTROCARDIOGRAM REPORT: CPT | Mod: XU | Performed by: INTERNAL MEDICINE

## 2024-08-22 PROCEDURE — 250N000011 HC RX IP 250 OP 636: Performed by: NURSE ANESTHETIST, CERTIFIED REGISTERED

## 2024-08-22 PROCEDURE — 999N000010 HC STATISTIC ANES STAT CODE-CRNA PER MINUTE

## 2024-08-22 PROCEDURE — 258N000003 HC RX IP 258 OP 636: Performed by: INTERNAL MEDICINE

## 2024-08-22 RX ORDER — MAGNESIUM SULFATE HEPTAHYDRATE 40 MG/ML
2 INJECTION, SOLUTION INTRAVENOUS
Status: DISCONTINUED | OUTPATIENT
Start: 2024-08-22 | End: 2024-08-22 | Stop reason: HOSPADM

## 2024-08-22 RX ORDER — POTASSIUM CHLORIDE 1500 MG/1
20 TABLET, EXTENDED RELEASE ORAL
Status: DISCONTINUED | OUTPATIENT
Start: 2024-08-22 | End: 2024-08-22 | Stop reason: HOSPADM

## 2024-08-22 RX ORDER — LIDOCAINE 40 MG/G
CREAM TOPICAL
Status: DISCONTINUED | OUTPATIENT
Start: 2024-08-22 | End: 2024-08-22 | Stop reason: HOSPADM

## 2024-08-22 RX ORDER — SODIUM CHLORIDE 9 MG/ML
INJECTION, SOLUTION INTRAVENOUS CONTINUOUS
Status: DISCONTINUED | OUTPATIENT
Start: 2024-08-22 | End: 2024-08-22 | Stop reason: HOSPADM

## 2024-08-22 RX ORDER — BENZOCAINE/MENTHOL 6 MG-10 MG
LOZENGE MUCOUS MEMBRANE 3 TIMES DAILY PRN
Status: CANCELLED | OUTPATIENT
Start: 2024-08-22 | End: 2024-08-24

## 2024-08-22 RX ORDER — BENZOCAINE/MENTHOL 6 MG-10 MG
LOZENGE MUCOUS MEMBRANE 3 TIMES DAILY PRN
Status: DISCONTINUED | OUTPATIENT
Start: 2024-08-22 | End: 2024-08-22 | Stop reason: HOSPADM

## 2024-08-22 RX ORDER — PROPOFOL 10 MG/ML
INJECTION, EMULSION INTRAVENOUS PRN
Status: DISCONTINUED | OUTPATIENT
Start: 2024-08-22 | End: 2024-08-22

## 2024-08-22 RX ADMIN — SODIUM CHLORIDE: 9 INJECTION, SOLUTION INTRAVENOUS at 12:44

## 2024-08-22 RX ADMIN — PROPOFOL 80 MG: 10 INJECTION, EMULSION INTRAVENOUS at 13:41

## 2024-08-22 ASSESSMENT — ACTIVITIES OF DAILY LIVING (ADL)
ADLS_ACUITY_SCORE: 35

## 2024-08-22 ASSESSMENT — ENCOUNTER SYMPTOMS: DYSRHYTHMIAS: 1

## 2024-08-22 NOTE — ANESTHESIA PREPROCEDURE EVALUATION
Anesthesia Pre-Procedure Evaluation    Patient: Matthew Still   MRN: [id number] : 1970        Procedure : Procedure(s):  Anesthesia cardioversion          Past Medical History:   Diagnosis Date    Atrial fibrillation (H)     Coronary artery disease     Heart disease     Hyperlipidemia     Hypertension       Past Surgical History:   Procedure Laterality Date    ANESTHESIA CARDIOVERSION N/A 2020    Procedure: CARDIOVERSION (FOLLOWING ÁNGELA AT 0930);  Surgeon: GENERIC ANESTHESIA PROVIDER;  Location:  OR    ANESTHESIA CARDIOVERSION N/A 2022    Procedure: CARDIOVERSION;  Surgeon: GENERIC ANESTHESIA PROVIDER;  Location:  OR    CV CORONARY ANGIOGRAM N/A 2020    Procedure: Coronary Angiogram;  Surgeon: Daniel Miranda MD;  Location:  HEART CARDIAC CATH LAB    CV FRACTIONAL FLOW RATIO WIRE N/A 2020    Procedure: Fractional Flow Treichlers;  Surgeon: Daniel Miranda MD;  Location:  HEART CARDIAC CATH LAB      No Known Allergies   Social History     Tobacco Use    Smoking status: Never    Smokeless tobacco: Former   Substance Use Topics    Alcohol use: Yes     Comment: 1 drink per month      Wt Readings from Last 1 Encounters:   24 112.2 kg (247 lb 4.8 oz)        Anesthesia Evaluation   Pt has had prior anesthetic.     No history of anesthetic complications       ROS/MED HX  ENT/Pulmonary:    (-) sleep apnea   Neurologic:    (-) no CVA   Cardiovascular:     (+) Dyslipidemia hypertension- -  CAD -  - -                        dysrhythmias, a-fib,             METS/Exercise Tolerance:     Hematologic:       Musculoskeletal:       GI/Hepatic:    (-) GERD   Renal/Genitourinary:       Endo:    (-) Type II DM   Psychiatric/Substance Use:       Infectious Disease:       Malignancy:       Other:            Physical Exam    Airway        Mallampati: II   TM distance: > 3 FB   Neck ROM: full   Mouth opening: > 3 cm    Respiratory Devices and Support         Dental  no notable dental history      (+) Minor Abnormalities - some fillings, tiny chips      Cardiovascular   cardiovascular exam normal          Pulmonary   pulmonary exam normal                OUTSIDE LABS:  CBC:   Lab Results   Component Value Date    WBC 6.0 11/26/2021    WBC 8.5 02/06/2020    HGB 15.1 11/26/2021    HGB 14.8 02/06/2020    HCT 46.0 11/26/2021    HCT 43.5 02/06/2020     (L) 11/26/2021     02/06/2020     BMP:   Lab Results   Component Value Date     08/16/2024     10/06/2023    POTASSIUM 4.4 08/22/2024    POTASSIUM 5.0 08/16/2024    CHLORIDE 111 (H) 08/16/2024    CHLORIDE 108 (H) 10/06/2023    CO2 23 08/16/2024    CO2 24 10/06/2023    BUN 15.5 08/16/2024    BUN 10.1 10/06/2023    CR 1.07 08/16/2024    CR 1.07 10/06/2023     (H) 08/16/2024     (H) 10/06/2023     COAGS:   Lab Results   Component Value Date    PTT 27 02/06/2020    INR 1.40 (H) 08/22/2024     POC:   Lab Results   Component Value Date     (H) 02/05/2018     HEPATIC:   Lab Results   Component Value Date    ALBUMIN 3.8 11/14/2017    PROTTOTAL 7.4 11/14/2017    ALT 19 12/11/2023    AST 18 11/14/2017    ALKPHOS 67 11/14/2017    BILITOTAL 0.2 11/14/2017     OTHER:   Lab Results   Component Value Date    A1C 5.3 11/15/2017    BARRON 9.1 08/16/2024    MAG 2.0 08/22/2024       Anesthesia Plan    ASA Status:  2    NPO Status:  NPO Appropriate    Anesthesia Type: MAC.     - Reason for MAC: straight local not clinically adequate              Consents    Anesthesia Plan(s) and associated risks, benefits, and realistic alternatives discussed. Questions answered and patient/representative(s) expressed understanding.     - Discussed:     - Discussed with:  Patient            Postoperative Care    Pain management: Multi-modal analgesia.   PONV prophylaxis: Ondansetron (or other 5HT-3)     Comments:               Mikayla Agarwal MD, MD    I have reviewed the pertinent notes and labs in the chart from the past 30 days and (re)examined the  patient.  Any updates or changes from those notes are reflected in this note.            # Drug Induced Coagulation Defect: home medication list includes an anticoagulant medication

## 2024-08-22 NOTE — PRE-PROCEDURE
GENERAL PRE-PROCEDURE:   Procedure:  Cardioversion  Date/Time:  8/22/2024 1:34 PM    Verbal consent obtained?: Yes    Risks and benefits: Risks, benefits and alternatives were discussed    Consent given by:  Patient  Patient states understanding of procedure being performed: Yes    Patient's understanding of procedure matches consent: Yes    Procedure consent matches procedure scheduled: Yes    Expected level of sedation:  Deep  Appropriately NPO:  Yes  ASA Class:  1  Mallampati  :  Grade 1- soft palate, uvula, tonsillar pillars, and posterior pharyngeal wall visible  Lungs:  Lungs clear with good breath sounds bilaterally  Heart:  A-fib  History & Physical reviewed:  History and physical reviewed and no updates needed  Statement of review:  I have reviewed the lab findings, diagnostic data, medications, and the plan for sedation

## 2024-08-22 NOTE — PROCEDURES
[facility]    Procedure: Cardioversion External    Date/Time: 8/22/2024 1:44 PM    Performed by: Brice Harrison MD  Authorized by: Brice Harrison MD      UNIVERSAL PROTOCOL   Site Marked: NA  Prior Images Obtained and Reviewed:  NA  Required items: Required blood products, implants, devices and special equipment available    Patient identity confirmed:  Verbally with patient  Patient was reevaluated immediately before administering moderate or deep sedation or anesthesia  Confirmation Checklist:  Patient's identity using two indicators  Time out: Immediately prior to the procedure a time out was called    Universal Protocol: the Joint Commission Universal Protocol was followed    Preparation: Patient was prepped and draped in usual sterile fashion       ANESTHESIA    Anesthesia was administered and monitored by anesthesiology.  See anesthesia documentation for details.    SEDATION  Patient Sedated: Yes    Vital signs: Vital signs monitored during sedation      PROCEDURE DETAILS  Cardioversion basis: elective  Pre-procedure rhythm: atrial fibrillation  Electrodes: pads  Number of attempts: 1  Post-procedure rhythm: normal sinus rhythm      PROCEDURE  Describe Procedure: Cardioversion Note    Indication: afib    Informed consent was signed by the patient.  A timeout was taken.    Anesthesia was present for cardioversion, see separate documentation for their sedation.    Baseline rhythm: afib  Synchronized cardioversion performed at 150JJ, x1  Post-DCCV rhythm: sinus, rate 50's    No complications.    Brice Harrison MD  Cardiology - [facility]  Pager: [phone]  Text Page  August 22, 2024        Patient Tolerance:  Patient tolerated the procedure well with no immediate complications

## 2024-08-22 NOTE — PROGRESS NOTES
Care Suites Admission Nursing Note    Patient Information  Name: Matthew Still  Age: 54 year old  Reason for admission: cardioversion  Care Suites arrival time: 1130    Visitor Information  Name: elvin Fang [phone]      Patient Admission/Assessment   Pre-procedure assessment complete: Yes  If abnormal assessment/labs, provider notified: No  NPO: Yes  Medications held per instructions/orders: N/A  Consent: obtained  If applicable, pregnancy test status: declined  Patient oriented to room: Yes  Education/questions answered: Yes  Plan/other: Review labs, obtain consent and proceed with scheduled treatment or intervention      Discharge Planning  Discharge name/phone number: elvin connolly - [phone]   Overnight post sedation caregiver: elvin connolly - [phone]   Discharge location: [place]    Jacobo Brito RN

## 2024-08-22 NOTE — ANESTHESIA POSTPROCEDURE EVALUATION
Patient: Matthew Still    Procedure: Procedure(s):  Anesthesia cardioversion       Anesthesia Type:  MAC    Note:     Postop Pain Control: Uneventful            Sign Out: Well controlled pain   PONV: No   Neuro/Psych: Uneventful            Sign Out: Acceptable/Baseline neuro status   Airway/Respiratory: Uneventful            Sign Out: Acceptable/Baseline resp. status   CV/Hemodynamics: Uneventful            Sign Out: Acceptable CV status; No obvious hypovolemia; No obvious fluid overload   Other NRE:    DID A NON-ROUTINE EVENT OCCUR? No           Last vitals:  Vitals:    08/22/24 1330 08/22/24 1344 08/22/24 1344   BP:  123/75    Pulse: 61  55   Resp: (!) 40  14   Temp:      SpO2:   96%       Electronically Signed By: Mkiayla Agarwal MD, MD  August 22, 2024  2:11 PM

## 2024-08-22 NOTE — DISCHARGE INSTRUCTIONS
Cardioversion Discharge Instructions    After you go home:       For 24 hours - due to the sedation you received:    Have an adult stay with you for 24 hours.   Relax and take it easy.  Do NOT make any important or legal decisions.  Do NOT drive or operate machines at home or at work.  Do NOT drink alcohol.    Diet:    Start with clear liquids and progress to your normal diet as you feel able.    Medicines:    Take your medications, including blood thinners, unless your provider tells you not to.  If you have stopped any medications, check with your provider about when to restart them.    Follow Up Appointments:    Follow up with your cardiologist at [facility] of patient preference as instructed.  Follow up with your primary care provider as needed.    Post cardioversion:    The skin on your chest or back may feel tender for 48 hours.  If your skin is tender, you may:    Use a cold pack on the site. Never use ice directly on your skin. Use the cold pack for 20 minutes. Remove it for at least 30 minutes before re-using.  Apply 1% hydrocortisone cream to the skin (sold at drug stores)  Take Advil (Ibuprofen) or Tylenol (Acetaminophen) per your provider's recommendations.      Call your provider if you have:    Weakness, dizziness, lightheadedness, or fainting.  Shortness of breath.  Irregular heartbeat, feelings of your heart fluttering or beating fast, hard or palpitations.   More than minor skin discomfort or redness where the cardioversion pads were placed.  Questions or concerns.      Call 911 if you have:    Pain in your chest, arm, shoulder, neck, or upper back.  You have problems speaking or seeing.  Weakness in your arm or leg.  You are unable to move your arm or leg.  You have uncontrolled bleeding.         [facility] [place]:    [phone] [facility] (7 days a week)

## 2024-08-22 NOTE — ANESTHESIA CARE TRANSFER NOTE
Patient: Matthew Still    Procedure: Procedure(s):  Anesthesia cardioversion       Diagnosis: A-fib (H) [I48.91]  Diagnosis Additional Information: No value filed.    Anesthesia Type:   MAC     Note:    Oropharynx: oropharynx clear of all foreign objects  Level of Consciousness: awake  Oxygen Supplementation: nasal cannula  Level of Supplemental Oxygen (L/min / FiO2): 2  Independent Airway: airway patency satisfactory and stable  Dentition: dentition unchanged  Vital Signs Stable: post-procedure vital signs reviewed and stable  Report to RN Given: handoff report given  Destination: care suites 17.      Vitals:  Vitals Value Taken Time   /78 08/22/24 1350   Temp     Pulse 52 08/22/24 1352   Resp 9 08/22/24 1352   SpO2 98 % 08/22/24 1352   Vitals shown include unfiled device data.    Electronically Signed By: HORTENSIA Foster CRNA  August 22, 2024  1:53 PM  
[birth year]2019

## 2024-08-22 NOTE — DISCHARGE SUMMARY
Care Suites Discharge Nursing Note    Patient Information  Name: Matthew tSill  Age: 54 year old    Discharge Education:  Discharge instructions reviewed: Yes  Additional education/resources provided: no  Patient/patient representative verbalizes understanding: Yes  Patient discharging on new medications: No  Medication education completed: N/A    Discharge Plans:   Discharge location: home  Discharge ride contacted: Yes  Approximate discharge time: 1430    Discharge Criteria:  Discharge criteria met and vital signs stable: Yes    Patient Belongs:  Patient belongings returned to patient: Yes    Jacobo Brito RN

## 2024-08-23 LAB
ATRIAL RATE - MUSE: 74 BPM
DIASTOLIC BLOOD PRESSURE - MUSE: NORMAL MMHG
INTERPRETATION ECG - MUSE: NORMAL
P AXIS - MUSE: NORMAL DEGREES
PR INTERVAL - MUSE: NORMAL MS
QRS DURATION - MUSE: 124 MS
QT - MUSE: 476 MS
QTC - MUSE: 510 MS
R AXIS - MUSE: 50 DEGREES
SYSTOLIC BLOOD PRESSURE - MUSE: NORMAL MMHG
T AXIS - MUSE: 217 DEGREES
VENTRICULAR RATE- MUSE: 69 BPM

## 2024-09-05 ENCOUNTER — HOSPITAL ENCOUNTER (OUTPATIENT)
Dept: CARDIOLOGY | Facility: CLINIC | Age: 54
Discharge: HOME | End: 2024-09-05
Attending: INTERNAL MEDICINE | Admitting: INTERNAL MEDICINE
Payer: COMMERCIAL

## 2024-09-05 ENCOUNTER — VIRTUAL VISIT (OUTPATIENT)
Dept: CARDIOLOGY | Facility: CLINIC | Age: 54
End: 2024-09-05
Attending: INTERNAL MEDICINE
Payer: COMMERCIAL

## 2024-09-05 ENCOUNTER — HOSPITAL ENCOUNTER (OUTPATIENT)
Facility: CLINIC | Age: 54
End: 2024-09-05
Attending: INTERNAL MEDICINE | Admitting: INTERNAL MEDICINE
Payer: COMMERCIAL

## 2024-09-05 VITALS
BODY MASS INDEX: 34.34 KG/M2 | HEART RATE: 54 BPM | SYSTOLIC BLOOD PRESSURE: 128 MMHG | OXYGEN SATURATION: 98 % | DIASTOLIC BLOOD PRESSURE: 80 MMHG | WEIGHT: 245.3 LBS | RESPIRATION RATE: 18 BRPM | HEIGHT: 71 IN

## 2024-09-05 DIAGNOSIS — I48.0 PAROXYSMAL ATRIAL FIBRILLATION (H): ICD-10-CM

## 2024-09-05 DIAGNOSIS — I48.0 PAROXYSMAL ATRIAL FIBRILLATION (H): Primary | ICD-10-CM

## 2024-09-05 PROCEDURE — 93005 ELECTROCARDIOGRAM TRACING: CPT | Performed by: REHABILITATION PRACTITIONER

## 2024-09-05 PROCEDURE — 93010 ELECTROCARDIOGRAM REPORT: CPT | Performed by: INTERNAL MEDICINE

## 2024-09-05 PROCEDURE — 99214 OFFICE O/P EST MOD 30 MIN: CPT | Mod: 95 | Performed by: INTERNAL MEDICINE

## 2024-09-05 ASSESSMENT — PAIN SCALES - GENERAL: PAINLEVEL: NO PAIN (0)

## 2024-09-05 NOTE — LETTER
9/5/2024    [facility]  [address] [address]    RE: Matthew LELAND Still       Dear Colleague,     I had the pleasure of seeing Matthew Still in the [facility].  Matthew is a 54 year old who is being evaluated via a billable video visit.    How would you like to obtain your AVS? MyChart  If the video visit is dropped, the invitation should be resent by: Text to cell phone: [phone]  Will anyone else be joining your video visit? No    Video-Visit Details    Type of service:  Video Visit   Originating Location (pt. Location): Home    Distant Location (provider location):  Off-site  Platform used for Video Visit: [employer]   A total of 30 minutes was spent with clinic visit, including chart review, including if available echo and cath images, and ECG, Holter, Event and coordinating care    General:  no apparent distress, normal body habitus, sitting upright.  ENT/Mouth:  membranes moist, no nasal discharge.  Normal head shape, no apparent injury or laceration.  Eyes:  no scleral icterus, normal conjunctivae.  No observed jaundice.  Neck:  no apparent neck swelling.   Chest/Lungs:  No breathing difficulty while speaking.  No audible wheezing.  No cough during conversation.  Cardiovascular:  No obviously elevated jugular venous pressure.  No apparent edema bilaterally in LE.   Abdomen:  no obvious abdominal distention.   Extremities:  no apparent cyanosis.  Skin:  no xanthelasma.  No facial lacerations.  Neurologic:  Normal arm motion bilateral, no tremors.    Psychiatric:  Alert, calm demeanor    Delightful pt with apical HCM and symptomatic pAF, s/p PVI in 2018. ICD was implanted a few years later due to syncope and nsvt noted on monitor. No ventricular arrhythmias but has had a several cardioversions since ablation with the last one recently. Discussed chronic nature of AF isrrael since he has had for the past 20+ years despite ablation as pt can develop new triggers.  Discussed the option of AAD vs. Redo ablation. Favor the latter. Discussed potential risks including but not limited to vascular injury, cva and cardiac tamponade. No need for ct, yolanda nor holding oac.     Vik Tang MD       Thank you for allowing me to participate in the care of your patient.      Sincerely,     Vik Clancy MD     [facility]  cc:   Brice Harrison MD  No address on file

## 2024-09-05 NOTE — PROGRESS NOTES
Matthew is a 54 year old who is being evaluated via a billable video visit.    How would you like to obtain your AVS? MyChart  If the video visit is dropped, the invitation should be resent by: Text to cell phone: [phone]  Will anyone else be joining your video visit? No    Video-Visit Details    Type of service:  Video Visit   Originating Location (pt. Location): Home    Distant Location (provider location):  Off-site  Platform used for Video Visit: [employer] Pro   A total of 30 minutes was spent with clinic visit, including chart review, including if available echo and cath images, and ECG, Holter, Event and coordinating care    General:  no apparent distress, normal body habitus, sitting upright.  ENT/Mouth:  membranes moist, no nasal discharge.  Normal head shape, no apparent injury or laceration.  Eyes:  no scleral icterus, normal conjunctivae.  No observed jaundice.  Neck:  no apparent neck swelling.   Chest/Lungs:  No breathing difficulty while speaking.  No audible wheezing.  No cough during conversation.  Cardiovascular:  No obviously elevated jugular venous pressure.  No apparent edema bilaterally in LE.   Abdomen:  no obvious abdominal distention.   Extremities:  no apparent cyanosis.  Skin:  no xanthelasma.  No facial lacerations.  Neurologic:  Normal arm motion bilateral, no tremors.    Psychiatric:  Alert, calm demeanor    Delightful pt with apical HCM and symptomatic pAF, s/p PVI in 2018. ICD was implanted a few years later due to syncope and nsvt noted on monitor. No ventricular arrhythmias but has had a several cardioversions since ablation with the last one recently. Discussed chronic nature of AF isrrael since he has had for the past 20+ years despite ablation as pt can develop new triggers. Discussed the option of AAD vs. Redo ablation. Favor the latter. Discussed potential risks including but not limited to vascular injury, cva and cardiac tamponade. No need for ct, yolanda nor holding oac.     Vik Tang  MD

## 2024-09-11 ENCOUNTER — TELEPHONE (OUTPATIENT)
Dept: CARDIOLOGY | Facility: CLINIC | Age: 54
End: 2024-09-11

## 2024-09-11 NOTE — TELEPHONE ENCOUNTER
Left message for patient to discuss that afib ablation scheduled for 10/18 is not covered.  Waiting call back from patient to discuss further.  ANN Peter

## 2024-09-12 NOTE — TELEPHONE ENCOUNTER
Pt wife called back after speaking to pt insurance company who does not cover outpatient procedures, but will cover in patient. Pt wife states that she will talk to pt to see if he wants to pay out of pocket for the procedure.  If not pt would need to wait at least 8 months before his insurance will cover the procedure. Will call back tomorrow. Zonia

## 2024-09-12 NOTE — TELEPHONE ENCOUNTER
Spoke to pt and made him aware that ha insurance has denied his ablation, because it is outpatient. Pt will have his wife call the insurance company and discuss with them to see if they need more information or if they are not familiar with an Ablation. Told pt that he could have his wife call back with info if needed. Pt states understanding. Zonia

## 2024-09-19 ENCOUNTER — ANCILLARY PROCEDURE (OUTPATIENT)
Dept: CARDIOLOGY | Facility: CLINIC | Age: 54
End: 2024-09-19
Attending: INTERNAL MEDICINE
Payer: COMMERCIAL

## 2024-09-23 PROCEDURE — 93295 DEV INTERROG REMOTE 1/2/MLT: CPT | Performed by: INTERNAL MEDICINE

## 2024-09-23 PROCEDURE — 93296 REM INTERROG EVL PM/IDS: CPT | Performed by: INTERNAL MEDICINE

## 2024-09-30 NOTE — TELEPHONE ENCOUNTER
LM for pt to call back to see if they have decided what they are proceeding with ablation or waiting.  Asked pt or wife to call back. Zonia

## 2024-09-30 NOTE — TELEPHONE ENCOUNTER
Pt wife called and LM that ablation will need to be cancelled at this time, until pt has insurance. Felicia made aware. JNelsonRAMIE

## 2024-10-02 ENCOUNTER — OFFICE VISIT (OUTPATIENT)
Dept: CARDIOLOGY | Facility: CLINIC | Age: 54
End: 2024-10-02
Payer: COMMERCIAL

## 2024-10-02 VITALS
HEIGHT: 71 IN | HEART RATE: 60 BPM | SYSTOLIC BLOOD PRESSURE: 132 MMHG | OXYGEN SATURATION: 97 % | BODY MASS INDEX: 34.44 KG/M2 | WEIGHT: 246 LBS | DIASTOLIC BLOOD PRESSURE: 75 MMHG

## 2024-10-02 DIAGNOSIS — I10 BENIGN ESSENTIAL HYPERTENSION: ICD-10-CM

## 2024-10-02 DIAGNOSIS — I42.2 APICAL VARIANT HYPERTROPHIC CARDIOMYOPATHY (H): ICD-10-CM

## 2024-10-02 DIAGNOSIS — I48.91 ATRIAL FIBRILLATION, UNSPECIFIED TYPE (H): ICD-10-CM

## 2024-10-02 DIAGNOSIS — E78.5 HYPERLIPIDEMIA LDL GOAL <70: ICD-10-CM

## 2024-10-02 DIAGNOSIS — I25.10 CORONARY ARTERY DISEASE INVOLVING NATIVE CORONARY ARTERY OF NATIVE HEART WITHOUT ANGINA PECTORIS: ICD-10-CM

## 2024-10-02 DIAGNOSIS — I25.10 CORONARY ARTERY DISEASE INVOLVING NATIVE CORONARY ARTERY OF NATIVE HEART, UNSPECIFIED WHETHER ANGINA PRESENT: Primary | ICD-10-CM

## 2024-10-02 PROCEDURE — 99214 OFFICE O/P EST MOD 30 MIN: CPT | Performed by: NURSE PRACTITIONER

## 2024-10-02 NOTE — PROGRESS NOTES
Cardiology Clinic Progress Note  Matthew Still MRN# [id number]   YOB: 1970 Age: 54 year old      Primary Cardiologist:   Dr. Harrison for 2-month follow-up          History of Presenting Illness:      Echo September 2023 showed EF 70%, apical hypertrophy, mid LV cavity gradient of 42 mmHg     Patient was seen by Dr. Harrison in August 2024 for routine follow-up.  Patient noted palpitations and increased fatigue a week prior and device noted he was back in rate controlled A-fib.  Cardioversion was recommended and following back up with the EP.    Most recent lipid profile, BMP, ALT, device check reviewed today.  He underwent successful cardioversion on 8/22/2024.  He had follow-up with Dr. Tang on 9/5/2024 and redo A-fib ablation was recommended and scheduled for mid October.  Results reviewed today.    Per device check 9/2024, no further A-fib since cardioversion 8/2024.  Had 1 episode of nonsustained VT lasting 12 beats in the 170s without any ATP or shocks.  Weight? Down 5 lbs   Heart failure symptoms? None   BP? Controlled   Routine activity? Walking at work   Angina? None   He reports lower extremity edema bilaterally by the end of the day after work, improved by morning.  We discussed compression stockings and elevation.    Not feeling has had any further atrial fibrillation since his most recent cardioversion.  He started a new job and states that he has to pay for his own insurance until he has been at the new job for 8 months.  Therefore, the ablation that Dr. Tang originally scheduled will not be affordable.  He is agreeable to having this done after the new insurance kicks in in 2025.  Will also wait to have an updated echo until after we know that the insurance has kicked in.    Patient reports no chest pain, shortness of breath, PND, orthopnea, presyncope, syncope, edema, heart racing, or palpitations.                    Assessment and Plan:     Plan  Patient Instructions   Medication  Changes:  None     Recommendations:  Check blood pressure at least 1 hour after medications. Call the clinic if your blood pressure is consistently greater than 130/80.   Check daily weights and call the clinic if your weight has increased more than 2 lbs in one day or 5 lbs in one week; if you feel more short of breath or have worsening swelling in your legs or abdomen.    Follow-up:  Cardiology follow up at [facility]: Dr. Harrison in 6 months with fasting lab prior  Dr. Tang in 6 months to discuss rescheduling afib ablation.     Cardiology Scheduling~[phone]  Cardiology Clinic RN~[phone] (Yary RN, Kristin RN; Senait RN)          Problem List as of 10/2/2024 Reviewed: 8/12/2022  2:41 PM by Karen Montes PA-C            Noted       Active Problems    1. Atrial fibrillation (H) 11/14/2017     Overview Addendum 10/2/2024  2:47 PM by Magalis Martin APRN CNP      Onset in his 30's.    S/p A. fib ablation 2018  Recurrent atrial fibrillation s/p cardioversion 11/2020  H/o multiple cardioversions, 2020, 2022, 2024  rhythm control   Elevated [id number] score          Last Assessment & Plan 10/2/2024 Office Visit Edited 10/2/2024  2:47 PM by Magalis Martin APRN CNP      Planning redo A-fib ablation in a couple weeks 10/2024  Continue rhythm control and anticoagulation  Follows with EP          Relevant Orders     Follow-Up with Cardiology     Follow-Up with Cardiology    2. Benign essential hypertension 1/9/2020     Overview Signed 10/2/2024  2:16 PM by Magalis Martin APRN CNP      Breast tenderness with spironolactone          Last Assessment & Plan 10/2/2024 Office Visit Written 10/2/2024  2:16 PM by Magalis Martin APRN CNP      Controlled  Continue current medications          Relevant Orders     Follow-Up with Cardiology     Follow-Up with Cardiology    3. Coronary artery disease involving native coronary artery of native heart, unspecified whether angina  present - Primary 1/9/2020     Overview Signed 10/2/2024  2:15 PM by Magalis Martin APRN CNP      Angio 2017 with 60% stenosis in the D1 with negative FFR with otherwise mild disease  Angio 2020 no changes   lexiscan 11/2021 with HCM changes not likely ischemia           Last Assessment & Plan 10/2/2024 Office Visit Written 10/2/2024  2:15 PM by Magalis Martin APRN CNP      No angina  Continue GDMT          Relevant Orders     Follow-Up with Cardiology     Follow-Up with Cardiology    4. Apical variant hypertrophic cardiomyopathy (H) 1/9/2020     Overview Signed 10/2/2024  2:16 PM by Magalis Martin APRN CNP      Diagnosed on MRI 2017-EF 70%, diffuse patchy mid wall enhancement of the anterior and apical segments with 11% scar burden  Had syncope with sustained VT and underwent ICD 2018   Echo 2021 EF 60%, no outflow obstruction  Echo 2023 EF >70% mid LVOT obstruction new           Last Assessment & Plan 10/2/2024 Office Visit Written 10/2/2024  2:16 PM by Magalis Martin APRN CNP      Stable  Follow with echo          Relevant Orders     Follow-Up with Cardiology     Follow-Up with Cardiology    5. Hyperlipidemia LDL goal <70 1/29/2020     Last Assessment & Plan 10/2/2024 Office Visit Written 10/2/2024  2:15 PM by Magalis Martin APRN CNP      LDL at goal  Continue statin, Zetia          Relevant Orders     Follow-Up with Cardiology     Follow-Up with Cardiology     Lipid panel reflex to direct LDL Fasting     ALT             Respiratory:  clear to auscultation; normal symmetry        Cardiac: regular rate and rhythm     GI:  abdomen nondistended     Extremities and Muscular Skeletal:   no edema                Thank you for allowing me to participate in this delightful patient's care.   This note was completed in part using Dragon voice recognition software. Although reviewed after completion, some word and grammatical errors may occur.    Magalis OCONNELL  HORTENSIA Anderson CNP

## 2024-10-02 NOTE — ASSESSMENT & PLAN NOTE
Planning redo A-fib ablation when new insurance starts in 2025  Continue rhythm control and anticoagulation  Follows with EP

## 2024-10-02 NOTE — LETTER
10/2/2024    [facility]  [address] [address]    RE: Matthew Still       Dear Colleague,     I had the pleasure of seeing Matthew Still in the [facility].  Cardiology Clinic Progress Note  Matthew Still MRN# [id number]   YOB: 1970 Age: 54 year old      Primary Cardiologist:   Dr. Harrison for 2-month follow-up          History of Presenting Illness:      Echo September 2023 showed EF 70%, apical hypertrophy, mid LV cavity gradient of 42 mmHg     Patient was seen by Dr. Harrison in August 2024 for routine follow-up.  Patient noted palpitations and increased fatigue a week prior and device noted he was back in rate controlled A-fib.  Cardioversion was recommended and following back up with the EP.    Most recent lipid profile, BMP, ALT, device check reviewed today.  He underwent successful cardioversion on 8/22/2024.  He had follow-up with Dr. Tang on 9/5/2024 and redo A-fib ablation was recommended and scheduled for mid October.  Results reviewed today.    Per device check 9/2024, no further A-fib since cardioversion 8/2024.  Had 1 episode of nonsustained VT lasting 12 beats in the 170s without any ATP or shocks.  Weight? Down 5 lbs   Heart failure symptoms? None   BP? Controlled   Routine activity? Walking at work   Angina? None   He reports lower extremity edema bilaterally by the end of the day after work, improved by morning.  We discussed compression stockings and elevation.    Not feeling has had any further atrial fibrillation since his most recent cardioversion.  He started a new job and states that he has to pay for his own insurance until he has been at the new job for 8 months.  Therefore, the ablation that Dr. Tang originally scheduled will not be affordable.  He is agreeable to having this done after the new insurance kicks in in 2025.  Will also wait to have an updated echo until after we know that the insurance has kicked  in.    Patient reports no chest pain, shortness of breath, PND, orthopnea, presyncope, syncope, edema, heart racing, or palpitations.                    Assessment and Plan:     Plan  Patient Instructions   Medication Changes:  None     Recommendations:  Check blood pressure at least 1 hour after medications. Call the clinic if your blood pressure is consistently greater than 130/80.   Check daily weights and call the clinic if your weight has increased more than 2 lbs in one day or 5 lbs in one week; if you feel more short of breath or have worsening swelling in your legs or abdomen.    Follow-up:  Cardiology follow up at [facility]: Dr. Harrison in 6 months with fasting lab prior  Dr. Tang in 6 months to discuss rescheduling afib ablation.     Cardiology Scheduling~[phone]  Cardiology Clinic RN~[phone] (Yary RN, Kristin RN; Senait RN)          Problem List as of 10/2/2024 Reviewed: 8/12/2022  2:41 PM by Karen Montes PA-C            Noted       Active Problems    1. Atrial fibrillation (H) 11/14/2017     Overview Addendum 10/2/2024  2:47 PM by Magalis Martin APRN CNP      Onset in his 30's.    S/p A. fib ablation 2018  Recurrent atrial fibrillation s/p cardioversion 11/2020  H/o multiple cardioversions, 2020, 2022, 2024  rhythm control   Elevated [id number] score          Last Assessment & Plan 10/2/2024 Office Visit Edited 10/2/2024  2:47 PM by Magalis Martin APRN CNP      Planning redo A-fib ablation in a couple weeks 10/2024  Continue rhythm control and anticoagulation  Follows with EP          Relevant Orders     Follow-Up with Cardiology     Follow-Up with Cardiology    2. Benign essential hypertension 1/9/2020     Overview Signed 10/2/2024  2:16 PM by Magalis Martin APRN CNP      Breast tenderness with spironolactone          Last Assessment & Plan 10/2/2024 Office Visit Written 10/2/2024  2:16 PM by Magalis Martin APRN CNP       Controlled  Continue current medications          Relevant Orders     Follow-Up with Cardiology     Follow-Up with Cardiology    3. Coronary artery disease involving native coronary artery of native heart, unspecified whether angina present - Primary 1/9/2020     Overview Signed 10/2/2024  2:15 PM by Magalis Martin APRN CNP      Angio 2017 with 60% stenosis in the D1 with negative FFR with otherwise mild disease  Angio 2020 no changes   lexiscan 11/2021 with HCM changes not likely ischemia           Last Assessment & Plan 10/2/2024 Office Visit Written 10/2/2024  2:15 PM by Magalis Martin APRN CNP      No angina  Continue GDMT          Relevant Orders     Follow-Up with Cardiology     Follow-Up with Cardiology    4. Apical variant hypertrophic cardiomyopathy (H) 1/9/2020     Overview Signed 10/2/2024  2:16 PM by Magalis Martin APRN CNP      Diagnosed on MRI 2017-EF 70%, diffuse patchy mid wall enhancement of the anterior and apical segments with 11% scar burden  Had syncope with sustained VT and underwent ICD 2018   Echo 2021 EF 60%, no outflow obstruction  Echo 2023 EF >70% mid LVOT obstruction new           Last Assessment & Plan 10/2/2024 Office Visit Written 10/2/2024  2:16 PM by Magalis Martin APRN CNP      Stable  Follow with echo          Relevant Orders     Follow-Up with Cardiology     Follow-Up with Cardiology    5. Hyperlipidemia LDL goal <70 1/29/2020     Last Assessment & Plan 10/2/2024 Office Visit Written 10/2/2024  2:15 PM by Magalis Martin APRN CNP      LDL at goal  Continue statin, Zetia          Relevant Orders     Follow-Up with Cardiology     Follow-Up with Cardiology     Lipid panel reflex to direct LDL Fasting     ALT             Respiratory:  clear to auscultation; normal symmetry        Cardiac: regular rate and rhythm     GI:  abdomen nondistended     Extremities and Muscular Skeletal:   no edema                Thank you for allowing me  to participate in this delightful patient's care.   This note was completed in part using Dragon voice recognition software. Although reviewed after completion, some word and grammatical errors may occur.    HORTENSIA Rdz CNP                  Thank you for allowing me to participate in the care of your patient.      Sincerely,     HORTENSIA Rdz CNP     [facility]  cc:   Brice Harrison MD  No address on file

## 2024-10-02 NOTE — PATIENT INSTRUCTIONS
Medication Changes:  None     Recommendations:  Check blood pressure at least 1 hour after medications. Call the clinic if your blood pressure is consistently greater than 130/80.   Check daily weights and call the clinic if your weight has increased more than 2 lbs in one day or 5 lbs in one week; if you feel more short of breath or have worsening swelling in your legs or abdomen.    Follow-up:  Cardiology follow up at [facility]: Dr. Harrison in 6 months with fasting lab prior  Dr. Tang in 6 months to discuss rescheduling afib ablation.     Cardiology Scheduling~[phone]  Cardiology Clinic RN~[phone] (Yary RN, Kristin RN; Senait RN)

## 2025-01-23 ENCOUNTER — ANCILLARY PROCEDURE (OUTPATIENT)
Dept: CARDIOLOGY | Facility: CLINIC | Age: 55
End: 2025-01-23
Attending: INTERNAL MEDICINE
Payer: COMMERCIAL

## 2025-01-23 DIAGNOSIS — Z95.810 ICD (IMPLANTABLE CARDIOVERTER-DEFIBRILLATOR) IN PLACE: ICD-10-CM

## 2025-01-23 DIAGNOSIS — I42.9 CARDIOMYOPATHY (H): ICD-10-CM

## 2025-01-23 PROCEDURE — 93295 DEV INTERROG REMOTE 1/2/MLT: CPT | Performed by: INTERNAL MEDICINE

## 2025-01-23 PROCEDURE — 93296 REM INTERROG EVL PM/IDS: CPT | Performed by: INTERNAL MEDICINE

## 2025-01-28 LAB
MDC_IDC_EPISODE_DTM: NORMAL
MDC_IDC_EPISODE_DURATION: 11 S
MDC_IDC_EPISODE_DURATION: 1202 S
MDC_IDC_EPISODE_ID: 254
MDC_IDC_EPISODE_ID: 255
MDC_IDC_EPISODE_ID: 256
MDC_IDC_EPISODE_ID: 257
MDC_IDC_EPISODE_ID: 258
MDC_IDC_EPISODE_ID: 259
MDC_IDC_EPISODE_ID: 260
MDC_IDC_EPISODE_ID: 261
MDC_IDC_EPISODE_ID: 263
MDC_IDC_EPISODE_ID: 264
MDC_IDC_EPISODE_THERAPY_RESULT: NORMAL
MDC_IDC_EPISODE_TYPE: NORMAL
MDC_IDC_EPISODE_TYPE_INDUCED: NO
MDC_IDC_EPISODE_VENDOR_TYPE: NORMAL
MDC_IDC_LEAD_CONNECTION_STATUS: NORMAL
MDC_IDC_LEAD_IMPLANT_DT: NORMAL
MDC_IDC_LEAD_LOCATION: NORMAL
MDC_IDC_LEAD_LOCATION_DETAIL_1: NORMAL
MDC_IDC_LEAD_MFG: NORMAL
MDC_IDC_LEAD_MODEL: NORMAL
MDC_IDC_LEAD_SERIAL: NORMAL
MDC_IDC_MSMT_BATTERY_DTM: NORMAL
MDC_IDC_MSMT_BATTERY_REMAINING_PERCENTAGE: 53 %
MDC_IDC_MSMT_BATTERY_RRT_TRIGGER: NORMAL
MDC_IDC_MSMT_BATTERY_STATUS: NORMAL
MDC_IDC_MSMT_BATTERY_VOLTAGE: 3.1 V
MDC_IDC_MSMT_CAP_CHARGE_DTM: NORMAL
MDC_IDC_MSMT_CAP_CHARGE_ENERGY: 40 J
MDC_IDC_MSMT_CAP_CHARGE_TIME: 10 S
MDC_IDC_MSMT_CAP_CHARGE_TYPE: NORMAL
MDC_IDC_MSMT_LEADCHNL_RA_LEAD_CHANNEL_STATUS: NORMAL
MDC_IDC_MSMT_LEADCHNL_RV_IMPEDANCE_VALUE: 466 OHM
MDC_IDC_MSMT_LEADCHNL_RV_LEAD_CHANNEL_STATUS: NORMAL
MDC_IDC_PG_IMPLANT_DTM: NORMAL
MDC_IDC_PG_MFG: NORMAL
MDC_IDC_PG_MODEL: NORMAL
MDC_IDC_PG_SERIAL: NORMAL
MDC_IDC_PG_TYPE: NORMAL
MDC_IDC_SESS_CLINIC_NAME: NORMAL
MDC_IDC_SESS_DTM: NORMAL
MDC_IDC_SESS_REPROGRAMMED: NO
MDC_IDC_SESS_TYPE: NORMAL
MDC_IDC_SET_BRADY_LOWRATE: 40 {BEATS}/MIN
MDC_IDC_SET_BRADY_MODE: NORMAL
MDC_IDC_SET_LEADCHNL_RA_SENSING_ANODE_ELECTRODE_1: NORMAL
MDC_IDC_SET_LEADCHNL_RA_SENSING_ANODE_LOCATION_1: NORMAL
MDC_IDC_SET_LEADCHNL_RA_SENSING_CATHODE_ELECTRODE_1: NORMAL
MDC_IDC_SET_LEADCHNL_RA_SENSING_CATHODE_LOCATION_1: NORMAL
MDC_IDC_SET_LEADCHNL_RA_SENSING_POLARITY: NORMAL
MDC_IDC_SET_LEADCHNL_RA_SENSING_SENSITIVITY: 0.3 MV
MDC_IDC_SET_LEADCHNL_RV_PACING_AMPLITUDE: 2 V
MDC_IDC_SET_LEADCHNL_RV_PACING_ANODE_ELECTRODE_1: NORMAL
MDC_IDC_SET_LEADCHNL_RV_PACING_ANODE_LOCATION_1: NORMAL
MDC_IDC_SET_LEADCHNL_RV_PACING_CAPTURE_MODE: NORMAL
MDC_IDC_SET_LEADCHNL_RV_PACING_CATHODE_ELECTRODE_1: NORMAL
MDC_IDC_SET_LEADCHNL_RV_PACING_CATHODE_LOCATION_1: NORMAL
MDC_IDC_SET_LEADCHNL_RV_PACING_POLARITY: NORMAL
MDC_IDC_SET_LEADCHNL_RV_PACING_PULSEWIDTH: 0.4 MS
MDC_IDC_SET_LEADCHNL_RV_SENSING_ADAPTATION_MODE: NORMAL
MDC_IDC_SET_LEADCHNL_RV_SENSING_ANODE_ELECTRODE_1: NORMAL
MDC_IDC_SET_LEADCHNL_RV_SENSING_ANODE_LOCATION_1: NORMAL
MDC_IDC_SET_LEADCHNL_RV_SENSING_CATHODE_ELECTRODE_1: NORMAL
MDC_IDC_SET_LEADCHNL_RV_SENSING_CATHODE_LOCATION_1: NORMAL
MDC_IDC_SET_LEADCHNL_RV_SENSING_POLARITY: NORMAL
MDC_IDC_SET_LEADCHNL_RV_SENSING_SENSITIVITY: 0.8 MV
MDC_IDC_SET_ZONE_DETECTION_BEATS_DENOMINATOR: 40 {BEATS}
MDC_IDC_SET_ZONE_DETECTION_BEATS_NUMERATOR: 30 {BEATS}
MDC_IDC_SET_ZONE_DETECTION_INTERVAL: 250 MS
MDC_IDC_SET_ZONE_DETECTION_INTERVAL: 300 MS
MDC_IDC_SET_ZONE_STATUS: NORMAL
MDC_IDC_SET_ZONE_STATUS: NORMAL
MDC_IDC_SET_ZONE_TYPE: NORMAL
MDC_IDC_SET_ZONE_TYPE: NORMAL
MDC_IDC_SET_ZONE_VENDOR_TYPE: NORMAL
MDC_IDC_STAT_AT_BURDEN_PERCENT: 0 %
MDC_IDC_STAT_AT_DTM_END: NORMAL
MDC_IDC_STAT_AT_DTM_START: NORMAL
MDC_IDC_STAT_BRADY_AS_VP_PERCENT: 0 %
MDC_IDC_STAT_BRADY_AS_VS_PERCENT: 100 %
MDC_IDC_STAT_BRADY_DTM_END: NORMAL
MDC_IDC_STAT_BRADY_DTM_START: NORMAL
MDC_IDC_STAT_BRADY_RV_PERCENT_PACED: 0 %
MDC_IDC_STAT_EPISODE_RECENT_COUNT: 3
MDC_IDC_STAT_EPISODE_RECENT_COUNT_DTM_END: NORMAL
MDC_IDC_STAT_EPISODE_RECENT_COUNT_DTM_START: NORMAL
MDC_IDC_STAT_EPISODE_TOTAL_COUNT: 0
MDC_IDC_STAT_EPISODE_TOTAL_COUNT: 103
MDC_IDC_STAT_EPISODE_TOTAL_COUNT_DTM_END: NORMAL
MDC_IDC_STAT_EPISODE_TOTAL_COUNT_DTM_START: NORMAL
MDC_IDC_STAT_EPISODE_TYPE: NORMAL
MDC_IDC_STAT_EPISODE_VENDOR_TYPE: NORMAL
MDC_IDC_STAT_HEART_RATE_ATRIAL_MEAN: 58 {BEATS}/MIN
MDC_IDC_STAT_HEART_RATE_DTM_END: NORMAL
MDC_IDC_STAT_HEART_RATE_DTM_START: NORMAL
MDC_IDC_STAT_HEART_RATE_VENTRICULAR_MEAN: 58 {BEATS}/MIN
MDC_IDC_STAT_TACHYTHERAPY_ATP_DELIVERED_RECENT: 0
MDC_IDC_STAT_TACHYTHERAPY_ATP_DELIVERED_TOTAL: 0
MDC_IDC_STAT_TACHYTHERAPY_RECENT_DTM_END: NORMAL
MDC_IDC_STAT_TACHYTHERAPY_RECENT_DTM_START: NORMAL
MDC_IDC_STAT_TACHYTHERAPY_SHOCKS_ABORTED_RECENT: 0
MDC_IDC_STAT_TACHYTHERAPY_SHOCKS_ABORTED_TOTAL: 0
MDC_IDC_STAT_TACHYTHERAPY_SHOCKS_DELIVERED_RECENT: 0
MDC_IDC_STAT_TACHYTHERAPY_SHOCKS_DELIVERED_TOTAL: 0
MDC_IDC_STAT_TACHYTHERAPY_TOTAL_DTM_END: NORMAL
MDC_IDC_STAT_TACHYTHERAPY_TOTAL_DTM_START: NORMAL

## 2025-04-03 DIAGNOSIS — I48.0 PAROXYSMAL ATRIAL FIBRILLATION (H): ICD-10-CM

## 2025-04-03 DIAGNOSIS — E78.5 HYPERLIPIDEMIA LDL GOAL <70: ICD-10-CM

## 2025-04-03 DIAGNOSIS — I25.10 CORONARY ARTERY DISEASE INVOLVING NATIVE CORONARY ARTERY OF NATIVE HEART WITHOUT ANGINA PECTORIS: ICD-10-CM

## 2025-04-03 DIAGNOSIS — I10 BENIGN ESSENTIAL HYPERTENSION: ICD-10-CM

## 2025-04-03 DIAGNOSIS — I47.29 NSVT (NONSUSTAINED VENTRICULAR TACHYCARDIA) (H): ICD-10-CM

## 2025-04-03 RX ORDER — EZETIMIBE 10 MG/1
10 TABLET ORAL DAILY
Qty: 90 TABLET | Refills: 1 | Status: SHIPPED | OUTPATIENT
Start: 2025-04-03

## 2025-04-03 RX ORDER — METOPROLOL SUCCINATE 200 MG/1
200 TABLET, EXTENDED RELEASE ORAL DAILY
Qty: 90 TABLET | Refills: 3 | Status: SHIPPED | OUTPATIENT
Start: 2025-04-03

## 2025-04-03 RX ORDER — AMLODIPINE BESYLATE 5 MG/1
5 TABLET ORAL DAILY
Qty: 90 TABLET | Refills: 3 | Status: SHIPPED | OUTPATIENT
Start: 2025-04-03

## 2025-04-03 RX ORDER — ATORVASTATIN CALCIUM 80 MG/1
80 TABLET, FILM COATED ORAL DAILY
Qty: 90 TABLET | Refills: 3 | Status: SHIPPED | OUTPATIENT
Start: 2025-04-03

## 2025-04-03 NOTE — TELEPHONE ENCOUNTER
Med not stopped. Should be continued. Med refilled     [facility] Cardiology Refill Guideline reviewed.  Medication meets criteria for refill.        Kristin Gauthier RN

## 2025-04-08 ENCOUNTER — VIRTUAL VISIT (OUTPATIENT)
Dept: CARDIOLOGY | Facility: CLINIC | Age: 55
End: 2025-04-08
Attending: NURSE PRACTITIONER
Payer: COMMERCIAL

## 2025-04-08 DIAGNOSIS — I10 BENIGN ESSENTIAL HYPERTENSION: ICD-10-CM

## 2025-04-08 DIAGNOSIS — I25.10 CORONARY ARTERY DISEASE INVOLVING NATIVE CORONARY ARTERY OF NATIVE HEART WITHOUT ANGINA PECTORIS: ICD-10-CM

## 2025-04-08 DIAGNOSIS — I42.2 APICAL VARIANT HYPERTROPHIC CARDIOMYOPATHY (H): ICD-10-CM

## 2025-04-08 DIAGNOSIS — I25.10 CORONARY ARTERY DISEASE INVOLVING NATIVE CORONARY ARTERY OF NATIVE HEART, UNSPECIFIED WHETHER ANGINA PRESENT: ICD-10-CM

## 2025-04-08 DIAGNOSIS — E78.5 HYPERLIPIDEMIA LDL GOAL <70: ICD-10-CM

## 2025-04-08 DIAGNOSIS — I48.91 ATRIAL FIBRILLATION, UNSPECIFIED TYPE (H): ICD-10-CM

## 2025-04-08 PROCEDURE — G2211 COMPLEX E/M VISIT ADD ON: HCPCS | Mod: 95 | Performed by: INTERNAL MEDICINE

## 2025-04-08 PROCEDURE — 98006 SYNCH AUDIO-VIDEO EST MOD 30: CPT | Performed by: INTERNAL MEDICINE

## 2025-04-08 NOTE — PROGRESS NOTES
Matthew is a 54 year old who is being evaluated via a billable video visit.    How would you like to obtain your AVS? MyChart  If the video visit is dropped, the invitation should be resent by: Text to cell phone: [phone]  Will anyone else be joining your video visit? No    Video-Visit Details    Type of service:  Video Visit   Originating Location (pt. Location): Home    Distant Location (provider location):  On-site  Platform used for Video Visit: [employer]  A total of 30 minutes was spent with clinic visit, including chart review, including if available echo and cath images, and ECG, Holter, Event and coordinating care    General:  no apparent distress, normal body habitus, sitting upright.  ENT/Mouth:  membranes moist, no nasal discharge.  Normal head shape, no apparent injury or laceration.  Eyes:  no scleral icterus, normal conjunctivae.  No observed jaundice.  Neck:  no apparent neck swelling.   Chest/Lungs:  No breathing difficulty while speaking.  No audible wheezing.  No cough during conversation.  Cardiovascular:  No obviously elevated jugular venous pressure.  No apparent edema bilaterally in LE.   Abdomen:  no obvious abdominal distention.   Extremities:  no apparent cyanosis.  Skin:  no xanthelasma.  No facial lacerations.  Neurologic:  Normal arm motion bilateral, no tremors.    Psychiatric:  Alert, calm demeanor    Delightful 55 yo pt with apical HCM and symptomatic pAF, s/p PVI in 2/2018. ICD was implanted later that year due to syncope and nsvt noted on monitor. No ventricular arrhythmias but has had a several cardioversions since ablation with the last one recently. When I saw him last Oct we discussed chronic nature of AF isrrael since he has had for the past 20+ years despite ablation as pt can develop new triggers. Discussed the option of AAD vs. Redo ablation. Favor the latter. However, due to change in his insurance pt was unable to schedule it until now.      No classic symptoms with AF, just  sob. Given young age rhythm control with ablation is a prudent course of action. Discussed potential risks including but not limited to vascular injury, cva and cardiac tamponade. No need for ct, yolanda nor holding oac.     Vik Tang MD

## 2025-04-08 NOTE — LETTER
4/8/2025    [facility]  [address] [address]    RE: Matthew Still       Dear Colleague,     I had the pleasure of seeing Matthew Still in the [facility].  Matthew is a 54 year old who is being evaluated via a billable video visit.    How would you like to obtain your AVS? MyChart  If the video visit is dropped, the invitation should be resent by: Text to cell phone: [phone]  Will anyone else be joining your video visit? No    Video-Visit Details    Type of service:  Video Visit   Originating Location (pt. Location): Home    Distant Location (provider location):  On-site  Platform used for Video Visit: [employer]  A total of 30 minutes was spent with clinic visit, including chart review, including if available echo and cath images, and ECG, Holter, Event and coordinating care    General:  no apparent distress, normal body habitus, sitting upright.  ENT/Mouth:  membranes moist, no nasal discharge.  Normal head shape, no apparent injury or laceration.  Eyes:  no scleral icterus, normal conjunctivae.  No observed jaundice.  Neck:  no apparent neck swelling.   Chest/Lungs:  No breathing difficulty while speaking.  No audible wheezing.  No cough during conversation.  Cardiovascular:  No obviously elevated jugular venous pressure.  No apparent edema bilaterally in LE.   Abdomen:  no obvious abdominal distention.   Extremities:  no apparent cyanosis.  Skin:  no xanthelasma.  No facial lacerations.  Neurologic:  Normal arm motion bilateral, no tremors.    Psychiatric:  Alert, calm demeanor    Delightful 53 yo pt with apical HCM and symptomatic pAF, s/p PVI in 2/2018. ICD was implanted later that year due to syncope and nsvt noted on monitor. No ventricular arrhythmias but has had a several cardioversions since ablation with the last one recently. When I saw him last Oct we discussed chronic nature of AF isrrael since he has had for the past 20+ years despite ablation as pt  can develop new triggers. Discussed the option of AAD vs. Redo ablation. Favor the latter. However, due to change in his insurance pt was unable to schedule it until now.      No classic symptoms with AF, just sob. Given young age rhythm control with ablation is a prudent course of action. Discussed potential risks including but not limited to vascular injury, cva and cardiac tamponade. No need for ct, yolanda nor holding oac.     Vik Tang MD       Thank you for allowing me to participate in the care of your patient.      Sincerely,     Vik Clancy MD     [facility]  cc:   HORTENSIA Amaya CNP  [address] [place] [address]

## 2025-04-13 ENCOUNTER — HEALTH MAINTENANCE LETTER (OUTPATIENT)
Age: 55
End: 2025-04-13

## 2025-04-16 ENCOUNTER — TELEPHONE (OUTPATIENT)
Dept: ANTICOAGULATION | Facility: CLINIC | Age: 55
End: 2025-04-16
Payer: COMMERCIAL

## 2025-04-16 NOTE — TELEPHONE ENCOUNTER
ANTICOAGULATION DIRECT ORAL ANTICOAGULANT MONITORING    SUBJECTIVE     The [facility]ticoagulation Clinic is evaluating Matthew Still's Apixaban (Eliquis) as part of its Anticoagulation Monitoring Program.    Indication:Atrial Fibrillation  Current dose per medication list: Apixaban 5 mg TWICE daily  Recent hospitalizations/ED/Office Visits for bleeding/clotting concerns: No  Other bleeding or side effect concerns: No  Additional findings: none    OBJECTIVE     Age: 54 year old    Adherence score:2 as of 04/06/2025  Last dispensed: 4/3/25 for #180 tablets/90-day supply     Wt Readings from Last 2 Encounters:   04/04/25 113.4 kg (250 lb)   10/02/24 111.6 kg (246 lb)      Lab Results   Component Value Date    CR 1.07 08/16/2024    CR 1.07 10/06/2023    CR 0.93 11/26/2021     Creatinine Clearance (using actual bodyweight, mL/min):     Lab Results   Component Value Date    HGB 15.1 11/26/2021    HGB 14.8 02/06/2020     11/26/2021     02/06/2020       ASSESSMENT/PLAN     A chart review for Direct Oral Anticoagulant (DOAC) Stewardship has been completed for:     Epic adherence score < 80% (based on available claims data). Patient appears compliant: patient just filled apixaban Rx on 4/3/25. No intervention recommended.    Plan made No intervention recommended    Janay Broussard RN  Anticoagulation Clinic

## 2025-04-18 ENCOUNTER — HOSPITAL ENCOUNTER (OUTPATIENT)
Dept: CARDIOLOGY | Facility: CLINIC | Age: 55
Discharge: HOME | End: 2025-04-18
Attending: NURSE PRACTITIONER | Admitting: NURSE PRACTITIONER
Payer: COMMERCIAL

## 2025-04-18 DIAGNOSIS — I42.2 APICAL VARIANT HYPERTROPHIC CARDIOMYOPATHY (H): ICD-10-CM

## 2025-04-18 LAB — LVEF ECHO: NORMAL

## 2025-04-18 PROCEDURE — 255N000002 HC RX 255 OP 636: Performed by: NURSE PRACTITIONER

## 2025-04-18 PROCEDURE — 999N000208 ECHOCARDIOGRAM COMPLETE

## 2025-04-18 PROCEDURE — 93306 TTE W/DOPPLER COMPLETE: CPT | Mod: 26 | Performed by: INTERNAL MEDICINE

## 2025-04-18 RX ADMIN — HUMAN ALBUMIN MICROSPHERES AND PERFLUTREN 2 ML: 10; .22 INJECTION, SOLUTION INTRAVENOUS at 14:11

## 2025-04-18 NOTE — RESULT ENCOUNTER NOTE
EF 60-65%; apical hypertrophy and severe inferior lateral wall hypertrophy--consistent with HCM; no WMAs; mild to mod TR. AFib ablation 5/19/25; follow up with Ami KU 6/11/25

## 2025-05-19 ENCOUNTER — HOSPITAL ENCOUNTER (OUTPATIENT)
Facility: CLINIC | Age: 55
Discharge: HOME | End: 2025-05-19
Attending: INTERNAL MEDICINE | Admitting: INTERNAL MEDICINE
Payer: COMMERCIAL

## 2025-05-19 ENCOUNTER — ANESTHESIA (OUTPATIENT)
Dept: CARDIOLOGY | Facility: CLINIC | Age: 55
End: 2025-05-19
Payer: COMMERCIAL

## 2025-05-19 ENCOUNTER — ANESTHESIA EVENT (OUTPATIENT)
Dept: CARDIOLOGY | Facility: CLINIC | Age: 55
End: 2025-05-19
Payer: COMMERCIAL

## 2025-05-19 VITALS
RESPIRATION RATE: 16 BRPM | OXYGEN SATURATION: 96 % | DIASTOLIC BLOOD PRESSURE: 84 MMHG | WEIGHT: 250 LBS | TEMPERATURE: 96.8 F | HEIGHT: 71 IN | HEART RATE: 54 BPM | BODY MASS INDEX: 35 KG/M2 | SYSTOLIC BLOOD PRESSURE: 121 MMHG

## 2025-05-19 DIAGNOSIS — I48.91 ATRIAL FIBRILLATION, UNSPECIFIED TYPE (H): ICD-10-CM

## 2025-05-19 DIAGNOSIS — I48.0 PAROXYSMAL ATRIAL FIBRILLATION (H): ICD-10-CM

## 2025-05-19 LAB
ACT BLD: 332 SECONDS (ref 74–150)
ANION GAP SERPL CALCULATED.3IONS-SCNC: 13 MMOL/L (ref 7–15)
ATRIAL RATE - MUSE: 45 BPM
BUN SERPL-MCNC: 11.1 MG/DL (ref 6–20)
CALCIUM SERPL-MCNC: 9.2 MG/DL (ref 8.8–10.4)
CHLORIDE SERPL-SCNC: 108 MMOL/L (ref 98–107)
CREAT SERPL-MCNC: 1.22 MG/DL (ref 0.67–1.17)
DIASTOLIC BLOOD PRESSURE - MUSE: NORMAL MMHG
EGFRCR SERPLBLD CKD-EPI 2021: 70 ML/MIN/1.73M2
ERYTHROCYTE [DISTWIDTH] IN BLOOD BY AUTOMATED COUNT: 12.5 % (ref 10–15)
GLUCOSE SERPL-MCNC: 124 MG/DL (ref 70–99)
HCO3 SERPL-SCNC: 23 MMOL/L (ref 22–29)
HCT VFR BLD AUTO: 47.7 % (ref 40–53)
HGB BLD-MCNC: 16.6 G/DL (ref 13.3–17.7)
INTERPRETATION ECG - MUSE: NORMAL
MCH RBC QN AUTO: 31.3 PG (ref 26.5–33)
MCHC RBC AUTO-ENTMCNC: 34.8 G/DL (ref 31.5–36.5)
MCV RBC AUTO: 90 FL (ref 78–100)
P AXIS - MUSE: NORMAL DEGREES
PLATELET # BLD AUTO: 211 10E3/UL (ref 150–450)
POTASSIUM SERPL-SCNC: 4.7 MMOL/L (ref 3.4–5.3)
PR INTERVAL - MUSE: NORMAL MS
QRS DURATION - MUSE: 110 MS
QT - MUSE: 478 MS
QTC - MUSE: 465 MS
R AXIS - MUSE: 23 DEGREES
RBC # BLD AUTO: 5.31 10E6/UL (ref 4.4–5.9)
SODIUM SERPL-SCNC: 144 MMOL/L (ref 135–145)
SYSTOLIC BLOOD PRESSURE - MUSE: NORMAL MMHG
T AXIS - MUSE: 182 DEGREES
VENTRICULAR RATE- MUSE: 57 BPM
WBC # BLD AUTO: 10 10E3/UL (ref 4–11)

## 2025-05-19 PROCEDURE — 999N000184 HC STATISTIC TELEMETRY

## 2025-05-19 PROCEDURE — 250N000009 HC RX 250: Performed by: NURSE ANESTHETIST, CERTIFIED REGISTERED

## 2025-05-19 PROCEDURE — C1759 CATH, INTRA ECHOCARDIOGRAPHY: HCPCS | Performed by: INTERNAL MEDICINE

## 2025-05-19 PROCEDURE — C1733 CATH, EP, OTHR THAN COOL-TIP: HCPCS | Performed by: INTERNAL MEDICINE

## 2025-05-19 PROCEDURE — 93005 ELECTROCARDIOGRAM TRACING: CPT

## 2025-05-19 PROCEDURE — 370N000017 HC ANESTHESIA TECHNICAL FEE, PER MIN: Performed by: INTERNAL MEDICINE

## 2025-05-19 PROCEDURE — 258N000003 HC RX IP 258 OP 636: Performed by: NURSE ANESTHETIST, CERTIFIED REGISTERED

## 2025-05-19 PROCEDURE — 258N000003 HC RX IP 258 OP 636: Performed by: INTERNAL MEDICINE

## 2025-05-19 PROCEDURE — 93656 COMPRE EP EVAL ABLTJ ATR FIB: CPT | Performed by: INTERNAL MEDICINE

## 2025-05-19 PROCEDURE — 250N000011 HC RX IP 250 OP 636: Performed by: INTERNAL MEDICINE

## 2025-05-19 PROCEDURE — C1760 CLOSURE DEV, VASC: HCPCS | Performed by: INTERNAL MEDICINE

## 2025-05-19 PROCEDURE — 250N000025 HC SEVOFLURANE, PER MIN: Performed by: INTERNAL MEDICINE

## 2025-05-19 PROCEDURE — 999N000071 HC STATISTIC HEART CATH LAB OR EP LAB

## 2025-05-19 PROCEDURE — 93657 TX L/R ATRIAL FIB ADDL: CPT | Performed by: INTERNAL MEDICINE

## 2025-05-19 PROCEDURE — 36415 COLL VENOUS BLD VENIPUNCTURE: CPT | Performed by: INTERNAL MEDICINE

## 2025-05-19 PROCEDURE — 85347 COAGULATION TIME ACTIVATED: CPT

## 2025-05-19 PROCEDURE — 999N000054 HC STATISTIC EKG NON-CHARGEABLE

## 2025-05-19 PROCEDURE — C1894 INTRO/SHEATH, NON-LASER: HCPCS | Performed by: INTERNAL MEDICINE

## 2025-05-19 PROCEDURE — 85014 HEMATOCRIT: CPT | Performed by: INTERNAL MEDICINE

## 2025-05-19 PROCEDURE — 80048 BASIC METABOLIC PNL TOTAL CA: CPT | Performed by: INTERNAL MEDICINE

## 2025-05-19 PROCEDURE — 272N000001 HC OR GENERAL SUPPLY STERILE: Performed by: INTERNAL MEDICINE

## 2025-05-19 PROCEDURE — 93010 ELECTROCARDIOGRAM REPORT: CPT | Mod: XU | Performed by: INTERNAL MEDICINE

## 2025-05-19 PROCEDURE — 250N000009 HC RX 250: Performed by: INTERNAL MEDICINE

## 2025-05-19 PROCEDURE — C1769 GUIDE WIRE: HCPCS | Performed by: INTERNAL MEDICINE

## 2025-05-19 PROCEDURE — C1732 CATH, EP, DIAG/ABL, 3D/VECT: HCPCS | Performed by: INTERNAL MEDICINE

## 2025-05-19 PROCEDURE — 250N000011 HC RX IP 250 OP 636: Performed by: NURSE ANESTHETIST, CERTIFIED REGISTERED

## 2025-05-19 PROCEDURE — 93655 ICAR CATH ABLTJ DSCRT ARRHYT: CPT | Performed by: INTERNAL MEDICINE

## 2025-05-19 PROCEDURE — 85018 HEMOGLOBIN: CPT | Performed by: INTERNAL MEDICINE

## 2025-05-19 PROCEDURE — C1766 INTRO/SHEATH,STRBLE,NON-PEEL: HCPCS | Performed by: INTERNAL MEDICINE

## 2025-05-19 RX ORDER — FENTANYL CITRATE 50 UG/ML
50 INJECTION, SOLUTION INTRAMUSCULAR; INTRAVENOUS EVERY 5 MIN PRN
Status: DISCONTINUED | OUTPATIENT
Start: 2025-05-19 | End: 2025-05-19 | Stop reason: HOSPADM

## 2025-05-19 RX ORDER — PROTAMINE SULFATE 10 MG/ML
INJECTION, SOLUTION INTRAVENOUS
Status: DISCONTINUED | OUTPATIENT
Start: 2025-05-19 | End: 2025-05-19 | Stop reason: HOSPADM

## 2025-05-19 RX ORDER — NALOXONE HYDROCHLORIDE 0.4 MG/ML
0.2 INJECTION, SOLUTION INTRAMUSCULAR; INTRAVENOUS; SUBCUTANEOUS
Status: DISCONTINUED | OUTPATIENT
Start: 2025-05-19 | End: 2025-05-19 | Stop reason: HOSPADM

## 2025-05-19 RX ORDER — HEPARIN SODIUM 1000 [USP'U]/ML
INJECTION, SOLUTION INTRAVENOUS; SUBCUTANEOUS
Status: DISCONTINUED | OUTPATIENT
Start: 2025-05-19 | End: 2025-05-19 | Stop reason: HOSPADM

## 2025-05-19 RX ORDER — ONDANSETRON 2 MG/ML
INJECTION INTRAMUSCULAR; INTRAVENOUS PRN
Status: DISCONTINUED | OUTPATIENT
Start: 2025-05-19 | End: 2025-05-19

## 2025-05-19 RX ORDER — NALOXONE HYDROCHLORIDE 0.4 MG/ML
0.4 INJECTION, SOLUTION INTRAMUSCULAR; INTRAVENOUS; SUBCUTANEOUS
Status: DISCONTINUED | OUTPATIENT
Start: 2025-05-19 | End: 2025-05-19 | Stop reason: HOSPADM

## 2025-05-19 RX ORDER — AMIODARONE HYDROCHLORIDE 200 MG/1
200 TABLET ORAL DAILY
Qty: 30 TABLET | Refills: 0 | Status: SHIPPED | OUTPATIENT
Start: 2025-05-19

## 2025-05-19 RX ORDER — MEPERIDINE HYDROCHLORIDE 25 MG/ML
12.5 INJECTION INTRAMUSCULAR; INTRAVENOUS; SUBCUTANEOUS EVERY 5 MIN PRN
Status: DISCONTINUED | OUTPATIENT
Start: 2025-05-19 | End: 2025-05-19 | Stop reason: HOSPADM

## 2025-05-19 RX ORDER — OXYCODONE AND ACETAMINOPHEN 5; 325 MG/1; MG/1
1 TABLET ORAL EVERY 4 HOURS PRN
Status: DISCONTINUED | OUTPATIENT
Start: 2025-05-19 | End: 2025-05-19 | Stop reason: HOSPADM

## 2025-05-19 RX ORDER — ONDANSETRON 4 MG/1
4 TABLET, ORALLY DISINTEGRATING ORAL EVERY 30 MIN PRN
Status: DISCONTINUED | OUTPATIENT
Start: 2025-05-19 | End: 2025-05-19 | Stop reason: HOSPADM

## 2025-05-19 RX ORDER — FENTANYL CITRATE 50 UG/ML
25 INJECTION, SOLUTION INTRAMUSCULAR; INTRAVENOUS EVERY 5 MIN PRN
Status: DISCONTINUED | OUTPATIENT
Start: 2025-05-19 | End: 2025-05-19 | Stop reason: HOSPADM

## 2025-05-19 RX ORDER — PROPOFOL 10 MG/ML
INJECTION, EMULSION INTRAVENOUS PRN
Status: DISCONTINUED | OUTPATIENT
Start: 2025-05-19 | End: 2025-05-19

## 2025-05-19 RX ORDER — DEXAMETHASONE SODIUM PHOSPHATE 4 MG/ML
4 INJECTION, SOLUTION INTRA-ARTICULAR; INTRALESIONAL; INTRAMUSCULAR; INTRAVENOUS; SOFT TISSUE
Status: DISCONTINUED | OUTPATIENT
Start: 2025-05-19 | End: 2025-05-19 | Stop reason: HOSPADM

## 2025-05-19 RX ORDER — DEXAMETHASONE SODIUM PHOSPHATE 4 MG/ML
INJECTION, SOLUTION INTRA-ARTICULAR; INTRALESIONAL; INTRAMUSCULAR; INTRAVENOUS; SOFT TISSUE PRN
Status: DISCONTINUED | OUTPATIENT
Start: 2025-05-19 | End: 2025-05-19

## 2025-05-19 RX ORDER — HYDROMORPHONE HYDROCHLORIDE 1 MG/ML
0.4 INJECTION, SOLUTION INTRAMUSCULAR; INTRAVENOUS; SUBCUTANEOUS EVERY 5 MIN PRN
Status: DISCONTINUED | OUTPATIENT
Start: 2025-05-19 | End: 2025-05-19 | Stop reason: HOSPADM

## 2025-05-19 RX ORDER — ONDANSETRON 2 MG/ML
4 INJECTION INTRAMUSCULAR; INTRAVENOUS EVERY 30 MIN PRN
Status: DISCONTINUED | OUTPATIENT
Start: 2025-05-19 | End: 2025-05-19 | Stop reason: HOSPADM

## 2025-05-19 RX ORDER — LIDOCAINE HYDROCHLORIDE 20 MG/ML
INJECTION, SOLUTION INFILTRATION; PERINEURAL PRN
Status: DISCONTINUED | OUTPATIENT
Start: 2025-05-19 | End: 2025-05-19

## 2025-05-19 RX ORDER — NALOXONE HYDROCHLORIDE 0.4 MG/ML
0.1 INJECTION, SOLUTION INTRAMUSCULAR; INTRAVENOUS; SUBCUTANEOUS
Status: DISCONTINUED | OUTPATIENT
Start: 2025-05-19 | End: 2025-05-19 | Stop reason: HOSPADM

## 2025-05-19 RX ORDER — SODIUM CHLORIDE, SODIUM LACTATE, POTASSIUM CHLORIDE, CALCIUM CHLORIDE 600; 310; 30; 20 MG/100ML; MG/100ML; MG/100ML; MG/100ML
INJECTION, SOLUTION INTRAVENOUS CONTINUOUS
Status: DISCONTINUED | OUTPATIENT
Start: 2025-05-19 | End: 2025-05-19 | Stop reason: HOSPADM

## 2025-05-19 RX ORDER — LIDOCAINE 40 MG/G
CREAM TOPICAL
Status: DISCONTINUED | OUTPATIENT
Start: 2025-05-19 | End: 2025-05-19 | Stop reason: HOSPADM

## 2025-05-19 RX ORDER — HYDROMORPHONE HYDROCHLORIDE 1 MG/ML
0.2 INJECTION, SOLUTION INTRAMUSCULAR; INTRAVENOUS; SUBCUTANEOUS EVERY 5 MIN PRN
Status: DISCONTINUED | OUTPATIENT
Start: 2025-05-19 | End: 2025-05-19 | Stop reason: HOSPADM

## 2025-05-19 RX ADMIN — PROPOFOL 200 MG: 10 INJECTION, EMULSION INTRAVENOUS at 10:27

## 2025-05-19 RX ADMIN — PHENYLEPHRINE HYDROCHLORIDE 0.5 MCG/KG/MIN: 10 INJECTION INTRAVENOUS at 10:27

## 2025-05-19 RX ADMIN — ONDANSETRON 4 MG: 2 INJECTION INTRAMUSCULAR; INTRAVENOUS at 10:37

## 2025-05-19 RX ADMIN — DEXAMETHASONE SODIUM PHOSPHATE 4 MG: 4 INJECTION, SOLUTION INTRA-ARTICULAR; INTRALESIONAL; INTRAMUSCULAR; INTRAVENOUS; SOFT TISSUE at 10:37

## 2025-05-19 RX ADMIN — ROCURONIUM BROMIDE 50 MG: 50 INJECTION, SOLUTION INTRAVENOUS at 10:27

## 2025-05-19 RX ADMIN — LIDOCAINE HYDROCHLORIDE 100 MG: 20 INJECTION, SOLUTION INFILTRATION; PERINEURAL at 10:27

## 2025-05-19 RX ADMIN — MIDAZOLAM 2 MG: 1 INJECTION INTRAMUSCULAR; INTRAVENOUS at 10:23

## 2025-05-19 RX ADMIN — SODIUM CHLORIDE, SODIUM LACTATE, POTASSIUM CHLORIDE, AND CALCIUM CHLORIDE: .6; .31; .03; .02 INJECTION, SOLUTION INTRAVENOUS at 08:39

## 2025-05-19 RX ADMIN — SUGAMMADEX 200 MG: 100 INJECTION, SOLUTION INTRAVENOUS at 11:30

## 2025-05-19 ASSESSMENT — ACTIVITIES OF DAILY LIVING (ADL)
ADLS_ACUITY_SCORE: 41
ADLS_ACUITY_SCORE: 44
ADLS_ACUITY_SCORE: 41
ADLS_ACUITY_SCORE: 44

## 2025-05-19 ASSESSMENT — ENCOUNTER SYMPTOMS: DYSRHYTHMIAS: 1

## 2025-05-19 NOTE — PROGRESS NOTES
Care Suites Post Procedure Note    Patient Information  Name: Matthew Still  Age: 54 year old    Post Procedure  Time patient returned to Care Suites: 1300  Concerns/abnormal assessment: n/a, R groin site CDI, CMS intact, neuro's intact ex base;one R side slight weakness   If abnormal assessment, provider notified: N/A  Plan/Other: per orders     Mildred Muñiz RN

## 2025-05-19 NOTE — PROGRESS NOTES
Care Suites Discharge Nursing Note    Patient Information  Name: Matthew Still  Age: 54 year old    Discharge Education:  Discharge instructions reviewed: Yes  Additional education/resources provided: Yes  Patient/patient representative verbalizes understanding: Yes  Patient discharging on new medications: Yes, Amiodarone  Medication education completed: Yes    Discharge Plans:   Discharge location: home  Discharge ride contacted: Yes  Approximate discharge time: 1440    Discharge Criteria:  Discharge criteria met and vital signs stable: Yes    Patient Belongs:  Patient belongings returned to patient: Yes    Mey Ashley RN

## 2025-05-19 NOTE — ANESTHESIA POSTPROCEDURE EVALUATION
Patient: Matthew Still    Procedure: Procedure(s):  Ablation Atrial Fibrilation       Anesthesia Type:  General    Note:  Disposition: Outpatient   Postop Pain Control: Uneventful            Sign Out: Well controlled pain   PONV: No   Neuro/Psych: Uneventful            Sign Out: Acceptable/Baseline neuro status   Airway/Respiratory: Uneventful            Sign Out: Acceptable/Baseline resp. status   CV/Hemodynamics: Uneventful            Sign Out: Acceptable CV status; No obvious hypovolemia; No obvious fluid overload   Other NRE:    DID A NON-ROUTINE EVENT OCCUR?        Last vitals:  Vitals Value Taken Time   /78 05/19/25 12:45   Temp 36  C (96.8  F) 05/19/25 12:45   Pulse 50 05/19/25 12:49   Resp 14 05/19/25 12:49   SpO2 98 % 05/19/25 12:49   Vitals shown include unfiled device data.    Electronically Signed By: Rachel Harrison MD  May 19, 2025  1:16 PM

## 2025-05-19 NOTE — DISCHARGE INSTRUCTIONS
A Fib Ablation Discharge Instructions    After you go home:  Have an adult stay with you until tomorrow.  You may eat your normal diet, unless your doctor tells you otherwise.  RELAX and take it easy for 3 days.        For 24-48 hours (due to the sedation you received):  DO NOT DRIVE FOR 2 DAYS!   Do NOT make any important or legal decisions.  Do NOT drive or operate machines at home or at work.  Do NOT drink alcohol.    Care of Puncture Site:  Check the puncture site severy 1-2 hours while awake.  For 2-3 days, when you cough, sneeze, laugh or move your bowels, hold your hand over the puncture sites and press firmly.  Please remove Dressing after 24 hours, works best to take off in shower.  Then apply a band aid daily for at least 3 days (if needed). If there is minor oozing, apply another band aid and remove it after 12 hours.   It is normal to have a bruising or a small lump that is present under the skin . This will go away on its own after 3-4 weeks.   You may shower. Do NOT take a bath, or use a hot tub or pool until groin site heals, which may take up to a week.  Do NOT scrub the site. Do NOT use anything like, lotion, alcohol or powder near/on the puncture site.    Activity:  NO bending, stooping over or squatting  for 3 days  Do NOT lift, push or pull more than 10 pounds (equal to a gallon of milk) for 3 days.  NO repetitive motions such as loading , vacuuming, raking, shoveling,   Limit going up and down the stairs repetitively, for the first 24 hours after procedure.     Bleeding:  If you start bleeding from the groin site, lie down flat and press firmly on the site for 10 minutes or until bleeding stops.   Once bleeding stops, lay flat for 1-2 hours.  Call your A Fib nurse if bleeding does not stop or after hours will need to go to ER.       Go to ER or Call 911 right away if you have heavy bleeding or bleeding that does not stop.    Medications:  Take your medications, including blood  thinners, unless your doctor tells you not to.  If you have stopped any other medicines, check with your nurse or provider about when to restart them.  If you have PAIN or a TIGHTNESS in your chest, you MAY take Tylenol (acetaminophen) and if this does not help, you MAY take Advil (ibuprofen-400 mg with food).  If you have constipation or prone to constipation,  take a fiber supplement, ie metamucil or stool softners.    Call the A Fib RN if:  Chest pain not relieved by acetaminophen or ibuprofen  Difficulty swallowing and/or coughing up blood  Shortness of breath  Increased groin pain or a large or growing hard lump around the site.  Groin site is red, swollen, hot or tender.  Blood or fluid is draining from the groin site.  You have chills or a fever greater than 101 F (38 C).  Your leg feels numb, cool or changes color.  If groin pain is not relieved by Tylenol or Ibuprofen.  Recurrent irregular or fast heart rate lasting over 2-3 hours.  Any questions or concerns.    Heart rhythms:  You may have some irregular heartbeats. These feel very strong. They may make you feel that the A Fib is going to start again.  Give it time. The irregular beats should occur less often.    Follow Up Appointments:  6/11/2025 with ELMIRA Mcgarry at 11:35am in [place]  8/26/2025 ambulatory EKG at 11:00am at [facility]  8/26/2025 Virtual Visit with Dr Tang at 3:30pm     [facility]   A Fib clinic RN's Mary Dangelo Kathy, Madalyn  [phone] (Mon-Fri, 8:00-4:30)   [phone] Option 2 (7 days a week) after hours for on call Cardiologist.

## 2025-05-19 NOTE — ANESTHESIA CARE TRANSFER NOTE
Patient: Mattehw Still    Procedure: Procedure(s):  Ablation Atrial Fibrilation       Diagnosis: afib  Diagnosis Additional Information: No value filed.    Anesthesia Type:   General     Note:    Oropharynx: oropharynx clear of all foreign objects  Level of Consciousness: drowsy  Oxygen Supplementation: face mask  Level of Supplemental Oxygen (L/min / FiO2): 6  Independent Airway: airway patency satisfactory and stable  Dentition: dentition unchanged  Vital Signs Stable: post-procedure vital signs reviewed and stable  Report to RN Given: handoff report given  Patient transferred to: PACU    Handoff Report: Identifed the Patient, Identified the Reponsible Provider, Reviewed the pertinent medical history, Discussed the surgical course, Reviewed Intra-OP anesthesia mangement and issues during anesthesia, Set expectations for post-procedure period and Allowed opportunity for questions and acknowledgement of understanding      Vitals:  Vitals Value Taken Time   /76 05/19/25 11:45   Temp     Pulse 51 05/19/25 11:45   Resp 15 05/19/25 11:45   SpO2 99 % 05/19/25 11:45   Vitals shown include unfiled device data.    Electronically Signed By: HORTENSIA Lopez CRNA  May 19, 2025  11:46 AM

## 2025-05-19 NOTE — ANESTHESIA PREPROCEDURE EVALUATION
Anesthesia Pre-Procedure Evaluation    Patient: Matthew Still   MRN: [id number] : 1970          Procedure : Procedure(s):  Ablation Atrial Fibrilation         Past Medical History:   Diagnosis Date    Atrial fibrillation (H)     Coronary artery disease     Heart disease     Hyperlipidemia     Hypertension       Past Surgical History:   Procedure Laterality Date    ANESTHESIA CARDIOVERSION N/A 2020    Procedure: CARDIOVERSION (FOLLOWING ÁNGELA AT 0930);  Surgeon: GENERIC ANESTHESIA PROVIDER;  Location:  OR    ANESTHESIA CARDIOVERSION N/A 2022    Procedure: CARDIOVERSION;  Surgeon: GENERIC ANESTHESIA PROVIDER;  Location:  OR    ANESTHESIA CARDIOVERSION N/A 2024    Procedure: Anesthesia cardioversion;  Surgeon: GENERIC ANESTHESIA PROVIDER;  Location:  OR    CV CORONARY ANGIOGRAM N/A 2020    Procedure: Coronary Angiogram;  Surgeon: Daniel Miranda MD;  Location:  [facility]    CV FRACTIONAL FLOW RATIO WIRE N/A 2020    Procedure: Fractional Flow [place];  Surgeon: Daniel Miranda MD;  Location:  [facility]      No Known Allergies   Social History     Tobacco Use    Smoking status: Never    Smokeless tobacco: Former   Substance Use Topics    Alcohol use: Not Currently     Comment: 1 drink per month      Wt Readings from Last 1 Encounters:   25 113.4 kg (250 lb)        Anesthesia Evaluation   Pt has had prior anesthetic.     No history of anesthetic complications       ROS/MED HX  ENT/Pulmonary:    (-) sleep apnea   Neurologic:    (-) no CVA   Cardiovascular:     (+) Dyslipidemia hypertension- -  CAD -  - -                        dysrhythmias, a-fib,             METS/Exercise Tolerance:     Hematologic:       Musculoskeletal:       GI/Hepatic:    (-) GERD   Renal/Genitourinary:       Endo:    (-) Type II DM   Psychiatric/Substance Use:       Infectious Disease:       Malignancy:       Other:              Physical Exam  Airway  Mallampati:  II  TM distance: >3 FB  Neck ROM: full  Upper bite lip test: II    Cardiovascular Rate: normal rate     Dental   (+) Modest Abnormalities - crowns, retainers, 1 or 2 missing teeth      Pulmonary Breath sounds clear to auscultation        Neurological   He appears awake, alert and oriented x3.    Other Findings       OUTSIDE LABS:  CBC:   Lab Results   Component Value Date    WBC 10.0 05/19/2025    WBC 6.0 11/26/2021    HGB 16.6 05/19/2025    HGB 15.1 11/26/2021    HCT 47.7 05/19/2025    HCT 46.0 11/26/2021     05/19/2025     (L) 11/26/2021     BMP:   Lab Results   Component Value Date     05/19/2025     08/16/2024    POTASSIUM 4.7 05/19/2025    POTASSIUM 4.4 08/22/2024    CHLORIDE 108 (H) 05/19/2025    CHLORIDE 111 (H) 08/16/2024    CO2 23 05/19/2025    CO2 23 08/16/2024    BUN 11.1 05/19/2025    BUN 15.5 08/16/2024    CR 1.22 (H) 05/19/2025    CR 1.07 08/16/2024     (H) 05/19/2025     (H) 08/16/2024     COAGS:   Lab Results   Component Value Date    PTT 27 02/06/2020    INR 1.40 (H) 08/22/2024     POC:   Lab Results   Component Value Date     (H) 02/05/2018     HEPATIC:   Lab Results   Component Value Date    ALBUMIN 3.8 11/14/2017    PROTTOTAL 7.4 11/14/2017    ALT 16 04/04/2025    AST 18 11/14/2017    ALKPHOS 67 11/14/2017    BILITOTAL 0.2 11/14/2017     OTHER:   Lab Results   Component Value Date    A1C 5.3 11/15/2017    BARRON 9.2 05/19/2025    MAG 2.0 08/22/2024       Anesthesia Plan    ASA Status:  3      NPO Status: NPO Appropriate   Anesthesia Type: General.  Airway: oral.  Induction: intravenous.   Techniques and Equipment:       - Monitoring Plan: standard ASA monitoring     Consents    Anesthesia Plan(s) and associated risks, benefits, and realistic alternatives discussed. Questions answered and patient/representative(s) expressed understanding.     - Discussed:     - Discussed with:  Patient               Postoperative Care         Comments:                    "Rachel Harrison MD    I have reviewed the pertinent notes and labs in the chart from the past 30 days and (re)examined the patient.  Any updates or changes from those notes are reflected in this note.    Clinically Significant Risk Factors Present on Admission          # Hyperchloremia: Highest Cl = 108 mmol/L in last 2 days, will monitor as appropriate           # Drug Induced Coagulation Defect: home medication list includes an anticoagulant medication    # Hypertension: Noted on problem list           # Obesity: Estimated body mass index is 34.87 kg/m  as calculated from the following:    Height as of this encounter: 1.803 m (5' 11\").    Weight as of this encounter: 113.4 kg (250 lb).        # Pacemaker present             "

## 2025-05-19 NOTE — PROGRESS NOTES
Care Suites Admission Nursing Note    Patient Information  Name: Matthew Still  Age: 54 year old  Reason for admission: A.fib ablation   Care Suites arrival time: ~0800    Visitor Information  Name: Mery-spouse      Patient Admission/Assessment   Pre-procedure assessment complete: Yes  If abnormal assessment/labs, provider notified: N/A  NPO: Yes  Medications held per instructions/orders: Yes  Consent: deferred  Patient oriented to room: Yes  Education/questions answered: Yes  Plan/other: Procedure ~1030    Discharge Planning  Discharge name/phone number: [phone]  Overnight post sedation caregiver: Spouse   Discharge location: home    Mey Ashley RN

## 2025-05-19 NOTE — PROGRESS NOTES
Re-isolation of PVs and isolation of the posterior LA  Accesses closed with devices  Amio 200 mg daily for 30 days  Resume all pta meds today.

## 2025-05-19 NOTE — ANESTHESIA PROCEDURE NOTES
Airway       Patient location during procedure: OR       Procedure Start/Stop Times: 5/19/2025 10:28 AM  Staff -        CRNA: Jacobo Cantu APRN CRNA       Performed By: CRNA  Consent for Airway        Urgency: elective  Indications and Patient Condition       Indications for airway management: rosey-procedural       Induction type:intravenous       Mask difficulty assessment: 2 - vent by mask + OA or adjuvant +/- NMBA    Final Airway Details       Final airway type: endotracheal airway       Successful airway: ETT - single  Endotracheal Airway Details        ETT size (mm): 8.0       Cuffed: yes       Successful intubation technique: video laryngoscopy       VL Blade Size: Mckenna 4       Grade View of Cords: 1       Adjucts: stylet       Bite block used: None    Post intubation assessment        Placement verified by: capnometry, equal breath sounds and chest rise        Number of attempts at approach: 1       Secured with: tape       Ease of procedure: easy       Dentition: Intact and Unchanged    Medication(s) Administered   Medication Administration Time: 5/19/2025 10:28 AM

## 2025-05-19 NOTE — LETTER
[facility] SUITES  [address] [place] [address]  Phone: [phone]    May 19, 2025        Matthew Still  [id number] [facility] [address]          To whom it may concern:    RE: Matthew Still    Patient may return to work 5/22/25 with the following:  Light duty-unable to lift more than 50 pounds for a week.    Please contact me for questions or concerns.      Sincerely,      Vik Tang MD

## 2025-05-19 NOTE — Clinical Note
Arrhythmia Type: atrial fibrillation.   Method of Cardioversion: synchronous.   The arrhythmia was terminated.   Energy shock delivered: 200 joules.   Time shock delivered: 11:04 CDT.   Post cardioversion rhythm: V-paced.   No early return to atrial fibrillation (ERAF). No immediate return to atrial fibrillation (IRAF).

## 2025-05-19 NOTE — H&P
"  Electrophysiology Clinic Progress Note    Matthew Still MRN# [id number]   YOB: 1970 Age: [unfilled]     Primary cardiologist:          Assessment and Plan   Delightful 53 yo pt with apical HCM and symptomatic pAF, s/p PVI in 2/2018. ICD was implanted later that year due to syncope and nsvt noted on monitor. No ventricular arrhythmias but has had a several cardioversions since ablation with the last one recently. When I saw him last Oct we discussed chronic nature of AF isrrael since he has had for the past 20+ years despite ablation as pt can develop new triggers. Discussed the option of AAD vs. Redo ablation. Favor the latter. However, due to change in his insurance pt was unable to schedule it until now.       No classic symptoms with AF, just sob. Given young age rhythm control with ablation is a prudent course of action. Discussed potential risks including but not limited to vascular injury, cva and cardiac tamponade. No need for ct, yolanda nor holding oac.          The longitudinal plan of care of the diagnosis(es)/condition (s) as documented were addressed during this visit. Due to the added complexity in care, I will continue to support the patient in the subsequent management and with ongoing continuity of care.       Review of Systems     12-pt ROS is negative except for as noted in the HPI.          Physical Exam     Vitals: /84 (BP Location: Left arm)   Pulse 65   Temp 97.5  F (36.4  C) (Oral)   Resp 16   Ht 1.803 m (5' 11\")   Wt 113.4 kg (250 lb)   SpO2 98%   BMI 34.87 kg/m    Wt Readings from Last 10 Encounters:   05/19/25 113.4 kg (250 lb)   04/04/25 113.4 kg (250 lb)   10/02/24 111.6 kg (246 lb)   09/05/24 111.3 kg (245 lb 4.8 oz)   08/22/24 112.2 kg (247 lb 4.8 oz)   08/16/24 113.9 kg (251 lb)   03/05/24 112.9 kg (249 lb)   09/27/23 112.7 kg (248 lb 6.4 oz)   09/20/22 112.9 kg (249 lb)   09/01/22 113.3 kg (249 lb 12.8 oz)       Constitutional:  Patient is pleasant, " alert, cooperative, and in NAD.  HEENT:  NCAT. PERRLA. EOM's intact.   Neck:  CVP appears normal. No carotid bruits.   Pulmonary: Normal respiratory effort. CTAB.   Cardiac: RRR, normal S1/S2, no S3/S4, no murmur or rub.   Abdomen:  Non-tender abdomen, no hepatosplenomegaly appreciated.   Vascular: Pulses in the upper and lower extremities are 2+ and equal bilaterally.  Extremities: No edema, erythema, cyanosis or tenderness appreciated.  Skin:  No rashes or lesions appreciated.   Neurological:  No gross motor or sensory deficits.   Psych: Appropriate affect.          Data   Labs reviewed:  Recent Labs   Lab Test 04/04/25  1123 12/11/23  1006 10/06/23  0959 10/13/21  1225 10/13/21  1225 10/19/20  1554 02/06/20  0845 01/27/20  0806   LDL 58 54 81   < > 69  --    < >  --    HDL 35* 49 35*   < > 39*  --    < >  --    NHDL 73 65 97   < > 103  --    < >  --    CHOL 108 114 132   < > 142  --    < >  --    TRIG 76 54 79   < > 170*  --    < >  --    NTBNP  --   --   --   --  1,357* 1,024*  --  1,558*    < > = values in this interval not displayed.       Lab Results   Component Value Date    WBC 10.0 05/19/2025    WBC 8.5 02/06/2020    RBC 5.31 05/19/2025    RBC 4.83 02/06/2020    HGB 16.6 05/19/2025    HGB 14.8 02/06/2020    HCT 47.7 05/19/2025    HCT 43.5 02/06/2020    MCV 90 05/19/2025    MCV 90 02/06/2020    MCH 31.3 05/19/2025    MCH 30.6 02/06/2020    MCHC 34.8 05/19/2025    MCHC 34.0 02/06/2020    RDW 12.5 05/19/2025    RDW 12.4 02/06/2020     05/19/2025     02/06/2020       Lab Results   Component Value Date     05/19/2025     10/19/2020    POTASSIUM 4.7 05/19/2025    POTASSIUM 4.3 09/20/2022    POTASSIUM 4.4 11/02/2020    CHLORIDE 108 (H) 05/19/2025    CHLORIDE 107 11/26/2021    CHLORIDE 110 (H) 10/19/2020    CO2 23 05/19/2025    CO2 27 11/26/2021    CO2 28 10/19/2020    ANIONGAP 13 05/19/2025    ANIONGAP 6 11/26/2021    ANIONGAP 4 10/19/2020     (H) 05/19/2025     (H)  11/26/2021     (H) 10/19/2020    BUN 11.1 05/19/2025    BUN 14 11/26/2021    BUN 16 10/19/2020    CR 1.22 (H) 05/19/2025    CR 1.04 10/19/2020    GFRESTIMATED 70 05/19/2025    GFRESTIMATED 82 10/19/2020    GFRESTBLACK >90 10/19/2020    BARRON 9.2 05/19/2025    BARRON 8.8 10/19/2020      Lab Results   Component Value Date    AST 18 11/14/2017    ALT 16 04/04/2025    ALT 47 02/06/2020       Lab Results   Component Value Date    A1C 5.3 11/15/2017       Lab Results   Component Value Date    INR 1.40 (H) 08/22/2024    INR 1.15 (H) 11/02/2020    INR 0.98 02/06/2020            Problem List     Patient Active Problem List   Diagnosis    Atrial fibrillation (H)    Benign essential hypertension    Coronary artery disease involving native coronary artery of native heart, unspecified whether angina present    NSVT (nonsustained ventricular tachycardia) (H)    Apical variant hypertrophic cardiomyopathy (H)    Hyperlipidemia LDL goal <70    ABREU (dyspnea on exertion)    Status post coronary angiogram    Pre-syncope            Medications     No current outpatient medications on file.            Past Medical History     Past Medical History:   Diagnosis Date    Atrial fibrillation (H)     Coronary artery disease     Heart disease     Hyperlipidemia     Hypertension      Past Surgical History:   Procedure Laterality Date    ANESTHESIA CARDIOVERSION N/A 11/2/2020    Procedure: CARDIOVERSION (FOLLOWING ÁNGELA AT 0930);  Surgeon: GENERIC ANESTHESIA PROVIDER;  Location:  OR    ANESTHESIA CARDIOVERSION N/A 9/20/2022    Procedure: CARDIOVERSION;  Surgeon: GENERIC ANESTHESIA PROVIDER;  Location:  OR    ANESTHESIA CARDIOVERSION N/A 8/22/2024    Procedure: Anesthesia cardioversion;  Surgeon: GENERIC ANESTHESIA PROVIDER;  Location:  OR    CV CORONARY ANGIOGRAM N/A 2/6/2020    Procedure: Coronary Angiogram;  Surgeon: Daniel Miranda MD;  Location:  [facility]    CV FRACTIONAL FLOW RATIO WIRE N/A 2/6/2020    Procedure:  Fractional Flow [place];  Surgeon: Daniel Miranda MD;  Location:  [facility] C[facility] LAB     Family History   Problem Relation Age of Onset    Coronary Artery Disease Mother         a fib, ,MI    Heart Failure Mother         CHF    Other Cancer Father     Diabetes Father     Coronary Artery Disease Paternal Grandmother     Coronary Artery Disease Paternal Grandfather      Social History     Socioeconomic History    Marital status:      Spouse name: Not on file    Number of children: Not on file    Years of education: Not on file    Highest education level: Not on file   Occupational History    Not on file   Tobacco Use    Smoking status: Never    Smokeless tobacco: Former   Substance and Sexual Activity    Alcohol use: Not Currently     Comment: 1 drink per month    Drug use: No    Sexual activity: Yes   Other Topics Concern    Parent/sibling w/ CABG, MI or angioplasty before 65F 55M? Yes     Comment: Mother MI age 45   Social History Narrative    Not on file     Social Drivers of Health     Financial Resource Strain: Not on file   Food Insecurity: Not on file   Transportation Needs: Not on file   Physical Activity: Not on file   Stress: Not on file   Social Connections: Not on file   Interpersonal Safety: Unknown (5/19/2025)    Interpersonal Safety     Do you feel physically and emotionally safe where you currently live?: Patient unable to answer     Within the past 12 months, have you been hit, slapped, kicked or otherwise physically hurt by someone?: Patient unable to answer     Within the past 12 months, have you been humiliated or emotionally abused in other ways by your partner or ex-partner?: Patient unable to answer   Housing Stability: Not on file            Allergies   Patient has no known allergies.    Today's clinic visit entailed:  The following tests were independently interpreted by me as noted in my documentation:    30 minutes spent by me on the date of the encounter doing chart  review   Provider  Link to MDM Help Grid     The level of medical decision making during this visit was of moderate complexity.

## 2025-05-20 ENCOUNTER — TELEPHONE (OUTPATIENT)
Dept: CARDIOLOGY | Facility: CLINIC | Age: 55
End: 2025-05-20
Payer: COMMERCIAL

## 2025-05-20 NOTE — TELEPHONE ENCOUNTER
Spoke to patient in follow up to afib ablation.  Reports chest tightness with inspiration.  He has not taken anything for this.  Suggested trying ibuprofen to help with the inflammation process.  Recommended that he follow recommended dosing as outlined in discharge instructions.  Denies SOB or lightheadedness.  Dressing intact on groin site.  Instructed patient to remove today and monitor site.  If no drainage leave open to air.  Reviewed lifting restrictions with patient.  Reports sore throat.  Discussed with patient this is from intubation tube and recommended that he suck on ice chips or hard candy.  Also warm salt water rinses is also soothing.  Patient eating, drinking and going to the bathroom with no difficulties.   Patient started amiodarone 200mg po every day today.  He is to take this for 30 days.  Reviewed discharge instructions and follow up appointment with patient.  Patient had not further questions or concerns.  ANN Peter

## 2025-05-29 ENCOUNTER — ANCILLARY PROCEDURE (OUTPATIENT)
Dept: CARDIOLOGY | Facility: CLINIC | Age: 55
End: 2025-05-29
Attending: INTERNAL MEDICINE
Payer: COMMERCIAL

## 2025-05-29 DIAGNOSIS — Z95.810 ICD (IMPLANTABLE CARDIOVERTER-DEFIBRILLATOR) IN PLACE: ICD-10-CM

## 2025-05-29 DIAGNOSIS — I42.9 CARDIOMYOPATHY (H): ICD-10-CM

## 2025-05-29 PROCEDURE — 93295 DEV INTERROG REMOTE 1/2/MLT: CPT | Performed by: INTERNAL MEDICINE

## 2025-05-29 PROCEDURE — 93296 REM INTERROG EVL PM/IDS: CPT | Performed by: INTERNAL MEDICINE

## 2025-06-05 LAB
MDC_IDC_EPISODE_DTM: NORMAL
MDC_IDC_EPISODE_ID: 357
MDC_IDC_EPISODE_TYPE: NORMAL
MDC_IDC_EPISODE_TYPE_INDUCED: NO
MDC_IDC_EPISODE_VENDOR_TYPE: NORMAL
MDC_IDC_LEAD_CONNECTION_STATUS: NORMAL
MDC_IDC_LEAD_IMPLANT_DT: NORMAL
MDC_IDC_LEAD_LOCATION: NORMAL
MDC_IDC_LEAD_LOCATION_DETAIL_1: NORMAL
MDC_IDC_LEAD_MFG: NORMAL
MDC_IDC_LEAD_MODEL: NORMAL
MDC_IDC_LEAD_SERIAL: NORMAL
MDC_IDC_MSMT_BATTERY_DTM: NORMAL
MDC_IDC_MSMT_BATTERY_REMAINING_PERCENTAGE: 48 %
MDC_IDC_MSMT_BATTERY_RRT_TRIGGER: NORMAL
MDC_IDC_MSMT_BATTERY_STATUS: NORMAL
MDC_IDC_MSMT_BATTERY_VOLTAGE: 3.09 V
MDC_IDC_MSMT_CAP_CHARGE_DTM: NORMAL
MDC_IDC_MSMT_CAP_CHARGE_ENERGY: 40 J
MDC_IDC_MSMT_CAP_CHARGE_TIME: 10.1 S
MDC_IDC_MSMT_CAP_CHARGE_TYPE: NORMAL
MDC_IDC_MSMT_LEADCHNL_RA_LEAD_CHANNEL_STATUS: NORMAL
MDC_IDC_MSMT_LEADCHNL_RV_IMPEDANCE_VALUE: 452 OHM
MDC_IDC_MSMT_LEADCHNL_RV_LEAD_CHANNEL_STATUS: NORMAL
MDC_IDC_PG_IMPLANT_DTM: NORMAL
MDC_IDC_PG_MFG: NORMAL
MDC_IDC_PG_MODEL: NORMAL
MDC_IDC_PG_SERIAL: NORMAL
MDC_IDC_PG_TYPE: NORMAL
MDC_IDC_SESS_CLINIC_NAME: NORMAL
MDC_IDC_SESS_DTM: NORMAL
MDC_IDC_SESS_REPROGRAMMED: NO
MDC_IDC_SESS_TYPE: NORMAL
MDC_IDC_SET_BRADY_LOWRATE: 40 {BEATS}/MIN
MDC_IDC_SET_BRADY_MODE: NORMAL
MDC_IDC_SET_LEADCHNL_RA_SENSING_ANODE_ELECTRODE_1: NORMAL
MDC_IDC_SET_LEADCHNL_RA_SENSING_ANODE_LOCATION_1: NORMAL
MDC_IDC_SET_LEADCHNL_RA_SENSING_CATHODE_ELECTRODE_1: NORMAL
MDC_IDC_SET_LEADCHNL_RA_SENSING_CATHODE_LOCATION_1: NORMAL
MDC_IDC_SET_LEADCHNL_RA_SENSING_POLARITY: NORMAL
MDC_IDC_SET_LEADCHNL_RA_SENSING_SENSITIVITY: 0.3 MV
MDC_IDC_SET_LEADCHNL_RV_PACING_AMPLITUDE: 2 V
MDC_IDC_SET_LEADCHNL_RV_PACING_ANODE_ELECTRODE_1: NORMAL
MDC_IDC_SET_LEADCHNL_RV_PACING_ANODE_LOCATION_1: NORMAL
MDC_IDC_SET_LEADCHNL_RV_PACING_CAPTURE_MODE: NORMAL
MDC_IDC_SET_LEADCHNL_RV_PACING_CATHODE_ELECTRODE_1: NORMAL
MDC_IDC_SET_LEADCHNL_RV_PACING_CATHODE_LOCATION_1: NORMAL
MDC_IDC_SET_LEADCHNL_RV_PACING_POLARITY: NORMAL
MDC_IDC_SET_LEADCHNL_RV_PACING_PULSEWIDTH: 0.4 MS
MDC_IDC_SET_LEADCHNL_RV_SENSING_ADAPTATION_MODE: NORMAL
MDC_IDC_SET_LEADCHNL_RV_SENSING_ANODE_ELECTRODE_1: NORMAL
MDC_IDC_SET_LEADCHNL_RV_SENSING_ANODE_LOCATION_1: NORMAL
MDC_IDC_SET_LEADCHNL_RV_SENSING_CATHODE_ELECTRODE_1: NORMAL
MDC_IDC_SET_LEADCHNL_RV_SENSING_CATHODE_LOCATION_1: NORMAL
MDC_IDC_SET_LEADCHNL_RV_SENSING_POLARITY: NORMAL
MDC_IDC_SET_LEADCHNL_RV_SENSING_SENSITIVITY: 0.8 MV
MDC_IDC_SET_ZONE_DETECTION_BEATS_DENOMINATOR: 40 {BEATS}
MDC_IDC_SET_ZONE_DETECTION_BEATS_NUMERATOR: 30 {BEATS}
MDC_IDC_SET_ZONE_DETECTION_INTERVAL: 250 MS
MDC_IDC_SET_ZONE_DETECTION_INTERVAL: 300 MS
MDC_IDC_SET_ZONE_STATUS: NORMAL
MDC_IDC_SET_ZONE_STATUS: NORMAL
MDC_IDC_SET_ZONE_TYPE: NORMAL
MDC_IDC_SET_ZONE_TYPE: NORMAL
MDC_IDC_SET_ZONE_VENDOR_TYPE: NORMAL
MDC_IDC_STAT_AT_BURDEN_PERCENT: 0 %
MDC_IDC_STAT_AT_DTM_END: NORMAL
MDC_IDC_STAT_AT_DTM_START: NORMAL
MDC_IDC_STAT_BRADY_AS_VP_PERCENT: 0 %
MDC_IDC_STAT_BRADY_AS_VS_PERCENT: 100 %
MDC_IDC_STAT_BRADY_DTM_END: NORMAL
MDC_IDC_STAT_BRADY_DTM_START: NORMAL
MDC_IDC_STAT_BRADY_RV_PERCENT_PACED: 0 %
MDC_IDC_STAT_EPISODE_RECENT_COUNT: 0
MDC_IDC_STAT_EPISODE_RECENT_COUNT_DTM_END: NORMAL
MDC_IDC_STAT_EPISODE_RECENT_COUNT_DTM_START: NORMAL
MDC_IDC_STAT_EPISODE_TOTAL_COUNT: 0
MDC_IDC_STAT_EPISODE_TOTAL_COUNT: 175
MDC_IDC_STAT_EPISODE_TOTAL_COUNT_DTM_END: NORMAL
MDC_IDC_STAT_EPISODE_TOTAL_COUNT_DTM_START: NORMAL
MDC_IDC_STAT_EPISODE_TYPE: NORMAL
MDC_IDC_STAT_EPISODE_VENDOR_TYPE: NORMAL
MDC_IDC_STAT_HEART_RATE_ATRIAL_MEAN: 55 {BEATS}/MIN
MDC_IDC_STAT_HEART_RATE_DTM_END: NORMAL
MDC_IDC_STAT_HEART_RATE_DTM_START: NORMAL
MDC_IDC_STAT_HEART_RATE_VENTRICULAR_MEAN: 55 {BEATS}/MIN
MDC_IDC_STAT_TACHYTHERAPY_ATP_DELIVERED_RECENT: 0
MDC_IDC_STAT_TACHYTHERAPY_ATP_DELIVERED_TOTAL: 0
MDC_IDC_STAT_TACHYTHERAPY_RECENT_DTM_END: NORMAL
MDC_IDC_STAT_TACHYTHERAPY_RECENT_DTM_START: NORMAL
MDC_IDC_STAT_TACHYTHERAPY_SHOCKS_ABORTED_RECENT: 0
MDC_IDC_STAT_TACHYTHERAPY_SHOCKS_ABORTED_TOTAL: 0
MDC_IDC_STAT_TACHYTHERAPY_SHOCKS_DELIVERED_RECENT: 0
MDC_IDC_STAT_TACHYTHERAPY_SHOCKS_DELIVERED_TOTAL: 0
MDC_IDC_STAT_TACHYTHERAPY_TOTAL_DTM_END: NORMAL
MDC_IDC_STAT_TACHYTHERAPY_TOTAL_DTM_START: NORMAL

## 2025-06-10 NOTE — PROGRESS NOTES
"[facility]    I had the pleasure of seeing Matthew when he came for follow up of AFib.  This 54 year old sees Dr. Tang and Dr. Harrison for his history of:    Apical HCM - s/p single chamber Biotronik ICD placed 2018 d/t syncope and monitors with NSVT  Persistent AFib  - s/p AFib ablation (PVI, SVC) 2/2018. Has required multiple DCCVs since. S/p redo ablation (PVI, LA posterior wall) with PFA 5/18/2025  CAD - Cath 2020 with moderate narrowing      Last Visit & Interval History:  Magalis last saw Matthew 4/2025 at which time they reviewed his persistent AFib with CVR based on device interrogations. He'd had symptoms of CP which he thought was related to AFib.  Met with Dr. Tang again (virtually) 4/8/2024 and recommended redo ablation now that insurance change had occurred.     Therefore, on 5/18 Matthew underwent Wide Area PV re-isolation and LA posterior wall ablation with PFA  started on amiodarone x 1 month. During follow-up phone call noted HR was quite low in 40-50s despite him decreasing the Metoprolol XL dose to 100 mg daily on his own. On 6/6, Dr. Tang reduced his Metoprolol XL to 50 mg daily and increased his Norvasc to 5 mg BID.    Today's Visit:  Matthew is doing well post ablation! No issues with prolonged arrhythmias and just occasional \"fluttering.\" He's tolerating amiodarone without issus and has 1 more week of this therapy.    No groin site issues. No bruising, bleeding. No fever/chills. No trouble swallowing after 24h.      Denies edema, orthopnea, PND. No c/o CP.    BPs have been higher since Dr. Tang reduced Metoprolol  mg daily to 50 mg daily     VITALS:  Vitals: BP (!) 145/88   Pulse 62   Resp 16   Ht 1.803 m (5' 11\")   Wt 112.9 kg (248 lb 14.4 oz)   SpO2 99%   BMI 34.71 kg/m      Diagnostic Testing:  EKG today, which I overread, showed SR 62 bpm with IVCD. Known apical HCM  Device interrogation 6/4/2025, showed 0% in SB 50s. No mode switches since 5/19 (ablation " 5/18). 9 runs of NSVT lasting up to 8s. No therapies.   Echocardiogram 4/2025 - EF 60-65%. Apical hypertrophy.  Severe inferior lateral wall hypertrophy. Maximum dimension of the inferolateral segment is 2.3 cm. These findings are  consistent with hypertrophic cardiomyopathy. Normal RV size. Mild LAE. Trace MR, 1-+TR with RPS 20+RAP (3). Normal aorta.  Angiogram 2020 - moderate narrowing in proximal D1/RI with normal FFR. LM, Cx and dominant RA and rest of LAD wtihout significant narrowing and just mild atheromatous changes.     Plan:   Stop amiodarone 200 mg daily in 1 week after 30 day course  2.     Call us/send [employer] message ~2 weeks after stopping amiodarone with update on BP and HR. If HR improves as expected off of amiodarone, will increase BB therapy for BP, as previous doses were controlling his BP along with amlodipine.   3.     See Dr. Tang with EKG and device (virtual) 8/2025 as planned  4.     See Dr. Harrison/General Cardiology 10/2025 as previously ordered    Assessment/Plan:    Symptomatic recurrent AFib   As above, s/p PVI 2/2018, with multiple DCCVs following.   S/p redo ablation (PVI, LA posterior wall) with PFA 5/18/2025). Device interrogation last week showed no recurrent mode switching since 5/19.  On amiodarone 200 mg daily x 30 days (stops 6/18).     EKG today shows SR    On AC with Eliquis 5 mg BID. Dose appropriate for age/weight/Cr. [id number] 1 (apical CM). Last Hgb 5/19/2025 was 16.6g/dL    PLAN:  Stop amiodarone after 30 days (1 more week)  Continue to monitor for recurrent AFib   See Dr. Tang 8/26 (virtual) with updated EKG and device check    Apical HCM  Has single chamber Biotronik ICD placed 2018 d/t syncope and h/o NSVT  Device checks have continued to show short runs of NSVT. No therapies delivered    PLAN:  Continue routine follow-up with Dr. Harrison/General Cardiology (had been ordered for 10/2025)  See Dr. Tang 8/26 (virtual) with updated EKG and device check    HTN  Had  been well controlled on amlodipine 5 and Metoprolol  mg daily  Metoprolol XL was reduced to 50 mg daily after had significant bradycardia after starting amiodarone while on Metoprolol .  Amlodipine increased to 5 mg BID 6/6    PLAN:  Given HR will likely climb after amiodarone stopped/washed out, may be able to increase Metoprolol XL back to at least 100 mg daily for better BP control before adding another agent.   He'll contact us ~4th of July with update on HRs to see if we can increase Metoprolol XL back for BP      Ami Rodrigues PA-C, MSPAS      Orders Placed This Encounter   Procedures    EKG 12-lead complete w/read - Clinics (performed today)     No orders of the defined types were placed in this encounter.    There are no discontinued medications.      Encounter Diagnoses   Name Primary?    Paroxysmal atrial fibrillation (H) Yes    NSVT (nonsustained ventricular tachycardia) (H)     Status post ablation of atrial fibrillation        CURRENT MEDICATIONS:  Current Outpatient Medications   Medication Sig Dispense Refill    amiodarone (PACERONE) 200 MG tablet Take 1 tablet (200 mg) by mouth daily. 30 tablet 0    amLODIPine (NORVASC) 5 MG tablet Take 1 tablet (5 mg) by mouth 2 times daily. 90 tablet 3    apixaban ANTICOAGULANT (ELIQUIS ANTICOAGULANT) 5 MG tablet Take 1 tablet (5 mg) by mouth 2 times daily. 180 tablet 3    Ascorbic Acid (VITAMIN C PO) Take by mouth daily Takes most days      atorvastatin (LIPITOR) 80 MG tablet Take 1 tablet (80 mg) by mouth daily. 90 tablet 3    ezetimibe (ZETIA) 10 MG tablet Take 1 tablet (10 mg) by mouth daily. 90 tablet 1    metoprolol succinate ER (TOPROL XL) 50 MG 24 hr tablet Take 1 tablet (50 mg) by mouth daily. 90 tablet 3       ALLERGIES   No Known Allergies      Review of Systems:  Skin:        Eyes:       ENT:       Respiratory:  Negative for shortness of breath, dyspnea on exertion  Cardiovascular:    Positive for, lower extremity symptoms, edema, fatigue,  "lightheadedness, dizziness  Gastroenterology:      Genitourinary:       Musculoskeletal:       Neurologic:       Psychiatric:       Heme/Lymph/Imm:       Endocrine:         Physical Exam:  Vitals: BP (!) 145/88   Pulse 62   Resp 16   Ht 1.803 m (5' 11\")   Wt 112.9 kg (248 lb 14.4 oz)   SpO2 99%   BMI 34.71 kg/m      Constitutional:  cooperative, alert and oriented, well developed, well nourished, in no acute distress obese      Skin:  warm and dry to the touch        Head:  normocephalic        Eyes:           ENT:  no pallor or cyanosis        Neck:  no stiffness        Chest:  clear to auscultation, normal symmetry        Cardiac: regular rhythm, normal S1 and S2   distant heart sounds no presence of murmur            Abdomen:  abdomen soft obese      Vascular: pulses full and equal                               right femoral bruit (-) R groin site ecchymosis    Extremities and Back:  no edema        Neurological:  no gross motor deficits, affect appropriate            PAST MEDICAL HISTORY:  Past Medical History:   Diagnosis Date    Atrial fibrillation (H)     Coronary artery disease     Heart disease     Hyperlipidemia     Hypertension        PAST SURGICAL HISTORY:  Past Surgical History:   Procedure Laterality Date    ANESTHESIA CARDIOVERSION N/A 11/2/2020    Procedure: CARDIOVERSION (FOLLOWING ÁNGELA AT 0930);  Surgeon: GENERIC ANESTHESIA PROVIDER;  Location:  OR    ANESTHESIA CARDIOVERSION N/A 9/20/2022    Procedure: CARDIOVERSION;  Surgeon: GENERIC ANESTHESIA PROVIDER;  Location:  OR    ANESTHESIA CARDIOVERSION N/A 8/22/2024    Procedure: Anesthesia cardioversion;  Surgeon: GENERIC ANESTHESIA PROVIDER;  Location:  OR    CV CORONARY ANGIOGRAM N/A 2/6/2020    Procedure: Coronary Angiogram;  Surgeon: Daniel Miranda MD;  Location: [facility] CARDIAC CATH LAB    CV FRACTIONAL FLOW RATIO WIRE N/A 2/6/2020    Procedure: Fractional Flow [place];  Surgeon: Daniel Miranda MD;  Location: [facility] " CARDIAC CATH LAB    EP ABLATION FOCAL AFIB N/A 5/19/2025    Procedure: Ablation Atrial Fibrilation;  Surgeon: Vik Tang MD;  Location:  HEART CARDIAC CATH LAB       FAMILY HISTORY:  Family History   Problem Relation Age of Onset    Coronary Artery Disease Mother         a fib, ,MI    Heart Failure Mother         CHF    Other Cancer Father     Diabetes Father     Coronary Artery Disease Paternal Grandmother     Coronary Artery Disease Paternal Grandfather        SOCIAL HISTORY:  Social History     Socioeconomic History    Marital status:      Spouse name: None    Number of children: None    Years of education: None    Highest education level: None   Tobacco Use    Smoking status: Never    Smokeless tobacco: Former   Substance and Sexual Activity    Alcohol use: Not Currently     Comment: 1 drink per month    Drug use: No    Sexual activity: Yes   Other Topics Concern    Parent/sibling w/ CABG, MI or angioplasty before 65F 55M? Yes     Comment: Mother MI age 45     Social Drivers of Health     Interpersonal Safety: Unknown (5/19/2025)    Interpersonal Safety     Do you feel physically and emotionally safe where you currently live?: Patient unable to answer     Within the past 12 months, have you been hit, slapped, kicked or otherwise physically hurt by someone?: Patient unable to answer     Within the past 12 months, have you been humiliated or emotionally abused in other ways by your partner or ex-partner?: Patient unable to answer

## 2025-06-11 ENCOUNTER — RESULTS FOLLOW-UP (OUTPATIENT)
Dept: CARDIOLOGY | Facility: CLINIC | Age: 55
End: 2025-06-11

## 2025-06-11 ENCOUNTER — OFFICE VISIT (OUTPATIENT)
Dept: CARDIOLOGY | Facility: CLINIC | Age: 55
End: 2025-06-11
Payer: COMMERCIAL

## 2025-06-11 VITALS
OXYGEN SATURATION: 99 % | HEART RATE: 62 BPM | HEIGHT: 71 IN | BODY MASS INDEX: 34.85 KG/M2 | DIASTOLIC BLOOD PRESSURE: 88 MMHG | RESPIRATION RATE: 16 BRPM | WEIGHT: 248.9 LBS | SYSTOLIC BLOOD PRESSURE: 145 MMHG

## 2025-06-11 DIAGNOSIS — I48.0 PAROXYSMAL ATRIAL FIBRILLATION (H): Primary | ICD-10-CM

## 2025-06-11 DIAGNOSIS — Z98.890 STATUS POST ABLATION OF ATRIAL FIBRILLATION: ICD-10-CM

## 2025-06-11 DIAGNOSIS — I47.29 NSVT (NONSUSTAINED VENTRICULAR TACHYCARDIA) (H): ICD-10-CM

## 2025-06-11 DIAGNOSIS — Z86.79 STATUS POST ABLATION OF ATRIAL FIBRILLATION: ICD-10-CM

## 2025-06-11 NOTE — LETTER
"6/11/2025    [facility]  [address] [address]    RE: Matthew Still       Dear Colleague,     I had the pleasure of seeing Matthew Still in the [facility].  [facility]    I had the pleasure of seeing Matthew when he came for follow up of AFib.  This 54 year old sees Dr. Tang and Dr. Harrison for his history of:    Apical HCM - s/p single chamber Biotronik ICD placed 2018 d/t syncope and monitors with NSVT  Persistent AFib  - s/p AFib ablation (PVI, SVC) 2/2018. Has required multiple DCCVs since. S/p redo ablation (PVI, LA posterior wall) with PFA 5/18/2025  CAD - Cath 2020 with moderate narrowing      Last Visit & Interval History:  Magalis last saw Matthew 4/2025 at which time they reviewed his persistent AFib with CVR based on device interrogations. He'd had symptoms of CP which he thought was related to AFib.  Met with Dr. Tang again (virtually) 4/8/2024 and recommended redo ablation now that insurance change had occurred.     Therefore, on 5/18 Matthew underwent Wide Area PV re-isolation and LA posterior wall ablation with PFA  started on amiodarone x 1 month. During follow-up phone call noted HR was quite low in 40-50s despite him decreasing the Metoprolol XL dose to 100 mg daily on his own. On 6/6, Dr. Tang reduced his Metoprolol XL to 50 mg daily and increased his Norvasc to 5 mg BID.    Today's Visit:  Matthew is doing well post ablation! No issues with prolonged arrhythmias and just occasional \"fluttering.\" He's tolerating amiodarone without issus and has 1 more week of this therapy.    No groin site issues. No bruising, bleeding. No fever/chills. No trouble swallowing after 24h.      Denies edema, orthopnea, PND. No c/o CP.    BPs have been higher since Dr. Tang reduced Metoprolol  mg daily to 50 mg daily     VITALS:  Vitals: BP (!) 145/88   Pulse 62   Resp 16   Ht 1.803 m (5' 11\")   Wt 112.9 kg (248 lb 14.4 oz)   SpO2 99%   " BMI 34.71 kg/m      Diagnostic Testing:  EKG today, which I overread, showed SR 62 bpm with IVCD. Known apical HCM  Device interrogation 6/4/2025, showed 0% in SB 50s. No mode switches since 5/19 (ablation 5/18). 9 runs of NSVT lasting up to 8s. No therapies.   Echocardiogram 4/2025 - EF 60-65%. Apical hypertrophy.  Severe inferior lateral wall hypertrophy. Maximum dimension of the inferolateral segment is 2.3 cm. These findings are  consistent with hypertrophic cardiomyopathy. Normal RV size. Mild LAE. Trace MR, 1-+TR with RPS 20+RAP (3). Normal aorta.  Angiogram 2020 - moderate narrowing in proximal D1/RI with normal FFR. LM, Cx and dominant RA and rest of LAD wtihout significant narrowing and just mild atheromatous changes.     Plan:   Stop amiodarone 200 mg daily in 1 week after 30 day course  2.     Call us/send [employer] message ~2 weeks after stopping amiodarone with update on BP and HR. If HR improves as expected off of amiodarone, will increase BB therapy for BP, as previous doses were controlling his BP along with amlodipine.   3.     See Dr. Tang with EKG and device (virtual) 8/2025 as planned  4.     See Dr. Harrison/General Cardiology 10/2025 as previously ordered    Assessment/Plan:    Symptomatic recurrent AFib   As above, s/p PVI 2/2018, with multiple DCCVs following.   S/p redo ablation (PVI, LA posterior wall) with PFA 5/18/2025). Device interrogation last week showed no recurrent mode switching since 5/19.  On amiodarone 200 mg daily x 30 days (stops 6/18).     EKG today shows SR    On AC with Eliquis 5 mg BID. Dose appropriate for age/weight/Cr. [id number] 1 (apical CM). Last Hgb 5/19/2025 was 16.6g/dL    PLAN:  Stop amiodarone after 30 days (1 more week)  Continue to monitor for recurrent AFib   See Dr. Tang 8/26 (virtual) with updated EKG and device check    Apical HCM  Has single chamber Biotronik ICD placed 2018 d/t syncope and h/o NSVT  Device checks have continued to show short runs of  NSVT. No therapies delivered    PLAN:  Continue routine follow-up with Dr. Harrison/General Cardiology (had been ordered for 10/2025)  See Dr. Tang 8/26 (virtual) with updated EKG and device check    HTN  Had been well controlled on amlodipine 5 and Metoprolol  mg daily  Metoprolol XL was reduced to 50 mg daily after had significant bradycardia after starting amiodarone while on Metoprolol .  Amlodipine increased to 5 mg BID 6/6    PLAN:  Given HR will likely climb after amiodarone stopped/washed out, may be able to increase Metoprolol XL back to at least 100 mg daily for better BP control before adding another agent.   He'll contact us ~4th of July with update on HRs to see if we can increase Metoprolol XL back for BP      Ami Rodrigues PA-C, MSPAS      Orders Placed This Encounter   Procedures     EKG 12-lead complete w/read - Clinics (performed today)     No orders of the defined types were placed in this encounter.    There are no discontinued medications.      Encounter Diagnoses   Name Primary?     Paroxysmal atrial fibrillation (H) Yes     NSVT (nonsustained ventricular tachycardia) (H)      Status post ablation of atrial fibrillation        CURRENT MEDICATIONS:  Current Outpatient Medications   Medication Sig Dispense Refill     amiodarone (PACERONE) 200 MG tablet Take 1 tablet (200 mg) by mouth daily. 30 tablet 0     amLODIPine (NORVASC) 5 MG tablet Take 1 tablet (5 mg) by mouth 2 times daily. 90 tablet 3     apixaban ANTICOAGULANT (ELIQUIS ANTICOAGULANT) 5 MG tablet Take 1 tablet (5 mg) by mouth 2 times daily. 180 tablet 3     Ascorbic Acid (VITAMIN C PO) Take by mouth daily Takes most days       atorvastatin (LIPITOR) 80 MG tablet Take 1 tablet (80 mg) by mouth daily. 90 tablet 3     ezetimibe (ZETIA) 10 MG tablet Take 1 tablet (10 mg) by mouth daily. 90 tablet 1     metoprolol succinate ER (TOPROL XL) 50 MG 24 hr tablet Take 1 tablet (50 mg) by mouth daily. 90 tablet 3       ALLERGIES   No  "Known Allergies      Review of Systems:  Skin:        Eyes:       ENT:       Respiratory:  Negative for shortness of breath, dyspnea on exertion  Cardiovascular:    Positive for, lower extremity symptoms, edema, fatigue, lightheadedness, dizziness  Gastroenterology:      Genitourinary:       Musculoskeletal:       Neurologic:       Psychiatric:       Heme/Lymph/Imm:       Endocrine:         Physical Exam:  Vitals: BP (!) 145/88   Pulse 62   Resp 16   Ht 1.803 m (5' 11\")   Wt 112.9 kg (248 lb 14.4 oz)   SpO2 99%   BMI 34.71 kg/m      Constitutional:  cooperative, alert and oriented, well developed, well nourished, in no acute distress obese      Skin:  warm and dry to the touch        Head:  normocephalic        Eyes:           ENT:  no pallor or cyanosis        Neck:  no stiffness        Chest:  clear to auscultation, normal symmetry        Cardiac: regular rhythm, normal S1 and S2   distant heart sounds no presence of murmur            Abdomen:  abdomen soft obese      Vascular: pulses full and equal                               right femoral bruit (-) R groin site ecchymosis    Extremities and Back:  no edema        Neurological:  no gross motor deficits, affect appropriate            PAST MEDICAL HISTORY:  Past Medical History:   Diagnosis Date     Atrial fibrillation (H)      Coronary artery disease      Heart disease      Hyperlipidemia      Hypertension        PAST SURGICAL HISTORY:  Past Surgical History:   Procedure Laterality Date     ANESTHESIA CARDIOVERSION N/A 11/2/2020    Procedure: CARDIOVERSION (FOLLOWING ÁNGELA AT 0930);  Surgeon: GENERIC ANESTHESIA PROVIDER;  Location:  OR     ANESTHESIA CARDIOVERSION N/A 9/20/2022    Procedure: CARDIOVERSION;  Surgeon: GENERIC ANESTHESIA PROVIDER;  Location:  OR     ANESTHESIA CARDIOVERSION N/A 8/22/2024    Procedure: Anesthesia cardioversion;  Surgeon: GENERIC ANESTHESIA PROVIDER;  Location:  OR     CV CORONARY ANGIOGRAM N/A 2/6/2020    Procedure: " Coronary Angiogram;  Surgeon: Daniel Miranda MD;  Location:  HEART CARDIAC CATH LAB     CV FRACTIONAL FLOW RATIO WIRE N/A 2/6/2020    Procedure: Fractional Flow [place];  Surgeon: Daniel Miranda MD;  Location: [facility] CARDIAC CATH LAB     EP ABLATION FOCAL AFIB N/A 5/19/2025    Procedure: Ablation Atrial Fibrilation;  Surgeon: Vik Tang MD;  Location:  HEART CARDIAC CATH LAB       FAMILY HISTORY:  Family History   Problem Relation Age of Onset     Coronary Artery Disease Mother         a fib, ,MI     Heart Failure Mother         CHF     Other Cancer Father      Diabetes Father      Coronary Artery Disease Paternal Grandmother      Coronary Artery Disease Paternal Grandfather        SOCIAL HISTORY:  Social History     Socioeconomic History     Marital status:      Spouse name: None     Number of children: None     Years of education: None     Highest education level: None   Tobacco Use     Smoking status: Never     Smokeless tobacco: Former   Substance and Sexual Activity     Alcohol use: Not Currently     Comment: 1 drink per month     Drug use: No     Sexual activity: Yes   Other Topics Concern     Parent/sibling w/ CABG, MI or angioplasty before 65F 55M? Yes     Comment: Mother MI age 45     Social Drivers of Health     Interpersonal Safety: Unknown (5/19/2025)    Interpersonal Safety      Do you feel physically and emotionally safe where you currently live?: Patient unable to answer      Within the past 12 months, have you been hit, slapped, kicked or otherwise physically hurt by someone?: Patient unable to answer      Within the past 12 months, have you been humiliated or emotionally abused in other ways by your partner or ex-partner?: Patient unable to answer             Thank you for allowing me to participate in the care of your patient.      Sincerely,     Marge Rodrigues PA-C     [facility]  cc:   Referred  Self, MD  No address on file

## 2025-06-11 NOTE — PATIENT INSTRUCTIONS
Matthew - it was nice to see you today!     At your visit today we reviewed:    EKG today showed normal rhythm - great news!  BP a bit too high after reducing Metoprolol XL      Medication Changes:    Agreed NONE for now ... I think we'll be able to increase your Metoprolol XL back after you stop amiodarone   Stop amiodarone 1 week as planned (30 days from ablation)    Recommendations:    Keep checking HR/BP. BP will stay high but HR will increase after you stop amiodarone  If it increases as expected, will increase the Metoprolol XL back to old dose as it was previously doing a good job for BP and HR!    Call [phone] with update ~7/4/2025 ... If HR has come up will increase Metoprolol XL back to old dose    Follow-up:    See Dr. Tang for cardiology follow up in 8/2025 and see Dr. Harrison 10/2025 but CALL Cardiology nurses Kristin & Yary @ [phone] for any issues, questions or concerns in the interim.      To schedule a future appointment, we kindly ask that you call cardiology scheduling at [phone] three months prior to requested visit date.    Important Phone Numbers for [facility]):    [place] [facility] Kristin Pringle: [phone]

## 2025-08-26 ENCOUNTER — HOSPITAL ENCOUNTER (OUTPATIENT)
Dept: CARDIOLOGY | Facility: CLINIC | Age: 55
Discharge: HOME | End: 2025-08-26
Payer: COMMERCIAL

## 2025-08-26 ENCOUNTER — HOSPITAL ENCOUNTER (OUTPATIENT)
Dept: CARDIOLOGY | Facility: CLINIC | Age: 55
Discharge: HOME | End: 2025-08-26
Attending: INTERNAL MEDICINE
Payer: COMMERCIAL

## 2025-08-26 ENCOUNTER — VIRTUAL VISIT (OUTPATIENT)
Dept: CARDIOLOGY | Facility: CLINIC | Age: 55
End: 2025-08-26
Payer: COMMERCIAL

## 2025-08-26 VITALS
OXYGEN SATURATION: 98 % | HEIGHT: 71 IN | DIASTOLIC BLOOD PRESSURE: 78 MMHG | SYSTOLIC BLOOD PRESSURE: 135 MMHG | RESPIRATION RATE: 16 BRPM | WEIGHT: 248.1 LBS | HEART RATE: 73 BPM | BODY MASS INDEX: 34.73 KG/M2

## 2025-08-26 DIAGNOSIS — Z95.810 ICD (IMPLANTABLE CARDIOVERTER-DEFIBRILLATOR) IN PLACE: ICD-10-CM

## 2025-08-26 DIAGNOSIS — I48.0 PAROXYSMAL ATRIAL FIBRILLATION (H): Primary | ICD-10-CM

## 2025-08-26 DIAGNOSIS — I48.0 PAROXYSMAL ATRIAL FIBRILLATION (H): ICD-10-CM

## 2025-08-26 DIAGNOSIS — Z98.890 STATUS POST ABLATION OF ATRIAL FIBRILLATION: ICD-10-CM

## 2025-08-26 DIAGNOSIS — I42.9 CARDIOMYOPATHY (H): Primary | ICD-10-CM

## 2025-08-26 DIAGNOSIS — Z86.79 STATUS POST ABLATION OF ATRIAL FIBRILLATION: ICD-10-CM

## 2025-08-26 PROCEDURE — 93282 PRGRMG EVAL IMPLANTABLE DFB: CPT

## 2025-08-26 RX ORDER — AMIODARONE HYDROCHLORIDE 200 MG/1
TABLET ORAL
Qty: 90 TABLET | Refills: 3 | Status: SHIPPED | OUTPATIENT
Start: 2025-08-26

## 2025-08-27 ENCOUNTER — TELEPHONE (OUTPATIENT)
Dept: CARDIOLOGY | Facility: CLINIC | Age: 55
End: 2025-08-27
Payer: COMMERCIAL

## 2025-08-27 LAB
MDC_IDC_EPISODE_DTM: NORMAL
MDC_IDC_EPISODE_ID: 361
MDC_IDC_EPISODE_ID: 364
MDC_IDC_EPISODE_ID: 366
MDC_IDC_EPISODE_ID: 367
MDC_IDC_EPISODE_ID: 368
MDC_IDC_EPISODE_ID: 369
MDC_IDC_EPISODE_ID: 370
MDC_IDC_EPISODE_ID: 372
MDC_IDC_EPISODE_ID: 373
MDC_IDC_EPISODE_TYPE: NORMAL
MDC_IDC_EPISODE_VENDOR_TYPE: NORMAL
MDC_IDC_LEAD_CONNECTION_STATUS: NORMAL
MDC_IDC_LEAD_IMPLANT_DT: NORMAL
MDC_IDC_LEAD_LOCATION: NORMAL
MDC_IDC_LEAD_LOCATION_DETAIL_1: NORMAL
MDC_IDC_LEAD_MFG: NORMAL
MDC_IDC_LEAD_MODEL: NORMAL
MDC_IDC_LEAD_SERIAL: NORMAL
MDC_IDC_MSMT_BATTERY_DTM: NORMAL
MDC_IDC_MSMT_BATTERY_REMAINING_PERCENTAGE: 45 %
MDC_IDC_MSMT_BATTERY_STATUS: NORMAL
MDC_IDC_MSMT_BATTERY_VOLTAGE: 3.09 V
MDC_IDC_MSMT_CAP_CHARGE_DTM: NORMAL
MDC_IDC_MSMT_CAP_CHARGE_ENERGY: 40 J
MDC_IDC_MSMT_CAP_CHARGE_TIME: 10 S
MDC_IDC_MSMT_CAP_CHARGE_TYPE: NORMAL
MDC_IDC_MSMT_LEADCHNL_RV_IMPEDANCE_VALUE: 466 OHM
MDC_IDC_MSMT_LEADCHNL_RV_PACING_THRESHOLD_AMPLITUDE: 0.6 V
MDC_IDC_MSMT_LEADCHNL_RV_PACING_THRESHOLD_PULSEWIDTH: 0.4 MS
MDC_IDC_PG_IMPLANT_DTM: NORMAL
MDC_IDC_PG_MFG: NORMAL
MDC_IDC_PG_MODEL: NORMAL
MDC_IDC_PG_SERIAL: NORMAL
MDC_IDC_PG_TYPE: NORMAL
MDC_IDC_SESS_CLINIC_NAME: NORMAL
MDC_IDC_SESS_DTM: NORMAL
MDC_IDC_SESS_TYPE: NORMAL
MDC_IDC_SET_BRADY_LOWRATE: 40 {BEATS}/MIN
MDC_IDC_SET_BRADY_MODE: NORMAL
MDC_IDC_SET_LEADCHNL_RA_PACING_POLARITY: NORMAL
MDC_IDC_SET_LEADCHNL_RA_SENSING_POLARITY: NORMAL
MDC_IDC_SET_LEADCHNL_RA_SENSING_SENSITIVITY: 0.3 MV
MDC_IDC_SET_LEADCHNL_RV_PACING_AMPLITUDE: 2 V
MDC_IDC_SET_LEADCHNL_RV_PACING_CAPTURE_MODE: NORMAL
MDC_IDC_SET_LEADCHNL_RV_PACING_POLARITY: NORMAL
MDC_IDC_SET_LEADCHNL_RV_PACING_PULSEWIDTH: 0.4 MS
MDC_IDC_SET_LEADCHNL_RV_SENSING_POLARITY: NORMAL
MDC_IDC_SET_LEADCHNL_RV_SENSING_SENSITIVITY: 0.8 MV
MDC_IDC_SET_ZONE_DETECTION_BEATS_NUMERATOR: 30 {BEATS}
MDC_IDC_SET_ZONE_DETECTION_BEATS_NUMERATOR: 32 {BEATS}
MDC_IDC_SET_ZONE_DETECTION_INTERVAL: 250 MS
MDC_IDC_SET_ZONE_DETECTION_INTERVAL: 300 MS
MDC_IDC_SET_ZONE_DETECTION_INTERVAL: 400 MS
MDC_IDC_SET_ZONE_STATUS: NORMAL
MDC_IDC_SET_ZONE_TYPE: NORMAL
MDC_IDC_SET_ZONE_VENDOR_TYPE: NORMAL
MDC_IDC_STAT_AT_BURDEN_PERCENT: 36 %
MDC_IDC_STAT_AT_DTM_END: NORMAL
MDC_IDC_STAT_AT_DTM_START: NORMAL
MDC_IDC_STAT_AT_MODE_SW_COUNT: 0
MDC_IDC_STAT_BRADY_AS_VP_PERCENT: 1 %
MDC_IDC_STAT_BRADY_AS_VS_PERCENT: 99 %
MDC_IDC_STAT_BRADY_DTM_END: NORMAL
MDC_IDC_STAT_BRADY_DTM_START: NORMAL
MDC_IDC_STAT_BRADY_RV_PERCENT_PACED: 1 %
MDC_IDC_STAT_EPISODE_RECENT_COUNT: 0
MDC_IDC_STAT_EPISODE_RECENT_COUNT_DTM_END: NORMAL
MDC_IDC_STAT_EPISODE_RECENT_COUNT_DTM_START: NORMAL
MDC_IDC_STAT_EPISODE_TOTAL_COUNT: 0
MDC_IDC_STAT_EPISODE_TOTAL_COUNT: 1
MDC_IDC_STAT_EPISODE_TOTAL_COUNT: 183
MDC_IDC_STAT_EPISODE_TOTAL_COUNT_DTM_END: NORMAL
MDC_IDC_STAT_EPISODE_TOTAL_COUNT_DTM_START: NORMAL
MDC_IDC_STAT_EPISODE_TYPE: NORMAL
MDC_IDC_STAT_EPISODE_VENDOR_TYPE: NORMAL
MDC_IDC_STAT_TACHYTHERAPY_ATP_DELIVERED_RECENT: 0
MDC_IDC_STAT_TACHYTHERAPY_ATP_DELIVERED_TOTAL: 0
MDC_IDC_STAT_TACHYTHERAPY_SHOCKS_ABORTED_TOTAL: 0
MDC_IDC_STAT_TACHYTHERAPY_SHOCKS_DELIVERED_RECENT: 0
MDC_IDC_STAT_TACHYTHERAPY_SHOCKS_DELIVERED_TOTAL: 0

## (undated) DEVICE — GUIDE WIRE SHEATH VERSACROSS D1 CURVE 93CM L180 VXAK0041

## (undated) DEVICE — SLEEVE TR BAND RADIAL COMPRESSION DEVICE 24CM TRB24-REG

## (undated) DEVICE — CLOSURE DEVICE 6FR VASC PROGLIDE MEDICATED SUTURE 12673-03

## (undated) DEVICE — WIRE GUIDE 0.035"X260CM SAFE-T-J EXCHANGE G00517

## (undated) DEVICE — KIT LG BORE TOUHY ACCESS PLUS MAP152

## (undated) DEVICE — CATH NAV PENTARAY F CURVE

## (undated) DEVICE — DEFIB PRO-PADZ LVP LQD GEL ADULT 8900-2105-01

## (undated) DEVICE — DEVICE CLOSURE VASCADE PERCUTANEOUS 800-612C-10U

## (undated) DEVICE — TOTE ANGIO CORP PC15AT SAN32CC83O

## (undated) DEVICE — 185CM VERRATA PLUS PRESSURE GUIDEWIRE

## (undated) DEVICE — CATH SOUNDSTAR 8FRX90CM 10439011

## (undated) DEVICE — INTRO GLIDESHEATH SLENDER 6FR 10X45CM 60-1060

## (undated) DEVICE — SHEATH INTRO FARADRIVE L74 CM ID13 FR STEERABLE M004PF21M402

## (undated) DEVICE — PATCH CARTO 3 EXTERNAL REFERENCE 3D MAPPING CREFP6

## (undated) DEVICE — CATH JACKY 5FR 3.5 CURVE 40-5023

## (undated) DEVICE — INTRODUCER SHEATH FAST-CATH 9FRX12CM 406116

## (undated) DEVICE — MANIFOLD KIT ANGIO AUTOMATED 014613

## (undated) DEVICE — CATH LAUNCHER 6FR LA6EBU375

## (undated) DEVICE — Device

## (undated) DEVICE — KIT HAND CONTROL ANGIOTOUCH ACIST 65CM AT-P65

## (undated) DEVICE — CABLE CATH FARASTAR CONNECTION STERILE LF DISP M004PF41M434

## (undated) DEVICE — PACK EP SRG PROC LF DISP SAN32EPFSR

## (undated) DEVICE — CATH ABLATION FARAWAVE 115X180CM OD31 MM OTW 20 M004PF41M401

## (undated) RX ORDER — HEPARIN SODIUM 1000 [USP'U]/ML
INJECTION, SOLUTION INTRAVENOUS; SUBCUTANEOUS
Status: DISPENSED
Start: 2017-11-16

## (undated) RX ORDER — VERAPAMIL HYDROCHLORIDE 2.5 MG/ML
INJECTION, SOLUTION INTRAVENOUS
Status: DISPENSED
Start: 2017-11-16

## (undated) RX ORDER — LIDOCAINE HYDROCHLORIDE 10 MG/ML
INJECTION, SOLUTION EPIDURAL; INFILTRATION; INTRACAUDAL; PERINEURAL
Status: DISPENSED
Start: 2018-11-14

## (undated) RX ORDER — PROTAMINE SULFATE 10 MG/ML
INJECTION, SOLUTION INTRAVENOUS
Status: DISPENSED
Start: 2018-02-05

## (undated) RX ORDER — FUROSEMIDE 10 MG/ML
INJECTION INTRAMUSCULAR; INTRAVENOUS
Status: DISPENSED
Start: 2018-02-05

## (undated) RX ORDER — HEPARIN SODIUM 200 [USP'U]/100ML
INJECTION, SOLUTION INTRAVENOUS
Status: DISPENSED
Start: 2025-05-19

## (undated) RX ORDER — GLYCOPYRROLATE 0.2 MG/ML
INJECTION, SOLUTION INTRAMUSCULAR; INTRAVENOUS
Status: DISPENSED
Start: 2020-11-02

## (undated) RX ORDER — REGADENOSON 0.08 MG/ML
INJECTION, SOLUTION INTRAVENOUS
Status: DISPENSED
Start: 2021-11-01

## (undated) RX ORDER — LIDOCAINE HYDROCHLORIDE 10 MG/ML
INJECTION, SOLUTION EPIDURAL; INFILTRATION; INTRACAUDAL; PERINEURAL
Status: DISPENSED
Start: 2018-02-05

## (undated) RX ORDER — LIDOCAINE HYDROCHLORIDE 10 MG/ML
INJECTION, SOLUTION EPIDURAL; INFILTRATION; INTRACAUDAL; PERINEURAL
Status: DISPENSED
Start: 2025-05-19

## (undated) RX ORDER — FENTANYL CITRATE 50 UG/ML
INJECTION, SOLUTION INTRAMUSCULAR; INTRAVENOUS
Status: DISPENSED
Start: 2017-11-16

## (undated) RX ORDER — HEPARIN SODIUM 1000 [USP'U]/ML
INJECTION, SOLUTION INTRAVENOUS; SUBCUTANEOUS
Status: DISPENSED
Start: 2018-02-05

## (undated) RX ORDER — FENTANYL CITRATE 50 UG/ML
INJECTION, SOLUTION INTRAMUSCULAR; INTRAVENOUS
Status: DISPENSED
Start: 2018-02-05

## (undated) RX ORDER — FENTANYL CITRATE 50 UG/ML
INJECTION, SOLUTION INTRAMUSCULAR; INTRAVENOUS
Status: DISPENSED
Start: 2020-02-06

## (undated) RX ORDER — FLUMAZENIL 0.1 MG/ML
INJECTION, SOLUTION INTRAVENOUS
Status: DISPENSED
Start: 2018-02-05

## (undated) RX ORDER — BUPIVACAINE HYDROCHLORIDE 2.5 MG/ML
INJECTION, SOLUTION EPIDURAL; INFILTRATION; INTRACAUDAL
Status: DISPENSED
Start: 2018-11-14

## (undated) RX ORDER — FENTANYL CITRATE 50 UG/ML
INJECTION, SOLUTION INTRAMUSCULAR; INTRAVENOUS
Status: DISPENSED
Start: 2017-12-13

## (undated) RX ORDER — NALOXONE HYDROCHLORIDE 0.4 MG/ML
INJECTION, SOLUTION INTRAMUSCULAR; INTRAVENOUS; SUBCUTANEOUS
Status: DISPENSED
Start: 2017-12-13

## (undated) RX ORDER — HEPARIN SODIUM 1000 [USP'U]/ML
INJECTION, SOLUTION INTRAVENOUS; SUBCUTANEOUS
Status: DISPENSED
Start: 2020-02-06

## (undated) RX ORDER — GLYCOPYRROLATE 0.2 MG/ML
INJECTION, SOLUTION INTRAMUSCULAR; INTRAVENOUS
Status: DISPENSED
Start: 2017-12-13

## (undated) RX ORDER — GLYCOPYRROLATE 0.2 MG/ML
INJECTION, SOLUTION INTRAMUSCULAR; INTRAVENOUS
Status: DISPENSED
Start: 2018-02-05

## (undated) RX ORDER — PROTAMINE SULFATE 10 MG/ML
INJECTION, SOLUTION INTRAVENOUS
Status: DISPENSED
Start: 2025-05-19

## (undated) RX ORDER — FENTANYL CITRATE 50 UG/ML
INJECTION, SOLUTION INTRAMUSCULAR; INTRAVENOUS
Status: DISPENSED
Start: 2018-11-14

## (undated) RX ORDER — LIDOCAINE HYDROCHLORIDE 10 MG/ML
INJECTION, SOLUTION EPIDURAL; INFILTRATION; INTRACAUDAL; PERINEURAL
Status: DISPENSED
Start: 2020-02-06

## (undated) RX ORDER — FLUMAZENIL 0.1 MG/ML
INJECTION, SOLUTION INTRAVENOUS
Status: DISPENSED
Start: 2017-12-13

## (undated) RX ORDER — VERAPAMIL HYDROCHLORIDE 2.5 MG/ML
INJECTION, SOLUTION INTRAVENOUS
Status: DISPENSED
Start: 2020-02-06

## (undated) RX ORDER — ADENOSINE 3 MG/ML
INJECTION, SOLUTION INTRAVENOUS
Status: DISPENSED
Start: 2020-02-06

## (undated) RX ORDER — HEPARIN SODIUM 200 [USP'U]/100ML
INJECTION, SOLUTION INTRAVENOUS
Status: DISPENSED
Start: 2020-02-06

## (undated) RX ORDER — ASPIRIN 81 MG/1
TABLET ORAL
Status: DISPENSED
Start: 2020-02-06

## (undated) RX ORDER — HEPARIN SODIUM 1000 [USP'U]/ML
INJECTION, SOLUTION INTRAVENOUS; SUBCUTANEOUS
Status: DISPENSED
Start: 2025-05-19

## (undated) RX ORDER — NALOXONE HYDROCHLORIDE 0.4 MG/ML
INJECTION, SOLUTION INTRAMUSCULAR; INTRAVENOUS; SUBCUTANEOUS
Status: DISPENSED
Start: 2018-02-05